# Patient Record
Sex: MALE | Race: WHITE | Employment: UNEMPLOYED | URBAN - METROPOLITAN AREA
[De-identification: names, ages, dates, MRNs, and addresses within clinical notes are randomized per-mention and may not be internally consistent; named-entity substitution may affect disease eponyms.]

---

## 2017-09-19 ENCOUNTER — HOSPITAL ENCOUNTER (EMERGENCY)
Age: 47
Discharge: HOME | End: 2017-09-19
Attending: EMERGENCY MEDICINE
Payer: SELF-PAY

## 2017-09-19 VITALS
HEART RATE: 88 BPM | SYSTOLIC BLOOD PRESSURE: 121 MMHG | TEMPERATURE: 99.4 F | RESPIRATION RATE: 16 BRPM | WEIGHT: 205 LBS | BODY MASS INDEX: 29.35 KG/M2 | HEIGHT: 70 IN | OXYGEN SATURATION: 98 % | DIASTOLIC BLOOD PRESSURE: 87 MMHG

## 2017-09-19 DIAGNOSIS — L02.91 ABSCESS: Primary | ICD-10-CM

## 2017-09-19 PROCEDURE — 75810000289 HC I&D ABSCESS SIMP/COMP/MULT: Performed by: NURSE PRACTITIONER

## 2017-09-19 PROCEDURE — 99283 EMERGENCY DEPT VISIT LOW MDM: CPT | Performed by: NURSE PRACTITIONER

## 2017-09-19 RX ORDER — CLINDAMYCIN HYDROCHLORIDE 150 MG/1
300 CAPSULE ORAL 4 TIMES DAILY
Qty: 56 CAP | Refills: 0 | Status: SHIPPED | OUTPATIENT
Start: 2017-09-19 | End: 2017-09-26

## 2017-09-19 RX ORDER — TRAMADOL HYDROCHLORIDE 50 MG/1
50 TABLET ORAL
Qty: 24 TAB | Refills: 0 | Status: SHIPPED | OUTPATIENT
Start: 2017-09-19 | End: 2018-02-28

## 2017-09-19 NOTE — ED NOTES
I have reviewed discharge instructions with the patient. The patient verbalized understanding. Patient left ED via Discharge Method: ambulatory to Home with (insert name of self). Opportunity for questions and clarification provided. Patient given 2 scripts.

## 2017-09-19 NOTE — ED TRIAGE NOTES
Lauren Ewing is a 52 y.o. male here for abscess. Rapid assessment performed. --- Orders were not placed. --- Patient will be waiting room  Signed By: Chasity Thapa NP     September 19, 2017          Ambulatory to ed for eval of cyst on back for over one year. Got painful last week, grow twice size it was previous. abx from clinic. Then got drained and now worse. Some fever at home. Finished abx 2 days ago - septra. On exam, abscess noted approx quarter sized with mild area of redness surrounding. Will send to back for I and d when room available.

## 2017-09-19 NOTE — DISCHARGE INSTRUCTIONS

## 2017-09-19 NOTE — ED PROVIDER NOTES
HPI Comments: 51 y/o m to ed with abscess left back - already had lanced once but got worse rather than better. Completed septra two days ago. Some fever at home    Patient is a 52 y.o. male presenting with skin problem. The history is provided by the patient. No  was used. Skin Problem    This is a recurrent problem. The current episode started more than 1 week ago. The problem has been gradually worsening. Patient reports a subjective fever - was not measured. The rash is present on the back. The pain is moderate. The pain has been constant since onset. Associated symptoms include pain. Pertinent negatives include no blisters, no itching, no weeping and no hives. Past Medical History:   Diagnosis Date    GERD (gastroesophageal reflux disease)     once a week OTC med       Past Surgical History:   Procedure Laterality Date    HX HEENT      wisdom teeth         History reviewed. No pertinent family history. Social History     Social History    Marital status: SINGLE     Spouse name: N/A    Number of children: N/A    Years of education: N/A     Occupational History    Not on file. Social History Main Topics    Smoking status: Current Every Day Smoker     Packs/day: 1.00     Years: 20.00    Smokeless tobacco: Never Used    Alcohol use 4.0 oz/week     8 Shots of liquor per week    Drug use: No    Sexual activity: Not on file     Other Topics Concern    Not on file     Social History Narrative         ALLERGIES: Review of patient's allergies indicates no known allergies. Review of Systems   Constitutional: Positive for fever. Negative for chills. HENT: Negative for facial swelling and postnasal drip. Eyes: Negative for discharge and redness. Respiratory: Negative for cough and shortness of breath. Cardiovascular: Negative for chest pain and palpitations. Gastrointestinal: Negative for nausea and vomiting.    Endocrine: Negative for cold intolerance and heat intolerance. Genitourinary: Negative for difficulty urinating and dysuria. Musculoskeletal: Negative for back pain and neck pain. Skin: Positive for color change and wound. Negative for itching. Neurological: Negative for dizziness and headaches. Psychiatric/Behavioral: Negative for confusion and decreased concentration. There were no vitals filed for this visit. Physical Exam   Constitutional: He is oriented to person, place, and time. He appears well-developed and well-nourished. No distress. HENT:   Head: Normocephalic and atraumatic. Right Ear: External ear normal.   Left Ear: External ear normal.   Nose: Nose normal.   Eyes: Conjunctivae and EOM are normal. Pupils are equal, round, and reactive to light. Neck: Normal range of motion. Neck supple. Cardiovascular: Normal rate, regular rhythm and normal heart sounds. Pulmonary/Chest: Effort normal and breath sounds normal. No respiratory distress. He has no wheezes. Abdominal: Soft. Bowel sounds are normal. He exhibits no distension. There is no tenderness. Musculoskeletal: Normal range of motion. He exhibits no edema or tenderness. Neurological: He is alert and oriented to person, place, and time. No cranial nerve deficit. Coordination normal.   Skin: Skin is warm and dry. No rash noted. Psychiatric: He has a normal mood and affect. His behavior is normal. Judgment and thought content normal.   Nursing note and vitals reviewed.        MDM  Number of Diagnoses or Management Options  Abscess: new and requires workup  Diagnosis management comments: Abscess, will nicolle and home with abx    Risk of Complications, Morbidity, and/or Mortality  Presenting problems: minimal  Diagnostic procedures: minimal  Management options: minimal    Patient Progress  Patient progress: stable    ED Course       I&D Dyana Simple  Date/Time: 9/19/2017 12:46 PM  Performed by: Tara Ordonez  Authorized by: Tara Ordonez Consent:     Consent obtained:  Verbal    Consent given by:  Patient    Risks discussed:  Bleeding, incomplete drainage, pain, infection and damage to other organs    Alternatives discussed:  No treatment  Location:     Type:  Abscess    Size:  6-8 mm    Location:  Trunk    Trunk location:  Back  Pre-procedure details:     Skin preparation:  Betadine  Anesthesia (see MAR for exact dosages): Anesthesia method:  Local infiltration    Local anesthetic:  Lidocaine 1% w/o epi (3 ml)  Procedure type:     Complexity:  Simple  Procedure details:     Incision types:  Single straight    Incision depth:  Subcutaneous    Scalpel blade:  11    Wound management:  Probed and deloculated, irrigated with saline and extensive cleaning    Drainage:  Purulent    Drainage amount: Moderate    Wound treatment:  Wound left open    Packing materials:  None  Post-procedure details:     Patient tolerance of procedure:   Tolerated well, no immediate complications  Comments:      procedure completed by Randy England RN NP student with my supervision

## 2017-09-19 NOTE — ED TRIAGE NOTES
Pt states that he has an abcess to the back and 2 weeks ago since being drained and taking antibiotics it seems to be getting worse.

## 2018-02-28 ENCOUNTER — APPOINTMENT (OUTPATIENT)
Dept: CT IMAGING | Age: 48
End: 2018-02-28
Attending: EMERGENCY MEDICINE
Payer: SELF-PAY

## 2018-02-28 ENCOUNTER — HOSPITAL ENCOUNTER (EMERGENCY)
Age: 48
Discharge: HOME | End: 2018-02-28
Attending: EMERGENCY MEDICINE
Payer: SELF-PAY

## 2018-02-28 VITALS
SYSTOLIC BLOOD PRESSURE: 154 MMHG | OXYGEN SATURATION: 97 % | RESPIRATION RATE: 16 BRPM | WEIGHT: 200 LBS | TEMPERATURE: 98.6 F | DIASTOLIC BLOOD PRESSURE: 82 MMHG | BODY MASS INDEX: 28.63 KG/M2 | HEIGHT: 70 IN | HEART RATE: 74 BPM

## 2018-02-28 DIAGNOSIS — R10.9 ACUTE LEFT FLANK PAIN: Primary | ICD-10-CM

## 2018-02-28 LAB
ALBUMIN SERPL-MCNC: 3.8 G/DL (ref 3.5–5)
ALBUMIN/GLOB SERPL: 0.9 {RATIO} (ref 1.2–3.5)
ALP SERPL-CCNC: 106 U/L (ref 50–136)
ALT SERPL-CCNC: 95 U/L (ref 12–65)
ANION GAP SERPL CALC-SCNC: 10 MMOL/L (ref 7–16)
AST SERPL-CCNC: 95 U/L (ref 15–37)
BACTERIA URNS QL MICRO: 0 /HPF
BASOPHILS # BLD: 0 K/UL (ref 0–0.2)
BASOPHILS NFR BLD: 0 % (ref 0–2)
BILIRUB SERPL-MCNC: 2.3 MG/DL (ref 0.2–1.1)
BUN SERPL-MCNC: 9 MG/DL (ref 6–23)
CALCIUM SERPL-MCNC: 9 MG/DL (ref 8.3–10.4)
CASTS URNS QL MICRO: NORMAL /LPF
CHLORIDE SERPL-SCNC: 102 MMOL/L (ref 98–107)
CO2 SERPL-SCNC: 28 MMOL/L (ref 21–32)
CREAT SERPL-MCNC: 0.91 MG/DL (ref 0.8–1.5)
DIFFERENTIAL METHOD BLD: ABNORMAL
EOSINOPHIL # BLD: 0.1 K/UL (ref 0–0.8)
EOSINOPHIL NFR BLD: 1 % (ref 0.5–7.8)
EPI CELLS #/AREA URNS HPF: NORMAL /HPF
ERYTHROCYTE [DISTWIDTH] IN BLOOD BY AUTOMATED COUNT: 14.6 % (ref 11.9–14.6)
GLOBULIN SER CALC-MCNC: 4.4 G/DL (ref 2.3–3.5)
GLUCOSE SERPL-MCNC: 142 MG/DL (ref 65–100)
HCT VFR BLD AUTO: 45.2 % (ref 41.1–50.3)
HGB BLD-MCNC: 16.8 G/DL (ref 13.6–17.2)
IMM GRANULOCYTES # BLD: 0 K/UL (ref 0–0.5)
IMM GRANULOCYTES NFR BLD AUTO: 0 % (ref 0–5)
LYMPHOCYTES # BLD: 1.1 K/UL (ref 0.5–4.6)
LYMPHOCYTES NFR BLD: 21 % (ref 13–44)
MCH RBC QN AUTO: 43 PG (ref 26.1–32.9)
MCHC RBC AUTO-ENTMCNC: 37.2 G/DL (ref 31.4–35)
MCV RBC AUTO: 115.6 FL (ref 79.6–97.8)
MONOCYTES # BLD: 0.5 K/UL (ref 0.1–1.3)
MONOCYTES NFR BLD: 9 % (ref 4–12)
NEUTS SEG # BLD: 3.8 K/UL (ref 1.7–8.2)
NEUTS SEG NFR BLD: 69 % (ref 43–78)
PLATELET # BLD AUTO: 243 K/UL (ref 150–450)
PMV BLD AUTO: 10.7 FL (ref 10.8–14.1)
POTASSIUM SERPL-SCNC: 5.5 MMOL/L (ref 3.5–5.1)
PROT SERPL-MCNC: 8.2 G/DL (ref 6.3–8.2)
RBC # BLD AUTO: 3.91 M/UL (ref 4.23–5.67)
RBC #/AREA URNS HPF: NORMAL /HPF
SODIUM SERPL-SCNC: 140 MMOL/L (ref 136–145)
WBC # BLD AUTO: 5.5 K/UL (ref 4.3–11.1)
WBC URNS QL MICRO: NORMAL /HPF

## 2018-02-28 PROCEDURE — 74176 CT ABD & PELVIS W/O CONTRAST: CPT

## 2018-02-28 PROCEDURE — 81003 URINALYSIS AUTO W/O SCOPE: CPT | Performed by: EMERGENCY MEDICINE

## 2018-02-28 PROCEDURE — 80053 COMPREHEN METABOLIC PANEL: CPT | Performed by: EMERGENCY MEDICINE

## 2018-02-28 PROCEDURE — 74011250637 HC RX REV CODE- 250/637: Performed by: EMERGENCY MEDICINE

## 2018-02-28 PROCEDURE — 85025 COMPLETE CBC W/AUTO DIFF WBC: CPT | Performed by: EMERGENCY MEDICINE

## 2018-02-28 PROCEDURE — 81015 MICROSCOPIC EXAM OF URINE: CPT | Performed by: EMERGENCY MEDICINE

## 2018-02-28 PROCEDURE — 99285 EMERGENCY DEPT VISIT HI MDM: CPT | Performed by: EMERGENCY MEDICINE

## 2018-02-28 RX ORDER — OXYCODONE HYDROCHLORIDE 5 MG/1
10 TABLET ORAL
Status: COMPLETED | OUTPATIENT
Start: 2018-02-28 | End: 2018-02-28

## 2018-02-28 RX ORDER — CYCLOBENZAPRINE HCL 10 MG
10 TABLET ORAL
Qty: 15 TAB | Refills: 0 | Status: SHIPPED | OUTPATIENT
Start: 2018-02-28

## 2018-02-28 RX ORDER — TRAMADOL HYDROCHLORIDE 50 MG/1
50-100 TABLET ORAL
Qty: 16 TAB | Refills: 0 | Status: SHIPPED | OUTPATIENT
Start: 2018-02-28 | End: 2020-11-02

## 2018-02-28 RX ORDER — ONDANSETRON 4 MG/1
4 TABLET, ORALLY DISINTEGRATING ORAL
Status: COMPLETED | OUTPATIENT
Start: 2018-02-28 | End: 2018-02-28

## 2018-02-28 RX ADMIN — ONDANSETRON 4 MG: 4 TABLET, ORALLY DISINTEGRATING ORAL at 19:03

## 2018-02-28 RX ADMIN — OXYCODONE HYDROCHLORIDE 10 MG: 5 TABLET ORAL at 19:03

## 2018-02-28 NOTE — ED TRIAGE NOTES
Patient presents via EMS with complaints of left flank pain that began last night, denies urinary symptoms at present.

## 2018-03-01 NOTE — ED NOTES
I have reviewed discharge instructions with the patient. The patient verbalized understanding. Patient left ED via Discharge Method: ambulatory to Home with self. Opportunity for questions and clarification provided. Patient given 2 scripts. To continue your aftercare when you leave the hospital, you may receive an automated call from our care team to check in on how you are doing. This is a free service and part of our promise to provide the best care and service to meet your aftercare needs.  If you have questions, or wish to unsubscribe from this service please call [phone]. Thank you for Choosing our [facility].

## 2018-03-01 NOTE — DISCHARGE INSTRUCTIONS
Rest.  Heating pad or hot water bottle. Call for appointment to recheck with a primary care doctor next week. Recheck sooner  Or rash, high fever, worse pain or vomiting. Abdominal Pain: Care Instructions  Your Care Instructions    Abdominal pain has many possible causes. Some aren't serious and get better on their own in a few days. Others need more testing and treatment. If your pain continues or gets worse, you need to be rechecked and may need more tests to find out what is wrong. You may need surgery to correct the problem. Don't ignore new symptoms, such as fever, nausea and vomiting, urination problems, pain that gets worse, and dizziness. These may be signs of a more serious problem. Your doctor may have recommended a follow-up visit in the next 8 to 12 hours. If you are not getting better, you may need more tests or treatment. The doctor has checked you carefully, but problems can develop later. If you notice any problems or new symptoms, get medical treatment right away. Follow-up care is a key part of your treatment and safety. Be sure to make and go to all appointments, and call your doctor if you are having problems. It's also a good idea to know your test results and keep a list of the medicines you take. How can you care for yourself at home? · Rest until you feel better. · To prevent dehydration, drink plenty of fluids, enough so that your urine is light yellow or clear like water. Choose water and other caffeine-free clear liquids until you feel better. If you have kidney, heart, or liver disease and have to limit fluids, talk with your doctor before you increase the amount of fluids you drink. · If your stomach is upset, eat mild foods, such as rice, dry toast or crackers, bananas, and applesauce. Try eating several small meals instead of two or three large ones.   · Wait until 48 hours after all symptoms have gone away before you have spicy foods, alcohol, and drinks that contain caffeine. · Do not eat foods that are high in fat. · Avoid anti-inflammatory medicines such as aspirin, ibuprofen (Advil, Motrin), and naproxen (Aleve). These can cause stomach upset. Talk to your doctor if you take daily aspirin for another health problem. When should you call for help? Call 911 anytime you think you may need emergency care. For example, call if:  ? · You passed out (lost consciousness). ? · You pass maroon or very bloody stools. ? · You vomit blood or what looks like coffee grounds. ? · You have new, severe belly pain. ?Call your doctor now or seek immediate medical care if:  ? · Your pain gets worse, especially if it becomes focused in one area of your belly. ? · You have a new or higher fever. ? · Your stools are black and look like tar, or they have streaks of blood. ? · You have unexpected vaginal bleeding. ? · You have symptoms of a urinary tract infection. These may include:  ¨ Pain when you urinate. ¨ Urinating more often than usual.  ¨ Blood in your urine. ? · You are dizzy or lightheaded, or you feel like you may faint. ? Watch closely for changes in your health, and be sure to contact your doctor if:  ? · You are not getting better after 1 day (24 hours). Where can you learn more? Go to http://maisha-sagar.info/. Enter I356 in the search box to learn more about \"Abdominal Pain: Care Instructions. \"  Current as of: March 20, 2017  Content Version: 11.4  © 5945-3226 [employer]. Care instructions adapted under license by [employer] (which disclaims liability or warranty for this information). If you have questions about a medical condition or this instruction, always ask your healthcare professional. [place] [address] any warranty or liability for your use of this information.        Flank Pain: Care Instructions  Your Care Instructions  Flank pain is pain on the side of the back just below the rib cage and above the waist. It can be on one or both sides. Flank pain has many possible causes, including a kidney stone, a urinary tract infection, or back strain. Flank pain may get better on its own. But don't ignore new symptoms, such as fever, nausea and vomiting, urination problems, pain that gets worse, and dizziness. These may be signs of a more serious problem. You may have to have tests or other treatment. Follow-up care is a key part of your treatment and safety. Be sure to make and go to all appointments, and call your doctor if you are having problems. It's also a good idea to know your test results and keep a list of the medicines you take. How can you care for yourself at home? · Rest until you feel better. · Take pain medicines exactly as directed. ¨ If the doctor gave you a prescription medicine for pain, take it as prescribed. ¨ If you are not taking a prescription pain medicine, ask your doctor if you can take an over-the-counter pain medicine, such as acetaminophen (Tylenol), ibuprofen (Advil, Motrin), or naproxen (Aleve). Read and follow all instructions on the label. · If your doctor prescribed antibiotics, take them as directed. Do not stop taking them just because you feel better. You need to take the full course of antibiotics. · To apply heat, put a warm water bottle, a heating pad set on low, or a warm cloth on the painful area. Do not go to sleep with a heating pad on your skin. · To prevent dehydration, drink plenty of fluids, enough so that your urine is light yellow or clear like water. Choose water and other caffeine-free clear liquids until you feel better. If you have kidney, heart, or liver disease and have to limit fluids, talk with your doctor before you increase the amount of fluids you drink. When should you call for help? Call your doctor now or seek immediate medical care if:  ? · Your flank pain gets worse.    ? · You have new symptoms, such as fever, nausea, or vomiting. ? · You have symptoms of a urinary problem. For example:  ¨ You have blood or pus in your urine. ¨ You have chills or body aches. ¨ It hurts to urinate. ¨ You have groin or belly pain. ? Watch closely for changes in your health, and be sure to contact your doctor if you do not get better as expected. Where can you learn more? Go to http://maisha-sagar.info/. Enter S191 in the search box to learn more about \"Flank Pain: Care Instructions. \"  Current as of: March 20, 2017  Content Version: 11.4  © 0878-1736 [employer]. Care instructions adapted under license by [employer] (which disclaims liability or warranty for this information). If you have questions about a medical condition or this instruction, always ask your healthcare professional. [address] any warranty or liability for your use of this information.

## 2018-03-01 NOTE — ED PROVIDER NOTES
HPI Comments: 45-year-old male onset 24 hours ago of left flank pain that radiates somewhat to the left upper quadrant. No fever. No nausea vomiting  Or diarrhea. No change in urine. Pain is worse with movement. No cough or change of pain with deep breathing. No substernal chest pain. No previous history of similar pain. Has history essentially benign except for history of hemolytic anemia. Patient states he does drink alcohol daily. Patient is a 52 y.o. male presenting with flank pain. The history is provided by the patient. Flank Pain    This is a new problem. The current episode started 12 to 24 hours ago. The problem has not changed since onset. The problem occurs constantly. Patient reports not work related injury. The pain is associated with no known injury. The quality of the pain is described as aching and dull. The pain is moderate. Associated symptoms include weakness. Pertinent negatives include no chest pain, no fever, no headaches, no abdominal pain, no dysuria and no paresthesias. He has tried nothing for the symptoms. Past Medical History:   Diagnosis Date    GERD (gastroesophageal reflux disease)     once a week OTC med       Past Surgical History:   Procedure Laterality Date    HX HEENT      wisdom teeth         No family history on file. Social History     Social History    Marital status: SINGLE     Spouse name: N/A    Number of children: N/A    Years of education: N/A     Occupational History    Not on file. Social History Main Topics    Smoking status: Current Every Day Smoker     Packs/day: 1.00     Years: 20.00    Smokeless tobacco: Never Used    Alcohol use 4.0 oz/week     8 Shots of liquor per week    Drug use: No    Sexual activity: Not on file     Other Topics Concern    Not on file     Social History Narrative         ALLERGIES: Review of patient's allergies indicates no known allergies.     Review of Systems   Constitutional: Negative for chills and fever.   HENT: Negative for ear pain. Respiratory: Negative for cough and shortness of breath. Cardiovascular: Negative for chest pain and palpitations. Gastrointestinal: Negative for abdominal pain, diarrhea and vomiting. Genitourinary: Positive for flank pain. Negative for dysuria. Musculoskeletal: Negative for back pain and neck pain. Skin: Negative for color change and rash. Neurological: Positive for weakness. Negative for syncope, headaches and paresthesias. All other systems reviewed and are negative. Vitals:    02/28/18 1853 02/28/18 2149   BP: 134/87 (!) 163/92   Pulse: 74 68   Resp: 18 20   Temp: 98.6 °F (37 °C)    SpO2: 96% 97%   Weight: 90.7 kg (200 lb)    Height: 5' 10\" (1.778 m)             Physical Exam   Constitutional: He is oriented to person, place, and time. He appears well-developed and well-nourished. No distress. HENT:   Head: Normocephalic and atraumatic. Mouth/Throat: Oropharynx is clear and moist. No oropharyngeal exudate. Eyes: Conjunctivae and EOM are normal. Pupils are equal, round, and reactive to light. Neck: Normal range of motion. Neck supple. Cardiovascular: Normal rate, regular rhythm and intact distal pulses. No murmur heard. Pulmonary/Chest: Breath sounds normal. No respiratory distress. Abdominal: Soft. Bowel sounds are normal. He exhibits no mass. There is tenderness in the left upper quadrant. There is no rebound, no guarding, no CVA tenderness, no tenderness at McBurney's point and negative Stock's sign. No hernia. Neurological: He is alert and oriented to person, place, and time. Gait normal.   Nl speech   Skin: Skin is warm and dry. Psychiatric: He has a normal mood and affect. His speech is normal.   Nursing note and vitals reviewed. MDM  Number of Diagnoses or Management Options  Diagnosis management comments: Yobani Danas UTI or kidney stone or diverticulitis.   No evidence to suggest pancreatitis or appendicitis. Amount and/or Complexity of Data Reviewed  Clinical lab tests: ordered and reviewed  Tests in the radiology section of CPT®: ordered and reviewed  Independent visualization of images, tracings, or specimens: yes    Risk of Complications, Morbidity, and/or Mortality  Presenting problems: moderate  Diagnostic procedures: low  Management options: moderate    Patient Progress  Patient progress: stable        ED Course       Procedures    Results Include:    Recent Results (from the past 24 hour(s))   CBC WITH AUTOMATED DIFF    Collection Time: 02/28/18  7:00 PM   Result Value Ref Range    WBC 5.5 4.3 - 11.1 K/uL    RBC 3.91 (L) 4.23 - 5.67 M/uL    HGB 16.8 13.6 - 17.2 g/dL    HCT 45.2 41.1 - 50.3 %    .6 (H) 79.6 - 97.8 FL    MCH 43.0 (H) 26.1 - 32.9 PG    MCHC 37.2 (H) 31.4 - 35.0 g/dL    RDW 14.6 11.9 - 14.6 %    PLATELET 416 349 - 542 K/uL    MPV 10.7 (L) 10.8 - 14.1 FL    DF AUTOMATED      NEUTROPHILS 69 43 - 78 %    LYMPHOCYTES 21 13 - 44 %    MONOCYTES 9 4.0 - 12.0 %    EOSINOPHILS 1 0.5 - 7.8 %    BASOPHILS 0 0.0 - 2.0 %    IMMATURE GRANULOCYTES 0 0.0 - 5.0 %    ABS. NEUTROPHILS 3.8 1.7 - 8.2 K/UL    ABS. LYMPHOCYTES 1.1 0.5 - 4.6 K/UL    ABS. MONOCYTES 0.5 0.1 - 1.3 K/UL    ABS. EOSINOPHILS 0.1 0.0 - 0.8 K/UL    ABS. BASOPHILS 0.0 0.0 - 0.2 K/UL    ABS. IMM. GRANS. 0.0 0.0 - 0.5 K/UL   METABOLIC PANEL, COMPREHENSIVE    Collection Time: 02/28/18  7:00 PM   Result Value Ref Range    Sodium 140 136 - 145 mmol/L    Potassium 5.5 (H) 3.5 - 5.1 mmol/L    Chloride 102 98 - 107 mmol/L    CO2 28 21 - 32 mmol/L    Anion gap 10 7 - 16 mmol/L    Glucose 142 (H) 65 - 100 mg/dL    BUN 9 6 - 23 MG/DL    Creatinine 0.91 0.8 - 1.5 MG/DL    GFR est AA >60 >60 ml/min/1.73m2    GFR est non-AA >60 >60 ml/min/1.73m2    Calcium 9.0 8.3 - 10.4 MG/DL    Bilirubin, total 2.3 (H) 0.2 - 1.1 MG/DL    ALT (SGPT) 95 (H) 12 - 65 U/L    AST (SGOT) 95 (H) 15 - 37 U/L    Alk.  phosphatase 106 50 - 136 U/L    Protein, total 8.2 6.3 - 8.2 g/dL    Albumin 3.8 3.5 - 5.0 g/dL    Globulin 4.4 (H) 2.3 - 3.5 g/dL    A-G Ratio 0.9 (L) 1.2 - 3.5     URINE MICROSCOPIC    Collection Time: 02/28/18  9:58 PM   Result Value Ref Range    WBC 0-3 0 /hpf    RBC 0-3 0 /hpf    Epithelial cells 0-3 0 /hpf    Bacteria 0 0 /hpf    Casts 10-20 0 /lpf     Ct Urogram Wo Cont    Result Date: 2/28/2018  CT ABDOMEN AND PELVIS WITHOUT CONTRAST. HISTORY:  Left flank pain. COMPARISON: 12 July 2007 TECHNIQUE:  5mm axial images from above the kidneys through the bladder base using renal stone protocol. Radiation dose reduction techniques were used for this study. Our CT scanners use one or more of the following:  Automated exposure control, adjustment of the mA and or kV according to patient size, iterative reconstruction. FINDINGS:  Abdomen: No radiopaque urinary tract calculi in the right kidney, left kidney or upper ureters. No hydronephrosis or hydroureter. No acute perinephric or periureteral stranding densities. Included portion of the liver and spleen are unremarkable. Aorta normal caliber. Pelvis: No calcified stones in the lower ureters or within the urinary bladder. There are few scattered sigmoid diverticula. IMPRESSION:  Limited noncontrast exam negative for radiopaque urinary tract calculi or evidence of obstruction.      Pain appears to be more musculoskeletal and more costal based than intra-abdominal.

## 2020-10-30 ENCOUNTER — APPOINTMENT (OUTPATIENT)
Dept: GENERAL RADIOLOGY | Age: 50
DRG: 185 | End: 2020-10-30
Attending: NURSE PRACTITIONER

## 2020-10-30 ENCOUNTER — HOSPITAL ENCOUNTER (INPATIENT)
Age: 50
LOS: 3 days | Discharge: HOME | DRG: 185 | End: 2020-11-02
Attending: EMERGENCY MEDICINE | Admitting: SURGERY

## 2020-10-30 ENCOUNTER — APPOINTMENT (OUTPATIENT)
Dept: GENERAL RADIOLOGY | Age: 50
DRG: 185 | End: 2020-10-30
Attending: EMERGENCY MEDICINE

## 2020-10-30 ENCOUNTER — APPOINTMENT (OUTPATIENT)
Dept: CT IMAGING | Age: 50
DRG: 185 | End: 2020-10-30
Attending: EMERGENCY MEDICINE

## 2020-10-30 DIAGNOSIS — S22.42XA CLOSED FRACTURE OF MULTIPLE RIBS OF LEFT SIDE, INITIAL ENCOUNTER: Primary | ICD-10-CM
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
## 2020-10-30 DIAGNOSIS — J93.9 PNEUMOTHORAX ON LEFT: ICD-10-CM

## 2020-10-30 DIAGNOSIS — J94.2 HEMOTHORAX ON LEFT: ICD-10-CM
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
## 2020-10-30 LAB
ALBUMIN SERPL-MCNC: 3.2 G/DL (ref 3.5–5)
ALBUMIN/GLOB SERPL: 1 {RATIO} (ref 1.2–3.5)
ALP SERPL-CCNC: 85 U/L (ref 50–136)
ALT SERPL-CCNC: 61 U/L (ref 12–65)
ANION GAP SERPL CALC-SCNC: 6 MMOL/L (ref 7–16)
AST SERPL-CCNC: 96 U/L (ref 15–37)
BASOPHILS # BLD: 0 K/UL (ref 0–0.2)
BASOPHILS NFR BLD: 1 % (ref 0–2)
BILIRUB SERPL-MCNC: 4.8 MG/DL (ref 0.2–1.1)
BUN SERPL-MCNC: 8 MG/DL (ref 6–23)
CALCIUM SERPL-MCNC: 8.2 MG/DL (ref 8.3–10.4)
CHLORIDE SERPL-SCNC: 98 MMOL/L (ref 98–107)
CO2 SERPL-SCNC: 31 MMOL/L (ref 21–32)
CREAT SERPL-MCNC: 0.72 MG/DL (ref 0.8–1.5)
DIFFERENTIAL METHOD BLD: ABNORMAL
EOSINOPHIL # BLD: 0.1 K/UL (ref 0–0.8)
EOSINOPHIL NFR BLD: 1 % (ref 0.5–7.8)
ERYTHROCYTE [DISTWIDTH] IN BLOOD BY AUTOMATED COUNT: 11.9 % (ref 11.9–14.6)
GLOBULIN SER CALC-MCNC: 3.1 G/DL (ref 2.3–3.5)
GLUCOSE SERPL-MCNC: 138 MG/DL (ref 65–100)
HCT VFR BLD AUTO: 41.2 % (ref 41.1–50.3)
HGB BLD-MCNC: 15.2 G/DL (ref 13.6–17.2)
IMM GRANULOCYTES # BLD AUTO: 0 K/UL (ref 0–0.5)
IMM GRANULOCYTES NFR BLD AUTO: 0 % (ref 0–5)
LIPASE SERPL-CCNC: 103 U/L (ref 73–393)
LYMPHOCYTES # BLD: 1.3 K/UL (ref 0.5–4.6)
LYMPHOCYTES NFR BLD: 21 % (ref 13–44)
MCH RBC QN AUTO: 42.1 PG (ref 26.1–32.9)
MCHC RBC AUTO-ENTMCNC: 36.9 G/DL (ref 31.4–35)
MCV RBC AUTO: 114.1 FL (ref 79.6–97.8)
MONOCYTES # BLD: 0.4 K/UL (ref 0.1–1.3)
MONOCYTES NFR BLD: 7 % (ref 4–12)
NEUTS SEG # BLD: 4.3 K/UL (ref 1.7–8.2)
NEUTS SEG NFR BLD: 69 % (ref 43–78)
NRBC # BLD: 0 K/UL (ref 0–0.2)
PLATELET # BLD AUTO: 88 K/UL (ref 150–450)
PMV BLD AUTO: 11 FL (ref 9.4–12.3)
POTASSIUM SERPL-SCNC: 3.3 MMOL/L (ref 3.5–5.1)
PROT SERPL-MCNC: 6.3 G/DL (ref 6.3–8.2)
RBC # BLD AUTO: 3.61 M/UL (ref 4.23–5.6)
SODIUM SERPL-SCNC: 135 MMOL/L (ref 136–145)
WBC # BLD AUTO: 6.2 K/UL (ref 4.3–11.1)

## 2020-10-30 PROCEDURE — 96374 THER/PROPH/DIAG INJ IV PUSH: CPT

## 2020-10-30 PROCEDURE — 74011250636 HC RX REV CODE- 250/636

## 2020-10-30 PROCEDURE — 75810000165 HC THORACENTESIS

## 2020-10-30 PROCEDURE — 74011250636 HC RX REV CODE- 250/636: Performed by: EMERGENCY MEDICINE

## 2020-10-30 PROCEDURE — 73030 X-RAY EXAM OF SHOULDER: CPT

## 2020-10-30 PROCEDURE — 96375 TX/PRO/DX INJ NEW DRUG ADDON: CPT

## 2020-10-30 PROCEDURE — 74011250637 HC RX REV CODE- 250/637: Performed by: SURGERY

## 2020-10-30 PROCEDURE — 71250 CT THORAX DX C-: CPT

## 2020-10-30 PROCEDURE — 65270000029 HC RM PRIVATE

## 2020-10-30 PROCEDURE — 2709999900 HC NON-CHARGEABLE SUPPLY

## 2020-10-30 PROCEDURE — 83690 ASSAY OF LIPASE: CPT

## 2020-10-30 PROCEDURE — 85025 COMPLETE CBC W/AUTO DIFF WBC: CPT

## 2020-10-30 PROCEDURE — 96376 TX/PRO/DX INJ SAME DRUG ADON: CPT

## 2020-10-30 PROCEDURE — 80053 COMPREHEN METABOLIC PANEL: CPT

## 2020-10-30 PROCEDURE — 74011250636 HC RX REV CODE- 250/636: Performed by: SURGERY

## 2020-10-30 PROCEDURE — 99284 EMERGENCY DEPT VISIT MOD MDM: CPT

## 2020-10-30 PROCEDURE — 77030040361 HC SLV COMPR DVT MDII -B

## 2020-10-30 PROCEDURE — 71045 X-RAY EXAM CHEST 1 VIEW: CPT

## 2020-10-30 RX ORDER — SODIUM CHLORIDE, SODIUM LACTATE, POTASSIUM CHLORIDE, CALCIUM CHLORIDE 600; 310; 30; 20 MG/100ML; MG/100ML; MG/100ML; MG/100ML
125 INJECTION, SOLUTION INTRAVENOUS CONTINUOUS
Status: DISCONTINUED | OUTPATIENT
Start: 2020-10-30 | End: 2020-11-01

## 2020-10-30 RX ORDER — SODIUM CHLORIDE 0.9 % (FLUSH) 0.9 %
5-40 SYRINGE (ML) INJECTION AS NEEDED
Status: DISCONTINUED | OUTPATIENT
Start: 2020-10-30 | End: 2020-11-02 | Stop reason: HOSPADM

## 2020-10-30 RX ORDER — SODIUM CHLORIDE 0.9 % (FLUSH) 0.9 %
5-40 SYRINGE (ML) INJECTION EVERY 8 HOURS
Status: DISCONTINUED | OUTPATIENT
Start: 2020-10-30 | End: 2020-11-02 | Stop reason: HOSPADM

## 2020-10-30 RX ORDER — PROPOFOL 10 MG/ML
30 INJECTION, EMULSION INTRAVENOUS
Status: COMPLETED | OUTPATIENT
Start: 2020-10-30 | End: 2020-10-30

## 2020-10-30 RX ORDER — HYDROMORPHONE HYDROCHLORIDE 1 MG/ML
1 INJECTION, SOLUTION INTRAMUSCULAR; INTRAVENOUS; SUBCUTANEOUS
Status: DISCONTINUED | OUTPATIENT
Start: 2020-10-30 | End: 2020-11-02 | Stop reason: HOSPADM

## 2020-10-30 RX ORDER — PROPOFOL 10 MG/ML
40 INJECTION, EMULSION INTRAVENOUS
Status: COMPLETED | OUTPATIENT
Start: 2020-10-30 | End: 2020-10-30

## 2020-10-30 RX ORDER — HYDROMORPHONE HYDROCHLORIDE 1 MG/ML
INJECTION, SOLUTION INTRAMUSCULAR; INTRAVENOUS; SUBCUTANEOUS
Status: COMPLETED
Start: 2020-10-30 | End: 2020-10-30

## 2020-10-30 RX ORDER — NALOXONE HYDROCHLORIDE 0.4 MG/ML
0.4 INJECTION, SOLUTION INTRAMUSCULAR; INTRAVENOUS; SUBCUTANEOUS AS NEEDED
Status: DISCONTINUED | OUTPATIENT
Start: 2020-10-30 | End: 2020-11-02 | Stop reason: HOSPADM

## 2020-10-30 RX ORDER — POTASSIUM CHLORIDE AND SODIUM CHLORIDE 450; 150 MG/100ML; MG/100ML
INJECTION, SOLUTION INTRAVENOUS CONTINUOUS
Status: DISCONTINUED | OUTPATIENT
Start: 2020-10-30 | End: 2020-11-02 | Stop reason: HOSPADM

## 2020-10-30 RX ORDER — KETOROLAC TROMETHAMINE 30 MG/ML
30 INJECTION, SOLUTION INTRAMUSCULAR; INTRAVENOUS EVERY 8 HOURS
Status: COMPLETED | OUTPATIENT
Start: 2020-10-30 | End: 2020-10-31

## 2020-10-30 RX ORDER — ONDANSETRON 2 MG/ML
4 INJECTION INTRAMUSCULAR; INTRAVENOUS
Status: DISCONTINUED | OUTPATIENT
Start: 2020-10-30 | End: 2020-11-02 | Stop reason: HOSPADM

## 2020-10-30 RX ORDER — MORPHINE SULFATE 4 MG/ML
4 INJECTION INTRAVENOUS
Status: COMPLETED | OUTPATIENT
Start: 2020-10-30 | End: 2020-10-30

## 2020-10-30 RX ORDER — HYDROMORPHONE HYDROCHLORIDE 1 MG/ML
1 INJECTION, SOLUTION INTRAMUSCULAR; INTRAVENOUS; SUBCUTANEOUS
Status: COMPLETED | OUTPATIENT
Start: 2020-10-30 | End: 2020-10-30

## 2020-10-30 RX ORDER — PROPOFOL 10 MG/ML
INJECTION, EMULSION INTRAVENOUS
Status: COMPLETED
Start: 2020-10-30 | End: 2020-10-30

## 2020-10-30 RX ORDER — ONDANSETRON 2 MG/ML
4 INJECTION INTRAMUSCULAR; INTRAVENOUS
Status: COMPLETED | OUTPATIENT
Start: 2020-10-30 | End: 2020-10-30

## 2020-10-30 RX ORDER — HYDROMORPHONE HYDROCHLORIDE 1 MG/ML
1 INJECTION, SOLUTION INTRAMUSCULAR; INTRAVENOUS; SUBCUTANEOUS ONCE
Status: COMPLETED | OUTPATIENT
Start: 2020-10-30 | End: 2020-10-30

## 2020-10-30 RX ORDER — OXYCODONE AND ACETAMINOPHEN 10; 325 MG/1; MG/1
1 TABLET ORAL
Status: DISCONTINUED | OUTPATIENT
Start: 2020-10-30 | End: 2020-11-02 | Stop reason: HOSPADM

## 2020-10-30 RX ADMIN — HYDROMORPHONE HYDROCHLORIDE 1 MG: 1 INJECTION, SOLUTION INTRAMUSCULAR; INTRAVENOUS; SUBCUTANEOUS at 13:34

## 2020-10-30 RX ADMIN — Medication 5 ML: at 22:49

## 2020-10-30 RX ADMIN — SODIUM CHLORIDE AND POTASSIUM CHLORIDE: 4.5; 1.49 INJECTION, SOLUTION INTRAVENOUS at 16:49

## 2020-10-30 RX ADMIN — KETOROLAC TROMETHAMINE 30 MG: 30 INJECTION, SOLUTION INTRAMUSCULAR at 22:48

## 2020-10-30 RX ADMIN — OXYCODONE HYDROCHLORIDE AND ACETAMINOPHEN 1 TABLET: 10; 325 TABLET ORAL at 18:43

## 2020-10-30 RX ADMIN — MORPHINE SULFATE 4 MG: 4 INJECTION INTRAVENOUS at 11:23

## 2020-10-30 RX ADMIN — HYDROMORPHONE HYDROCHLORIDE 1 MG: 1 INJECTION, SOLUTION INTRAMUSCULAR; INTRAVENOUS; SUBCUTANEOUS at 12:14

## 2020-10-30 RX ADMIN — Medication 5 ML: at 16:08

## 2020-10-30 RX ADMIN — PROPOFOL 30 MG: 10 INJECTION, EMULSION INTRAVENOUS at 12:21

## 2020-10-30 RX ADMIN — ONDANSETRON 4 MG: 2 INJECTION INTRAMUSCULAR; INTRAVENOUS at 11:35

## 2020-10-30 RX ADMIN — PROPOFOL 30 MG: 10 INJECTION, EMULSION INTRAVENOUS at 12:26

## 2020-10-30 RX ADMIN — HYDROMORPHONE HYDROCHLORIDE 1 MG: 1 INJECTION, SOLUTION INTRAMUSCULAR; INTRAVENOUS; SUBCUTANEOUS at 16:05

## 2020-10-30 RX ADMIN — ONDANSETRON 4 MG: 2 INJECTION INTRAMUSCULAR; INTRAVENOUS at 17:52

## 2020-10-30 RX ADMIN — ONDANSETRON 4 MG: 2 INJECTION INTRAMUSCULAR; INTRAVENOUS at 22:48

## 2020-10-30 RX ADMIN — PROPOFOL 30 MG: 10 INJECTION, EMULSION INTRAVENOUS at 12:32

## 2020-10-30 RX ADMIN — KETOROLAC TROMETHAMINE 30 MG: 30 INJECTION, SOLUTION INTRAMUSCULAR at 16:11

## 2020-10-30 RX ADMIN — SODIUM CHLORIDE, SODIUM LACTATE, POTASSIUM CHLORIDE, AND CALCIUM CHLORIDE 125 ML/HR: 600; 310; 30; 20 INJECTION, SOLUTION INTRAVENOUS at 11:25

## 2020-10-30 RX ADMIN — PROPOFOL 40 MG: 10 INJECTION, EMULSION INTRAVENOUS at 12:18

## 2020-10-30 RX ADMIN — HYDROMORPHONE HYDROCHLORIDE 1 MG: 1 INJECTION, SOLUTION INTRAMUSCULAR; INTRAVENOUS; SUBCUTANEOUS at 20:50

## 2020-10-30 RX ADMIN — SODIUM CHLORIDE, SODIUM LACTATE, POTASSIUM CHLORIDE, AND CALCIUM CHLORIDE 1000 ML: 600; 310; 30; 20 INJECTION, SOLUTION INTRAVENOUS at 13:34

## 2020-10-30 NOTE — ED NOTES
36 F Chest tube placed by Dr John Robles and Dr Dwight Pinto. Patient tolerated well.  Chest tube connected to atruim/suction and is functioning normal.

## 2020-10-30 NOTE — PROGRESS NOTES
TRANSFER - IN REPORT:    Verbal report received from [facility] on Alma Marr  being received from ED for routine progression of care      Report consisted of patients Situation, Background, Assessment and   Recommendations(SBAR). Information from the following report(s) SBAR was reviewed with the receiving nurse. Opportunity for questions and clarification was provided. Assessment completed upon patients arrival to unit and care assumed.

## 2020-10-30 NOTE — ED TRIAGE NOTES
Pt ambulatory to triage without complications. Pt states he fell out of the attic yesterday morning about 10 foot fall. Pt reports left rib pain and SOB. Bruising noted to thoracic spine. Pt denies n/v/d, abd pain or abd swelling. Pt denies hematuria. No bruising noted to the left ribs. Bilateral, posterior breath sounds noted. Pt denies hitting head or LOC. Pt is not on anticoags.  Pt also reports left shoulder pain

## 2020-10-30 NOTE — ED NOTES
TRANSFER - OUT REPORT:    Verbal report given to Devang Alexander (name) on Manuel Calvin  being transferred to 7th floor (unit) for routine progression of care       Report consisted of patients Situation, Background, Assessment and   Recommendations(SBAR). Information from the following report(s) SBAR was reviewed with the receiving nurse. Lines:   Peripheral IV 10/30/20 Left Antecubital (Active)        Opportunity for questions and clarification was provided.       Patient transported with:   [employer]

## 2020-10-30 NOTE — H&P
H&P/Consult Note/Progress Note/Office Note:   Marc Tiwari  MRN: [id number]  FVM:[id number]  Age:50 y.o. General Surgery Consult ordered by: Dr. Chery Price  Reason for General Surgery Consult: Traumatic fall injury    HPI: Marc Tiwari is a 48 y.o. male with a past medical history of GERD who presented to the ED 10/30/20 with c/o mild shortness of breath and pain to Left flank and rib area after falling through his attic approximately 10 feet and landing on his left side 24 hours ago. Denies LOC. 10/30/20 CT Chest  CT CHEST without CONTRAST.     INDICATION: Pain following fall from attic.     COMPARISON: None.     TECHNIQUE:   5 mm axial scans from the apices through the diaphragms without  contrast. Radiation dose reduction techniques were used for this study. Our CT  scanners use one or more of the following:  Automated exposure control,  adjustment of the mA and or kV according to patient size, iterative  reconstruction.     FINDINGS:   LUNGS: Small right pneumothorax. There is a pneumomediastinum. Moderate  subcutaneous gas on the left. No pulmonary nodules. No airspace disease. AIRWAYS: Trachea and proximal bronchi grossly patent. PLEURA: Small pleural fluid collection on the left. LYMPH NODES: No enlarged axillary, hilar or mediastinal lymph nodes. HEART: Normal size. CORONARIES: Mild  calcifications. UPPER ABDOMEN: Normal size adrenal glands. Fatty infiltration of liver. SKELETAL/CHEST WALL: Multiple acute left-sided rib fractures, numbers 4, 5, 6, 7  and 8. Remote healed fractures of ribs 9, 10 and 11. Sternum is intact. Spinal  alignment appears anatomic. No spinal fracture.     IMPRESSION  IMPRESSION:  Small pneumothorax and pneumomediastinum and subcutaneous gas on  the left due to multiple left-sided rib fractures. 10/30/20  XR Left Shoulder  Left Shoulder      INDICATION: Shoulder pain     Three views of the left shoulder were obtained      FINDINGS: Left shoulder is normally located. Proximal humerus is intact. Scapular evaluation is somewhat limited by extensive subcutaneous air. There  are multiple left rib fractures involving at least the fourth-sixth ribs.     XR of Left Shoulder  IMPRESSION:   1. No evidence of shoulder fracture or dislocation. 2.  Multiple left rib fractures with extensive subcutaneous air. Past Medical History:   Diagnosis Date    GERD (gastroesophageal reflux disease)     once a week OTC med     Past Surgical History:   Procedure Laterality Date    HX HEENT      wisdom teeth     Current Facility-Administered Medications   Medication Dose Route Frequency    lactated Ringers infusion  125 mL/hr IntraVENous CONTINUOUS    sodium chloride (NS) flush 5-40 mL  5-40 mL IntraVENous Q8H    sodium chloride (NS) flush 5-40 mL  5-40 mL IntraVENous PRN    lactated ringers bolus infusion 1,000 mL  1,000 mL IntraVENous ONCE     Current Outpatient Medications   Medication Sig    traMADol (ULTRAM) 50 mg tablet Take 1-2 Tabs by mouth every six (6) hours as needed for Pain. Max Daily Amount: 400 mg.  cyclobenzaprine (FLEXERIL) 10 mg tablet Take 1 Tab by mouth three (3) times daily as needed for Muscle Spasm(s). Patient has no known allergies. Social History     Socioeconomic History    Marital status: SINGLE     Spouse name: Not on file    Number of children: Not on file    Years of education: Not on file    Highest education level: Not on file   Tobacco Use    Smoking status: Current Every Day Smoker     Packs/day: 1.00     Years: 20.00     Pack years: 20.00    Smokeless tobacco: Never Used   Substance and Sexual Activity    Alcohol use: Yes     Alcohol/week: 6.7 standard drinks     Types: 8 Shots of liquor per week    Drug use: No     Social History     Tobacco Use   Smoking Status Current Every Day Smoker    Packs/day: 1.00    Years: 20.00    Pack years: 20.00   Smokeless Tobacco Never Used     No family history on file. ROS: No fever or chills. Comprehensive review of systems was otherwise unremarkable except as noted above. Physical Exam:   Visit Vitals  BP (!) 166/98   Pulse 89   Temp 98.8 °F (37.1 °C)   Resp 18   Ht 5' 10\" (1.778 m)   Wt 185 lb (83.9 kg)   SpO2 100%   BMI 26.54 kg/m²     Vitals:    10/30/20 1103 10/30/20 1307 10/30/20 1308 10/30/20 1328   BP: (!) 168/106  (!) 157/97 (!) 166/98   Pulse: 89 86  89   Resp:  16  18   Temp:       SpO2: 100% 100%  100%   Weight:       Height:         10/30 0701 - 10/30 1900  In: -   Out: 25   No intake/output data recorded. Constitutional: Alert, cooperative, no acute distress; appears stated age    Eyes: Sclera are clear. EOMs intact  ENMT: no external lesions gross hearing normal; no obvious neck masses, no ear or lip lesions, nares normal  CV: RRR. Normal perfusion  Resp: No JVD. Breathing is  non-labored; no audible wheezing. Left chest tube   GI: soft and non-distended     Musculoskeletal: No embolic signs or cyanosis.    Neuro:  Oriented; moves all 4; no focal deficits  Psychiatric: normal affect and mood, no memory impairment    Recent vitals (if inpt):  Patient Vitals for the past 24 hrs:   BP Temp Pulse Resp SpO2 Height Weight   10/30/20 1328 (!) 166/98  89 18 100 %     10/30/20 1308 (!) 157/97         10/30/20 1307   86 16 100 %     10/30/20 1103 (!) 168/106  89  100 %     10/30/20 1021   (!) 103  97 %     10/30/20 1013 (!) 167/107 98.8 °F (37.1 °C) (!) 119 24 99 % 5' 10\" (1.778 m) 185 lb (83.9 kg)       Lab Results   Component Value Date/Time    WBC 5.5 02/28/2018 07:00 PM    HGB 16.8 02/28/2018 07:00 PM    PLATELET 871 54/73/3598 07:00 PM    Sodium 140 02/28/2018 07:00 PM    Potassium 5.5 (H) 02/28/2018 07:00 PM    Chloride 102 02/28/2018 07:00 PM    CO2 28 02/28/2018 07:00 PM    BUN 9 02/28/2018 07:00 PM    Creatinine 0.91 02/28/2018 07:00 PM    Glucose 142 (H) 02/28/2018 07:00 PM    INR 1.2 08/18/2016 06:32 AM    Bilirubin, total 2.3 (H) 02/28/2018 07:00 PM Bilirubin, direct 0.7 (H) 08/17/2016 04:10 PM    ALT (SGPT) 95 (H) 02/28/2018 07:00 PM    Alk. phosphatase 106 02/28/2018 07:00 PM       CT Results  (Last 48 hours)               10/30/20 1038  CT CHEST WO CONT Final result    Impression:  IMPRESSION:  Small pneumothorax and pneumomediastinum and subcutaneous gas on   the left due to multiple left-sided rib fractures. Narrative:  CT CHEST without CONTRAST. INDICATION: Pain following fall from attic. COMPARISON: None. TECHNIQUE:   5 mm axial scans from the apices through the diaphragms without   contrast. Radiation dose reduction techniques were used for this study. Our CT   scanners use one or more of the following:  Automated exposure control,   adjustment of the mA and or kV according to patient size, iterative   reconstruction. FINDINGS:    LUNGS: Small right pneumothorax. There is a pneumomediastinum. Moderate   subcutaneous gas on the left. No pulmonary nodules. No airspace disease. AIRWAYS: Trachea and proximal bronchi grossly patent. PLEURA: Small pleural fluid collection on the left. LYMPH NODES: No enlarged axillary, hilar or mediastinal lymph nodes. HEART: Normal size. CORONARIES: Mild  calcifications. UPPER ABDOMEN: Normal size adrenal glands. Fatty infiltration of liver. SKELETAL/CHEST WALL: Multiple acute left-sided rib fractures, numbers 4, 5, 6, 7   and 8. Remote healed fractures of ribs 9, 10 and 11. Sternum is intact. Spinal   alignment appears anatomic. No spinal fracture. chest X-ray      I reviewed recent labs, recent radiologic studies, and pertinent records including other doctor notes if needed. I independently reviewed radiology images for studies I described above or studies I have ordered.    Admission date (for inpatients): 10/30/2020   * No surgery found *  * No surgery found *    ASSESSMENT/PLAN:  Problem List  Never Reviewed          Codes Class Noted    Acute blood loss anemia ICD-10-CM: D62  ICD-9-CM: 285.1  8/17/2016        Alcohol abuse ICD-10-CM: F10.10  ICD-9-CM: 305.00  8/17/2016        Tobacco abuse ICD-10-CM: Z72.0  ICD-9-CM: 305.1  8/17/2016            Active Problems:    * No active hospital problems.  *       Plan:  Admit to General Surgery  Pain control  Left chest tube to [facility] [place] NP

## 2020-10-30 NOTE — ED PROVIDER NOTES
30-year-old gentleman presents with concerns about left flank pain and left rib pain after falling through his attic. He says he fell about 10 feet and landed mostly on his left side. He said he now has fairly severe pain and some shortness of breath. Denies any loss of consciousness. This fall happened yesterday. He says he took some Tylenol and thought he would get better. He says his pain has increased. No other associated symptoms. Elements of this note were created using speech recognition software. As such, errors of speech recognition may be present. Past Medical History:   Diagnosis Date    GERD (gastroesophageal reflux disease)     once a week OTC med       Past Surgical History:   Procedure Laterality Date    HX HEENT      wisdom teeth         No family history on file. Social History     Socioeconomic History    Marital status: SINGLE     Spouse name: Not on file    Number of children: Not on file    Years of education: Not on file    Highest education level: Not on file   Occupational History    Not on file   Social Needs    Financial resource strain: Not on file    Food insecurity     Worry: Not on file     Inability: Not on file    Transportation needs     Medical: Not on file     Non-medical: Not on file   Tobacco Use    Smoking status: Current Every Day Smoker     Packs/day: 1.00     Years: 20.00     Pack years: 20.00    Smokeless tobacco: Never Used   Substance and Sexual Activity    Alcohol use:  Yes     Alcohol/week: 6.7 standard drinks     Types: 8 Shots of liquor per week    Drug use: No    Sexual activity: Not on file   Lifestyle    Physical activity     Days per week: Not on file     Minutes per session: Not on file    Stress: Not on file   Relationships    Social connections     Talks on phone: Not on file     Gets together: Not on file     Attends Mosque service: Not on file     Active member of club or organization: Not on file     Attends meetings of clubs or organizations: Not on file     Relationship status: Not on file    Intimate partner violence     Fear of current or ex partner: Not on file     Emotionally abused: Not on file     Physically abused: Not on file     Forced sexual activity: Not on file   Other Topics Concern    Not on file   Social History Narrative    Not on file         ALLERGIES: Patient has no known allergies. Review of Systems   Constitutional: Negative for chills and fever. HENT: Negative. Eyes: Negative. Respiratory: Positive for shortness of breath. Negative for cough. Cardiovascular: Negative for chest pain and palpitations. Gastrointestinal: Negative for diarrhea, nausea and vomiting. Musculoskeletal: Negative for myalgias. Skin: Negative for color change and wound. Neurological: Negative for dizziness and headaches. Vitals:    10/30/20 1013 10/30/20 1021   BP: (!) 167/107    Pulse: (!) 119 (!) 103   Resp: 24 (!) 58   Temp: 98.8 °F (37.1 °C)    SpO2: 99% 97%   Weight: 83.9 kg (185 lb)    Height: 5' 10\" (1.778 m)             Physical Exam  Vitals signs and nursing note reviewed. Constitutional:       Appearance: Normal appearance. HENT:      Head: Normocephalic and atraumatic. Cardiovascular:      Rate and Rhythm: Regular rhythm. Tachycardia present. Comments: Mild tachycardia  Pulmonary:      Comments: Patient has splinting while breathing. He does appear to be short of breath. Has diffuse left-sided chest wall crepitus. Abdominal:      General: Bowel sounds are normal.      Palpations: Abdomen is soft. Neurological:      Mental Status: He is alert. MDM  Number of Diagnoses or Management Options  Closed fracture of multiple ribs of left side, initial encounter:   Hemothorax on left:   Pneumothorax on left:   Diagnosis management comments: Concerned about a large left-sided pneumothorax. CRITICAL CARE Documentation:    This patient is critically ill and there is a high probability of of imminent or life threatening deterioration in the patient's condition without immediate management. The nature of the patient's clinical problem is: Multiple rib fractures, hemothorax    I have spent 45 minutes in direct patient care, documentation, review of labs/xrays/old records, discussion with patient, surgery. The time involved in the performance of separately reportable procedures was not counted toward critical care time. Cookie Edward MD; 10/30/2020 @1:23 PM        ED Course as of Oct 30 1256   Fri Oct 30, 2020   1054 No significant amounts of subcutaneous air on his CT with multiple left-sided rib fractures. I have discussed the case with surgery. [AC]   1144 I spoke with surgery who kindly agreed to evaluate the patient. They did asked that I place the chest tube since the surgeon was good to be in the OR for a while.    [AC]      ED Course User Index  [AC] Maurice Becerra MD       Chest Tube Insertion    Date/Time: 10/30/2020 12:57 PM  Performed by: Maurice Becerra MD  Authorized by: Maurice Becerra MD     Consent:     Consent obtained:  Verbal and written    Consent given by:  Patient    Risks discussed:  Damage to surrounding structures, bleeding and incomplete drainage    Alternatives discussed:  No treatment  Pre-procedure details:     Skin preparation:  ChloraPrep    Preparation: Patient was prepped and draped in the usual sterile fashion    Sedation:     Sedation type: Anxiolysis  Anesthesia (see MAR for exact dosages):      Anesthesia method:  Local infiltration    Local anesthetic:  Lidocaine 1% w/o epi  Procedure details:     Placement location:  L lateral    Scalpel size:  11    Tube size (Fr):  40    Dissection instrument:  Lizabeth clamp and finger    Ultrasound guidance: no      Tension pneumothorax: no      Tube connected to:  Water seal    Drainage characteristics:  Bloody    Suture material:  2-0 silk    Dressing:  4x4 sterile gauze and petrolatum-impregnated gauze  Post-procedure details:     Patient tolerance of procedure: Tolerated with difficulty  Comments:      Patient had persistent pain during the procedure so I did use anxiolysis with some boluses of propofol. X-ray was pending at the time of the procedure note.

## 2020-10-31 ENCOUNTER — APPOINTMENT (OUTPATIENT)
Dept: CT IMAGING | Age: 50
DRG: 185 | End: 2020-10-31
Attending: NURSE PRACTITIONER

## 2020-10-31 ENCOUNTER — APPOINTMENT (OUTPATIENT)
Dept: GENERAL RADIOLOGY | Age: 50
DRG: 185 | End: 2020-10-31
Attending: NURSE PRACTITIONER

## 2020-10-31 PROCEDURE — 65270000029 HC RM PRIVATE

## 2020-10-31 PROCEDURE — 73200 CT UPPER EXTREMITY W/O DYE: CPT

## 2020-10-31 PROCEDURE — 74011250636 HC RX REV CODE- 250/636: Performed by: SURGERY

## 2020-10-31 PROCEDURE — 74011250637 HC RX REV CODE- 250/637: Performed by: SURGERY

## 2020-10-31 PROCEDURE — 2709999900 HC NON-CHARGEABLE SUPPLY

## 2020-10-31 PROCEDURE — 71045 X-RAY EXAM CHEST 1 VIEW: CPT

## 2020-10-31 PROCEDURE — 74011250637 HC RX REV CODE- 250/637: Performed by: NURSE PRACTITIONER

## 2020-10-31 RX ORDER — PANTOPRAZOLE SODIUM 40 MG/1
40 TABLET, DELAYED RELEASE ORAL DAILY
Status: DISCONTINUED | OUTPATIENT
Start: 2020-10-31 | End: 2020-11-02 | Stop reason: HOSPADM

## 2020-10-31 RX ADMIN — Medication 5 ML: at 06:18

## 2020-10-31 RX ADMIN — OXYCODONE HYDROCHLORIDE AND ACETAMINOPHEN 1 TABLET: 10; 325 TABLET ORAL at 08:59

## 2020-10-31 RX ADMIN — Medication 5 ML: at 13:01

## 2020-10-31 RX ADMIN — Medication 5 ML: at 06:19

## 2020-10-31 RX ADMIN — Medication 10 ML: at 21:21

## 2020-10-31 RX ADMIN — HYDROMORPHONE HYDROCHLORIDE 1 MG: 1 INJECTION, SOLUTION INTRAMUSCULAR; INTRAVENOUS; SUBCUTANEOUS at 19:33

## 2020-10-31 RX ADMIN — PANTOPRAZOLE SODIUM 40 MG: 40 TABLET, DELAYED RELEASE ORAL at 09:05

## 2020-10-31 RX ADMIN — OXYCODONE HYDROCHLORIDE AND ACETAMINOPHEN 1 TABLET: 10; 325 TABLET ORAL at 02:01

## 2020-10-31 RX ADMIN — Medication 10 ML: at 21:22

## 2020-10-31 RX ADMIN — OXYCODONE HYDROCHLORIDE AND ACETAMINOPHEN 1 TABLET: 10; 325 TABLET ORAL at 15:17

## 2020-10-31 RX ADMIN — OXYCODONE HYDROCHLORIDE AND ACETAMINOPHEN 1 TABLET: 10; 325 TABLET ORAL at 21:20

## 2020-10-31 RX ADMIN — KETOROLAC TROMETHAMINE 30 MG: 30 INJECTION, SOLUTION INTRAMUSCULAR at 13:01

## 2020-10-31 RX ADMIN — KETOROLAC TROMETHAMINE 30 MG: 30 INJECTION, SOLUTION INTRAMUSCULAR at 06:13

## 2020-10-31 NOTE — PROGRESS NOTES
Problem: Falls - Risk of  Goal: *Absence of Falls  Description: Document Shana Barneyann Fall Risk and appropriate interventions in the flowsheet.   Outcome: Progressing Towards Goal  Note: Fall Risk Interventions:  Mobility Interventions: Assess mobility with egress test, Bed/chair exit alarm, Communicate number of staff needed for ambulation/transfer         Medication Interventions: Assess postural VS orthostatic hypotension, Teach patient to arise slowly    Elimination Interventions: Bed/chair exit alarm, Elevated toilet seat, Toileting schedule/hourly rounds    History of Falls Interventions: Door open when patient unattended, Consult care management for discharge planning         Problem: Patient Education: Go to Patient Education Activity  Goal: Patient/Family Education  Outcome: Progressing Towards Goal

## 2020-10-31 NOTE — PROGRESS NOTES
H&P/Consult Note/Progress Note/Office Note:   Franco Walsh  MRN: [id number]  BHR:8/33/7263  Age:50 y.o. General Surgery Consult ordered by: Dr. Justin Murphy  Reason for General Surgery Consult: Traumatic fall injury    HPI: Franco Walsh is a 48 y.o. male with a past medical history of GERD who presented to the ED 10/30/20 with c/o mild shortness of breath and pain to Left flank and rib area after falling through his attic approximately 10 feet and landing on his left side 24 hours ago. Denies LOC. 10/30/20 CT Chest  CT CHEST without CONTRAST.     INDICATION: Pain following fall from attic.     COMPARISON: None.     TECHNIQUE:   5 mm axial scans from the apices through the diaphragms without  contrast. Radiation dose reduction techniques were used for this study. Our CT  scanners use one or more of the following:  Automated exposure control,  adjustment of the mA and or kV according to patient size, iterative  reconstruction.     FINDINGS:   LUNGS: Small right pneumothorax. There is a pneumomediastinum. Moderate  subcutaneous gas on the left. No pulmonary nodules. No airspace disease. AIRWAYS: Trachea and proximal bronchi grossly patent. PLEURA: Small pleural fluid collection on the left. LYMPH NODES: No enlarged axillary, hilar or mediastinal lymph nodes. HEART: Normal size. CORONARIES: Mild  calcifications. UPPER ABDOMEN: Normal size adrenal glands. Fatty infiltration of liver. SKELETAL/CHEST WALL: Multiple acute left-sided rib fractures, numbers 4, 5, 6, 7  and 8. Remote healed fractures of ribs 9, 10 and 11. Sternum is intact. Spinal  alignment appears anatomic. No spinal fracture.     IMPRESSION  IMPRESSION:  Small pneumothorax and pneumomediastinum and subcutaneous gas on  the left due to multiple left-sided rib fractures. 10/30/20  XR Left Shoulder  Left Shoulder      INDICATION: Shoulder pain     Three views of the left shoulder were obtained      FINDINGS: Left shoulder is normally located. Proximal humerus is intact. Scapular evaluation is somewhat limited by extensive subcutaneous air. There  are multiple left rib fractures involving at least the fourth-sixth ribs.     XR of Left Shoulder  IMPRESSION:   1. No evidence of shoulder fracture or dislocation. 2.  Multiple left rib fractures with extensive subcutaneous air. 10/31/20: Pt awake in bed. Pain currently controlled. Has not been out of bed. Tolerating soft diet, however, reports decreased appetite. Left chest tube in place. AF, NAD. CXR pending. Past Medical History:   Diagnosis Date    GERD (gastroesophageal reflux disease)     once a week OTC med     Past Surgical History:   Procedure Laterality Date    HX HEENT      wisdom teeth     Current Facility-Administered Medications   Medication Dose Route Frequency    pantoprazole (PROTONIX) tablet 40 mg  40 mg Oral DAILY    lactated Ringers infusion  125 mL/hr IntraVENous CONTINUOUS    sodium chloride (NS) flush 5-40 mL  5-40 mL IntraVENous Q8H    sodium chloride (NS) flush 5-40 mL  5-40 mL IntraVENous PRN    sodium chloride (NS) flush 5-40 mL  5-40 mL IntraVENous Q8H    sodium chloride (NS) flush 5-40 mL  5-40 mL IntraVENous PRN    HYDROmorphone (PF) (DILAUDID) injection 1 mg  1 mg IntraVENous Q3H PRN    oxyCODONE-acetaminophen (PERCOCET 10)  mg per tablet 1 Tab  1 Tab Oral Q4H PRN    naloxone (NARCAN) injection 0.4 mg  0.4 mg IntraVENous PRN    ondansetron (ZOFRAN) injection 4 mg  4 mg IntraVENous Q4H PRN    0.45% sodium chloride with KCl 20 mEq/L infusion   IntraVENous CONTINUOUS    ketorolac (TORADOL) injection 30 mg  30 mg IntraVENous Q8H     Patient has no known allergies.   Social History     Socioeconomic History    Marital status: SINGLE     Spouse name: Not on file    Number of children: Not on file    Years of education: Not on file    Highest education level: Not on file   Tobacco Use    Smoking status: Current Every Day Smoker     Packs/day: 1.00 Years: 20.00     Pack years: 20.00    Smokeless tobacco: Never Used   Substance and Sexual Activity    Alcohol use: Yes     Alcohol/week: 6.7 standard drinks     Types: 8 Shots of liquor per week    Drug use: No     Social History     Tobacco Use   Smoking Status Current Every Day Smoker    Packs/day: 1.00    Years: 20.00    Pack years: 20.00   Smokeless Tobacco Never Used     No family history on file. ROS: No fever or chills. Comprehensive review of systems was otherwise unremarkable except as noted above. Physical Exam:   Visit Vitals  BP (!) 142/87 (BP 1 Location: Right arm, BP Patient Position: Sitting)   Pulse 70   Temp 98.6 °F (37 °C)   Resp 20   Ht 5' 10\" (1.778 m)   Wt 185 lb (83.9 kg)   SpO2 98%   BMI 26.54 kg/m²     Vitals:    10/30/20 1549 10/30/20 2053 10/31/20 0012 10/31/20 0428   BP: (!) 158/94 (!) 168/114 134/89 (!) 142/87   Pulse: 90 82 88 70   Resp: 22 22 20 20   Temp: 98.6 °F (37 °C) 98.3 °F (36.8 °C) 97.7 °F (36.5 °C) 98.6 °F (37 °C)   SpO2: 99% 94% 99% 98%   Weight:       Height:         No intake/output data recorded. 10/29 1901 - 10/31 0700  In: -   Out: 700 [Urine:125]    Constitutional: Comfortable, Alert, cooperative, NAD. Eyes: Sclera are clear. EOMs intact  ENMT: no external lesions gross hearing normal; no obvious neck masses, no ear or lip lesions, nares normal  CV: RRR. Normal perfusion  Resp: No JVD. Breathing is  non-labored; no audible wheezing. Left chest tube - 25mL out since placed  GI: soft and non-distended     Musculoskeletal: No embolic signs or cyanosis.    Neuro:  Oriented; moves all 4; no focal deficits  Psychiatric: normal affect and mood, no memory impairment    Recent vitals (if inpt):  Patient Vitals for the past 24 hrs:   BP Temp Pulse Resp SpO2 Height Weight   10/31/20 0428 (!) 142/87 98.6 °F (37 °C) 70 20 98 %     10/31/20 0012 134/89 97.7 °F (36.5 °C) 88 20 99 %     10/30/20 2053 (!) 168/114 98.3 °F (36.8 °C) 82 22 94 %     10/30/20 1549 (!) 158/94 98.6 °F (37 °C) 90 22 99 %     10/30/20 1407 (!) 169/98  92 20 98 %     10/30/20 1328 (!) 166/98  89 18 100 %     10/30/20 1308 (!) 157/97         10/30/20 1307   86 16 100 %     10/30/20 1103 (!) 168/106  89  100 %     10/30/20 1021   (!) 103  97 %     10/30/20 1013 (!) 167/107 98.8 °F (37.1 °C) (!) 119 24 99 % 5' 10\" (1.778 m) 185 lb (83.9 kg)       Lab Results   Component Value Date/Time    WBC 6.2 10/30/2020 12:53 PM    HGB 15.2 10/30/2020 12:53 PM    PLATELET 88 (L) 28/10/3081 12:53 PM    Sodium 135 (L) 10/30/2020 12:53 PM    Potassium 3.3 (L) 10/30/2020 12:53 PM    Chloride 98 10/30/2020 12:53 PM    CO2 31 10/30/2020 12:53 PM    BUN 8 10/30/2020 12:53 PM    Creatinine 0.72 (L) 10/30/2020 12:53 PM    Glucose 138 (H) 10/30/2020 12:53 PM    INR 1.2 08/18/2016 06:32 AM    Bilirubin, total 4.8 (H) 10/30/2020 12:53 PM    Bilirubin, direct 0.7 (H) 08/17/2016 04:10 PM    ALT (SGPT) 61 10/30/2020 12:53 PM    Alk. phosphatase 85 10/30/2020 12:53 PM    Lipase 103 10/30/2020 12:53 PM       CT Results  (Last 48 hours)               10/30/20 1038  CT CHEST WO CONT Final result    Impression:  IMPRESSION:  Small pneumothorax and pneumomediastinum and subcutaneous gas on   the left due to multiple left-sided rib fractures. Narrative:  CT CHEST without CONTRAST. INDICATION: Pain following fall from attic. COMPARISON: None. TECHNIQUE:   5 mm axial scans from the apices through the diaphragms without   contrast. Radiation dose reduction techniques were used for this study. Our CT   scanners use one or more of the following:  Automated exposure control,   adjustment of the mA and or kV according to patient size, iterative   reconstruction. FINDINGS:    LUNGS: Small right pneumothorax. There is a pneumomediastinum. Moderate   subcutaneous gas on the left. No pulmonary nodules. No airspace disease.     AIRWAYS: Trachea and proximal bronchi grossly patent. PLEURA: Small pleural fluid collection on the left. LYMPH NODES: No enlarged axillary, hilar or mediastinal lymph nodes. HEART: Normal size. CORONARIES: Mild  calcifications. UPPER ABDOMEN: Normal size adrenal glands. Fatty infiltration of liver. SKELETAL/CHEST WALL: Multiple acute left-sided rib fractures, numbers 4, 5, 6, 7   and 8. Remote healed fractures of ribs 9, 10 and 11. Sternum is intact. Spinal   alignment appears anatomic. No spinal fracture. chest X-ray      I reviewed recent labs, recent radiologic studies, and pertinent records including other doctor notes if needed. I independently reviewed radiology images for studies I described above or studies I have ordered. Admission date (for inpatients): 10/30/2020   * No surgery found *  * No surgery found *    ASSESSMENT/PLAN:  Problem List  Never Reviewed          Codes Class Noted    * (Principal) Fall with significant injury ICD-10-CM: W19. Luigi Sierradominic  ICD-9-CM: Q744.4  10/30/2020        Acute blood loss anemia ICD-10-CM: D62  ICD-9-CM: 285.1  8/17/2016        Alcohol abuse ICD-10-CM: F10.10  ICD-9-CM: 305.00  8/17/2016        Tobacco abuse ICD-10-CM: Z72.0  ICD-9-CM: 305.1  8/17/2016            Principal Problem:    Fall with significant injury (10/30/2020)         Plan:  Pain control - dilaudid, percocet, Toradol  Soft diet  Decrease IVF  OOB  SCD  Left Chest tube  CXR  IS    Keegan Alexandra NP

## 2020-10-31 NOTE — ED NOTES
Note on 10/31/2020. I was reviewing his CT scan for follow-up and educational purposes and became concerned that there may be a small fracture of his glenoid on the left. I discussed the uncertainty of this with on-call for surgery, Rios Alva as he is now admitted.

## 2020-10-31 NOTE — PROGRESS NOTES
Problem: Falls - Risk of  Goal: *Absence of Falls  Description: Document Daphne Ronquillo Fall Risk and appropriate interventions in the flowsheet. Outcome: Progressing Towards Goal  Note: Fall Risk Interventions:  Mobility Interventions: Patient to call before getting OOB         Medication Interventions: Evaluate medications/consider consulting pharmacy, Patient to call before getting OOB    Elimination Interventions: Call light in reach, Bed/chair exit alarm    History of Falls Interventions: Door open when patient unattended, Evaluate medications/consider consulting pharmacy         Problem: Patient Education: Go to Patient Education Activity  Goal: Patient/Family Education  Outcome: Progressing Towards Goal     Problem: Patient Education: Go to Patient Education Activity  Goal: Patient/Family Education  Outcome: Progressing Towards Goal     Problem: Patient Education: Go to Patient Education Activity  Goal: Patient/Family Education  Outcome: Progressing Towards Goal     Problem: Surgical Pathway Day of Surgery  Goal: Off Pathway (Use only if patient is Off Pathway)  Outcome: Progressing Towards Goal  Goal: Activity/Safety  Outcome: Progressing Towards Goal  Goal: Consults, if ordered  Outcome: Progressing Towards Goal  Goal: Nutrition/Diet  Outcome: Progressing Towards Goal  Goal: Medications  Outcome: Progressing Towards Goal  Goal: Respiratory  Outcome: Progressing Towards Goal  Goal: Treatments/Interventions/Procedures  Outcome: Progressing Towards Goal  Goal: Psychosocial  Outcome: Progressing Towards Goal  Goal: *No signs and symptoms of infection or wound complications  Outcome: Progressing Towards Goal  Goal: *Optimal pain control at patient's stated goal  Outcome: Progressing Towards Goal  Goal: *Adequate urinary output (equal to or greater than 30 milliliters/hour)  Description: Ambulatory Surgery patients voiding without difficulty.   Outcome: Progressing Towards Goal  Goal: *Hemodynamically stable  Outcome: Progressing Towards Goal  Goal: *Tolerating diet  Outcome: Progressing Towards Goal  Goal: *Demonstrates progressive activity  Outcome: Progressing Towards Goal

## 2020-11-01 ENCOUNTER — APPOINTMENT (OUTPATIENT)
Dept: GENERAL RADIOLOGY | Age: 50
DRG: 185 | End: 2020-11-01
Attending: NURSE PRACTITIONER

## 2020-11-01 PROCEDURE — 74011250636 HC RX REV CODE- 250/636: Performed by: NURSE PRACTITIONER

## 2020-11-01 PROCEDURE — 99231 SBSQ HOSP IP/OBS SF/LOW 25: CPT | Performed by: SURGERY

## 2020-11-01 PROCEDURE — 77030037759

## 2020-11-01 PROCEDURE — 74011250636 HC RX REV CODE- 250/636: Performed by: SURGERY

## 2020-11-01 PROCEDURE — 74011250637 HC RX REV CODE- 250/637: Performed by: NURSE PRACTITIONER

## 2020-11-01 PROCEDURE — 74011250637 HC RX REV CODE- 250/637: Performed by: SURGERY

## 2020-11-01 PROCEDURE — 71045 X-RAY EXAM CHEST 1 VIEW: CPT

## 2020-11-01 PROCEDURE — 76937 US GUIDE VASCULAR ACCESS: CPT

## 2020-11-01 PROCEDURE — 2709999900 HC NON-CHARGEABLE SUPPLY

## 2020-11-01 PROCEDURE — 65270000029 HC RM PRIVATE

## 2020-11-01 RX ADMIN — HYDROMORPHONE HYDROCHLORIDE 1 MG: 1 INJECTION, SOLUTION INTRAMUSCULAR; INTRAVENOUS; SUBCUTANEOUS at 21:15

## 2020-11-01 RX ADMIN — OXYCODONE HYDROCHLORIDE AND ACETAMINOPHEN 1 TABLET: 10; 325 TABLET ORAL at 18:24

## 2020-11-01 RX ADMIN — HYDROMORPHONE HYDROCHLORIDE 1 MG: 1 INJECTION, SOLUTION INTRAMUSCULAR; INTRAVENOUS; SUBCUTANEOUS at 15:59

## 2020-11-01 RX ADMIN — SODIUM CHLORIDE AND POTASSIUM CHLORIDE: 4.5; 1.49 INJECTION, SOLUTION INTRAVENOUS at 05:32

## 2020-11-01 RX ADMIN — Medication 10 ML: at 05:30

## 2020-11-01 RX ADMIN — OXYCODONE HYDROCHLORIDE AND ACETAMINOPHEN 1 TABLET: 10; 325 TABLET ORAL at 09:41

## 2020-11-01 RX ADMIN — Medication 5 ML: at 21:23

## 2020-11-01 RX ADMIN — Medication 10 ML: at 14:05

## 2020-11-01 RX ADMIN — HYDROMORPHONE HYDROCHLORIDE 1 MG: 1 INJECTION, SOLUTION INTRAMUSCULAR; INTRAVENOUS; SUBCUTANEOUS at 10:38

## 2020-11-01 RX ADMIN — HYDROMORPHONE HYDROCHLORIDE 1 MG: 1 INJECTION, SOLUTION INTRAMUSCULAR; INTRAVENOUS; SUBCUTANEOUS at 01:44

## 2020-11-01 RX ADMIN — PANTOPRAZOLE SODIUM 40 MG: 40 TABLET, DELAYED RELEASE ORAL at 09:41

## 2020-11-01 RX ADMIN — Medication 10 ML: at 05:33

## 2020-11-01 RX ADMIN — OXYCODONE HYDROCHLORIDE AND ACETAMINOPHEN 1 TABLET: 10; 325 TABLET ORAL at 05:26

## 2020-11-01 NOTE — PROGRESS NOTES
Problem: Falls - Risk of  Goal: *Absence of Falls  Description: Document Beverley Rodriguez Fall Risk and appropriate interventions in the flowsheet. Outcome: Progressing Towards Goal  Note: Fall Risk Interventions:  Mobility Interventions: Bed/chair exit alarm         Medication Interventions: Bed/chair exit alarm    Elimination Interventions: Call light in reach, Bed/chair exit alarm    History of Falls Interventions: Door open when patient unattended         Problem: Patient Education: Go to Patient Education Activity  Goal: Patient/Family Education  Outcome: Progressing Towards Goal     Problem: Patient Education: Go to Patient Education Activity  Goal: Patient/Family Education  Outcome: Progressing Towards Goal     Problem: Patient Education: Go to Patient Education Activity  Goal: Patient/Family Education  Outcome: Progressing Towards Goal     Problem: Surgical Pathway Day of Surgery  Goal: Off Pathway (Use only if patient is Off Pathway)  Outcome: Progressing Towards Goal  Goal: Activity/Safety  Outcome: Progressing Towards Goal  Goal: Consults, if ordered  Outcome: Progressing Towards Goal  Goal: Nutrition/Diet  Outcome: Progressing Towards Goal  Goal: Medications  Outcome: Progressing Towards Goal  Goal: Respiratory  Outcome: Progressing Towards Goal  Goal: Treatments/Interventions/Procedures  Outcome: Progressing Towards Goal  Goal: Psychosocial  Outcome: Progressing Towards Goal  Goal: *No signs and symptoms of infection or wound complications  Outcome: Progressing Towards Goal  Goal: *Optimal pain control at patient's stated goal  Outcome: Progressing Towards Goal  Goal: *Adequate urinary output (equal to or greater than 30 milliliters/hour)  Description: Ambulatory Surgery patients voiding without difficulty.   Outcome: Progressing Towards Goal  Goal: *Hemodynamically stable  Outcome: Progressing Towards Goal  Goal: *Tolerating diet  Outcome: Progressing Towards Goal  Goal: *Demonstrates progressive activity  Outcome: Progressing Towards Goal

## 2020-11-01 NOTE — PROGRESS NOTES
Care Management Interventions  PCP Verified by CM: Yes(no PCP; given free clinic information)  Discharge Durable Medical Equipment: No  Physical Therapy Consult: No  Occupational Therapy Consult: No  Speech Therapy Consult: No  Current Support Network: Own Home(w/ girlfriend)  Discharge Location  Discharge Placement: Home with family assistance    CM met with patient at bedside. Proper PPE worn and social distancing maintained. Patient admitted s/p fall through attic. Patient states he lives in home with his girlfriend. Normally independent with ADLs. Has cane in home. Patient is currently unemployed. He is is uninsured and does not have PCP. CM provided him with information on free medical clinics in the area. Per [facility] notes, Medicaid application started. CM will remain available to assist with discharge needs.

## 2020-11-01 NOTE — PROGRESS NOTES
H&P/Consult Note/Progress Note/Office Note:   Wilman Pedroza  MRN: [id number]  QBC:0/49/9506  Age:50 y.o. General Surgery Consult ordered by: Dr. Claude Mcneill  Reason for General Surgery Consult: Traumatic fall injury    HPI: Wilman Pedroza is a 48 y.o. male with a past medical history of GERD who presented to the ED 10/30/20 with c/o mild shortness of breath and pain to Left flank and rib area after falling through his attic approximately 10 feet and landing on his left side 24 hours ago. Denies LOC. 10/30/20 CT Chest  CT CHEST without CONTRAST.     INDICATION: Pain following fall from attic.     COMPARISON: None.     TECHNIQUE:   5 mm axial scans from the apices through the diaphragms without  contrast. Radiation dose reduction techniques were used for this study. Our CT  scanners use one or more of the following:  Automated exposure control,  adjustment of the mA and or kV according to patient size, iterative  reconstruction.     FINDINGS:   LUNGS: Small right pneumothorax. There is a pneumomediastinum. Moderate  subcutaneous gas on the left. No pulmonary nodules. No airspace disease. AIRWAYS: Trachea and proximal bronchi grossly patent. PLEURA: Small pleural fluid collection on the left. LYMPH NODES: No enlarged axillary, hilar or mediastinal lymph nodes. HEART: Normal size. CORONARIES: Mild  calcifications. UPPER ABDOMEN: Normal size adrenal glands. Fatty infiltration of liver. SKELETAL/CHEST WALL: Multiple acute left-sided rib fractures, numbers 4, 5, 6, 7  and 8. Remote healed fractures of ribs 9, 10 and 11. Sternum is intact. Spinal  alignment appears anatomic. No spinal fracture.     IMPRESSION  IMPRESSION:  Small pneumothorax and pneumomediastinum and subcutaneous gas on  the left due to multiple left-sided rib fractures. 10/30/20  XR Left Shoulder  Left Shoulder      INDICATION: Shoulder pain     Three views of the left shoulder were obtained      FINDINGS: Left shoulder is normally located. Proximal humerus is intact. Scapular evaluation is somewhat limited by extensive subcutaneous air. There  are multiple left rib fractures involving at least the fourth-sixth ribs.     XR of Left Shoulder  IMPRESSION:   1. No evidence of shoulder fracture or dislocation. 2.  Multiple left rib fractures with extensive subcutaneous air. 10/31/20: Pt awake in bed. Pain currently controlled. Has not been out of bed. Tolerating soft diet, however, reports decreased appetite. Left chest tube in place. AF, NAD. CXR pending. 11/01/20: Pt awake in bed. C/o pain to Left rib area. Tolerating soft diet. Left chest tube in place to LIS. No air leak. AF, NAD. CXR showing No visible pneumothorax. CT of Left shoulder showing left coracoid fracture. Past Medical History:   Diagnosis Date    GERD (gastroesophageal reflux disease)     once a week OTC med     Past Surgical History:   Procedure Laterality Date    HX HEENT      wisdom teeth     Current Facility-Administered Medications   Medication Dose Route Frequency    pantoprazole (PROTONIX) tablet 40 mg  40 mg Oral DAILY    lactated Ringers infusion  125 mL/hr IntraVENous CONTINUOUS    sodium chloride (NS) flush 5-40 mL  5-40 mL IntraVENous Q8H    sodium chloride (NS) flush 5-40 mL  5-40 mL IntraVENous PRN    sodium chloride (NS) flush 5-40 mL  5-40 mL IntraVENous Q8H    sodium chloride (NS) flush 5-40 mL  5-40 mL IntraVENous PRN    HYDROmorphone (PF) (DILAUDID) injection 1 mg  1 mg IntraVENous Q3H PRN    oxyCODONE-acetaminophen (PERCOCET 10)  mg per tablet 1 Tab  1 Tab Oral Q4H PRN    naloxone (NARCAN) injection 0.4 mg  0.4 mg IntraVENous PRN    ondansetron (ZOFRAN) injection 4 mg  4 mg IntraVENous Q4H PRN    0.45% sodium chloride with KCl 20 mEq/L infusion   IntraVENous CONTINUOUS     Patient has no known allergies.   Social History     Socioeconomic History    Marital status: SINGLE     Spouse name: Not on file    Number of children: Not on file  Years of education: Not on file    Highest education level: Not on file   Tobacco Use    Smoking status: Current Every Day Smoker     Packs/day: 1.00     Years: 20.00     Pack years: 20.00    Smokeless tobacco: Never Used   Substance and Sexual Activity    Alcohol use: Yes     Alcohol/week: 6.7 standard drinks     Types: 8 Shots of liquor per week    Drug use: No     Social History     Tobacco Use   Smoking Status Current Every Day Smoker    Packs/day: 1.00    Years: 20.00    Pack years: 20.00   Smokeless Tobacco Never Used     No family history on file. ROS: No fever or chills. Comprehensive review of systems was otherwise unremarkable except as noted above. Physical Exam:   Visit Vitals  BP (!) 138/102 (BP 1 Location: Left arm, BP Patient Position: At rest)   Pulse 87   Temp 98.1 °F (36.7 °C)   Resp 18   Ht 5' 10\" (1.778 m)   Wt 185 lb (83.9 kg)   SpO2 90%   BMI 26.54 kg/m²     Vitals:    11/01/20 0032 11/01/20 0423 11/01/20 0920 11/01/20 1156   BP: (!) 141/89 (!) 140/90 (!) 140/93 (!) 138/102   Pulse: 73 84 84 87   Resp: 18 18 18 18   Temp: 97.8 °F (36.6 °C) 97.9 °F (36.6 °C) 98 °F (36.7 °C) 98.1 °F (36.7 °C)   SpO2: 99% 100% 97% 90%   Weight:       Height:         No intake/output data recorded. 10/30 1901 - 11/01 0700  In: -   Out: 028 [Urine:125]    Constitutional: Alert, cooperative, NAD. Eyes: Sclera are clear. EOMs intact  ENMT: no external lesions gross hearing normal; no obvious neck masses, no ear or lip lesions, nares normal  CV: RRR. Normal perfusion  Resp: No JVD. Breathing is  non-labored; no audible wheezing. Left chest tube, no air leak. GI: soft and non-distended     Musculoskeletal: No embolic signs or cyanosis.    Neuro:  Oriented; moves all 4; no focal deficits  Psychiatric: normal affect and mood, no memory impairment    Recent vitals (if inpt):  Patient Vitals for the past 24 hrs:   BP Temp Pulse Resp SpO2   11/01/20 1156 (!) 138/102 98.1 °F (36.7 °C) 87 18 90 % 11/01/20 0920 (!) 140/93 98 °F (36.7 °C) 84 18 97 %   11/01/20 0423 (!) 140/90 97.9 °F (36.6 °C) 84 18 100 %   11/01/20 0032 (!) 141/89 97.8 °F (36.6 °C) 73 18 99 %   10/31/20 2034 126/85 97.9 °F (36.6 °C) 81 18 99 %   10/31/20 1533 131/89 98.4 °F (36.9 °C) 88 18 95 %       Lab Results   Component Value Date/Time    WBC 6.2 10/30/2020 12:53 PM    HGB 15.2 10/30/2020 12:53 PM    PLATELET 88 (L) 23/43/9245 12:53 PM    Sodium 135 (L) 10/30/2020 12:53 PM    Potassium 3.3 (L) 10/30/2020 12:53 PM    Chloride 98 10/30/2020 12:53 PM    CO2 31 10/30/2020 12:53 PM    BUN 8 10/30/2020 12:53 PM    Creatinine 0.72 (L) 10/30/2020 12:53 PM    Glucose 138 (H) 10/30/2020 12:53 PM    INR 1.2 08/18/2016 06:32 AM    Bilirubin, total 4.8 (H) 10/30/2020 12:53 PM    Bilirubin, direct 0.7 (H) 08/17/2016 04:10 PM    ALT (SGPT) 61 10/30/2020 12:53 PM    Alk. phosphatase 85 10/30/2020 12:53 PM    Lipase 103 10/30/2020 12:53 PM       CT Results  (Last 48 hours)               10/31/20 1806  CT SHOULDER LT WO CONT Final result    Impression:  IMPRESSION:       COMMINUTED FRACTURE OF THE CORACOID PROCESS OF THE SCAPULA WITH MILD DISTRACTION   OF FRAGMENTS. THE SCAPULA OTHERWISE APPEARS INTACT, AS DOES THE CLAVICLE AND   PROXIMAL HUMERUS. Narrative:  NONCONTRAST CT LEFT SHOULDER WITH ADDITIONAL REFORMATS:       CLINICAL HISTORY:  Left shoulder pain with clinical concern for glenoid   fracture. TECHNIQUE:  Axial images were obtained with spiral technique, and coronal and   sagittal reformats were produced. Radiation dose reduction was achieved using   one or all of the following techniques: automated exposure control, weight-based   dosing, iterative reconstruction. COMPARISON:  Radiographs yesterday. FINDINGS: There is mildly comminuted fracture of the distal coracoid process of   the scapula with mild distraction of fragments.   Specifically, the there is no   definite fracture of glenoid, and the shoulder otherwise appears intact with   normal alignment of the acromioclavicular and glenohumeral joints. Multiple   left rib fractures are again noted with extensive subcutaneous emphysema and   pneumomediastinum. No definite pneumothorax with chest tube in place. chest X-ray      I reviewed recent labs, recent radiologic studies, and pertinent records including other doctor notes if needed. I independently reviewed radiology images for studies I described above or studies I have ordered. Admission date (for inpatients): 10/30/2020   * No surgery found *  * No surgery found *    ASSESSMENT/PLAN:  Problem List  Never Reviewed          Codes Class Noted    * (Principal) Fall with significant injury ICD-10-CM: W19. Lili Champ  ICD-9-CM: U941.4  10/30/2020        Acute blood loss anemia ICD-10-CM: D62  ICD-9-CM: 285.1  8/17/2016        Alcohol abuse ICD-10-CM: F10.10  ICD-9-CM: 305.00  8/17/2016        Tobacco abuse ICD-10-CM: Z72.0  ICD-9-CM: 305.1  8/17/2016            Principal Problem:    Fall with significant injury (10/30/2020)         Plan:  Pain control - dilaudid, percocet  Soft diet  OOB  SCD  Left Chest tube to water seal  CXR in am  IS  Possibly home tomorrow if cleared by ortho and pain controlled    Don Hodgkin, NP

## 2020-11-01 NOTE — PROGRESS NOTES
Unsuccessful attempt x1 18g 2.25inch Accucath in right forearm with ultrasound assistance. Able to place 20g PIV in right forearm.     Lot# [id number]

## 2020-11-01 NOTE — CONSULTS
Ortho      Has left coracoid fracture. It is stable. Sling for comfort. Can follow up with mi in the office in 7-10 days.     Thanks

## 2020-11-02 ENCOUNTER — APPOINTMENT (OUTPATIENT)
Dept: GENERAL RADIOLOGY | Age: 50
DRG: 185 | End: 2020-11-02
Attending: SURGERY

## 2020-11-02 ENCOUNTER — APPOINTMENT (OUTPATIENT)
Dept: GENERAL RADIOLOGY | Age: 50
DRG: 185 | End: 2020-11-02
Attending: NURSE PRACTITIONER

## 2020-11-02 VITALS
OXYGEN SATURATION: 95 % | HEART RATE: 131 BPM | BODY MASS INDEX: 26.48 KG/M2 | HEIGHT: 70 IN | WEIGHT: 185 LBS | SYSTOLIC BLOOD PRESSURE: 138 MMHG | TEMPERATURE: 99.9 F | DIASTOLIC BLOOD PRESSURE: 88 MMHG | RESPIRATION RATE: 28 BRPM

## 2020-11-02 PROCEDURE — 74011250637 HC RX REV CODE- 250/637: Performed by: NURSE PRACTITIONER

## 2020-11-02 PROCEDURE — 74011250636 HC RX REV CODE- 250/636: Performed by: SURGERY

## 2020-11-02 PROCEDURE — 74011250636 HC RX REV CODE- 250/636: Performed by: NURSE PRACTITIONER

## 2020-11-02 PROCEDURE — 71045 X-RAY EXAM CHEST 1 VIEW: CPT

## 2020-11-02 PROCEDURE — 74011250637 HC RX REV CODE- 250/637: Performed by: SURGERY

## 2020-11-02 RX ORDER — CYCLOBENZAPRINE HCL 10 MG
10 TABLET ORAL 3 TIMES DAILY
Status: DISCONTINUED | OUTPATIENT
Start: 2020-11-02 | End: 2020-11-02 | Stop reason: HOSPADM

## 2020-11-02 RX ORDER — KETOROLAC TROMETHAMINE 15 MG/ML
15 INJECTION, SOLUTION INTRAMUSCULAR; INTRAVENOUS EVERY 6 HOURS
Status: DISCONTINUED | OUTPATIENT
Start: 2020-11-02 | End: 2020-11-02 | Stop reason: HOSPADM

## 2020-11-02 RX ORDER — HYDROCODONE BITARTRATE AND ACETAMINOPHEN 5; 325 MG/1; MG/1
1 TABLET ORAL
Qty: 30 TAB | Refills: 0 | Status: SHIPPED | OUTPATIENT
Start: 2020-11-02 | End: 2020-11-12

## 2020-11-02 RX ORDER — KETOROLAC TROMETHAMINE 15 MG/ML
15 INJECTION, SOLUTION INTRAMUSCULAR; INTRAVENOUS EVERY 6 HOURS
Status: DISCONTINUED | OUTPATIENT
Start: 2020-11-02 | End: 2020-11-02

## 2020-11-02 RX ADMIN — CYCLOBENZAPRINE 10 MG: 10 TABLET, FILM COATED ORAL at 11:14

## 2020-11-02 RX ADMIN — OXYCODONE HYDROCHLORIDE AND ACETAMINOPHEN 1 TABLET: 10; 325 TABLET ORAL at 07:41

## 2020-11-02 RX ADMIN — Medication 10 ML: at 05:30

## 2020-11-02 RX ADMIN — KETOROLAC TROMETHAMINE 15 MG: 15 INJECTION, SOLUTION INTRAMUSCULAR; INTRAVENOUS at 11:41

## 2020-11-02 RX ADMIN — OXYCODONE HYDROCHLORIDE AND ACETAMINOPHEN 1 TABLET: 10; 325 TABLET ORAL at 00:01

## 2020-11-02 RX ADMIN — SODIUM CHLORIDE AND POTASSIUM CHLORIDE: 4.5; 1.49 INJECTION, SOLUTION INTRAVENOUS at 03:24

## 2020-11-02 RX ADMIN — PANTOPRAZOLE SODIUM 40 MG: 40 TABLET, DELAYED RELEASE ORAL at 07:42

## 2020-11-02 RX ADMIN — HYDROMORPHONE HYDROCHLORIDE 1 MG: 1 INJECTION, SOLUTION INTRAMUSCULAR; INTRAVENOUS; SUBCUTANEOUS at 03:30

## 2020-11-02 RX ADMIN — OXYCODONE HYDROCHLORIDE AND ACETAMINOPHEN 1 TABLET: 10; 325 TABLET ORAL at 11:14

## 2020-11-02 NOTE — PROGRESS NOTES
Pt is medically cleared for dc to home today. No dc needs or concerns identified or reported. SW remains available to assist as needed.     Care Management Interventions  PCP Verified by CM: Yes(no PCP; given free clinic information)  Discharge Durable Medical Equipment: No  Physical Therapy Consult: No  Occupational Therapy Consult: No  Speech Therapy Consult: No  Current Support Network: Own Home(w/ girlfriend)  Discharge Location  Discharge Placement: Home with family assistance

## 2020-11-02 NOTE — PROGRESS NOTES
Problem: Falls - Risk of  Goal: *Absence of Falls  Description: Document Sal Le Fall Risk and appropriate interventions in the flowsheet.   11/1/2020 2251 by Krystina Reyes  Outcome: Progressing Towards Goal  Note: Fall Risk Interventions:  Mobility Interventions: Bed/chair exit alarm         Medication Interventions: Bed/chair exit alarm, Teach patient to arise slowly    Elimination Interventions: Call light in reach    History of Falls Interventions: Consult care management for discharge planning, Door open when patient unattended      11/1/2020 2250 by Krystina Reyes  Outcome: Progressing Towards Goal  Note: Fall Risk Interventions:  Mobility Interventions: Bed/chair exit alarm         Medication Interventions: Bed/chair exit alarm, Teach patient to arise slowly    Elimination Interventions: Call light in reach    History of Falls Interventions: Consult care management for discharge planning, Door open when patient unattended         Problem: Pain  Goal: *Control of Pain  Outcome: Progressing Towards Goal

## 2020-11-02 NOTE — PROGRESS NOTES
Patient told this nurse that his chest tube was suppose to be taken out today. Nurse explained to patient that MD will  assess the chest tube  and order for chest xray. Patient was very upset. Three hours later,nurse  found chest tube on the floor. Patient pulled out chest tube. Respirations are even and unlabored. Pt denies any distress. Patient is alert and oriented. Dr Aggie Whipple notified. MD says to applied  Vaseline guaze on the site. Site is intact and dry. Will monitor.

## 2020-11-02 NOTE — PROGRESS NOTES
Discharge instructions covered thoroughly with patient. All questions answered to satisfaction. All belongings sent home with patient. Patient insists on leaving by walking and his girlfriend will accompany him home. Follow up instructions given as well as MD contact info. Encouraged to obtain a PCP and f/u with urgent care or ED should he experience worsening symptoms. Patient verbalized understanding.

## 2020-11-02 NOTE — DISCHARGE INSTRUCTIONS
Patient Education     Discharge Instructions/Follow-up Plans:   MD Instructions: Follow-up with your PCP as needed  Diet - as tolerated  Activity - ambulate - as tolerated  Cough and deep breathing exercises     No driving while taking narcotics. Do not drink alcohol while taking narcotics. Resume other home medications. If problems or questions arise, please call our office at [phone]. Greater than 30 minutes were spent discharging the patient    Chest Tube Removal: What to Expect at [address] [facility]     A chest tube is placed through the chest wall between two ribs. You may have had a chest tube put in to help your collapsed lung expand. Or the tube may have helped drain fluid from a chest infection or surgery. The tube was removed before you came home. You may have some pain in your chest from the cut (incision) where the tube was put in. For most people, the pain goes away after about 2 weeks. You will have a bandage taped over the wound. Your doctor will remove the bandage and examine the wound in about 2 days. It will take about 3 to 4 weeks for your incision to heal completely. It may leave a small scar that will fade with time. This care sheet gives you a general idea about how long it will take for you to recover. But each person recovers at a different pace. Follow the steps below to feel better as quickly as possible. How can you care for yourself at home? Activity    · Rest when you feel tired. Getting enough sleep will help you recover.     · Try to walk each day. Start by walking a little more than you did the day before. Bit by bit, increase the amount you walk. Walking boosts blood flow and helps prevent pneumonia and constipation.     · Avoid strenuous activities, such as bicycle riding, jogging, weight lifting, or aerobic exercise, until your doctor says it is okay.     · How soon you can return to work or your normal routine depends on what health problems you have. Talk with your doctor about how long it will take you to recover.     · You may shower after your bandage is removed. Pat the cut (incision) dry. Do not take a bath for 2 weeks after your chest tube is out, or until your doctor tells you it is okay.     · Practice deep breathing exercises as directed by your doctor. Coughing exercises also can help drain fluid out of your chest.   Diet    · You can eat your normal diet. If your stomach is upset, try bland, low-fat foods like plain rice, broiled chicken, toast, and yogurt.     · Drink plenty of fluids (unless your doctor tells you not to). Medicines    · Your doctor will tell you if and when you can restart your medicines. You will also get instructions about taking any new medicines.     · If you take aspirin or some other blood thinner, ask your doctor if and when to start taking it again. Make sure that you understand exactly what your doctor wants you to do.     · Be safe with medicines. Take pain medicines exactly as directed. ? If the doctor gave you a prescription medicine for pain, take it as prescribed. ? If you are not taking a prescription pain medicine, ask your doctor if you can take an over-the-counter medicine.     · Take your antibiotics as directed. Do not stop taking them just because you feel better. You need to take the full course of antibiotics. Incision care    · Keep the incision dry as it heals. You will have a bandage over it to help the incision heal and protect it. Your doctor will tell you how to take care of this. Other instructions    · Do not smoke. Smoking makes lung problems worse. If you need help quitting, talk to your doctor about stop-smoking programs and medicines. These can increase your chances of quitting for good. Follow-up care is a key part of your treatment and safety. Be sure to make and go to all appointments, and call your doctor if you are having problems.  It's also a good idea to know your test results and keep a list of the medicines you take. When should you call for help? Call 911 anytime you think you may need emergency care. For example, call if:    · You passed out (lost consciousness).     · You have severe trouble breathing.     · You have sudden chest pain and shortness of breath, or you cough up blood. Call your doctor now or seek immediate medical care if:    · You have trouble breathing.     · Your shortness of breath is getting worse.     · Bright red blood soaks through the bandage over your incision.     · You have a fever. Watch closely for any changes in your health, and be sure to contact your doctor if:    · You do not get better as expected. Where can you learn more? Go to http://www.gray.com/  Enter U748 in the search box to learn more about \"Chest Tube Removal: What to Expect at Home. \"  Current as of: February 24, 2020               Content Version: 12.6  © 6855-1082 [employer]. Care instructions adapted under license by [employer] (which disclaims liability or warranty for this information). If you have questions about a medical condition or this instruction, always ask your healthcare professional. [place] [address] any warranty or liability for your use of this information.

## 2020-11-02 NOTE — DISCHARGE SUMMARY
[facility] [address], [address] [facility]  [phone]   Discharge Summary     Bella Ramos  MRN: [id number]     : 1970     Age: 48 y.o. Admit date: 10/30/2020     Discharge date: 2020  Attending Physician: Som Jacob MD  Primary Discharge Diagnosis:   Principal Problem:    Fall with significant injury (10/30/2020)      Primary Operations or Procedures Performed :  * No surgery found *     Brief History and Reason for Admission: Bella Ramos was admitted with the following history of present illness. Bella Ramos is a 48 y.o. male with a past medical history of GERD who presented to the ED 10/30/20 with c/o mild shortness of breath and pain to Left flank and rib area after falling through his attic approximately 10 feet and landing on his left side 24 hours ago. Denies LOC.      10/30/20 CT Chest  CT CHEST without CONTRAST.     INDICATION: Pain following fall from attic.     COMPARISON: None.     TECHNIQUE:   5 mm axial scans from the apices through the diaphragms without  contrast. Radiation dose reduction techniques were used for this study.  Our CT  scanners use one or more of the following:  Automated exposure control,  adjustment of the mA and or kV according to patient size, iterative  reconstruction.     FINDINGS:   LUNGS: Small right pneumothorax. There is a pneumomediastinum. Moderate  subcutaneous gas on the left. No pulmonary nodules.   No airspace disease. AIRWAYS: Trachea and proximal bronchi grossly patent. PLEURA: Small pleural fluid collection on the left. LYMPH NODES: No enlarged axillary, hilar or mediastinal lymph nodes. HEART: Normal size. CORONARIES: Mild  calcifications. UPPER ABDOMEN: Normal size adrenal glands. Fatty infiltration of liver. SKELETAL/CHEST WALL: Multiple acute left-sided rib fractures, numbers 4, 5, 6, 7  and 8. Remote healed fractures of ribs 9, 10 and 11. Sternum is intact. Spinal  alignment appears anatomic. No spinal fracture.     IMPRESSION  IMPRESSION:  Small pneumothorax and pneumomediastinum and subcutaneous gas on  the left due to multiple left-sided rib fractures.      10/30/20  XR Left Shoulder  Left Shoulder      INDICATION: Shoulder pain     Three views of the left shoulder were obtained      FINDINGS: Left shoulder is normally located.  Proximal humerus is intact. Scapular evaluation is somewhat limited by extensive subcutaneous air.  There  are multiple left rib fractures involving at least the fourth-sixth ribs.     XR of Left Shoulder  IMPRESSION:   1.  No evidence of shoulder fracture or dislocation. 2.  Multiple left rib fractures with extensive subcutaneous air. Hospital Course: The patient pulled out his chest tube. Follow up chest xray was stable. His only complaint was pain from rib fractures. He is stable for discharge. The patient progressed satisfactorily meeting milestones necessary for successful discharge including tolerating a diet, adequate mobility, adequate pain control, and active bowel function. Patient was deemed a good candidate for discharge at the time of morning rounding. They are to follow up as indicated in their provided discharge paperwork. The patient helped develop and voices understanding with the plan of care. They are amenable without reservations at this time to moving forward with discharge. Condition at Discharge: Good    Discharge Medications:   Current Discharge Medication List      START taking these medications    Details   HYDROcodone-acetaminophen (Norco) 5-325 mg per tablet Take 1 Tab by mouth every four (4) hours as needed for Pain for up to 5 days. Max Daily Amount: 6 Tabs.   Qty: 30 Tab, Refills: 0    Associated Diagnoses: Fall with significant injury, subsequent encounter         CONTINUE these medications which have NOT CHANGED    Details   cyclobenzaprine (FLEXERIL) 10 mg tablet Take 1 Tab by mouth three (3) times daily as needed for Muscle Spasm(s). Qty: 15 Tab, Refills: 0    Associated Diagnoses: Acute left flank pain         STOP taking these medications       traMADol (ULTRAM) 50 mg tablet Comments:   Reason for Stopping:                 Disposition: Home    Discharge Instructions/Follow-up Care:      MD Instructions: Follow-up with your PCP as needed  Diet - as tolerated  Activity - ambulate - as tolerated  Cough and deep breathing exercises     No driving while taking narcotics. Do not drink alcohol while taking narcotics. Resume other home medications. If problems or questions arise, please call our office at [phone].      Greater than 30 minutes were spent discharging the patient      Signed:  Trevon Chao NP   11/2/2020  2:35 PM

## 2020-11-02 NOTE — PROGRESS NOTES
Problem: Falls - Risk of  Goal: *Absence of Falls  Description: Document Ele Ruth Fall Risk and appropriate interventions in the flowsheet.   Outcome: Progressing Towards Goal  Note: Fall Risk Interventions:  Mobility Interventions: Bed/chair exit alarm         Medication Interventions: Bed/chair exit alarm, Teach patient to arise slowly    Elimination Interventions: Call light in reach    History of Falls Interventions: Consult care management for discharge planning, Door open when patient unattended         Problem: Patient Education: Go to Patient Education Activity  Goal: Patient/Family Education  Outcome: Progressing Towards Goal

## 2020-11-03 ENCOUNTER — APPOINTMENT (OUTPATIENT)
Dept: GENERAL RADIOLOGY | Age: 50
DRG: 871 | End: 2020-11-03
Attending: EMERGENCY MEDICINE

## 2020-11-03 ENCOUNTER — APPOINTMENT (OUTPATIENT)
Dept: CT IMAGING | Age: 50
DRG: 871 | End: 2020-11-03
Attending: EMERGENCY MEDICINE

## 2020-11-03 ENCOUNTER — HOSPITAL ENCOUNTER (INPATIENT)
Age: 50
LOS: 9 days | Discharge: HOME | DRG: 871 | End: 2020-11-12
Attending: EMERGENCY MEDICINE | Admitting: INTERNAL MEDICINE

## 2020-11-03 DIAGNOSIS — R00.0 SINUS TACHYCARDIA: ICD-10-CM

## 2020-11-03 DIAGNOSIS — J94.8 HYDROPNEUMOTHORAX: ICD-10-CM

## 2020-11-03 DIAGNOSIS — E87.6 HYPOKALEMIA: ICD-10-CM

## 2020-11-03 DIAGNOSIS — J90 LOCULATED PLEURAL EFFUSION: ICD-10-CM

## 2020-11-03 DIAGNOSIS — J18.9 PNEUMONIA OF LEFT LUNG DUE TO INFECTIOUS ORGANISM, UNSPECIFIED PART OF LUNG: Primary | ICD-10-CM

## 2020-11-03 DIAGNOSIS — B95.61 MSSA BACTEREMIA: ICD-10-CM

## 2020-11-03 DIAGNOSIS — S22.42XG CLOSED FRACTURE OF MULTIPLE RIBS OF LEFT SIDE WITH DELAYED HEALING, SUBSEQUENT ENCOUNTER: ICD-10-CM

## 2020-11-03 DIAGNOSIS — R78.81 MSSA BACTEREMIA: ICD-10-CM

## 2020-11-03 DIAGNOSIS — E87.20 LACTIC ACIDOSIS: ICD-10-CM

## 2020-11-03 DIAGNOSIS — E83.42 HYPOMAGNESEMIA: ICD-10-CM

## 2020-11-03 DIAGNOSIS — E87.1 HYPONATREMIA: ICD-10-CM

## 2020-11-03 PROBLEM — D72.829 LEUKOCYTOSIS: Status: ACTIVE | Noted: 2020-11-03

## 2020-11-03 PROBLEM — A41.9 SEPSIS (HCC): Status: ACTIVE | Noted: 2020-11-03

## 2020-11-03 LAB
ALBUMIN SERPL-MCNC: 2.7 G/DL (ref 3.5–5)
ALBUMIN/GLOB SERPL: 0.7 {RATIO} (ref 1.2–3.5)
ALP SERPL-CCNC: 73 U/L (ref 50–136)
ALT SERPL-CCNC: 47 U/L (ref 12–65)
ANION GAP SERPL CALC-SCNC: 9 MMOL/L (ref 7–16)
APTT PPP: 35.7 SEC (ref 24.1–35.1)
AST SERPL-CCNC: 53 U/L (ref 15–37)
BASOPHILS # BLD: 0.1 K/UL (ref 0–0.2)
BASOPHILS NFR BLD: 1 % (ref 0–2)
BILIRUB SERPL-MCNC: 3.6 MG/DL (ref 0.2–1.1)
BUN SERPL-MCNC: 9 MG/DL (ref 6–23)
CALCIUM SERPL-MCNC: 8.7 MG/DL (ref 8.3–10.4)
CHLORIDE SERPL-SCNC: 88 MMOL/L (ref 98–107)
CO2 SERPL-SCNC: 31 MMOL/L (ref 21–32)
CREAT SERPL-MCNC: 0.88 MG/DL (ref 0.8–1.5)
DIFFERENTIAL METHOD BLD: ABNORMAL
EOSINOPHIL # BLD: 0.1 K/UL (ref 0–0.8)
EOSINOPHIL NFR BLD: 1 % (ref 0.5–7.8)
ERYTHROCYTE [DISTWIDTH] IN BLOOD BY AUTOMATED COUNT: 11.8 % (ref 11.9–14.6)
GLOBULIN SER CALC-MCNC: 4.1 G/DL (ref 2.3–3.5)
GLUCOSE SERPL-MCNC: 176 MG/DL (ref 65–100)
HCT VFR BLD AUTO: 40.4 % (ref 41.1–50.3)
HGB BLD-MCNC: 15.1 G/DL (ref 13.6–17.2)
IMM GRANULOCYTES # BLD AUTO: 0.4 K/UL (ref 0–0.5)
IMM GRANULOCYTES NFR BLD AUTO: 3 % (ref 0–5)
INR PPP: 1.2
LACTATE SERPL-SCNC: 1.6 MMOL/L (ref 0.4–2)
LACTATE SERPL-SCNC: 2.8 MMOL/L (ref 0.4–2)
LIPASE SERPL-CCNC: 29 U/L (ref 73–393)
LYMPHOCYTES # BLD: 0.6 K/UL (ref 0.5–4.6)
LYMPHOCYTES NFR BLD: 5 % (ref 13–44)
MCH RBC QN AUTO: 41.9 PG (ref 26.1–32.9)
MCHC RBC AUTO-ENTMCNC: 37.4 G/DL (ref 31.4–35)
MCV RBC AUTO: 112.2 FL (ref 79.6–97.8)
MONOCYTES # BLD: 1.8 K/UL (ref 0.1–1.3)
MONOCYTES NFR BLD: 14 % (ref 4–12)
NEUTS SEG # BLD: 9.9 K/UL (ref 1.7–8.2)
NEUTS SEG NFR BLD: 76 % (ref 43–78)
NRBC # BLD: 0 K/UL (ref 0–0.2)
PLATELET # BLD AUTO: 143 K/UL (ref 150–450)
PLATELET COMMENTS,PCOM: SLIGHT
PMV BLD AUTO: 10.5 FL (ref 9.4–12.3)
POTASSIUM SERPL-SCNC: 3.4 MMOL/L (ref 3.5–5.1)
PROCALCITONIN SERPL-MCNC: 18.54 NG/ML
PROT SERPL-MCNC: 6.8 G/DL (ref 6.3–8.2)
PROTHROMBIN TIME: 15.2 SEC (ref 12.5–14.7)
RBC # BLD AUTO: 3.6 M/UL (ref 4.23–5.6)
RBC MORPH BLD: ABNORMAL
SODIUM SERPL-SCNC: 128 MMOL/L (ref 138–145)
WBC # BLD AUTO: 12.9 K/UL (ref 4.3–11.1)
WBC MORPH BLD: ABNORMAL

## 2020-11-03 PROCEDURE — 85025 COMPLETE CBC W/AUTO DIFF WBC: CPT

## 2020-11-03 PROCEDURE — 99285 EMERGENCY DEPT VISIT HI MDM: CPT

## 2020-11-03 PROCEDURE — 71045 X-RAY EXAM CHEST 1 VIEW: CPT

## 2020-11-03 PROCEDURE — 96365 THER/PROPH/DIAG IV INF INIT: CPT

## 2020-11-03 PROCEDURE — 93005 ELECTROCARDIOGRAM TRACING: CPT | Performed by: EMERGENCY MEDICINE

## 2020-11-03 PROCEDURE — 2709999900 HC NON-CHARGEABLE SUPPLY

## 2020-11-03 PROCEDURE — 84145 PROCALCITONIN (PCT): CPT

## 2020-11-03 PROCEDURE — 80053 COMPREHEN METABOLIC PANEL: CPT

## 2020-11-03 PROCEDURE — 74011000636 HC RX REV CODE- 636: Performed by: EMERGENCY MEDICINE

## 2020-11-03 PROCEDURE — 74011000258 HC RX REV CODE- 258: Performed by: EMERGENCY MEDICINE

## 2020-11-03 PROCEDURE — 87205 SMEAR GRAM STAIN: CPT

## 2020-11-03 PROCEDURE — 36415 COLL VENOUS BLD VENIPUNCTURE: CPT

## 2020-11-03 PROCEDURE — 83605 ASSAY OF LACTIC ACID: CPT

## 2020-11-03 PROCEDURE — 85730 THROMBOPLASTIN TIME PARTIAL: CPT

## 2020-11-03 PROCEDURE — 96366 THER/PROPH/DIAG IV INF ADDON: CPT

## 2020-11-03 PROCEDURE — 71260 CT THORAX DX C+: CPT

## 2020-11-03 PROCEDURE — 87077 CULTURE AEROBIC IDENTIFY: CPT

## 2020-11-03 PROCEDURE — 77030040361 HC SLV COMPR DVT MDII -B

## 2020-11-03 PROCEDURE — 74011250636 HC RX REV CODE- 250/636: Performed by: INTERNAL MEDICINE

## 2020-11-03 PROCEDURE — 77030027138 HC INCENT SPIROMETER -A

## 2020-11-03 PROCEDURE — 83690 ASSAY OF LIPASE: CPT

## 2020-11-03 PROCEDURE — 85610 PROTHROMBIN TIME: CPT

## 2020-11-03 PROCEDURE — 65270000029 HC RM PRIVATE

## 2020-11-03 PROCEDURE — 96375 TX/PRO/DX INJ NEW DRUG ADDON: CPT

## 2020-11-03 PROCEDURE — 74011250637 HC RX REV CODE- 250/637: Performed by: INTERNAL MEDICINE

## 2020-11-03 PROCEDURE — 87040 BLOOD CULTURE FOR BACTERIA: CPT

## 2020-11-03 PROCEDURE — 96367 TX/PROPH/DG ADDL SEQ IV INF: CPT

## 2020-11-03 PROCEDURE — 74011250636 HC RX REV CODE- 250/636: Performed by: EMERGENCY MEDICINE

## 2020-11-03 PROCEDURE — 87150 DNA/RNA AMPLIFIED PROBE: CPT

## 2020-11-03 PROCEDURE — 87186 SC STD MICRODIL/AGAR DIL: CPT

## 2020-11-03 RX ORDER — HYDROMORPHONE HYDROCHLORIDE 1 MG/ML
1 INJECTION, SOLUTION INTRAMUSCULAR; INTRAVENOUS; SUBCUTANEOUS ONCE
Status: COMPLETED | OUTPATIENT
Start: 2020-11-03 | End: 2020-11-03

## 2020-11-03 RX ORDER — POLYETHYLENE GLYCOL 3350 17 G/17G
17 POWDER, FOR SOLUTION ORAL DAILY PRN
Status: DISCONTINUED | OUTPATIENT
Start: 2020-11-03 | End: 2020-11-12 | Stop reason: HOSPADM

## 2020-11-03 RX ORDER — POTASSIUM CHLORIDE 20 MEQ/1
40 TABLET, EXTENDED RELEASE ORAL
Status: COMPLETED | OUTPATIENT
Start: 2020-11-03 | End: 2020-11-03

## 2020-11-03 RX ORDER — SODIUM CHLORIDE 0.9 % (FLUSH) 0.9 %
5-40 SYRINGE (ML) INJECTION AS NEEDED
Status: DISCONTINUED | OUTPATIENT
Start: 2020-11-03 | End: 2020-11-12 | Stop reason: HOSPADM

## 2020-11-03 RX ORDER — ACETAMINOPHEN 650 MG/1
650 SUPPOSITORY RECTAL
Status: DISCONTINUED | OUTPATIENT
Start: 2020-11-03 | End: 2020-11-07

## 2020-11-03 RX ORDER — SODIUM CHLORIDE 9 MG/ML
125 INJECTION, SOLUTION INTRAVENOUS CONTINUOUS
Status: DISCONTINUED | OUTPATIENT
Start: 2020-11-03 | End: 2020-11-06

## 2020-11-03 RX ORDER — MORPHINE SULFATE 2 MG/ML
4 INJECTION, SOLUTION INTRAMUSCULAR; INTRAVENOUS ONCE
Status: COMPLETED | OUTPATIENT
Start: 2020-11-03 | End: 2020-11-03

## 2020-11-03 RX ORDER — KETOROLAC TROMETHAMINE 15 MG/ML
15 INJECTION, SOLUTION INTRAMUSCULAR; INTRAVENOUS
Status: DISPENSED | OUTPATIENT
Start: 2020-11-03 | End: 2020-11-08

## 2020-11-03 RX ORDER — HYDROMORPHONE HYDROCHLORIDE 1 MG/ML
0.5 INJECTION, SOLUTION INTRAMUSCULAR; INTRAVENOUS; SUBCUTANEOUS
Status: DISCONTINUED | OUTPATIENT
Start: 2020-11-03 | End: 2020-11-11

## 2020-11-03 RX ORDER — SODIUM CHLORIDE 0.9 % (FLUSH) 0.9 %
10 SYRINGE (ML) INJECTION
Status: COMPLETED | OUTPATIENT
Start: 2020-11-03 | End: 2020-11-03

## 2020-11-03 RX ORDER — AMOXICILLIN AND CLAVULANATE POTASSIUM 875; 125 MG/1; MG/1
1 TABLET, FILM COATED ORAL EVERY 12 HOURS
Status: DISCONTINUED | OUTPATIENT
Start: 2020-11-03 | End: 2020-11-04

## 2020-11-03 RX ORDER — ONDANSETRON 2 MG/ML
4 INJECTION INTRAMUSCULAR; INTRAVENOUS
Status: DISCONTINUED | OUTPATIENT
Start: 2020-11-03 | End: 2020-11-12 | Stop reason: HOSPADM

## 2020-11-03 RX ORDER — ONDANSETRON 4 MG/1
4 TABLET, ORALLY DISINTEGRATING ORAL
Status: DISCONTINUED | OUTPATIENT
Start: 2020-11-03 | End: 2020-11-12 | Stop reason: HOSPADM

## 2020-11-03 RX ORDER — SODIUM CHLORIDE 0.9 % (FLUSH) 0.9 %
5-40 SYRINGE (ML) INJECTION EVERY 8 HOURS
Status: DISCONTINUED | OUTPATIENT
Start: 2020-11-03 | End: 2020-11-12 | Stop reason: HOSPADM

## 2020-11-03 RX ORDER — OXYCODONE AND ACETAMINOPHEN 10; 325 MG/1; MG/1
1 TABLET ORAL
Status: DISCONTINUED | OUTPATIENT
Start: 2020-11-03 | End: 2020-11-12

## 2020-11-03 RX ORDER — NALOXONE HYDROCHLORIDE 0.4 MG/ML
0.4 INJECTION, SOLUTION INTRAMUSCULAR; INTRAVENOUS; SUBCUTANEOUS AS NEEDED
Status: DISCONTINUED | OUTPATIENT
Start: 2020-11-03 | End: 2020-11-12 | Stop reason: HOSPADM

## 2020-11-03 RX ORDER — ACETAMINOPHEN 325 MG/1
650 TABLET ORAL
Status: DISCONTINUED | OUTPATIENT
Start: 2020-11-03 | End: 2020-11-07

## 2020-11-03 RX ORDER — VANCOMYCIN 2 GRAM/500 ML IN 0.9 % SODIUM CHLORIDE INTRAVENOUS
2000
Status: COMPLETED | OUTPATIENT
Start: 2020-11-03 | End: 2020-11-03

## 2020-11-03 RX ADMIN — POTASSIUM CHLORIDE 40 MEQ: 20 TABLET, EXTENDED RELEASE ORAL at 16:23

## 2020-11-03 RX ADMIN — AMOXICILLIN AND CLAVULANATE POTASSIUM 1 TABLET: 875; 125 TABLET, FILM COATED ORAL at 21:48

## 2020-11-03 RX ADMIN — SODIUM CHLORIDE 125 ML/HR: 900 INJECTION, SOLUTION INTRAVENOUS at 17:30

## 2020-11-03 RX ADMIN — HYDROMORPHONE HYDROCHLORIDE 1 MG: 1 INJECTION, SOLUTION INTRAMUSCULAR; INTRAVENOUS; SUBCUTANEOUS at 13:27

## 2020-11-03 RX ADMIN — CEFEPIME HYDROCHLORIDE 2 G: 2 INJECTION, POWDER, FOR SOLUTION INTRAVENOUS at 11:54

## 2020-11-03 RX ADMIN — SODIUM CHLORIDE 100 ML: 900 INJECTION, SOLUTION INTRAVENOUS at 13:51

## 2020-11-03 RX ADMIN — IOPAMIDOL 100 ML: 755 INJECTION, SOLUTION INTRAVENOUS at 13:51

## 2020-11-03 RX ADMIN — SODIUM CHLORIDE 1000 ML: 900 INJECTION, SOLUTION INTRAVENOUS at 11:57

## 2020-11-03 RX ADMIN — OXYCODONE HYDROCHLORIDE AND ACETAMINOPHEN 1 TABLET: 10; 325 TABLET ORAL at 17:33

## 2020-11-03 RX ADMIN — OXYCODONE HYDROCHLORIDE AND ACETAMINOPHEN 1 TABLET: 10; 325 TABLET ORAL at 23:39

## 2020-11-03 RX ADMIN — Medication 10 ML: at 17:30

## 2020-11-03 RX ADMIN — MORPHINE SULFATE 4 MG: 2 INJECTION, SOLUTION INTRAMUSCULAR; INTRAVENOUS at 11:55

## 2020-11-03 RX ADMIN — HYDROMORPHONE HYDROCHLORIDE 0.5 MG: 1 INJECTION, SOLUTION INTRAMUSCULAR; INTRAVENOUS; SUBCUTANEOUS at 21:47

## 2020-11-03 RX ADMIN — HYDROMORPHONE HYDROCHLORIDE 1 MG: 1 INJECTION, SOLUTION INTRAMUSCULAR; INTRAVENOUS; SUBCUTANEOUS at 16:22

## 2020-11-03 RX ADMIN — Medication 10 ML: at 13:51

## 2020-11-03 RX ADMIN — Medication 10 ML: at 21:51

## 2020-11-03 RX ADMIN — VANCOMYCIN HYDROCHLORIDE 2000 MG: 10 INJECTION, POWDER, LYOPHILIZED, FOR SOLUTION INTRAVENOUS at 12:57

## 2020-11-03 NOTE — PROGRESS NOTES
11/03/20 1710   Dual Skin Pressure Injury Assessment   Dual Skin Pressure Injury Assessment WDL   Second Care Provider (Based on [address] [place] [address]) Gilles Canary   Skin Integumentary   Skin Integumentary (WDL) X    Pressure  Injury Documentation No Pressure Injury Noted-Pressure Ulcer Prevention Initiated   Skin Color Appropriate for ethnicity   Skin Condition/Temp Dry; Warm   Skin Integrity Incision (comment)  (Left lateral chest dsg d/I)   Wound Prevention and Protection Methods   Orientation of Wound Prevention Posterior   Location of Wound Prevention Sacrum/Coccyx   Dressing Present  No   Wound Offloading (Prevention Methods) Repositioning

## 2020-11-03 NOTE — H&P
Hospitalist Note     Admit Date:  11/3/2020 11:31 AM   Name:  Tj Erazo   Age:  48 y.o.  :  1970   MRN:  [id number]   PCP:  None  Treatment Team: Attending Provider: Amaya Varghese MD; Primary Nurse: Tata Duque    HPI/Subjective:   Mr. Anthony Gr is a 47 y/o WM with a h/o GERD who was admitted to the surgical service on 10/30 for traumatic PTX and rib fractures after falling from the attic while attempting to do some home repairs. Chest tube placed in the ER. Reportedly removed his CT over the weekend. F/u CXR was unchanged. CT shoulder shows a coracoid fracture, seen by Ortho and planned for sling and conservative management. He was discharged home with pain medications on . He was only able to get Norco filled, not his Flexeril (said it \"wasn't ready\"). He's had progressive pain and SOB since then. No fevers, chills, sweats, chest pain, hemoptysis, N/V/D, abdominal pain. Intermittent cough which is apparently his baseline due to tobacco abuse. Main reason for returning is progression of pain. CXR shows new effusion. CT chest with loculated hydropneumothorax. Surgery has reviewed imaging and no indication for intervention or repeat chest tube placement at this point. Labs with hypoNa, hypoK, WBCs 13K (normal neutrophil count), LA 2.8, Tm 100.3F. Hospitalist consulted for admission for pain control. 10 systems reviewed and negative except as noted in HPI. Past Medical History:   Diagnosis Date    GERD (gastroesophageal reflux disease)     once a week OTC med      Past Surgical History:   Procedure Laterality Date    HX HEENT      wisdom teeth      No Known Allergies   Social History     Tobacco Use    Smoking status: Current Every Day Smoker     Packs/day: 1.00     Years: 20.00     Pack years: 20.00    Smokeless tobacco: Never Used   Substance Use Topics    Alcohol use: Yes     Alcohol/week: 6.7 standard drinks     Types: 8 Shots of liquor per week      No family history on file. Family history reviewed and noncontributory. Immunization History   Administered Date(s) Administered    TDAP Vaccine 09/01/2011     PTA Medications:  Current Outpatient Medications   Medication Instructions    cyclobenzaprine (FLEXERIL) 10 mg, Oral, 3 TIMES DAILY AS NEEDED    HYDROcodone-acetaminophen (Norco) 5-325 mg per tablet 1 Tab, Oral, EVERY 4 HOURS AS NEEDED       Objective:     Patient Vitals for the past 24 hrs:   Temp Pulse Resp BP SpO2   11/03/20 1435 -- (!) 124 -- -- 98 %   11/03/20 1400 -- (!) 120 20 (!) 140/87 96 %   11/03/20 1314 -- (!) 123 18 (!) 162/103 95 %   11/03/20 1259 -- (!) 127 (!) 61 135/85 98 %   11/03/20 1213 -- (!) 124 29 125/82 97 %   11/03/20 1158 -- (!) 118 20 (!) 130/94 98 %   11/03/20 1147 -- (!) 126 -- (!) 126/90 94 %   11/03/20 1124 100.3 °F (37.9 °C) (!) 141 22 118/77 94 %     Oxygen Therapy  O2 Sat (%): 98 % (11/03/20 1435)  Pulse via Oximetry: 124 beats per minute (11/03/20 1435)  O2 Device: Nasal cannula (11/03/20 1400)  O2 Flow Rate (L/min): 2 l/min (11/03/20 1400)    Estimated body mass index is 26.54 kg/m² as calculated from the following:    Height as of this encounter: 5' 10\" (1.778 m). Weight as of this encounter: 83.9 kg (185 lb). No intake or output data in the 24 hours ending 11/03/20 1526    *Note that automatically entered I/Os may not be accurate; dependent on patient compliance with collection and accurate  by assistants. Physical Exam:  General:    Well nourished. No overt distress. Eyes:   Normal sclerae. Extraocular movements intact. HENT:  Normocephalic, atraumatic. Moist mucous membranes  CV:   Tachycardia, reg rhythm. No m/r/g. No edema  Lungs:  Rhonchi mid left fields, decreased towards the base. Clear on the right. 2L NC O2. Abdomen: Soft, nontender, nondistended. Extremities: Warm and dry. No cyanosis or clubbing  Neurologic: CN II-XII grossly intact. Sensation intact. Skin:     No rashes. No jaundice.   Normal coloration. Extensive ecchymoses over left flank and above hip, less so in the midline near the waist line. Gauze bandage in place over left lateral chest from old CT site. Tenderness to palpation of the area. Psych:  Normal mood and affect. I reviewed the labs, imaging, EKGs, telemetry, and other studies done this admission. Data Reviewed:   Recent Results (from the past 24 hour(s))   CBC WITH AUTOMATED DIFF    Collection Time: 11/03/20 11:48 AM   Result Value Ref Range    WBC 12.9 (H) 4.3 - 11.1 K/uL    RBC 3.60 (L) 4.23 - 5.6 M/uL    HGB 15.1 13.6 - 17.2 g/dL    HCT 40.4 (L) 41.1 - 50.3 %    .2 (H) 79.6 - 97.8 FL    MCH 41.9 (H) 26.1 - 32.9 PG    MCHC 37.4 (H) 31.4 - 35.0 g/dL    RDW 11.8 (L) 11.9 - 14.6 %    PLATELET 711 (L) 981 - 450 K/uL    MPV 10.5 9.4 - 12.3 FL    ABSOLUTE NRBC 0.00 0.0 - 0.2 K/uL    NEUTROPHILS 76 43 - 78 %    LYMPHOCYTES 5 (L) 13 - 44 %    MONOCYTES 14 (H) 4.0 - 12.0 %    EOSINOPHILS 1 0.5 - 7.8 %    BASOPHILS 1 0.0 - 2.0 %    IMMATURE GRANULOCYTES 3 0.0 - 5.0 %    ABS. NEUTROPHILS 9.9 (H) 1.7 - 8.2 K/UL    ABS. LYMPHOCYTES 0.6 0.5 - 4.6 K/UL    ABS. MONOCYTES 1.8 (H) 0.1 - 1.3 K/UL    ABS. EOSINOPHILS 0.1 0.0 - 0.8 K/UL    ABS. BASOPHILS 0.1 0.0 - 0.2 K/UL    ABS. IMM. GRANS.  0.4 0.0 - 0.5 K/UL    RBC COMMENTS NORMOCYTIC/NORMOCHROMIC      WBC COMMENTS Result Confirmed By Smear      PLATELET COMMENTS SLIGHT      DF AUTOMATED     PROTHROMBIN TIME + INR    Collection Time: 11/03/20 11:48 AM   Result Value Ref Range    Prothrombin time 15.2 (H) 12.5 - 14.7 sec    INR 1.2     PTT    Collection Time: 11/03/20 11:48 AM   Result Value Ref Range    aPTT 35.7 (H) 24.1 - 35.1 SEC   LIPASE    Collection Time: 11/03/20 11:48 AM   Result Value Ref Range    Lipase 29 (L) 73 - 017 U/L   METABOLIC PANEL, COMPREHENSIVE    Collection Time: 11/03/20 11:48 AM   Result Value Ref Range    Sodium 128 (L) 138 - 145 mmol/L    Potassium 3.4 (L) 3.5 - 5.1 mmol/L    Chloride 88 (L) 98 - 107 mmol/L    CO2 31 21 - 32 mmol/L    Anion gap 9 7 - 16 mmol/L    Glucose 176 (H) 65 - 100 mg/dL    BUN 9 6 - 23 MG/DL    Creatinine 0.88 0.8 - 1.5 MG/DL    GFR est AA >60 >60 ml/min/1.73m2    GFR est non-AA >60 >60 ml/min/1.73m2    Calcium 8.7 8.3 - 10.4 MG/DL    Bilirubin, total 3.6 (H) 0.2 - 1.1 MG/DL    ALT (SGPT) 47 12 - 65 U/L    AST (SGOT) 53 (H) 15 - 37 U/L    Alk.  phosphatase 73 50 - 136 U/L    Protein, total 6.8 6.3 - 8.2 g/dL    Albumin 2.7 (L) 3.5 - 5.0 g/dL    Globulin 4.1 (H) 2.3 - 3.5 g/dL    A-G Ratio 0.7 (L) 1.2 - 3.5     LACTIC ACID    Collection Time: 11/03/20 11:48 AM   Result Value Ref Range    Lactic acid 2.8 (H) 0.4 - 2.0 MMOL/L       All Micro Results     Procedure Component Value Units Date/Time    CULTURE, BLOOD [[id number]] Collected:  11/03/20 1145    Order Status:  Completed Specimen:  Blood Updated:  11/03/20 1258    CULTURE, BLOOD [[id number]] Collected:  11/03/20 1205    Order Status:  Completed Specimen:  Blood Updated:  11/03/20 1220          Medications Administered     cefepime (MAXIPIME) 2 g in 0.9% sodium chloride (MBP/ADV) 100 mL     Admin Date  11/03/2020 Action  New Bag Dose  2 g Rate  200 mL/hr Route  IntraVENous Administered By  Dhaval Ferrzaynab          HYDROmorphone (PF) (DILAUDID) injection 1 mg     Admin Date  11/03/2020 Action  Given Dose  1 mg Route  IntraVENous Administered By  Sharon Counter          iopamidoL (ISOVUE-370) 76 % injection 100 mL     Admin Date  11/03/2020 Action  Given Dose  100 mL Route  IntraVENous Administered By  Jake Liu L, RT, R, CT          morphine injection 4 mg     Admin Date  11/03/2020 Action  Given Dose  4 mg Route  IntraVENous Administered By  Edgar MEYERS          saline peripheral flush soln 10 mL     Admin Date  11/03/2020 Action  Given Dose  10 mL Route  InterCATHeter Administered By  Jake Liu L, RT, R, CT          sodium chloride 0.9 % bolus infusion 1,000 mL     Admin Date  11/03/2020 Action  New Bag Dose  1000 mL Route  IntraVENous Administered By  Eulene El          sodium chloride 0.9 % bolus infusion 100 mL     Admin Date  11/03/2020 Action  New Bag Dose  100 mL Route  IntraVENous Administered By  Chelsie Kent, RT, R, CT          vancomycin (VANCOCIN) 2000 mg in  ml infusion     Admin Date  11/03/2020 Action  New Bag Dose  2000 mg Rate  250 mL/hr Route  IntraVENous Administered By  Martha Graff                Other Studies:  No results found for this visit on 11/03/20. Ct Chest W Cont    Result Date: 11/3/2020  PE protocol chest CT 11/3/2020 Comparison: Chest x-ray earlier today Indication: Fever, shortness of breath, recent fracture. Technique: Multiple contiguous 2.5 mm axial images were obtained through the pulmonary vasculature following uneventful administration of IV contrast, Isovue 370 100 cc. Intravenous contrast was used for better evaluation of solid organs and vascular structures. Radiation dose reduction techniques were used for this study:  Our CT scanners use one or all of the following: Automated exposure control, adjustment of the mA and/or kVp according to patient's size, iterative reconstruction. Findings: An adequate bolus opacifies the pulmonary vasculature. No evidence of pulmonary embolism. No evidence of aortic dissection. The main pulmonary artery is normal in caliber. Multilevel nondisplaced lateral left rib fractures. Extensive subcutaneous emphysema along the left chest wall. In addition, there appears to be loculated hydropneumothorax on the left with adjacent atelectasis. Right lung is clear. Heart size normal for age. Limited arterial phase evaluation of the upper abdomen is unremarkable. Review of bone windows demonstrates no aggressive appearing bony lesions. IMPRESSION: 1. Negative for pulmonary embolism. 2. Loculated hydropneumothorax on the left. Superimposed infection cannot be excluded.      Xr Chest Port    Result Date: 11/3/2020   Portable view of the chest 11/3/2020 Comparison: Chest x-ray 11/02/2020 and prior Indication: Shortness of breath FINDINGS: Mild cardiac enlargement progressive infiltrates throughout the left lung particularly peripherally of the left upper lobe. Suspected increase in left pleural effusion. No discrete pneumothorax identified. Left chest wall subcutaneous emphysema slightly improved. No discrete acute osseous lesion seen. IMPRESSION: Increasing left pleural effusion and left infiltrates. SARS-CoV-2 Lab Results  \"Novel Coronavirus\" Test: No results found for: COV2NT   \"Emergent Disease\" Test: No results found for: EDPR  \"SARS-COV-2\" Test: No results found for: XGCOVT  Rapid Test: No results found for: COVR           Assessment and Plan:     Hospital Problems as of 11/3/2020 Date Reviewed: 11/3/2020          Codes Class Noted - Resolved POA    Sinus tachycardia ICD-10-CM: R00.0  ICD-9-CM: 427.89  11/3/2020 - Present Yes        Leukocytosis ICD-10-CM: D72.829  ICD-9-CM: 288.60  11/3/2020 - Present Yes        * (Principal) Hydropneumothorax ICD-10-CM: J94.8  ICD-9-CM: 511.89  11/3/2020 - Present Yes        Loculated pleural effusion ICD-10-CM: J90  ICD-9-CM: 511.9  11/3/2020 - Present Yes        Lactic acidosis ICD-10-CM: E87.2  ICD-9-CM: 276.2  11/3/2020 - Present Yes        Hyponatremia ICD-10-CM: E87.1  ICD-9-CM: 276.1  11/3/2020 - Present Yes        Hypokalemia ICD-10-CM: E87.6  ICD-9-CM: 276.8  11/3/2020 - Present Yes              Plan:  # Sepsis likely 2/2 pulmonary source   - Tachycardia, lactic acidosis, leukocytosis with recent traumatic PTX now with effusion/hematoma on imaging. No surgical intervention recommended at this point. Overall he appears well. Pain control, Augmentin, add on PCT. # Lactic acidosis   - Repeat at 1800. # Hydropneumothorax   - As above. # HypoNa   - IVFs, BMP tomorrow. # HypoK   - Replace. BMP tomorrow. Discharge planning: Home when able. DVT ppx ordered: SCDs, ambulation.   Code status:  Full code.   Estimated LOS:  Greater than 2 midnights  Risk:  high    Signed:  Rosa Jameson MD

## 2020-11-03 NOTE — ED PROVIDER NOTES
49-year-old male presenting for worsening shortness of breath and rib pain. Recent hospitalizations for multiple rib fractures  Was discharged about 3 days ago tells me that he had his tramadol and Norco prescriptions filled but they would not fill the patient's muscle relaxer and said his pain is become extraordinarily high. He also feels increasingly short of breath and feverish. He fell from the attic about 4 days ago landing on his left side resulting in multiple rib fractures. He had a pneumothorax requiring chest tube placement. While he was in the hospital the chest tube got dislodged and a repeat chest x-ray thereafter showed that the lung remained inflated. He was eating drinking and had decent pain control so he was discharged home. Since then his pain is gradually worsened. He is become more short of breath and more weak. The history is provided by the patient. Rib Pain   This is a recurrent problem. The current episode started more than 2 days ago. The problem has been gradually worsening. Associated symptoms include a fever, cough, sputum production, wheezing and chest pain. Pertinent negatives include no headaches, no coryza, no rhinorrhea, no sore throat, no swollen glands, no ear pain, no neck pain, no hemoptysis, no PND, no orthopnea, no syncope, no vomiting, no abdominal pain, no rash, no leg pain, no leg swelling and no claudication. The problem's precipitants include medical treatment. He has tried nothing for the symptoms. The treatment provided no relief. He has had prior hospitalizations. He has had prior ED visits. He has had no prior ICU admissions. Associated medical issues comments: rib fractures. Shortness of Breath   This is a new problem. The current episode started 2 days ago. The problem has been gradually worsening. Associated symptoms include a fever, cough, sputum production, wheezing and chest pain.  Pertinent negatives include no headaches, no coryza, no rhinorrhea, no sore throat, no swollen glands, no ear pain, no neck pain, no hemoptysis, no PND, no orthopnea, no syncope, no vomiting, no abdominal pain, no rash, no leg pain, no leg swelling and no claudication. The problem's precipitants include medical treatment. He has tried nothing for the symptoms. The treatment provided no relief. He has had prior hospitalizations. He has had prior ED visits. He has had no prior ICU admissions. Associated medical issues comments: rib fractures. Past Medical History:   Diagnosis Date    GERD (gastroesophageal reflux disease)     once a week OTC med       Past Surgical History:   Procedure Laterality Date    HX HEENT      wisdom teeth         No family history on file. Social History     Socioeconomic History    Marital status: SINGLE     Spouse name: Not on file    Number of children: Not on file    Years of education: Not on file    Highest education level: Not on file   Occupational History    Not on file   Social Needs    Financial resource strain: Not on file    Food insecurity     Worry: Not on file     Inability: Not on file    Transportation needs     Medical: Not on file     Non-medical: Not on file   Tobacco Use    Smoking status: Current Every Day Smoker     Packs/day: 1.00     Years: 20.00     Pack years: 20.00    Smokeless tobacco: Never Used   Substance and Sexual Activity    Alcohol use:  Yes     Alcohol/week: 6.7 standard drinks     Types: 8 Shots of liquor per week    Drug use: No    Sexual activity: Not on file   Lifestyle    Physical activity     Days per week: Not on file     Minutes per session: Not on file    Stress: Not on file   Relationships    Social connections     Talks on phone: Not on file     Gets together: Not on file     Attends [employer] service: Not on file     Active member of club or organization: Not on file     Attends meetings of clubs or organizations: Not on file     Relationship status: Not on file   [address] [place] [address] Intimate partner violence     Fear of current or ex partner: Not on file     Emotionally abused: Not on file     Physically abused: Not on file     Forced sexual activity: Not on file   Other Topics Concern    Not on file   Social History Narrative    Not on file         ALLERGIES: Patient has no known allergies. Review of Systems   Constitutional: Positive for fever. HENT: Negative for ear pain, rhinorrhea and sore throat. Respiratory: Positive for cough, sputum production, chest tightness, shortness of breath and wheezing. Negative for hemoptysis. Cardiovascular: Positive for chest pain. Negative for orthopnea, claudication, leg swelling, syncope and PND. Gastrointestinal: Negative for abdominal pain and vomiting. Musculoskeletal: Negative for neck pain. Skin: Negative for rash. Neurological: Negative for headaches. All other systems reviewed and are negative. Vitals:    11/03/20 1124   BP: 118/77   Pulse: (!) 141   Resp: 22   Temp: 100.3 °F (37.9 °C)   SpO2: 94%   Weight: 83.9 kg (185 lb)   Height: 5' 10\" (1.778 m)            Physical Exam  Vitals signs and nursing note reviewed. Constitutional:       Appearance: He is well-developed. He is ill-appearing. HENT:      Head: Normocephalic and atraumatic. Eyes:      Conjunctiva/sclera: Conjunctivae normal.      Pupils: Pupils are equal, round, and reactive to light. Neck:      Musculoskeletal: Normal range of motion and neck supple. Cardiovascular:      Rate and Rhythm: Regular rhythm. Tachycardia present. Heart sounds: Normal heart sounds. Pulmonary:      Effort: Tachypnea present. Breath sounds: Examination of the left-upper field reveals rhonchi. Examination of the left-middle field reveals rhonchi. Examination of the left-lower field reveals rhonchi. Rhonchi present. Chest:      Chest wall: Tenderness present. Abdominal:      General: Bowel sounds are normal.      Palpations: Abdomen is soft. Musculoskeletal: Normal range of motion. General: No deformity. Skin:     General: Skin is warm and dry. Neurological:      Mental Status: He is alert and oriented to person, place, and time. Cranial Nerves: No cranial nerve deficit. Psychiatric:         Behavior: Behavior normal.          MDM  Number of Diagnoses or Management Options  Closed fracture of multiple ribs of left side with delayed healing, subsequent encounter:   Pneumonia of left lung due to infectious organism, unspecified part of lung:   Diagnosis management comments: 51-year-old male presenting for reevaluation of left-sided chest wall pain. Patient was found to be tachycardic and had a borderline fever. Given his recent rib fractures, worsening shortness of breath and to sirs criteria I am concerned the patient may have an underlying pneumonia. Ordering sepsis labs blood cultures and starting at antibiotics for HCAP. Other considerations are PE so we will get a CT of his chest which will evaluate the ribs, lungs, and rule out clot. I fully expect the patient to need hospitalization.        Amount and/or Complexity of Data Reviewed  Clinical lab tests: ordered and reviewed (Results for orders placed or performed during the hospital encounter of 11/03/20  -CBC WITH AUTOMATED DIFF       Result                      Value             Ref Range           WBC                         12.9 (H)          4.3 - 11.1 K*       RBC                         3.60 (L)          4.23 - 5.6 M*       HGB                         15.1              13.6 - 17.2 *       HCT                         40.4 (L)          41.1 - 50.3 %       MCV                         112.2 (H)         79.6 - 97.8 *       MCH                         41.9 (H)          26.1 - 32.9 *       MCHC                        37.4 (H)          31.4 - 35.0 *       RDW                         11.8 (L)          11.9 - 14.6 %       PLATELET                    143 (L)           150 - 450 K/*       MPV                         10.5              9.4 - 12.3 FL       ABSOLUTE NRBC               0.00              0.0 - 0.2 K/*       NEUTROPHILS                 76                43 - 78 %           LYMPHOCYTES                 5 (L)             13 - 44 %           MONOCYTES                   14 (H)            4.0 - 12.0 %        EOSINOPHILS                 1                 0.5 - 7.8 %         BASOPHILS                   1                 0.0 - 2.0 %         IMMATURE GRANULOCYTES       3                 0.0 - 5.0 %         ABS. NEUTROPHILS            9.9 (H)           1.7 - 8.2 K/*       ABS. LYMPHOCYTES            0.6               0.5 - 4.6 K/*       ABS. MONOCYTES              1.8 (H)           0.1 - 1.3 K/*       ABS. EOSINOPHILS            0.1               0.0 - 0.8 K/*       ABS. BASOPHILS              0.1               0.0 - 0.2 K/*       ABS. IMM.  GRANS.            0.4               0.0 - 0.5 K/*       RBC COMMENTS                                                  NORMOCYTIC/NORMOCHROMIC       WBC COMMENTS                                                  Result Confirmed By Smear       PLATELET COMMENTS           SLIGHT                                DF                          AUTOMATED                        -PROTHROMBIN TIME + INR       Result                      Value             Ref Range           Prothrombin time            15.2 (H)          12.5 - 14.7 *       INR                         1.2                              -PTT       Result                      Value             Ref Range           aPTT                        35.7 (H)          24.1 - 35.1 *  -LIPASE       Result                      Value             Ref Range           Lipase                      29 (L)            73 - 505 U/L   -METABOLIC PANEL, COMPREHENSIVE       Result                      Value             Ref Range           Sodium                      128 (L)           138 - 145 mm*       Potassium 3.4 (L)           3.5 - 5.1 mm*       Chloride                    88 (L)            98 - 107 mmo*       CO2                         31                21 - 32 mmol*       Anion gap                   9                 7 - 16 mmol/L       Glucose                     176 (H)           65 - 100 mg/*       BUN                         9                 6 - 23 MG/DL        Creatinine                  0.88              0.8 - 1.5 MG*       GFR est AA                  >60               >60 ml/min/1*       GFR est non-AA              >60               >60 ml/min/1*       Calcium                     8.7               8.3 - 10.4 M*       Bilirubin, total            3.6 (H)           0.2 - 1.1 MG*       ALT (SGPT)                  47                12 - 65 U/L         AST (SGOT)                  53 (H)            15 - 37 U/L         Alk. phosphatase            73                50 - 136 U/L        Protein, total              6.8               6.3 - 8.2 g/*       Albumin                     2.7 (L)           3.5 - 5.0 g/*       Globulin                    4.1 (H)           2.3 - 3.5 g/*       A-G Ratio                   0.7 (L)           1.2 - 3.5      -LACTIC ACID       Result                      Value             Ref Range           Lactic acid                 2.8 (H)           0.4 - 2.0 MM*  )  Tests in the radiology section of CPT®: ordered and reviewed (Xr Chest Sngl V    Result Date: 11/2/2020  Clinical history: Follow-up. TECHNIQUE: AP portable chest. COMPARISON: Yesterday. FINDINGS: Previous left chest tube has been removed. There is small left apical pneumothorax. There is left-sided subcutaneous emphysema. Bibasilar opacities, likely atelectasis. Cardiac mediastinal contour and the surrounding bones are stable. IMPRESSION: 1. Interval removal of left chest tube. Small left apical pneumothorax. 2. Bibasilar opacities, likely atelectasis.     Xr Chest Sngl V    Result Date: 11/1/2020   Portable view of the chest COMPARISON: Yesterday CLINICAL HISTORY: Follow-up. FINDINGS: Stable left-sided chest tube. There is no visible pneumothorax. There is left-sided subcutaneous emphysema. No evidence of pulmonary edema or large pleural effusion. Cardiac mediastinal contour and the surrounding bones are stable. IMPRESSION: 1. Stable left chest tube. No visible pneumothorax. 2. Left chest wall emphysema. Xr Chest Sngl V    Result Date: 10/31/2020  PORTABLE CHEST 1 VIEW HISTORY: Small pneumothorax and pneumomediastinum COMPARISON: October 30, 2020 FINDINGS: A left-sided chest tube has been repositioned. Subcutaneous air is present throughout the left chest wall and lower left neck. There is no new consolidation or mass effect. IMPRESSION: Repositioning of left-sided chest tube. Xr Shoulder Lt Ap/lat Min 2 V    Result Date: 10/30/2020  Left Shoulder INDICATION: Shoulder pain Three views of the left shoulder were obtained FINDINGS: Left shoulder is normally located. Proximal humerus is intact. Scapular evaluation is somewhat limited by extensive subcutaneous air. There are multiple left rib fractures involving at least the fourth-sixth ribs. IMPRESSION: 1. No evidence of shoulder fracture or dislocation. 2.  Multiple left rib fractures with extensive subcutaneous air. Ct Chest Wo Cont    Result Date: 10/30/2020  CT CHEST without CONTRAST. INDICATION: Pain following fall from attic. COMPARISON: None. TECHNIQUE:   5 mm axial scans from the apices through the diaphragms without contrast. Radiation dose reduction techniques were used for this study. Our CT scanners use one or more of the following:  Automated exposure control, adjustment of the mA and or kV according to patient size, iterative reconstruction. FINDINGS: LUNGS: Small right pneumothorax. There is a pneumomediastinum. Moderate subcutaneous gas on the left. No pulmonary nodules. No airspace disease. AIRWAYS: Trachea and proximal bronchi grossly patent. PLEURA: Small pleural fluid collection on the left. LYMPH NODES: No enlarged axillary, hilar or mediastinal lymph nodes. HEART: Normal size. CORONARIES: Mild  calcifications. UPPER ABDOMEN: Normal size adrenal glands. Fatty infiltration of liver. SKELETAL/CHEST WALL: Multiple acute left-sided rib fractures, numbers 4, 5, 6, 7 and 8. Remote healed fractures of ribs 9, 10 and 11. Sternum is intact. Spinal alignment appears anatomic. No spinal fracture. IMPRESSION:  Small pneumothorax and pneumomediastinum and subcutaneous gas on the left due to multiple left-sided rib fractures. Ct Chest W Cont    Result Date: 11/3/2020  PE protocol chest CT 11/3/2020 Comparison: Chest x-ray earlier today Indication: Fever, shortness of breath, recent fracture. Technique: Multiple contiguous 2.5 mm axial images were obtained through the pulmonary vasculature following uneventful administration of IV contrast, Isovue 370 100 cc. Intravenous contrast was used for better evaluation of solid organs and vascular structures. Radiation dose reduction techniques were used for this study:  Our CT scanners use one or all of the following: Automated exposure control, adjustment of the mA and/or kVp according to patient's size, iterative reconstruction. Findings: An adequate bolus opacifies the pulmonary vasculature. No evidence of pulmonary embolism. No evidence of aortic dissection. The main pulmonary artery is normal in caliber. Multilevel nondisplaced lateral left rib fractures. Extensive subcutaneous emphysema along the left chest wall. In addition, there appears to be loculated hydropneumothorax on the left with adjacent atelectasis. Right lung is clear. Heart size normal for age. Limited arterial phase evaluation of the upper abdomen is unremarkable. Review of bone windows demonstrates no aggressive appearing bony lesions. IMPRESSION: 1. Negative for pulmonary embolism. 2. Loculated hydropneumothorax on the left. Superimposed infection cannot be excluded. Ct Shoulder Lt Wo Cont    Result Date: 10/31/2020  NONCONTRAST CT LEFT SHOULDER WITH ADDITIONAL REFORMATS: CLINICAL HISTORY:  Left shoulder pain with clinical concern for glenoid fracture. TECHNIQUE:  Axial images were obtained with spiral technique, and coronal and sagittal reformats were produced. Radiation dose reduction was achieved using one or all of the following techniques: automated exposure control, weight-based dosing, iterative reconstruction. COMPARISON:  Radiographs yesterday. FINDINGS: There is mildly comminuted fracture of the distal coracoid process of the scapula with mild distraction of fragments. Specifically, the there is no definite fracture of glenoid, and the shoulder otherwise appears intact with normal alignment of the acromioclavicular and glenohumeral joints. Multiple left rib fractures are again noted with extensive subcutaneous emphysema and pneumomediastinum. No definite pneumothorax with chest tube in place. IMPRESSION: COMMINUTED FRACTURE OF THE CORACOID PROCESS OF THE SCAPULA WITH MILD DISTRACTION OF FRAGMENTS. THE SCAPULA OTHERWISE APPEARS INTACT, AS DOES THE CLAVICLE AND PROXIMAL HUMERUS. Xr Chest Port    Result Date: 11/3/2020   Portable view of the chest 11/3/2020 Comparison: Chest x-ray 11/02/2020 and prior Indication: Shortness of breath FINDINGS: Mild cardiac enlargement progressive infiltrates throughout the left lung particularly peripherally of the left upper lobe. Suspected increase in left pleural effusion. No discrete pneumothorax identified. Left chest wall subcutaneous emphysema slightly improved. No discrete acute osseous lesion seen. IMPRESSION: Increasing left pleural effusion and left infiltrates. Xr Chest Port    Result Date: 11/2/2020  PORTABLE CHEST, November 2, 2020 at 1201 hours CLINICAL HISTORY:  Follow-up left pneumothorax after chest tube removal. COMPARISON:  0445 hours today.  FINDINGS: AP erect image demonstrates small left apical pneumothorax which is not significantly changed. Subcutaneous emphysema persists in the left lateral chest wall as well. Multiple left rib fractures with adjacent contusion/infiltrate is again noted. The heart size is within normal limits without evidence of congestive heart failure. IMPRESSION:  STABLE SMALL LEFT APICAL PNEUMOTHORAX WITH NO SIGNIFICANT CHANGE FROM 0445 HOURS TODAY. Xr Chest Port    Result Date: 10/30/2020  EXAMINATION: CHEST RADIOGRAPH 10/30/2020 12:58 PM ACCESSION NUMBER: [id number] INDICATION: s/p chest tube placement COMPARISON: August 17, 2016 TECHNIQUE: A single AP view of the chest was obtained. FINDINGS: Cardiac Silhouette: Within normal limits in size. Lungs: Placement of left basilar chest tube remain, associated atelectasis. No apparent pneumothorax. Extensive left-sided subcutaneous air. Pleura: No pleural effusion. No pneumothorax. Osseous Structures: Multiple left rib fractures. Upper Abdomen: Unremarkable. IMPRESSION: Placement of left chest tube drained. No visible pneumothorax. Left-sided rib fractures.     )  Tests in the medicine section of CPT®: ordered and reviewed  Discuss the patient with other providers: yes (Discussed the case with surgery who is signed off. Discussed the case with the hospitalist will assume care and make other appropriate consultations)  Independent visualization of images, tracings, or specimens: yes (Reviewed the chest x-ray.)    Risk of Complications, Morbidity, and/or Mortality  Presenting problems: high  Diagnostic procedures: high  Management options: high  General comments: Patient's vital signs seem to be improving. He has been treated with broad-spectrum antibiotics for concerning left lower lobe pneumonia in the setting of rib fractures. Acid is slightly elevated so will need to be repeated. Getting a chest CT to better characterize the fluid and lung injury.   Further discussion with the patient reveals that he had a very difficult time with the initial chest tube and may very well need general anesthesia in order to accomplish another 1. I personally reviewed the patient's vital signs, laboratory tests, and/or radiological findings. I discussed these findings with the patient and their significance. I answered all questions and explained that given these findings there is significant concern for increased morbidity and/or mortality without immediate intervention. As a result, I recommended admission to the hospital, consulted the appropriate service, and transitioned care to that service in improved condition      Patient Progress  Patient progress: improved    ED Course as of Nov 03 1344   Tue Nov 03, 2020   1132 EKG performed shows sinus tachycardia rate 145, normal axis, low voltage, no acute ischemic change    [JS]   1240 Patient has significantly enlarged consolidation in the left lung concerning for worsening pulmonary contusion versus pneumonia    [JS]   1316 Discussed case with the surgery service as he was just discharged from their care yesterday. They feel that he is more appropriate now for the hospitalist service. [JS]   1717 Consulting the hospitalist for admission.     [JS]      ED Course User Index  [JS] Sasha Castro MD       Critical Care  Performed by: Sasha Castro MD  Authorized by: Sasha Castro MD     Critical care provider statement:     Critical care time (minutes):  45    Critical care was necessary to treat or prevent imminent or life-threatening deterioration of the following conditions:  Sepsis    Critical care was time spent personally by me on the following activities:  Blood draw for specimens, ordering and performing treatments and interventions, development of treatment plan with patient or surrogate, ordering and review of laboratory studies, discussions with consultants, ordering and review of radiographic studies, discussions with primary provider, pulse oximetry, evaluation of patient's response to treatment, re-evaluation of patient's condition, examination of patient, review of old charts and obtaining history from patient or surrogate    I assumed direction of critical care for this patient from another provider in my specialty: no I   feel   weak

## 2020-11-03 NOTE — ED TRIAGE NOTES
Patient arrives ambulatory to triage with mask in place. Discharged from this facility yesterday. Reports left sided rib pain worsening over night. Reports difficulty to breathe due to pain. Only able to obtain norco, not flexiril when discharged.

## 2020-11-03 NOTE — ED NOTES
TRANSFER - OUT REPORT:    Verbal report given to Vangie Woods RN(name) on Emilie Fall  being transferred to 222(unit) for routine progression of care       Report consisted of patients Situation, Background, Assessment and   Recommendations(SBAR). Information from the following report(s) Kardex, ED Summary, MAR and Recent Results was reviewed with the receiving nurse. Lines:   Peripheral IV 11/03/20 Left Antecubital (Active)        Opportunity for questions and clarification was provided.       Patient transported with:   [employer]

## 2020-11-03 NOTE — CONSULTS
Pt discharged yesterday after admission for PTX due to rib fractures. He pulled his own chest tube Saturday morning. Returns now due to pain    CT reviewed:  - no new pneumothorax. He has several areas of coagulated blood with entrapped air, all extrinsic to the lung  -no pulmonary contusion    He requires no surgical intervention nor chest tube  Hemothorax should resolve spontaneously  Will be available if any surgical need arises- recommend improving analgesia and prompt discharge.

## 2020-11-03 NOTE — PROGRESS NOTES
TRANSFER - IN REPORT:    Verbal report received from Jonel on Renu Fall  being received from ER for routine progression of care      Report consisted of patients Situation, Background, Assessment and   Recommendations(SBAR). Information from the following report(s) Kardex was reviewed with the receiving nurse. Opportunity for questions and clarification was provided. Assessment completed upon patients arrival to unit and care assumed.

## 2020-11-04 ENCOUNTER — APPOINTMENT (OUTPATIENT)
Dept: CT IMAGING | Age: 50
DRG: 871 | End: 2020-11-04
Attending: NURSE PRACTITIONER

## 2020-11-04 ENCOUNTER — APPOINTMENT (OUTPATIENT)
Dept: GENERAL RADIOLOGY | Age: 50
DRG: 871 | End: 2020-11-04
Attending: NURSE PRACTITIONER

## 2020-11-04 PROBLEM — B95.61 MSSA BACTEREMIA: Status: ACTIVE | Noted: 2020-11-04

## 2020-11-04 PROBLEM — R78.81 MSSA BACTEREMIA: Status: ACTIVE | Noted: 2020-11-04

## 2020-11-04 LAB
ACC. NO. FROM MICRO ORDER, ACCP: ABNORMAL
ANION GAP SERPL CALC-SCNC: 4 MMOL/L (ref 7–16)
APPEARANCE FLD: NORMAL
ATRIAL RATE: 136 BPM
BASOPHILS # BLD: 0.1 K/UL (ref 0–0.2)
BASOPHILS NFR BLD: 1 % (ref 0–2)
BUN SERPL-MCNC: 8 MG/DL (ref 6–23)
CALCIUM SERPL-MCNC: 7.9 MG/DL (ref 8.3–10.4)
CALCULATED P AXIS, ECG09: 56 DEGREES
CALCULATED R AXIS, ECG10: 19 DEGREES
CALCULATED T AXIS, ECG11: 64 DEGREES
CHLORIDE SERPL-SCNC: 96 MMOL/L (ref 98–107)
CO2 SERPL-SCNC: 33 MMOL/L (ref 21–32)
COLOR FLD: YELLOW
CREAT SERPL-MCNC: 0.66 MG/DL (ref 0.8–1.5)
DIAGNOSIS, 93000: NORMAL
DIFFERENTIAL METHOD BLD: ABNORMAL
EOSINOPHIL # BLD: 0.2 K/UL (ref 0–0.8)
EOSINOPHIL NFR BLD: 2 % (ref 0.5–7.8)
EOSINOPHIL NFR BRONCH MANUAL: 1 %
ERYTHROCYTE [DISTWIDTH] IN BLOOD BY AUTOMATED COUNT: 12 % (ref 11.9–14.6)
GLUCOSE SERPL-MCNC: 114 MG/DL (ref 65–100)
HCT VFR BLD AUTO: 38 % (ref 41.1–50.3)
HGB BLD-MCNC: 13.6 G/DL (ref 13.6–17.2)
IMM GRANULOCYTES # BLD AUTO: 1 K/UL (ref 0–0.5)
IMM GRANULOCYTES NFR BLD AUTO: 12 % (ref 0–5)
INTERPRETATION: ABNORMAL
LDH FLD L TO P-CCNC: 575 U/L
LYMPHOCYTES # BLD: 0.5 K/UL (ref 0.5–4.6)
LYMPHOCYTES NFR BLD: 6 % (ref 13–44)
LYMPHOCYTES NFR BRONCH MANUAL: 6 %
MCH RBC QN AUTO: 41.6 PG (ref 26.1–32.9)
MCHC RBC AUTO-ENTMCNC: 35.8 G/DL (ref 31.4–35)
MCV RBC AUTO: 116.2 FL (ref 79.6–97.8)
MECA (METHICILLIN-RESISTANCE GENES), MRGP: NOT DETECTED
MONOCYTES # BLD: 1.2 K/UL (ref 0.1–1.3)
MONOCYTES NFR BLD: 14 % (ref 4–12)
NEUTROPHILS NFR BRONCH MANUAL: 93 %
NEUTS SEG # BLD: 5.6 K/UL (ref 1.7–8.2)
NEUTS SEG NFR BLD: 65 % (ref 43–78)
NRBC # BLD: 0 K/UL (ref 0–0.2)
NUC CELL # FLD: 982 /CU MM
P-R INTERVAL, ECG05: 126 MS
PLATELET # BLD AUTO: 135 K/UL (ref 150–450)
PLATELET COMMENTS,PCOM: ADEQUATE
PMV BLD AUTO: 10.8 FL (ref 9.4–12.3)
POTASSIUM SERPL-SCNC: 3.6 MMOL/L (ref 3.5–5.1)
PROT FLD-MCNC: 4 G/DL
Q-T INTERVAL, ECG07: 310 MS
QRS DURATION, ECG06: 90 MS
QTC CALCULATION (BEZET), ECG08: 466 MS
RBC # BLD AUTO: 3.27 M/UL (ref 4.23–5.6)
RBC # FLD: 3000 /CU MM
RBC MORPH BLD: ABNORMAL
RBC MORPH BLD: ABNORMAL
SODIUM SERPL-SCNC: 133 MMOL/L (ref 136–145)
SPECIMEN SOURCE FLD: NORMAL
STAPHYLOCOCCUS AUREUS: DETECTED
STAPHYLOCOCCUS, STAPP: DETECTED
VENTRICULAR RATE, ECG03: 136 BPM
WBC # BLD AUTO: 8.6 K/UL (ref 4.3–11.1)
WBC MORPH BLD: ABNORMAL

## 2020-11-04 PROCEDURE — 74011250636 HC RX REV CODE- 250/636: Performed by: INTERNAL MEDICINE

## 2020-11-04 PROCEDURE — 74011250637 HC RX REV CODE- 250/637: Performed by: NURSE PRACTITIONER

## 2020-11-04 PROCEDURE — 71046 X-RAY EXAM CHEST 2 VIEWS: CPT

## 2020-11-04 PROCEDURE — 0W9B30Z DRAINAGE OF LEFT PLEURAL CAVITY WITH DRAINAGE DEVICE, PERCUTANEOUS APPROACH: ICD-10-PCS | Performed by: RADIOLOGY

## 2020-11-04 PROCEDURE — 84157 ASSAY OF PROTEIN OTHER: CPT

## 2020-11-04 PROCEDURE — 2709999900 HC NON-CHARGEABLE SUPPLY

## 2020-11-04 PROCEDURE — 74011000250 HC RX REV CODE- 250: Performed by: RADIOLOGY

## 2020-11-04 PROCEDURE — 87150 DNA/RNA AMPLIFIED PROBE: CPT

## 2020-11-04 PROCEDURE — 87040 BLOOD CULTURE FOR BACTERIA: CPT

## 2020-11-04 PROCEDURE — 87205 SMEAR GRAM STAIN: CPT

## 2020-11-04 PROCEDURE — 36415 COLL VENOUS BLD VENIPUNCTURE: CPT

## 2020-11-04 PROCEDURE — 80048 BASIC METABOLIC PNL TOTAL CA: CPT

## 2020-11-04 PROCEDURE — 74011000250 HC RX REV CODE- 250: Performed by: NURSE PRACTITIONER

## 2020-11-04 PROCEDURE — 74011250636 HC RX REV CODE- 250/636: Performed by: NURSE PRACTITIONER

## 2020-11-04 PROCEDURE — 74011250636 HC RX REV CODE- 250/636: Performed by: RADIOLOGY

## 2020-11-04 PROCEDURE — 65270000029 HC RM PRIVATE

## 2020-11-04 PROCEDURE — 83615 LACTATE (LD) (LDH) ENZYME: CPT

## 2020-11-04 PROCEDURE — 89050 BODY FLUID CELL COUNT: CPT

## 2020-11-04 PROCEDURE — 99153 MOD SED SAME PHYS/QHP EA: CPT

## 2020-11-04 PROCEDURE — 77030003511 CT THORACENTESIS INSRT CHEST TUBE

## 2020-11-04 PROCEDURE — 74011250637 HC RX REV CODE- 250/637: Performed by: INTERNAL MEDICINE

## 2020-11-04 PROCEDURE — 99152 MOD SED SAME PHYS/QHP 5/>YRS: CPT

## 2020-11-04 PROCEDURE — 85025 COMPLETE CBC W/AUTO DIFF WBC: CPT

## 2020-11-04 PROCEDURE — 74011000258 HC RX REV CODE- 258: Performed by: NURSE PRACTITIONER

## 2020-11-04 RX ORDER — CEFAZOLIN SODIUM/WATER 2 G/20 ML
2 SYRINGE (ML) INTRAVENOUS EVERY 8 HOURS
Status: DISCONTINUED | OUTPATIENT
Start: 2020-11-04 | End: 2020-11-12 | Stop reason: HOSPADM

## 2020-11-04 RX ORDER — LIDOCAINE HYDROCHLORIDE 20 MG/ML
2-20 INJECTION, SOLUTION INFILTRATION; PERINEURAL
Status: DISCONTINUED | OUTPATIENT
Start: 2020-11-04 | End: 2020-11-04

## 2020-11-04 RX ORDER — VANCOMYCIN/0.9 % SOD CHLORIDE 1.5G/250ML
1500 PLASTIC BAG, INJECTION (ML) INTRAVENOUS EVERY 12 HOURS
Status: DISCONTINUED | OUTPATIENT
Start: 2020-11-04 | End: 2020-11-04

## 2020-11-04 RX ORDER — MIDAZOLAM HYDROCHLORIDE 1 MG/ML
.5-2 INJECTION, SOLUTION INTRAMUSCULAR; INTRAVENOUS
Status: DISCONTINUED | OUTPATIENT
Start: 2020-11-04 | End: 2020-11-11

## 2020-11-04 RX ORDER — LORAZEPAM 1 MG/1
1 TABLET ORAL
Status: DISCONTINUED | OUTPATIENT
Start: 2020-11-04 | End: 2020-11-11

## 2020-11-04 RX ORDER — SODIUM CHLORIDE 0.9 % (FLUSH) 0.9 %
5-40 SYRINGE (ML) INJECTION AS NEEDED
Status: DISCONTINUED | OUTPATIENT
Start: 2020-11-04 | End: 2020-11-12 | Stop reason: HOSPADM

## 2020-11-04 RX ORDER — LANOLIN ALCOHOL/MO/W.PET/CERES
100 CREAM (GRAM) TOPICAL DAILY
Status: DISCONTINUED | OUTPATIENT
Start: 2020-11-04 | End: 2020-11-12 | Stop reason: HOSPADM

## 2020-11-04 RX ORDER — FOLIC ACID 1 MG/1
1 TABLET ORAL DAILY
Status: DISCONTINUED | OUTPATIENT
Start: 2020-11-04 | End: 2020-11-12 | Stop reason: HOSPADM

## 2020-11-04 RX ORDER — SODIUM CHLORIDE 9 MG/ML
25 INJECTION, SOLUTION INTRAVENOUS ONCE
Status: COMPLETED | OUTPATIENT
Start: 2020-11-04 | End: 2020-11-04

## 2020-11-04 RX ORDER — SODIUM CHLORIDE 0.9 % (FLUSH) 0.9 %
5-40 SYRINGE (ML) INJECTION EVERY 8 HOURS
Status: DISCONTINUED | OUTPATIENT
Start: 2020-11-04 | End: 2020-11-04

## 2020-11-04 RX ORDER — FENTANYL CITRATE 50 UG/ML
25-100 INJECTION, SOLUTION INTRAMUSCULAR; INTRAVENOUS
Status: DISCONTINUED | OUTPATIENT
Start: 2020-11-04 | End: 2020-11-04

## 2020-11-04 RX ORDER — VANCOMYCIN 2 GRAM/500 ML IN 0.9 % SODIUM CHLORIDE INTRAVENOUS
2000 EVERY 12 HOURS
Status: DISCONTINUED | OUTPATIENT
Start: 2020-11-04 | End: 2020-11-04

## 2020-11-04 RX ORDER — IBUPROFEN 200 MG
1 TABLET ORAL EVERY 24 HOURS
Status: DISCONTINUED | OUTPATIENT
Start: 2020-11-04 | End: 2020-11-12 | Stop reason: HOSPADM

## 2020-11-04 RX ADMIN — LIDOCAINE HYDROCHLORIDE 10 ML: 20 INJECTION, SOLUTION INFILTRATION; PERINEURAL at 15:54

## 2020-11-04 RX ADMIN — CEFAZOLIN SODIUM 2 G: 100 INJECTION, POWDER, LYOPHILIZED, FOR SOLUTION INTRAVENOUS at 20:38

## 2020-11-04 RX ADMIN — LORAZEPAM 1 MG: 1 TABLET ORAL at 23:37

## 2020-11-04 RX ADMIN — MIDAZOLAM 1 MG: 1 INJECTION INTRAMUSCULAR; INTRAVENOUS at 15:27

## 2020-11-04 RX ADMIN — OXYCODONE HYDROCHLORIDE AND ACETAMINOPHEN 1 TABLET: 10; 325 TABLET ORAL at 05:44

## 2020-11-04 RX ADMIN — Medication 10 ML: at 05:45

## 2020-11-04 RX ADMIN — MIDAZOLAM 1 MG: 1 INJECTION INTRAMUSCULAR; INTRAVENOUS at 15:03

## 2020-11-04 RX ADMIN — CEFTRIAXONE 2 G: 2 INJECTION, POWDER, FOR SOLUTION INTRAMUSCULAR; INTRAVENOUS at 09:52

## 2020-11-04 RX ADMIN — Medication 10 ML: at 16:58

## 2020-11-04 RX ADMIN — SODIUM CHLORIDE 125 ML/HR: 900 INJECTION, SOLUTION INTRAVENOUS at 01:32

## 2020-11-04 RX ADMIN — HYDROMORPHONE HYDROCHLORIDE 0.5 MG: 1 INJECTION, SOLUTION INTRAMUSCULAR; INTRAVENOUS; SUBCUTANEOUS at 14:12

## 2020-11-04 RX ADMIN — SODIUM CHLORIDE 125 ML/HR: 900 INJECTION, SOLUTION INTRAVENOUS at 17:05

## 2020-11-04 RX ADMIN — HYDROMORPHONE HYDROCHLORIDE 0.5 MG: 1 INJECTION, SOLUTION INTRAMUSCULAR; INTRAVENOUS; SUBCUTANEOUS at 03:03

## 2020-11-04 RX ADMIN — HYDROMORPHONE HYDROCHLORIDE 0.5 MG: 1 INJECTION, SOLUTION INTRAMUSCULAR; INTRAVENOUS; SUBCUTANEOUS at 18:52

## 2020-11-04 RX ADMIN — SODIUM CHLORIDE 25 ML/HR: 900 INJECTION, SOLUTION INTRAVENOUS at 14:59

## 2020-11-04 RX ADMIN — OXYCODONE HYDROCHLORIDE AND ACETAMINOPHEN 1 TABLET: 10; 325 TABLET ORAL at 16:59

## 2020-11-04 RX ADMIN — FENTANYL CITRATE 50 MCG: 50 INJECTION, SOLUTION INTRAMUSCULAR; INTRAVENOUS at 15:27

## 2020-11-04 RX ADMIN — OXYCODONE HYDROCHLORIDE AND ACETAMINOPHEN 1 TABLET: 10; 325 TABLET ORAL at 09:53

## 2020-11-04 RX ADMIN — CEFAZOLIN SODIUM 2 G: 100 INJECTION, POWDER, LYOPHILIZED, FOR SOLUTION INTRAVENOUS at 14:59

## 2020-11-04 RX ADMIN — VANCOMYCIN HYDROCHLORIDE 2000 MG: 10 INJECTION, POWDER, LYOPHILIZED, FOR SOLUTION INTRAVENOUS at 02:46

## 2020-11-04 RX ADMIN — HYDROMORPHONE HYDROCHLORIDE 0.5 MG: 1 INJECTION, SOLUTION INTRAMUSCULAR; INTRAVENOUS; SUBCUTANEOUS at 22:09

## 2020-11-04 RX ADMIN — FENTANYL CITRATE 50 MCG: 50 INJECTION, SOLUTION INTRAMUSCULAR; INTRAVENOUS at 15:03

## 2020-11-04 NOTE — PROGRESS NOTES
This CM attempted to complete assessment with pt, however, pt off floor for procedure. This CM to try again at a later time.       Care Management Interventions  Transition of Care Consult (CM Consult): Discharge Planning

## 2020-11-04 NOTE — PROGRESS NOTES
TRANSFER - IN REPORT:    Verbal report received from [facility] on Alcus Ree  being received from IR for ordered procedure      Report consisted of patients Situation, Background, Assessment and   Recommendations(SBAR). Information from the following report(s) Kardex was reviewed with the receiving nurse. Opportunity for questions and clarification was provided. Assessment completed upon patients arrival to unit and care assumed.

## 2020-11-04 NOTE — PROGRESS NOTES
END OF SHIFT NOTE:    INTAKE/OUTPUT  No intake/output data recorded. Voiding: YES  Catheter: NO  Drain:              Flatus: Patient does have flatus present. Stool:  0 occurrences. Characteristics:       Emesis: 0 occurrences. Characteristics:        VITAL SIGNS  Patient Vitals for the past 12 hrs:   Temp Pulse Resp BP SpO2   11/03/20 1650 99.1 °F (37.3 °C) (!) 121 20 132/80 99 %   11/03/20 1618 -- (!) 126 18 (!) 146/78 98 %   11/03/20 1512 -- (!) 126 -- -- 98 %   11/03/20 1435 -- (!) 124 -- -- 98 %   11/03/20 1414 -- -- -- (!) 140/79 96 %   11/03/20 1400 -- (!) 120 20 (!) 140/87 96 %   11/03/20 1314 -- (!) 123 18 (!) 162/103 95 %   11/03/20 1259 -- (!) 127 (!) 61 135/85 98 %   11/03/20 1213 -- (!) 124 29 125/82 97 %   11/03/20 1158 -- (!) 118 20 (!) 130/94 98 %   11/03/20 1147 -- (!) 126 -- (!) 126/90 94 %   11/03/20 1124 100.3 °F (37.9 °C) (!) 141 22 118/77 94 %       Pain Assessment  Pain Intensity 1: 4 (11/03/20 1830)  Pain Location 1: Chest  Pain Intervention(s) 1: Medication (see MAR)  Patient Stated Pain Goal: 0    Ambulating  Yes    Shift report given to oncoming nurse at the bedside.     Beto Roman, OSMANY

## 2020-11-04 NOTE — PROCEDURES
Department of Interventional Radiology  [phone]        Interventional Radiology Brief Procedure Note    Patient: Mia Sharif MRN: [id number]  SSN: [id number]    YOB: 1970  Age: 48 y.o. Sex: male      Date of Procedure: 11/4/2020    Pre-Procedure Diagnosis: loculated pleural effusion    Post-Procedure Diagnosis: SAME    Procedure(s): Image Guided Drain Placement    Brief Description of Procedure: chest tube placement    Performed By: Sincere Ro MD     Assistants: None    Anesthesia:Moderate Sedation    Estimated Blood Loss: Less than 10ml    Specimens:  Microbiology    Implants: All Purpose Drain    Findings: largest collection in anterior left thorax. 12F drain placed. Fluid sent for cultures    Complications: None    Recommendations: external drainage     Follow Up: we will follow.   Will likely benefit from tpa    Signed By: Sincere Ro MD     November 4, 2020

## 2020-11-04 NOTE — PROGRESS NOTES
TRANSFER - OUT REPORT:    Verbal report given to [facility] RN(name) on Derik altamirano M Fall  being transferred to 2nd floor(unit) for routine progression of care       Report consisted of patients Situation, Background, Assessment and   Recommendations(SBAR). Information from the following report(s) SBAR, Procedure Summary and MAR was reviewed with the receiving nurse. Opportunity for questions and clarification was provided. Conscious Sedation:   100 Mcg of Fentanyl administered  2 Mg of Versed administered    Pt tolerated procedure well.      Visit Vitals  /85   Pulse (!) 108   Temp 99 °F (37.2 °C)   Resp 18   Ht 5' 10\" (1.778 m)   Wt 83.9 kg (185 lb)   SpO2 96%   BMI 26.54 kg/m²     Past Medical History:   Diagnosis Date    GERD (gastroesophageal reflux disease)     once a week OTC med     Peripheral IV 11/03/20 Left Antecubital (Active)   Site Assessment Clean, dry, & intact 11/04/20 0850   Phlebitis Assessment 0 11/04/20 0850   Infiltration Assessment 0 11/04/20 0850   Dressing Status Clean, dry, & intact 11/04/20 0850   Dressing Type Transparent 11/04/20 0850   Hub Color/Line Status Infusing 11/04/20 0850

## 2020-11-04 NOTE — PROGRESS NOTES
Hospitalist Progress Note     Admit Date:  11/3/2020 11:31 AM   Name:  Brigido Art   Age:  48 y.o.  :  1970   MRN:  [id number]   PCP:  None  Treatment Team: Attending Provider: Rex Nickerson MD; Primary Nurse: Natacha Rogers    Subjective:   HPI and or CC:  48year old CM PMH GERD, ETOH and tobacco use admitted to surgical service on 10/30- s/p mechanical fall through drywall from attic. Chest tube placed in ED that patient removed self. CT shoulder shows a coracoid fracture, seen by Ortho and planned for sling and conservative management. He was discharged home with pain medications. The patient returned 11/3 after stating unable to get Flexeril but did get Norco. CT chest showed loculated hydropneumothorax. Surgery saw patient and stated in indication for intervention or repeat chest tube. Labs with hypoNa, hypoK, WBCs 13K (normal neutrophil count), LA 2.8, Tm 100.3F.   BC shows MSSA. IR consulted for drain placement for questionable empyema on chest x ray . ABT changed from Vancomycin and Augmentin to Rocephin IV and ID consulted. :  Patient alert and oriented x3. On RA with saturations 99%/ afebrile. IR consulted today for drain placement. Rocephin started today.         Objective:     Patient Vitals for the past 24 hrs:   Temp Pulse Resp BP SpO2   20 0702 98.6 °F (37 °C) (!) 111 19 117/70 99 %   20 0324 99.4 °F (37.4 °C) (!) 113 19 135/88 95 %   20 2307 98.3 °F (36.8 °C) (!) 110 19 129/80 95 %   20 1913 98.2 °F (36.8 °C) (!) 113 20 125/87 94 %   20 1650 99.1 °F (37.3 °C) (!) 121 20 132/80 99 %   20 1618 -- (!) 126 18 (!) 146/78 98 %   20 1512 -- (!) 126 -- -- 98 %   20 1435 -- (!) 124 -- -- 98 %   20 1414 -- -- -- (!) 140/79 96 %   20 1400 -- (!) 120 20 (!) 140/87 96 %   20 1314 -- (!) 123 18 (!) 162/103 95 %   20 1259 -- (!) 127 (!) 61 135/85 98 %   20 1213 -- (!) 124 29 125/82 97 % 11/03/20 1158 -- (!) 118 20 (!) 130/94 98 %   11/03/20 1147 -- (!) 126 -- (!) 126/90 94 %   11/03/20 1124 100.3 °F (37.9 °C) (!) 141 22 118/77 94 %     Oxygen Therapy  O2 Sat (%): 99 % (11/04/20 0702)  Pulse via Oximetry: 126 beats per minute (11/03/20 1512)  O2 Device: Room air (11/03/20 1925)  O2 Flow Rate (L/min): 2 l/min (11/03/20 1400)  ETCO2 (mmHg): 2 mmHg (11/03/20 1618)    Intake/Output Summary (Last 24 hours) at 11/4/2020 0923  Last data filed at 11/4/2020 0918  Gross per 24 hour   Intake 1661 ml   Output 1650 ml   Net 11 ml         REVIEW OF SYSTEMS: Comprehensive ROS performed and negative except as stated in HPI. Physical Examination:  General:    Well nourished. No gross distress. Head:  Normocephalic, atraumatic, nares patent  Eyes:  Extraocular movements intact, normal sclera  CV:   RRR. No  Murmurs, clicks, or gallops, distal pulses intact  Lungs:   Diminished left lower lobe, SQ air appreciated  Abdomen:   Soft, nondistended, nontender. Extremities: Warm and dry. No cyanosis or edema. Skin:     No rashes or jaundice. Neuro:  No gross focal deficits, no tremor  Psych:  Flat affect    Data Review:  I have reviewed all labs, meds, telemetry events, and studies from the last 24 hours.     Recent Results (from the past 24 hour(s))   EKG, 12 LEAD, INITIAL    Collection Time: 11/03/20 11:26 AM   Result Value Ref Range    Ventricular Rate 136 BPM    Atrial Rate 136 BPM    P-R Interval 126 ms    QRS Duration 90 ms    Q-T Interval 310 ms    QTC Calculation (Bezet) 466 ms    Calculated P Axis 56 degrees    Calculated R Axis 19 degrees    Calculated T Axis 64 degrees    Diagnosis       Sinus tachycardia  Cannot rule out Inferior infarct , age undetermined  Abnormal ECG  When compared with ECG of 17-AUG-2016 20:25,  Minimal criteria for Inferior infarct are now Present  Confirmed by LORIE MUSE (), Lana Burt ([id number]) on 11/4/2020 6:30:06 AM     CULTURE, BLOOD    Collection Time: 11/03/20 11:45 AM Specimen: Blood   Result Value Ref Range    Special Requests: LEFT  Antecubital        GRAM STAIN GRAM POSITIVE COCCI      GRAM STAIN AEROBIC AND ANAEROBIC BOTTLES      GRAM STAIN        RESULTS VERIFIED, PHONED TO AND READ BACK BY OSMANY MCDONOUGH @ 0030 11/4/20 MM    Culture result: CULTURE IN [facility] UPDATES TO FOLLOW      Culture result: REFER TO Gayle Barrera Dr [id number]    BLOOD CULTURE ID PANEL    Collection Time: 11/03/20 11:45 AM    Specimen: Blood   Result Value Ref Range    Acc. no. from Micro Order [id number]     Staphylococcus Detected (A) NOTDET      Staphylococcus aureus Detected (A) NOTDET      mecA (Methicillin-Resistance Genes) NOT DETECTED NOTDET      INTERPRETATION        Gram positive cocci in clusters, identified in realtime PCR as probable MSSA. CBC WITH AUTOMATED DIFF    Collection Time: 11/03/20 11:48 AM   Result Value Ref Range    WBC 12.9 (H) 4.3 - 11.1 K/uL    RBC 3.60 (L) 4.23 - 5.6 M/uL    HGB 15.1 13.6 - 17.2 g/dL    HCT 40.4 (L) 41.1 - 50.3 %    .2 (H) 79.6 - 97.8 FL    MCH 41.9 (H) 26.1 - 32.9 PG    MCHC 37.4 (H) 31.4 - 35.0 g/dL    RDW 11.8 (L) 11.9 - 14.6 %    PLATELET 208 (L) 362 - 450 K/uL    MPV 10.5 9.4 - 12.3 FL    ABSOLUTE NRBC 0.00 0.0 - 0.2 K/uL    NEUTROPHILS 76 43 - 78 %    LYMPHOCYTES 5 (L) 13 - 44 %    MONOCYTES 14 (H) 4.0 - 12.0 %    EOSINOPHILS 1 0.5 - 7.8 %    BASOPHILS 1 0.0 - 2.0 %    IMMATURE GRANULOCYTES 3 0.0 - 5.0 %    ABS. NEUTROPHILS 9.9 (H) 1.7 - 8.2 K/UL    ABS. LYMPHOCYTES 0.6 0.5 - 4.6 K/UL    ABS. MONOCYTES 1.8 (H) 0.1 - 1.3 K/UL    ABS. EOSINOPHILS 0.1 0.0 - 0.8 K/UL    ABS. BASOPHILS 0.1 0.0 - 0.2 K/UL    ABS. IMM. GRANS.  0.4 0.0 - 0.5 K/UL    RBC COMMENTS NORMOCYTIC/NORMOCHROMIC      WBC COMMENTS Result Confirmed By Smear      PLATELET COMMENTS SLIGHT      DF AUTOMATED     PROTHROMBIN TIME + INR    Collection Time: 11/03/20 11:48 AM   Result Value Ref Range    Prothrombin time 15.2 (H) 12.5 - 14.7 sec    INR 1.2     PTT    Collection Time: 11/03/20 11:48 AM   Result Value Ref Range    aPTT 35.7 (H) 24.1 - 35.1 SEC   LIPASE    Collection Time: 11/03/20 11:48 AM   Result Value Ref Range    Lipase 29 (L) 73 - 666 U/L   METABOLIC PANEL, COMPREHENSIVE    Collection Time: 11/03/20 11:48 AM   Result Value Ref Range    Sodium 128 (L) 138 - 145 mmol/L    Potassium 3.4 (L) 3.5 - 5.1 mmol/L    Chloride 88 (L) 98 - 107 mmol/L    CO2 31 21 - 32 mmol/L    Anion gap 9 7 - 16 mmol/L    Glucose 176 (H) 65 - 100 mg/dL    BUN 9 6 - 23 MG/DL    Creatinine 0.88 0.8 - 1.5 MG/DL    GFR est AA >60 >60 ml/min/1.73m2    GFR est non-AA >60 >60 ml/min/1.73m2    Calcium 8.7 8.3 - 10.4 MG/DL    Bilirubin, total 3.6 (H) 0.2 - 1.1 MG/DL    ALT (SGPT) 47 12 - 65 U/L    AST (SGOT) 53 (H) 15 - 37 U/L    Alk. phosphatase 73 50 - 136 U/L    Protein, total 6.8 6.3 - 8.2 g/dL    Albumin 2.7 (L) 3.5 - 5.0 g/dL    Globulin 4.1 (H) 2.3 - 3.5 g/dL    A-G Ratio 0.7 (L) 1.2 - 3.5     LACTIC ACID    Collection Time: 11/03/20 11:48 AM   Result Value Ref Range    Lactic acid 2.8 (H) 0.4 - 2.0 MMOL/L   PROCALCITONIN    Collection Time: 11/03/20 11:48 AM   Result Value Ref Range    Procalcitonin 18.54 ng/mL   CULTURE, BLOOD    Collection Time: 11/03/20 12:05 PM    Specimen: Blood   Result Value Ref Range    Special Requests: RIGHT  HAND        GRAM STAIN GRAM POS COCCI IN CLUSTERS      GRAM STAIN AEROBIC BOTTLE POSITIVE      GRAM STAIN        CRITICAL RESULT NOT CALLED DUE TO PREVIOUS NOTIFICATION OF CRITICAL RESULT WITHIN THE LAST 24 HOURS.     Culture result: CULTURE IN PROGRESS,FURTHER UPDATES TO FOLLOW     LACTIC ACID    Collection Time: 11/03/20  6:24 PM   Result Value Ref Range    Lactic acid 1.6 0.4 - 2.0 MMOL/L   METABOLIC PANEL, BASIC    Collection Time: 11/04/20  1:36 AM   Result Value Ref Range    Sodium 133 (L) 136 - 145 mmol/L    Potassium 3.6 3.5 - 5.1 mmol/L    Chloride 96 (L) 98 - 107 mmol/L    CO2 33 (H) 21 - 32 mmol/L    Anion gap 4 (L) 7 - 16 mmol/L    Glucose 114 (H) 65 - 100 mg/dL    BUN 8 6 - 23 MG/DL    Creatinine 0.66 (L) 0.8 - 1.5 MG/DL    GFR est AA >60 >60 ml/min/1.73m2    GFR est non-AA >60 >60 ml/min/1.73m2    Calcium 7.9 (L) 8.3 - 10.4 MG/DL   CBC WITH AUTOMATED DIFF    Collection Time: 11/04/20  1:36 AM   Result Value Ref Range    WBC 8.6 4.3 - 11.1 K/uL    RBC 3.27 (L) 4.23 - 5.6 M/uL    HGB 13.6 13.6 - 17.2 g/dL    HCT 38.0 (L) 41.1 - 50.3 %    .2 (H) 79.6 - 97.8 FL    MCH 41.6 (H) 26.1 - 32.9 PG    MCHC 35.8 (H) 31.4 - 35.0 g/dL    RDW 12.0 11.9 - 14.6 %    PLATELET 368 (L) 217 - 450 K/uL    MPV 10.8 9.4 - 12.3 FL    ABSOLUTE NRBC 0.00 0.0 - 0.2 K/uL    DF PENDING         All Micro Results     Procedure Component Value Units Date/Time    CULTURE, BODY FLUID Alvena Distel STAIN [[id number]]     Order Status:  Sent Specimen: Body Fluid from Pleural Fluid     BLOOD CULTURE ID PANEL [[id number]]  (Abnormal) Collected:  11/03/20 1145    Order Status:  Completed Specimen:  Blood Updated:  11/04/20 0806     Acc. no. from Micro Order [id number]     Staphylococcus Detected        Staphylococcus aureus Detected        Comment: RESULTS VERIFIED, PHONED TO AND READ BACK BY  Amilcar tejada RN @ 3897 ON 11/04/2020 AK. mecA (Methicillin-Resistance Genes) NOT DETECTED        INTERPRETATION       Gram positive cocci in clusters, identified in realtime PCR as probable MSSA. Comment: Recommend discontinuing IV vancomycin starting cefazolin or nafcillin if patient not on beta-lactam therapy. Infectious Diseases Consult recommended in adult patients. THIS TEST DOES NOT REPLACE SENSITIVITY TESTING. CULTURE, BLOOD [[id number]] Collected:  11/03/20 1205    Order Status:  Completed Specimen:  Blood Updated:  11/04/20 0653     Special Requests: --        RIGHT  HAND       GRAM STAIN       GRAM POS COCCI IN CLUSTERS            AEROBIC BOTTLE POSITIVE               CRITICAL RESULT NOT CALLED DUE TO PREVIOUS NOTIFICATION OF CRITICAL RESULT WITHIN THE LAST 24 HOURS.            Culture result: CULTURE IN PROGRESS,FURTHER UPDATES TO FOLLOW          CULTURE, BLOOD [[id number]] Collected:  11/04/20 0136    Order Status:  Completed Specimen:  Blood Updated:  11/04/20 0315    CULTURE, BLOOD [[id number]] Collected:  11/03/20 1145    Order Status:  Completed Specimen:  Blood Updated:  11/04/20 0231     Special Requests: --        LEFT  Antecubital       GRAM STAIN GRAM POSITIVE COCCI               AEROBIC AND ANAEROBIC BOTTLES                  RESULTS VERIFIED, PHONED TO AND READ BACK BY OSMANY MCDONOUGH @ 0030 11/4/20 MM           Culture result:       CULTURE IN PROGRESS,FURTHER UPDATES TO FOLLOW            REFER TO Gayle Barrera Dr [id number]          Current Meds:  Current Facility-Administered Medications   Medication Dose Route Frequency    cefTRIAXone (ROCEPHIN) 2 g in 0.9% sodium chloride (MBP/ADV) 50 mL  2 g IntraVENous Q24H    ketorolac (TORADOL) injection 15 mg  15 mg IntraMUSCular Q6H PRN    oxyCODONE-acetaminophen (PERCOCET 10)  mg per tablet 1 Tab  1 Tab Oral Q4H PRN    naloxone (NARCAN) injection 0.4 mg  0.4 mg IntraVENous PRN    HYDROmorphone (PF) (DILAUDID) injection 0.5 mg  0.5 mg IntraVENous Q3H PRN    sodium chloride (NS) flush 5-40 mL  5-40 mL IntraVENous Q8H    sodium chloride (NS) flush 5-40 mL  5-40 mL IntraVENous PRN    acetaminophen (TYLENOL) tablet 650 mg  650 mg Oral Q6H PRN    Or    acetaminophen (TYLENOL) suppository 650 mg  650 mg Rectal Q6H PRN    polyethylene glycol (MIRALAX) packet 17 g  17 g Oral DAILY PRN    ondansetron (ZOFRAN ODT) tablet 4 mg  4 mg Oral Q8H PRN    Or    ondansetron (ZOFRAN) injection 4 mg  4 mg IntraVENous Q6H PRN    0.9% sodium chloride infusion  125 mL/hr IntraVENous CONTINUOUS       Diet:  DIET REGULAR    Other Studies (last 24 hours):  Xr Chest Pa Lat    Result Date: 11/4/2020  CHEST X-RAY, 2 views. HISTORY:  Pleural effusion TECHNIQUE: PA and lateral views. COMPARISON: Multiple recent exams. FINDINGS: Lungs: Right lung is clear.  Large densities left lung remains with air-fluid levels. Costophrenic angles: Blunted on the left. Heart size: is normal. Pulmonary vasculature: is unremarkable. Aorta: Unremarkable. Included portion of the upper abdomen: is unremarkable. Bones: Right rib fractures Other: None. IMPRESSION: Right lung remains clear. Multiple densities left lung with air-fluid levels, possibly empyema. Ct Chest W Cont    Result Date: 11/3/2020  PE protocol chest CT 11/3/2020 Comparison: Chest x-ray earlier today Indication: Fever, shortness of breath, recent fracture. Technique: Multiple contiguous 2.5 mm axial images were obtained through the pulmonary vasculature following uneventful administration of IV contrast, Isovue 370 100 cc. Intravenous contrast was used for better evaluation of solid organs and vascular structures. Radiation dose reduction techniques were used for this study:  Our CT scanners use one or all of the following: Automated exposure control, adjustment of the mA and/or kVp according to patient's size, iterative reconstruction. Findings: An adequate bolus opacifies the pulmonary vasculature. No evidence of pulmonary embolism. No evidence of aortic dissection. The main pulmonary artery is normal in caliber. Multilevel nondisplaced lateral left rib fractures. Extensive subcutaneous emphysema along the left chest wall. In addition, there appears to be loculated hydropneumothorax on the left with adjacent atelectasis. Right lung is clear. Heart size normal for age. Limited arterial phase evaluation of the upper abdomen is unremarkable. Review of bone windows demonstrates no aggressive appearing bony lesions. IMPRESSION: 1. Negative for pulmonary embolism. 2. Loculated hydropneumothorax on the left. Superimposed infection cannot be excluded.      Xr Chest Port    Result Date: 11/3/2020   Portable view of the chest 11/3/2020 Comparison: Chest x-ray 11/02/2020 and prior Indication: Shortness of breath FINDINGS: Mild cardiac enlargement progressive infiltrates throughout the left lung particularly peripherally of the left upper lobe. Suspected increase in left pleural effusion. No discrete pneumothorax identified. Left chest wall subcutaneous emphysema slightly improved. No discrete acute osseous lesion seen. IMPRESSION: Increasing left pleural effusion and left infiltrates. Assessment and Plan:     Hospital Problems as of 11/4/2020 Date Reviewed: 11/3/2020          Codes Class Noted - Resolved POA    Sinus tachycardia ICD-10-CM: R00.0  ICD-9-CM: 427.89  11/3/2020 - Present Yes        Leukocytosis ICD-10-CM: D72.829  ICD-9-CM: 288.60  11/3/2020 - Present Yes        Hydropneumothorax ICD-10-CM: J94.8  ICD-9-CM: 511.89  11/3/2020 - Present Yes        Loculated pleural effusion ICD-10-CM: J90  ICD-9-CM: 511.9  11/3/2020 - Present Yes        Lactic acidosis ICD-10-CM: E87.2  ICD-9-CM: 276.2  11/3/2020 - Present Yes        Hyponatremia ICD-10-CM: E87.1  ICD-9-CM: 276.1  11/3/2020 - Present Yes        Hypokalemia ICD-10-CM: E87.6  ICD-9-CM: 276.8  11/3/2020 - Present Yes        * (Principal) Sepsis ([facility] [address] ICD-10-CM: A41.9  ICD-9-CM: 038.9, 995.91  11/3/2020 - Present Yes              A/P:    1. MSSA bacteremia with sepsis/lactic acidosis:  Vancomycin and Augmentin stopped, Rocephin 2 grams IV started  [facility] tomorrow am  ID consult  Source likely left lung effusion/empyema-IR consulted for drain-culture fluid  LA now normal.    2. Loculated pleural effusion/empyema:  Spoke with surgery today; they recommend IR consult  Order placed  Will culture fluid    3. Acute Hyponatremia:  133 today; improved  NS IVF  Trend    4. Alcohol abuse:  States last drank 1 week ago  Will order CIWA for now      5. Tobacco abuse:  Smoking cessation  Nicotine patch          Signed:  Charles Pradhan NP

## 2020-11-04 NOTE — CONSULTS
Infectious Disease Consult    Today's Date: 11/4/2020   Admit Date: 11/3/2020    Impression:   · MSSA bacteremia (11/3), source lung? · Left sided loculated hydropneumothorax, s/p fall  · Low grade fever/leukocytosis   · ETOH abuse    Plan:   · Change Ceftriaxone to Cefazolin  · Follow repeat BC and thoracentesis results: would send fluid for cell count and cultures   · Check TTE     Anti-infectives:   · Cefazolin 11/4-  · Vanc 11/3-11/4  · Augmentin 11/3-11/4  · CTX 11/4  · Cefepime 11/3    Subjective:   Date of Consultation:  November 4, 2020  Referring Physician: Priti Valladares NP    Patient is a 48 y.o. male who on 10/29 fell through the attic floor, landing on the 1st floor. He was evaluated the ED the following day 10/30, and noted to have rib fractures and traumatic pneumothorax s/p CT placement. He was observed until 11/2, when he insisted on leaving; at this point his CT has accidentally fallen out and repeat imaging was stable. He returned the following day 11/3 secondary to pain. He reports having \"hot flashes\" but no obvious fever/chills/rigors. Noted to have leukocytosis, elevated PCT, repeat CT chest with loculated Left sided hydropneumothorax. BC with MSSA in 3 of 4 bottles. Denies having any prior illness, skin issues, IV site issues. Currently with pain on the left side. No nausea, vomiting or diarrhea. Patient Active Problem List   Diagnosis Code    Acute blood loss anemia D62    Alcohol abuse F10.10    Tobacco abuse Z72.0    Fall with significant injury W19. XXXA    Sinus tachycardia R00.0    Leukocytosis D72.829    Hydropneumothorax J94.8    Loculated pleural effusion J90    Lactic acidosis E87.2    Hyponatremia E87.1    Hypokalemia E87.6    Sepsis (HCC) A41.9    MSSA bacteremia R78.81, B95.61     Past Medical History:   Diagnosis Date    GERD (gastroesophageal reflux disease)     once a week OTC med      No family history on file.    Social History     Tobacco Use    Smoking status: Current Every Day Smoker     Packs/day: 1.00     Years: 20.00     Pack years: 20.00    Smokeless tobacco: Never Used   Substance Use Topics    Alcohol use: Yes     Alcohol/week: 6.7 standard drinks     Types: 8 Shots of liquor per week     Past Surgical History:   Procedure Laterality Date    HX HEENT      wisdom teeth      Prior to Admission medications    Medication Sig Start Date End Date Taking? Authorizing Provider   HYDROcodone-acetaminophen (Norco) 5-325 mg per tablet Take 1 Tab by mouth every four (4) hours as needed for Pain for up to 5 days. Max Daily Amount: 6 Tabs. 20  Jose Carmona NP   cyclobenzaprine (FLEXERIL) 10 mg tablet Take 1 Tab by mouth three (3) times daily as needed for Muscle Spasm(s). 18   Saúl Salmeron MD       No Known Allergies     Review of Systems:  A comprehensive review of systems was negative except for that written in the History of Present Illness. Objective:     Visit Vitals  /70   Pulse (!) 111   Temp 98.6 °F (37 °C)   Resp 19   Ht 5' 10\" (1.778 m)   Wt 83.9 kg (185 lb)   SpO2 99%   BMI 26.54 kg/m²     Temp (24hrs), Av.7 °F (37.1 °C), Min:98.2 °F (36.8 °C), Max:99.4 °F (37.4 °C)       Lines:  Peripheral IV:   Left arm    Physical Exam:    General:  Alert, cooperative, well noursished, well developed, appears stated age   Eyes:  Sclera anicteric. Pupils equally round and reactive to light. Mouth/Throat: Mucous membranes normal, oral pharynx clear   Neck: Supple   Lungs:   Clear to auscultation R lung, Left with diffuse crackles, room air, good effort   CV:  Regular rate and rhythm,no murmur, click, rub or gallop   Abdomen:   Soft, non-tender.  bowel sounds normal. non-distended   Extremities: No cyanosis or edema   Skin: Skin color, texture, turgor normal. no acute rash or lesions, moderate amounts of bruising noted to chest/back   Lymph nodes: Cervical and supraclavicular normal   Musculoskeletal: No swelling or deformity   Lines/Devices:  Intact, no erythema, drainage or tenderness   Psych: Alert and oriented, normal mood affect given the setting       Data Review:     CBC:  Recent Labs     11/04/20 0136 11/03/20  1148   WBC 8.6 12.9*   GRANS 65 76   MONOS 14* 14*   EOS 2 1   ANEU 5.6 9.9*   ABL 0.5 0.6   HGB 13.6 15.1   HCT 38.0* 40.4*   * 143*       BMP:  Recent Labs     11/04/20 0136 11/03/20  1148   CREA 0.66* 0.88   BUN 8 9   * 128*   K 3.6 3.4*   CL 96* 88*   CO2 33* 31   AGAP 4* 9   * 176*       LFTS:  Recent Labs     11/03/20  1148   TBILI 3.6*   ALT 47   AP 73   TP 6.8   ALB 2.7*       Microbiology:     All Micro Results     Procedure Component Value Units Date/Time    CULTURE, BODY FLUID Anil Whittington STAIN [[id number]]     Order Status:  Sent Specimen: Body Fluid from Pleural Fluid     BLOOD CULTURE ID PANEL [[id number]]  (Abnormal) Collected:  11/03/20 1145    Order Status:  Completed Specimen:  Blood Updated:  11/04/20 0806     Acc. no. from Micro Order [id number]     Staphylococcus Detected        Staphylococcus aureus Detected        Comment: RESULTS VERIFIED, PHONED TO AND READ BACK BY  Amilcar tejada RN @ 5868 ON 11/04/2020 AK. mecA (Methicillin-Resistance Genes) NOT DETECTED        INTERPRETATION       Gram positive cocci in clusters, identified in realtime PCR as probable MSSA. Comment: Recommend discontinuing IV vancomycin starting cefazolin or nafcillin if patient not on beta-lactam therapy. Infectious Diseases Consult recommended in adult patients. THIS TEST DOES NOT REPLACE SENSITIVITY TESTING. CULTURE, BLOOD [[id number]] Collected:  11/03/20 1205    Order Status:  Completed Specimen:  Blood Updated:  11/04/20 0653     Special Requests: --        RIGHT  HAND       GRAM STAIN       GRAM POS COCCI IN CLUSTERS            AEROBIC BOTTLE POSITIVE               CRITICAL RESULT NOT CALLED DUE TO PREVIOUS NOTIFICATION OF CRITICAL RESULT WITHIN THE LAST 24 HOURS. Culture result:       CULTURE IN PROGRESS,FURTHER UPDATES TO FOLLOW          CULTURE, BLOOD [[id number]] Collected:  11/04/20 0136    Order Status:  Completed Specimen:  Blood Updated:  11/04/20 0315    CULTURE, BLOOD [[id number]] Collected:  11/03/20 1145    Order Status:  Completed Specimen:  Blood Updated:  11/04/20 0231     Special Requests: --        LEFT  Antecubital       GRAM STAIN GRAM POSITIVE COCCI               AEROBIC AND ANAEROBIC BOTTLES                  RESULTS VERIFIED, PHONED TO AND READ BACK BY OSMANY MCDONOUGH @ 0030 11/4/20 MM           Culture result:       CULTURE IN PROGRESS,FURTHER UPDATES TO FOLLOW            REFER TO Yessenia Christy [id number]          Imaging:   Reviewed     Signed By: Mikayla Cotton NP     November 4, 2020

## 2020-11-04 NOTE — PROGRESS NOTES
Blood culture grew Gram Positive Cocci in Anaerobic bottle . Will redo blood culture   Give empiric Vancomycin. Wood Polo

## 2020-11-05 LAB
ANION GAP SERPL CALC-SCNC: 8 MMOL/L (ref 7–16)
BASOPHILS # BLD: 0.1 K/UL (ref 0–0.2)
BASOPHILS NFR BLD: 1 % (ref 0–2)
BUN SERPL-MCNC: 7 MG/DL (ref 6–23)
CALCIUM SERPL-MCNC: 7.6 MG/DL (ref 8.3–10.4)
CHLORIDE SERPL-SCNC: 95 MMOL/L (ref 98–107)
CO2 SERPL-SCNC: 28 MMOL/L (ref 21–32)
CREAT SERPL-MCNC: 0.41 MG/DL (ref 0.8–1.5)
DIFFERENTIAL METHOD BLD: ABNORMAL
EOSINOPHIL # BLD: 0.1 K/UL (ref 0–0.8)
EOSINOPHIL NFR BLD: 1 % (ref 0.5–7.8)
ERYTHROCYTE [DISTWIDTH] IN BLOOD BY AUTOMATED COUNT: 12.5 % (ref 11.9–14.6)
GLUCOSE SERPL-MCNC: 131 MG/DL (ref 65–100)
HCT VFR BLD AUTO: 31.5 % (ref 41.1–50.3)
HGB BLD-MCNC: 11.4 G/DL (ref 13.6–17.2)
HIV 1+2 AB+HIV1 P24 AG SERPL QL IA: NONREACTIVE
HIV12 RESULT COMMENT, HHIVC: ABNORMAL
IMM GRANULOCYTES # BLD AUTO: 0.1 K/UL (ref 0–0.5)
IMM GRANULOCYTES NFR BLD AUTO: 1 % (ref 0–5)
LYMPHOCYTES # BLD: 0.5 K/UL (ref 0.5–4.6)
LYMPHOCYTES NFR BLD: 7 % (ref 13–44)
MCH RBC QN AUTO: 40.9 PG (ref 26.1–32.9)
MCHC RBC AUTO-ENTMCNC: 36.2 G/DL (ref 31.4–35)
MCV RBC AUTO: 112.9 FL (ref 79.6–97.8)
MONOCYTES # BLD: 1 K/UL (ref 0.1–1.3)
MONOCYTES NFR BLD: 14 % (ref 4–12)
NEUTS SEG # BLD: 5.6 K/UL (ref 1.7–8.2)
NEUTS SEG NFR BLD: 76 % (ref 43–78)
NRBC # BLD: 0 K/UL (ref 0–0.2)
PLATELET # BLD AUTO: 152 K/UL (ref 150–450)
PLATELET COMMENTS,PCOM: ADEQUATE
PMV BLD AUTO: 10.1 FL (ref 9.4–12.3)
POTASSIUM SERPL-SCNC: 3 MMOL/L (ref 3.5–5.1)
PROCALCITONIN SERPL-MCNC: 26.99 NG/ML
RBC # BLD AUTO: 2.79 M/UL (ref 4.23–5.6)
RBC MORPH BLD: ABNORMAL
SODIUM SERPL-SCNC: 131 MMOL/L (ref 138–145)
WBC # BLD AUTO: 7.4 K/UL (ref 4.3–11.1)
WBC MORPH BLD: ABNORMAL

## 2020-11-05 PROCEDURE — 87389 HIV-1 AG W/HIV-1&-2 AB AG IA: CPT

## 2020-11-05 PROCEDURE — 0T9B70Z DRAINAGE OF BLADDER WITH DRAINAGE DEVICE, VIA NATURAL OR ARTIFICIAL OPENING: ICD-10-PCS | Performed by: INTERNAL MEDICINE

## 2020-11-05 PROCEDURE — 2709999900 HC NON-CHARGEABLE SUPPLY

## 2020-11-05 PROCEDURE — 74011250636 HC RX REV CODE- 250/636: Performed by: INTERNAL MEDICINE

## 2020-11-05 PROCEDURE — 87040 BLOOD CULTURE FOR BACTERIA: CPT

## 2020-11-05 PROCEDURE — 36415 COLL VENOUS BLD VENIPUNCTURE: CPT

## 2020-11-05 PROCEDURE — C8929 TTE W OR WO FOL WCON,DOPPLER: HCPCS

## 2020-11-05 PROCEDURE — 74011250637 HC RX REV CODE- 250/637: Performed by: NURSE PRACTITIONER

## 2020-11-05 PROCEDURE — 84145 PROCALCITONIN (PCT): CPT

## 2020-11-05 PROCEDURE — 74011000250 HC RX REV CODE- 250: Performed by: INTERNAL MEDICINE

## 2020-11-05 PROCEDURE — 65270000029 HC RM PRIVATE

## 2020-11-05 PROCEDURE — 74011000250 HC RX REV CODE- 250: Performed by: NURSE PRACTITIONER

## 2020-11-05 PROCEDURE — 85025 COMPLETE CBC W/AUTO DIFF WBC: CPT

## 2020-11-05 PROCEDURE — 80048 BASIC METABOLIC PNL TOTAL CA: CPT

## 2020-11-05 PROCEDURE — 74011250636 HC RX REV CODE- 250/636: Performed by: NURSE PRACTITIONER

## 2020-11-05 PROCEDURE — 74011250637 HC RX REV CODE- 250/637: Performed by: INTERNAL MEDICINE

## 2020-11-05 RX ADMIN — SODIUM CHLORIDE 125 ML/HR: 900 INJECTION, SOLUTION INTRAVENOUS at 10:13

## 2020-11-05 RX ADMIN — OXYCODONE HYDROCHLORIDE AND ACETAMINOPHEN 1 TABLET: 10; 325 TABLET ORAL at 15:08

## 2020-11-05 RX ADMIN — OXYCODONE HYDROCHLORIDE AND ACETAMINOPHEN 1 TABLET: 10; 325 TABLET ORAL at 09:17

## 2020-11-05 RX ADMIN — Medication 10 ML: at 21:00

## 2020-11-05 RX ADMIN — HYDROMORPHONE HYDROCHLORIDE 0.5 MG: 1 INJECTION, SOLUTION INTRAMUSCULAR; INTRAVENOUS; SUBCUTANEOUS at 13:05

## 2020-11-05 RX ADMIN — Medication 10 ML: at 13:09

## 2020-11-05 RX ADMIN — MULTIPLE VITAMINS W/ MINERALS TAB 1 TABLET: TAB at 09:17

## 2020-11-05 RX ADMIN — SODIUM CHLORIDE 125 ML/HR: 900 INJECTION, SOLUTION INTRAVENOUS at 02:05

## 2020-11-05 RX ADMIN — Medication 10 ML: at 05:57

## 2020-11-05 RX ADMIN — HYDROMORPHONE HYDROCHLORIDE 0.5 MG: 1 INJECTION, SOLUTION INTRAMUSCULAR; INTRAVENOUS; SUBCUTANEOUS at 22:45

## 2020-11-05 RX ADMIN — CEFAZOLIN SODIUM 2 G: 100 INJECTION, POWDER, LYOPHILIZED, FOR SOLUTION INTRAVENOUS at 05:22

## 2020-11-05 RX ADMIN — Medication 100 MG: at 09:17

## 2020-11-05 RX ADMIN — CEFAZOLIN SODIUM 2 G: 100 INJECTION, POWDER, LYOPHILIZED, FOR SOLUTION INTRAVENOUS at 12:24

## 2020-11-05 RX ADMIN — WATER 4 MG: 1 INJECTION INTRAMUSCULAR; INTRAVENOUS; SUBCUTANEOUS at 11:18

## 2020-11-05 RX ADMIN — PERFLUTREN 1 ML: 6.52 INJECTION, SUSPENSION INTRAVENOUS at 09:00

## 2020-11-05 RX ADMIN — HYDROMORPHONE HYDROCHLORIDE 0.5 MG: 1 INJECTION, SOLUTION INTRAMUSCULAR; INTRAVENOUS; SUBCUTANEOUS at 17:57

## 2020-11-05 RX ADMIN — SODIUM CHLORIDE 125 ML/HR: 900 INJECTION, SOLUTION INTRAVENOUS at 18:05

## 2020-11-05 RX ADMIN — CEFAZOLIN SODIUM 2 G: 100 INJECTION, POWDER, LYOPHILIZED, FOR SOLUTION INTRAVENOUS at 21:00

## 2020-11-05 RX ADMIN — HYDROMORPHONE HYDROCHLORIDE 0.5 MG: 1 INJECTION, SOLUTION INTRAMUSCULAR; INTRAVENOUS; SUBCUTANEOUS at 05:57

## 2020-11-05 RX ADMIN — FOLIC ACID 1 MG: 1 TABLET ORAL at 09:17

## 2020-11-05 RX ADMIN — OXYCODONE HYDROCHLORIDE AND ACETAMINOPHEN 1 TABLET: 10; 325 TABLET ORAL at 02:00

## 2020-11-05 NOTE — PROGRESS NOTES
Problem: Falls - Risk of  Goal: *Absence of Falls  Description: Document Daphne Ronquillo Fall Risk and appropriate interventions in the flowsheet.   Outcome: Progressing Towards Goal  Note: Fall Risk Interventions:            Medication Interventions: Patient to call before getting OOB, Teach patient to arise slowly         History of Falls Interventions: Consult care management for discharge planning         Problem: Patient Education: Go to Patient Education Activity  Goal: Patient/Family Education  Outcome: Progressing Towards Goal

## 2020-11-05 NOTE — PROGRESS NOTES
Department of Interventional Radiology  [phone]                                 Progress Note  Patient: Ailyn Mace MRN: [id number]  SSN: [id number]    YOB: 1970  Age: 48 y.o. Sex: male      Subjective:   Ambulating in room. No new complaints. Review of Systems:    Pertinent items are noted in the History of Present Illness. Objective:     Vitals:    11/04/20 1857 11/04/20 2317 11/05/20 0410 11/05/20 0733   BP: 128/83 (!) 157/87 (!) 128/90 (!) 132/93   Pulse: (!) 109 (!) 119 (!) 108 (!) 107   Resp: 19 19 18 18   Temp: 99 °F (37.2 °C) 98.3 °F (36.8 °C) 98.2 °F (36.8 °C) 99.2 °F (37.3 °C)   SpO2: 96% 98% 92% 97%   Weight:       Height:            Pain Assessment  Pain Intensity 1: 7 (11/05/20 0917)  Pain Location 1: Chest  Pain Intervention(s) 1: Medication (see MAR)  Patient Stated Pain Goal: 2    Intake and Output:   chest tube 180 ml in [facility], thin blood tinged          Physical Exam:   chest tube flushes easily, serosang fluid in atrium   Dressing and drain intact    Lab/Data Review:  Pleural fluid cultures pending    BMP: No results found for: NA, K, CL, CO2, AGAP, GLU, BUN, CREA, GFRAA, GFRNA  CBC: No results found for: WBC, HGB, HGBEXT, HCT, HCTEXT, PLT, PLTEXT, HGBEXT, HCTEXT, PLTEXT  Assessment:   Loculated pleural effusion s/p chest tube placement    Plan:   tpa injected into chest tube to dwell.   Return to suction @ 1300    Signed By: Roya Monahan PA-C     November 5, 2020

## 2020-11-05 NOTE — PROGRESS NOTES
END OF SHIFT NOTE:    INTAKE/OUTPUT  11/03 0701 - 11/04 0700  In: 4042 [I.V.:1661]  Out: 1200 [Urine:1200]  Voiding: YES  Catheter: NO  Drain:              Flatus: Patient does have flatus present. Stool:  0 occurrences. Characteristics:  Stool Assessment  Stool Color: Other (Comment)(have not observed)    Emesis: 0 occurrences. Characteristics:        VITAL SIGNS  Patient Vitals for the past 12 hrs:   Temp Pulse Resp BP SpO2   11/04/20 1857 99 °F (37.2 °C) (!) 109 19 128/83 96 %   11/04/20 1645 99.6 °F (37.6 °C) (!) 111 19 (!) 141/87 98 %   11/04/20 1611 -- (!) 108 -- 129/85 96 %   11/04/20 1601 -- (!) 105 -- 126/84 95 %   11/04/20 1551 -- (!) 112 18 134/88 91 %   11/04/20 1547 -- (!) 110 18 135/86 93 %   11/04/20 1534 -- (!) 110 16 124/79 97 %   11/04/20 1530 -- (!) 108 18 127/79 98 %   11/04/20 1525 -- (!) 112 16 120/79 97 %   11/04/20 1520 -- (!) 107 18 123/84 98 %   11/04/20 1515 -- (!) 110 20 (!) 127/92 98 %   11/04/20 1510 -- (!) 111 18 (!) 131/92 98 %   11/04/20 1505 -- (!) 108 20 130/84 98 %   11/04/20 1501 -- (!) 107 18 125/85 98 %   11/04/20 1455 -- (!) 106 18 (!) 138/90 97 %   11/04/20 1316 -- (!) 110 -- 120/70 90 %   11/04/20 1221 99 °F (37.2 °C) (!) 109 -- 124/72 91 %   11/04/20 1107 99 °F (37.2 °C) (!) 116 -- 126/76 96 %       Pain Assessment  Pain Intensity 1: 7 (11/04/20 1852)  Pain Location 1: Chest  Pain Intervention(s) 1: Medication (see MAR)  Patient Stated Pain Goal: 2    Ambulating  Yes    Shift report given to oncoming nurse at the bedside.     Sierra Arias RN

## 2020-11-05 NOTE — PROGRESS NOTES
Hospitalist Progress Note     Admit Date:  11/3/2020 11:31 AM   Name:  Doc Fields   Age:  48 y.o.  :  1970   MRN:  [id number]   PCP:  None  Treatment Team: Attending Provider: Otis Doran MD; Consulting Provider: Desmond Garcia MD; Care Manager: Sundar [facility]; Utilization Review: Bandar Escobar RN; Primary Nurse: Noreen Guy; Care Manager: Chris Sanchez    Subjective:   HPI and or CC:  48year old CM PMH GERD, ETOH and tobacco use admitted to surgical service on 10/30- s/p mechanical fall through drywall from attic. Chest tube placed in ED that patient removed self. CT shoulder shows a coracoid fracture, seen by Ortho and planned for sling and conservative management. He was discharged home with pain medications. The patient returned 11/3 after stating unable to get Flexeril but did get Norco. CT chest showed loculated hydropneumothorax. Surgery saw patient and stated in indication for intervention or repeat chest tube. Labs with hypoNa, hypoK, WBCs 13K (normal neutrophil count), LA 2.8, Tm 100.3F.   BC shows MSSA. IR consulted for drain placement for questionable empyema on chest x ray . ABT changed from Vancomycin and Augmentin to Rocephin IV and ID consulted. :  Patient alert and oriented x3. On 2 liters with saturations 99%. Echocardiogram today. Afebrile, no leukocytosis.         Objective:     Patient Vitals for the past 24 hrs:   Temp Pulse Resp BP SpO2   20 0733 99.2 °F (37.3 °C) (!) 107 18 (!) 132/93 97 %   20 0410 98.2 °F (36.8 °C) (!) 108 18 (!) 128/90 92 %   20 2317 98.3 °F (36.8 °C) (!) 119 19 (!) 157/87 98 %   20 1857 99 °F (37.2 °C) (!) 109 19 128/83 96 %   20 1645 99.6 °F (37.6 °C) (!) 111 19 (!) 141/87 98 %   20 1611 -- (!) 108 -- 129/85 96 %   20 1601 -- (!) 105 -- 126/84 95 %   20 1551 -- (!) 112 18 134/88 91 %   20 1547 -- (!) 110 18 135/86 93 %   20 1534 -- (!) 110 16 124/79 97 %   11/04/20 1530 -- (!) 108 18 127/79 98 %   11/04/20 1525 -- (!) 112 16 120/79 97 %   11/04/20 1520 -- (!) 107 18 123/84 98 %   11/04/20 1515 -- (!) 110 20 (!) 127/92 98 %   11/04/20 1510 -- (!) 111 18 (!) 131/92 98 %   11/04/20 1505 -- (!) 108 20 130/84 98 %   11/04/20 1501 -- (!) 107 18 125/85 98 %   11/04/20 1455 -- (!) 106 18 (!) 138/90 97 %   11/04/20 1316 -- (!) 110 -- 120/70 90 %   11/04/20 1221 99 °F (37.2 °C) (!) 109 -- 124/72 91 %   11/04/20 1107 99 °F (37.2 °C) (!) 116 -- 126/76 96 %     Oxygen Therapy  O2 Sat (%): 97 % (11/05/20 0733)  Pulse via Oximetry: 108 beats per minute (11/04/20 1611)  O2 Device: Nasal cannula (11/05/20 0800)  O2 Flow Rate (L/min): 2 l/min (11/05/20 0800)  ETCO2 (mmHg): 34 mmHg (11/04/20 1534)    Intake/Output Summary (Last 24 hours) at 11/5/2020 0958  Last data filed at 11/5/2020 0758  Gross per 24 hour   Intake 2226 ml   Output 830 ml   Net 1396 ml         REVIEW OF SYSTEMS: Comprehensive ROS performed and negative except as stated in HPI. Physical Examination:  General:    Well nourished. No gross distress. Head:  Normocephalic, atraumatic, nares patent  Eyes:  Extraocular movements intact, normal sclera  CV:   RRR. No  Murmurs, clicks, or gallops, distal pulses intact  Lungs:   Diminished left lower lobe, drain present left upper chest  Abdomen:   Soft, nondistended, nontender. Extremities: Warm and dry. No cyanosis or edema. Skin:     No rashes or jaundice. Ecchymosis present left flank, low back. Skin irritation left chest/abdomen from tape. Neuro:  No gross focal deficits, no tremor  Psych:  Flat affect    Data Review:  I have reviewed all labs, meds, telemetry events, and studies from the last 24 hours. Recent Results (from the past 24 hour(s))   CULTURE, BODY FLUID Ethelene Ramal STAIN    Collection Time: 11/04/20  3:25 PM    Specimen: Pleural Fluid;  Body Fluid   Result Value Ref Range    Special Requests: NO SPECIAL REQUESTS      GRAM STAIN PENDING Culture result:        NO GROWTH AFTER SHORT PERIOD OF INCUBATION. FURTHER RESULTS TO FOLLOW AFTER OVERNIGHT INCUBATION. LDH, BODY FLUID    Collection Time: 11/04/20  3:25 PM   Result Value Ref Range    Fluid Type: PLEURAL FLUID      LD, body fld. 575 U/L   PROTEIN TOTAL, FLUID    Collection Time: 11/04/20  3:25 PM   Result Value Ref Range    Fluid Type: PLEURAL FLUID      Protein total, body fld. 4.0 g/dL   CELL COUNT, BODY FLUID    Collection Time: 11/04/20  3:25 PM   Result Value Ref Range    BODY FLUID TYPE PLEURAL FLUID      FLUID COLOR YELLOW      FLUID APPEARANCE TURBID      FLUID RBC CT. 3,000 /cu mm    FLUID WBC COUNT 982 /cu mm    BRCH NEUTROPHIL 93 %    BRCH LYMPHS 6 %    BRCH EOSINS 1 %        All Micro Results     Procedure Component Value Units Date/Time    CULTURE, BLOOD [[id number]]  (Abnormal) Collected:  11/03/20 1205    Order Status:  Completed Specimen:  Blood Updated:  11/05/20 0934     Special Requests: --        RIGHT  HAND       GRAM STAIN       GRAM POS COCCI IN CLUSTERS            AEROBIC BOTTLE POSITIVE               CRITICAL RESULT NOT CALLED DUE TO PREVIOUS NOTIFICATION OF CRITICAL RESULT WITHIN THE LAST 24 HOURS. Culture result: STAPHYLOCOCCUS AUREUS         REFER TO ACCESSION [id number] FOR SUSCEPTIBILITY            CULTURE IN PROGRESS,FURTHER UPDATES TO FOLLOW          CULTURE, BLOOD [[id number]]  (Abnormal) Collected:  11/03/20 1145    Order Status:  Completed Specimen:  Blood Updated:  11/05/20 0932     Special Requests: --        LEFT  Antecubital       GRAM STAIN GRAM POSITIVE COCCI               AEROBIC AND ANAEROBIC BOTTLES                  RESULTS VERIFIED, PHONED TO AND READ BACK BY OSMANY MCDONOUGH @ 0030 11/4/20 MM           Culture result:       STAPHYLOCOCCUS AUREUS SENSITIVITY TO FOLLOW            REFER TO [employer] PANEL ACCESSION [id number]    CULTURE, BODY FLUID W [place] Call [[id number]] Collected:  11/04/20 1525    Order Status:  Completed Specimen:   Body Fluid from Pleural Fluid Updated:  11/05/20 0750     Special Requests: NO SPECIAL REQUESTS        GRAM STAIN PENDING     Culture result:       NO GROWTH AFTER SHORT PERIOD OF INCUBATION. FURTHER RESULTS TO FOLLOW AFTER OVERNIGHT INCUBATION. CULTURE, BLOOD [[id number]] Collected:  11/04/20 0136    Order Status:  Completed Specimen:  Blood Updated:  11/05/20 0735     Special Requests: --        RIGHT  ARM       Culture result: NO GROWTH 1 DAY       CULTURE, BLOOD [[id number]]     Order Status:  Sent Specimen:  Blood     CULTURE, BLOOD [[id number]]     Order Status:  Sent Specimen:  Blood     BLOOD CULTURE ID PANEL [[id number]]  (Abnormal) Collected:  11/03/20 1145    Order Status:  Completed Specimen:  Blood Updated:  11/04/20 0806     Acc. no. from Micro Order [id number]     Staphylococcus Detected        Staphylococcus aureus Detected        Comment: RESULTS VERIFIED, PHONED TO AND READ BACK BY  Amilcar tejada RN @ 4169 ON 11/04/2020 AK. mecA (Methicillin-Resistance Genes) NOT DETECTED        INTERPRETATION       Gram positive cocci in clusters, identified in realtime PCR as probable MSSA. Comment: Recommend discontinuing IV vancomycin starting cefazolin or nafcillin if patient not on beta-lactam therapy. Infectious Diseases Consult recommended in adult patients. THIS TEST DOES NOT REPLACE SENSITIVITY TESTING.              Current Meds:  Current Facility-Administered Medications   Medication Dose Route Frequency    sodium chloride (NS) flush 5-40 mL  5-40 mL IntraVENous PRN    LORazepam (ATIVAN) tablet 1 mg  1 mg Oral F9F PRN    folic acid (FOLVITE) tablet 1 mg  1 mg Oral DAILY    thiamine HCL (B-1) tablet 100 mg  100 mg Oral DAILY    multivitamin, tx-iron-ca-min (THERA-M w/ IRON) tablet 1 Tab  1 Tab Oral DAILY    nicotine (NICODERM CQ) 21 mg/24 hr patch 1 Patch  1 Patch TransDERmal Q24H    ceFAZolin (ANCEF) 2 g/20 mL in sterile water IV syringe  2 g IntraVENous Q8H    midazolam (VERSED) injection 0.5-2 mg  0.5-2 mg IntraVENous Multiple    ketorolac (TORADOL) injection 15 mg  15 mg IntraMUSCular Q6H PRN    oxyCODONE-acetaminophen (PERCOCET 10)  mg per tablet 1 Tab  1 Tab Oral Q4H PRN    naloxone (NARCAN) injection 0.4 mg  0.4 mg IntraVENous PRN    HYDROmorphone (PF) (DILAUDID) injection 0.5 mg  0.5 mg IntraVENous Q3H PRN    sodium chloride (NS) flush 5-40 mL  5-40 mL IntraVENous Q8H    sodium chloride (NS) flush 5-40 mL  5-40 mL IntraVENous PRN    acetaminophen (TYLENOL) tablet 650 mg  650 mg Oral Q6H PRN    Or    acetaminophen (TYLENOL) suppository 650 mg  650 mg Rectal Q6H PRN    polyethylene glycol (MIRALAX) packet 17 g  17 g Oral DAILY PRN    ondansetron (ZOFRAN ODT) tablet 4 mg  4 mg Oral Q8H PRN    Or    ondansetron (ZOFRAN) injection 4 mg  4 mg IntraVENous Q6H PRN    0.9% sodium chloride infusion  125 mL/hr IntraVENous CONTINUOUS       Diet:  DIET REGULAR    Other Studies (last 24 hours):  Ct Thoracentesis Insrt Chest Tube    Result Date: 11/4/2020  Title: Percutaneous drain placement. History: History of left rib fractures with recurrent loculated left pleural effusion, possibly empyema Consent: Informed written and oral consent was obtained from the patient after explanation of benefits and risks (including, but not limitted to: Infection, Hemorrhage, Visceral Injury). The patient's questions were answered to their satisfaction. The patient stated understanding and requested that we proceed. Technique: All CT scans at this facility are performed using dose reduction/dose modulation techniques, as appropriate the performed exam, including the following:  Automated Exposure Control; Adjustment of the mA and/or kV according to patient size (this includes techniques or standardized protocols for targeted exams where dose is matched to indication/reason for exam); and Use of Iterative Reconstruction Technique.  Procedure:  Sterile barrier technique (including:  cap, mask, sterile gloves, sterile sheet, hand hygiene, and chlorhexidene for cutaneous antisepsis) were used. A a preliminary CT scan was performed with radio-opaque markers on the skin. Following routine prep and drape of the left anterior chest, a local field block with 1% lidocaine was achieved. A needle was advanced into the cavity. The needle was exchanged over an Amplatz wire for a 12 [employer] multipurpose drain. . The catheter was secured with a suture. A atrium chest tube was attached. Specimen:  Microbiology. Radiation Exposure Indices: Total Exam DLP = 1168 mGy-cm Medications:  Under physician supervision, 31 minutes of moderate intravenous conscious sedation was performed by administering Versed, Fentanyl intravenously. Continuous pulse oximetry, heart rate, and blood pressure monitoring was performed with an independent, trained observer present. Impression: Uncomplicated percutaneous drain placement into the likely left pleural effusion. Plan: Chest tube suction drainage. We will follow. The tube may benefit from TPA administration in the near future.        Assessment and Plan:     Hospital Problems as of 11/5/2020 Date Reviewed: 11/3/2020          Codes Class Noted - Resolved POA    MSSA bacteremia ICD-10-CM: R78.81, B95.61  ICD-9-CM: 790.7, 041.11  11/4/2020 - Present Unknown        Sinus tachycardia ICD-10-CM: R00.0  ICD-9-CM: 427.89  11/3/2020 - Present Yes        Leukocytosis ICD-10-CM: D72.829  ICD-9-CM: 288.60  11/3/2020 - Present Yes        Hydropneumothorax ICD-10-CM: J94.8  ICD-9-CM: 511.89  11/3/2020 - Present Yes        Loculated pleural effusion ICD-10-CM: J90  ICD-9-CM: 511.9  11/3/2020 - Present Yes        Lactic acidosis ICD-10-CM: E87.2  ICD-9-CM: 276.2  11/3/2020 - Present Yes        Hyponatremia ICD-10-CM: E87.1  ICD-9-CM: 276.1  11/3/2020 - Present Yes        Hypokalemia ICD-10-CM: E87.6  ICD-9-CM: 276.8  11/3/2020 - Present Yes        * (Principal) Sepsis ([address] ICD-10-CM: A41.9  ICD-9-CM: 038.9, 995.91  11/3/2020 - Present Yes              A/P:    1. MSSA bacteremia with sepsis/lactic acidosis:  Vancomycin and Augmentin stopped 11/4 and Ancef started by ID  [facility]  ID following  Source likely left lung effusion/empyema-IR consulted for drain-culture fluid  LA now normal.  Echocardiogram today    2. Loculated pleural effusion/empyema:  Spoke with surgery 11/4; they recommend IR consult  IR placed drain 11/4   culture fluid    3. Acute Hyponatremia:  133 today; improved  NS IVF  Trend    4. Alcohol abuse:  States last drank 1 week ago   CIWA for now      5. Tobacco abuse:  Smoking cessation  Nicotine patch          Signed:  Percy Butler NP

## 2020-11-05 NOTE — PROGRESS NOTES
CM met with patient to complete initial assessment. Patient was found lying in bed, alert and oriented x3. States that he lives in a one story home, 3STE with his g/f. 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 He stated that for his medications, he has been using his savings and does not anticipate that he will be out of work much longer. I asked him if he would like information on the [facility], he stated it is not necessary because he will be back to work soon. Demographics confirmed. Patient does not have PCP currently. CM will continue to follow. Unknown needs at this time.      Care Management Interventions  PCP Verified by CM: Yes(Patient does not have PCP )  Transition of Care Consult (CM Consult): Discharge Planning  Physical Therapy Consult: No  Occupational Therapy Consult: No  Speech Therapy Consult: No  Current Support Network: Lives with Spouse(Patient lives with his g/f)  Confirm Follow Up Transport: Family  Discharge Location  Discharge Placement: Home

## 2020-11-05 NOTE — PROGRESS NOTES
Infectious Disease Consult    Today's Date: 2020   Admit Date: 11/3/2020    Impression:   · MSSA bacteremia (11/3), source chest tube soft tissue site? Empyema? · Left sided loculated exudative pleural effusions; possible empyema  · ETOH abuse    Plan:   · Continue Cefazolin  · Follow repeat BC and pleural fluid cultures  · If pleural fluid culture is positive, then I think that he will need VATS    Anti-infectives:   · Cefazolin -  · Vanc 11/3-  · Augmentin 11/3-  · CTX   · Cefepime 11/3    Subjective:   He is complaining of discomfort around the anterior chest tube that is affected by movement. No Known Allergies     Review of Systems:  A comprehensive review of systems was negative except for that written in the History of Present Illness. Objective:     Visit Vitals  /86   Pulse (!) 107   Temp 99.8 °F (37.7 °C)   Resp 18   Ht 5' 10\" (1.778 m)   Wt 83.9 kg (185 lb)   SpO2 91%   BMI 26.54 kg/m²     Temp (24hrs), Av.9 °F (37.2 °C), Min:97.9 °F (36.6 °C), Max:99.8 °F (37.7 °C)       Lines:  Peripheral IV:   Left arm    Physical Exam:    General:  Appears uncomfortable from chest tube; no cardiopulmonary distress   Eyes:  Sclera anicteric. Pupils equally round and reactive to light. Mouth/Throat: Mucous membranes normal, oral pharynx clear   Neck: Supple   Lungs:   Clear to auscultation bilaterally; anterior pleural drain on left   CV:  Regular rate and rhythm,no murmur, click, rub or gallop   Abdomen:   Soft, non-tender.  bowel sounds normal. non-distended   Extremities: No cyanosis or edema   Skin: Extensive bruising to lower back and left flank; induration around prior chest tube site in left mid axillary line is improved   Lymph nodes: Cervical and supraclavicular normal   Musculoskeletal: No swelling or deformity   Lines/Devices:  Intact, no erythema, drainage or tenderness   Psych: Alert and oriented       Data Review:     CBC:  Recent Labs     20  1159 11/04/20  0136 11/03/20  1148   WBC 7.4 8.6 12.9*   GRANS 76 65 76   MONOS 14* 14* 14*   EOS 1 2 1   ANEU 5.6 5.6 9.9*   ABL 0.5 0.5 0.6   HGB 11.4* 13.6 15.1   HCT 31.5* 38.0* 40.4*    135* 143*       BMP:  Recent Labs     11/05/20  1159 11/04/20  0136 11/03/20  1148   CREA 0.41* 0.66* 0.88   BUN 7 8 9   * 133* 128*   K 3.0* 3.6 3.4*   CL 95* 96* 88*   CO2 28 33* 31   AGAP 8 4* 9   * 114* 176*       LFTS:  Recent Labs     11/03/20  1148   TBILI 3.6*   ALT 47   AP 73   TP 6.8   ALB 2.7*       Microbiology:     All Micro Results     Procedure Component Value Units Date/Time    CULTURE, BODY FLUID Gege Keen STAIN [[id number]] Collected:  11/04/20 1525    Order Status:  Completed Specimen: Body Fluid from Pleural Fluid Updated:  11/05/20 1256     Special Requests: NO SPECIAL REQUESTS        GRAM STAIN NO WBC'S SEEN         NO DEFINITE ORGANISM SEEN        Culture result:       NO GROWTH AFTER SHORT PERIOD OF INCUBATION. FURTHER RESULTS TO FOLLOW AFTER OVERNIGHT INCUBATION. CULTURE, BLOOD [[id number]] Collected:  11/05/20 1159    Order Status:  Completed Specimen:  Blood Updated:  11/05/20 1242    CULTURE, BLOOD [[id number]]     Order Status:  Canceled Specimen:  Blood     CULTURE, BLOOD [[id number]]     Order Status:  Canceled Specimen:  Blood     CULTURE, BLOOD [[id number]]  (Abnormal) Collected:  11/03/20 1205    Order Status:  Completed Specimen:  Blood Updated:  11/05/20 0934     Special Requests: --        RIGHT  HAND       GRAM STAIN       GRAM POS COCCI IN CLUSTERS            AEROBIC BOTTLE POSITIVE               CRITICAL RESULT NOT CALLED DUE TO PREVIOUS NOTIFICATION OF CRITICAL RESULT WITHIN THE LAST 24 HOURS.            Culture result: STAPHYLOCOCCUS AUREUS         REFER TO ACCESSION [id number] FOR SUSCEPTIBILITY            CULTURE IN PROGRESS,FURTHER UPDATES TO FOLLOW          CULTURE, BLOOD [[id number]]  (Abnormal) Collected:  11/03/20 1145    Order Status:  Completed Specimen:  Blood Updated:  11/05/20 0932     Special Requests: --        LEFT  Antecubital       GRAM STAIN GRAM POSITIVE COCCI               AEROBIC AND ANAEROBIC BOTTLES                  RESULTS VERIFIED, PHONED TO AND READ BACK BY OSMANY MCDONOUGH @ 0030 11/4/20 MM           Culture result:       STAPHYLOCOCCUS AUREUS SENSITIVITY TO FOLLOW            REFER TO [address]le [address] Dr PANEL ACCESSION [id number]    CULTURE, BLOOD [[id number]] Collected:  11/04/20 0136    Order Status:  Completed Specimen:  Blood Updated:  11/05/20 0735     Special Requests: --        RIGHT  ARM       Culture result: NO GROWTH 1 DAY       CULTURE, BLOOD [[id number]]     Order Status:  Canceled Specimen:  Blood     CULTURE, BLOOD [[id number]]     Order Status:  Canceled Specimen:  Blood     BLOOD CULTURE ID PANEL [[id number]]  (Abnormal) Collected:  11/03/20 1145    Order Status:  Completed Specimen:  Blood Updated:  11/04/20 0806     Acc. no. from Micro Order [id number]     Staphylococcus Detected        Staphylococcus aureus Detected        Comment: RESULTS VERIFIED, PHONED TO AND READ BACK BY  Amilcar tejada RN @ 5199 ON 11/04/2020 [place]. mecA (Methicillin-Resistance Genes) NOT DETECTED        INTERPRETATION       Gram positive cocci in clusters, identified in realtime PCR as probable MSSA. Comment: Recommend discontinuing IV vancomycin starting cefazolin or nafcillin if patient not on beta-lactam therapy. Infectious Diseases Consult recommended in adult patients. THIS TEST DOES NOT REPLACE SENSITIVITY TESTING. Imaging:   TTE (11/4/2020)  -  Left ventricle: Systolic function was normal. Ejection fraction was estimated in the range of 55 % to 60 %. There were no regional wall motion  abnormalities. -  Aortic valve: Although there was no diagnostic evidence for vegetation, this study is not adequate to completely exclude the possibility.  -  Mitral valve:  Although there was no diagnostic evidence for vegetation, this study is not adequate to completely exclude the possibility.  -  Tricuspid valve: Although there was no diagnostic evidence for vegetation, this study is not adequate to completely exclude the possibility.     Signed By: Lakisha Narvaez MD     November 5, 2020

## 2020-11-06 LAB
ACC. NO. FROM MICRO ORDER, ACCP: ABNORMAL
ANION GAP SERPL CALC-SCNC: 8 MMOL/L (ref 7–16)
BASOPHILS # BLD: 0 K/UL (ref 0–0.2)
BASOPHILS NFR BLD: 1 % (ref 0–2)
BUN SERPL-MCNC: 7 MG/DL (ref 6–23)
CALCIUM SERPL-MCNC: 7.5 MG/DL (ref 8.3–10.4)
CHLORIDE SERPL-SCNC: 94 MMOL/L (ref 98–107)
CO2 SERPL-SCNC: 31 MMOL/L (ref 21–32)
CREAT SERPL-MCNC: 0.53 MG/DL (ref 0.8–1.5)
DIFFERENTIAL METHOD BLD: ABNORMAL
EOSINOPHIL # BLD: 0.1 K/UL (ref 0–0.8)
EOSINOPHIL NFR BLD: 1 % (ref 0.5–7.8)
ERYTHROCYTE [DISTWIDTH] IN BLOOD BY AUTOMATED COUNT: 13.1 % (ref 11.9–14.6)
GLUCOSE SERPL-MCNC: 80 MG/DL (ref 65–100)
HCT VFR BLD AUTO: 33.4 % (ref 41.1–50.3)
HGB BLD-MCNC: 11.9 G/DL (ref 13.6–17.2)
IMM GRANULOCYTES # BLD AUTO: 0.1 K/UL (ref 0–0.5)
IMM GRANULOCYTES NFR BLD AUTO: 1 % (ref 0–5)
INTERPRETATION: ABNORMAL
LYMPHOCYTES # BLD: 0.6 K/UL (ref 0.5–4.6)
LYMPHOCYTES NFR BLD: 7 % (ref 13–44)
MCH RBC QN AUTO: 40.6 PG (ref 26.1–32.9)
MCHC RBC AUTO-ENTMCNC: 35.6 G/DL (ref 31.4–35)
MCV RBC AUTO: 114 FL (ref 79.6–97.8)
MECA (METHICILLIN-RESISTANCE GENES), MRGP: NOT DETECTED
MONOCYTES # BLD: 1.1 K/UL (ref 0.1–1.3)
MONOCYTES NFR BLD: 15 % (ref 4–12)
NEUTS SEG # BLD: 5.9 K/UL (ref 1.7–8.2)
NEUTS SEG NFR BLD: 76 % (ref 43–78)
NRBC # BLD: 0 K/UL (ref 0–0.2)
PLATELET # BLD AUTO: 150 K/UL (ref 150–450)
PMV BLD AUTO: 10.1 FL (ref 9.4–12.3)
POTASSIUM SERPL-SCNC: 2.8 MMOL/L (ref 3.5–5.1)
RBC # BLD AUTO: 2.93 M/UL (ref 4.23–5.6)
SODIUM SERPL-SCNC: 133 MMOL/L (ref 136–145)
STAPHYLOCOCCUS AUREUS: DETECTED
STAPHYLOCOCCUS, STAPP: DETECTED
WBC # BLD AUTO: 7.7 K/UL (ref 4.3–11.1)

## 2020-11-06 PROCEDURE — 94760 N-INVAS EAR/PLS OXIMETRY 1: CPT

## 2020-11-06 PROCEDURE — 74011250637 HC RX REV CODE- 250/637: Performed by: INTERNAL MEDICINE

## 2020-11-06 PROCEDURE — 74011250636 HC RX REV CODE- 250/636: Performed by: INTERNAL MEDICINE

## 2020-11-06 PROCEDURE — 87075 CULTR BACTERIA EXCEPT BLOOD: CPT

## 2020-11-06 PROCEDURE — 2709999900 HC NON-CHARGEABLE SUPPLY

## 2020-11-06 PROCEDURE — 65270000029 HC RM PRIVATE

## 2020-11-06 PROCEDURE — 77010033678 HC OXYGEN DAILY

## 2020-11-06 PROCEDURE — 74011250637 HC RX REV CODE- 250/637: Performed by: NURSE PRACTITIONER

## 2020-11-06 PROCEDURE — 87205 SMEAR GRAM STAIN: CPT

## 2020-11-06 PROCEDURE — 85025 COMPLETE CBC W/AUTO DIFF WBC: CPT

## 2020-11-06 PROCEDURE — 80048 BASIC METABOLIC PNL TOTAL CA: CPT

## 2020-11-06 PROCEDURE — 74011250636 HC RX REV CODE- 250/636: Performed by: NURSE PRACTITIONER

## 2020-11-06 PROCEDURE — 74011000250 HC RX REV CODE- 250: Performed by: NURSE PRACTITIONER

## 2020-11-06 PROCEDURE — 87077 CULTURE AEROBIC IDENTIFY: CPT

## 2020-11-06 PROCEDURE — 36415 COLL VENOUS BLD VENIPUNCTURE: CPT

## 2020-11-06 PROCEDURE — 87186 SC STD MICRODIL/AGAR DIL: CPT

## 2020-11-06 RX ORDER — HALOPERIDOL 5 MG/ML
2 INJECTION INTRAMUSCULAR ONCE
Status: COMPLETED | OUTPATIENT
Start: 2020-11-06 | End: 2020-11-06

## 2020-11-06 RX ORDER — SODIUM CHLORIDE 9 MG/ML
100 INJECTION, SOLUTION INTRAVENOUS CONTINUOUS
Status: DISCONTINUED | OUTPATIENT
Start: 2020-11-06 | End: 2020-11-07

## 2020-11-06 RX ORDER — POTASSIUM CHLORIDE 20 MEQ/1
40 TABLET, EXTENDED RELEASE ORAL
Status: COMPLETED | OUTPATIENT
Start: 2020-11-06 | End: 2020-11-06

## 2020-11-06 RX ORDER — POTASSIUM CHLORIDE 20 MEQ/1
40 TABLET, EXTENDED RELEASE ORAL EVERY 6 HOURS
Status: COMPLETED | OUTPATIENT
Start: 2020-11-06 | End: 2020-11-07

## 2020-11-06 RX ORDER — CHLORDIAZEPOXIDE HYDROCHLORIDE 25 MG/1
25 CAPSULE, GELATIN COATED ORAL 4 TIMES DAILY
Status: DISCONTINUED | OUTPATIENT
Start: 2020-11-06 | End: 2020-11-07

## 2020-11-06 RX ORDER — LORAZEPAM 2 MG/ML
1 INJECTION INTRAMUSCULAR
Status: DISCONTINUED | OUTPATIENT
Start: 2020-11-06 | End: 2020-11-11

## 2020-11-06 RX ADMIN — Medication 10 ML: at 20:49

## 2020-11-06 RX ADMIN — Medication 10 ML: at 14:32

## 2020-11-06 RX ADMIN — KETOROLAC TROMETHAMINE 15 MG: 15 INJECTION, SOLUTION INTRAMUSCULAR; INTRAVENOUS at 00:35

## 2020-11-06 RX ADMIN — LORAZEPAM 1 MG: 1 TABLET ORAL at 17:20

## 2020-11-06 RX ADMIN — POTASSIUM CHLORIDE 40 MEQ: 1500 TABLET, EXTENDED RELEASE ORAL at 20:49

## 2020-11-06 RX ADMIN — FOLIC ACID 1 MG: 1 TABLET ORAL at 08:32

## 2020-11-06 RX ADMIN — HALOPERIDOL LACTATE 2 MG: 5 INJECTION, SOLUTION INTRAMUSCULAR at 03:47

## 2020-11-06 RX ADMIN — MULTIPLE VITAMINS W/ MINERALS TAB 1 TABLET: TAB at 08:32

## 2020-11-06 RX ADMIN — CHLORDIAZEPOXIDE HYDROCHLORIDE 25 MG: 25 CAPSULE ORAL at 08:32

## 2020-11-06 RX ADMIN — OXYCODONE HYDROCHLORIDE AND ACETAMINOPHEN 1 TABLET: 10; 325 TABLET ORAL at 15:35

## 2020-11-06 RX ADMIN — CHLORDIAZEPOXIDE HYDROCHLORIDE 25 MG: 25 CAPSULE ORAL at 17:20

## 2020-11-06 RX ADMIN — CEFAZOLIN SODIUM 2 G: 100 INJECTION, POWDER, LYOPHILIZED, FOR SOLUTION INTRAVENOUS at 11:40

## 2020-11-06 RX ADMIN — OXYCODONE HYDROCHLORIDE AND ACETAMINOPHEN 1 TABLET: 10; 325 TABLET ORAL at 10:17

## 2020-11-06 RX ADMIN — POTASSIUM CHLORIDE 40 MEQ: 1500 TABLET, EXTENDED RELEASE ORAL at 15:35

## 2020-11-06 RX ADMIN — SODIUM CHLORIDE 100 ML/HR: 900 INJECTION, SOLUTION INTRAVENOUS at 15:37

## 2020-11-06 RX ADMIN — CHLORDIAZEPOXIDE HYDROCHLORIDE 25 MG: 25 CAPSULE ORAL at 14:32

## 2020-11-06 RX ADMIN — LORAZEPAM 1 MG: 1 TABLET ORAL at 00:35

## 2020-11-06 RX ADMIN — LORAZEPAM 1 MG: 2 INJECTION INTRAMUSCULAR; INTRAVENOUS at 20:49

## 2020-11-06 RX ADMIN — CHLORDIAZEPOXIDE HYDROCHLORIDE 25 MG: 25 CAPSULE ORAL at 20:49

## 2020-11-06 RX ADMIN — Medication 10 ML: at 05:18

## 2020-11-06 RX ADMIN — Medication 100 MG: at 08:32

## 2020-11-06 RX ADMIN — CEFAZOLIN SODIUM 2 G: 100 INJECTION, POWDER, LYOPHILIZED, FOR SOLUTION INTRAVENOUS at 03:48

## 2020-11-06 RX ADMIN — CEFAZOLIN SODIUM 2 G: 100 INJECTION, POWDER, LYOPHILIZED, FOR SOLUTION INTRAVENOUS at 20:54

## 2020-11-06 RX ADMIN — CHLORDIAZEPOXIDE HYDROCHLORIDE 25 MG: 25 CAPSULE ORAL at 03:47

## 2020-11-06 NOTE — PROGRESS NOTES
END OF SHIFT NOTE:    INTAKE/OUTPUT  11/04 0701 - 11/05 0700  In: 2226 [I.V.:2226]  Out: 1080 [Urine:900]  Voiding: YES  Catheter: NO  Drain:              Flatus: Patient does have flatus present. Stool:  1 occurrences. Characteristics:  Stool Assessment  Stool Color: Other (Comment)(have not observed)    Emesis: 0 occurrences. Characteristics:        VITAL SIGNS  Patient Vitals for the past 12 hrs:   Temp Pulse Resp BP SpO2   11/05/20 1534 99.8 °F (37.7 °C) (!) 107 18 131/86 91 %   11/05/20 1114 97.9 °F (36.6 °C) (!) 113 18 (!) 140/88 92 %       Pain Assessment  Pain Intensity 1: 7 (11/05/20 1757)  Pain Location 1: Chest  Pain Intervention(s) 1: Medication (see MAR)  Patient Stated Pain Goal: 2    Ambulating  Yes    Shift report given to oncoming nurse at the bedside.     [place]

## 2020-11-06 NOTE — PROGRESS NOTES
Hospitalist Progress Note    Subjective:   Daily Progress Note: 11/6/2020 2:18 PM    Patient was admitted to surgical service from 10/30-11/2 after mechanical fall through attic floor sustaining rib fractures and left pneumothorax. Chest tube placed in ER at that time, then reportedly removed chest tube about 4 days PTA. Also sustained a coracoid fracture, seen by Ortho with sling/conservative management. Progressive pain and SOB since discharge, then presented to ER again 11/3. New effusion on CXR. CT chest with loculated hydropneumothorax. Tachycardic on arrival.  Leukocytosis   11/4:  Blood culture with Gram Positive Cocci in Anaerobic bottle. Empiric vanc begun, changed to cefazolin after culture returned with [employer] MSSA. To IR for chest tube/drain placement. Concern for developing empyema. Second set of cultures ordered. ID in for consult. 11/5:  Cultures repeated. TTE below, study not adequate to completely exclude the possibility of vegetation. Received tPa in tube    11/6:  Confused early this am, apparently disconnected chest tube and was up walking around halls. Calmed with librium and haldol. Currently alert and oriented, reports feeling somewhat better. Chest tube draining bloody fluid. Old chest tube site with small amount purulent drainage, culture to lab.   Blood cultures done 11/5 with NGTD    ADDITIONAL HISTORY:  ETOH abuse, tobacco use, GERD,     Current Facility-Administered Medications   Medication Dose Route Frequency    chlordiazePOXIDE (LIBRIUM) capsule 25 mg  25 mg Oral QID    sodium chloride (NS) flush 5-40 mL  5-40 mL IntraVENous PRN    LORazepam (ATIVAN) tablet 1 mg  1 mg Oral C3F PRN    folic acid (FOLVITE) tablet 1 mg  1 mg Oral DAILY    thiamine HCL (B-1) tablet 100 mg  100 mg Oral DAILY    multivitamin, tx-iron-ca-min (THERA-M w/ IRON) tablet 1 Tab  1 Tab Oral DAILY    nicotine (NICODERM CQ) 21 mg/24 hr patch 1 Patch  1 Patch TransDERmal Q24H    ceFAZolin (ANCEF) 2 g/20 mL in sterile water IV syringe  2 g IntraVENous Q8H    midazolam (VERSED) injection 0.5-2 mg  0.5-2 mg IntraVENous Multiple    ketorolac (TORADOL) injection 15 mg  15 mg IntraMUSCular Q6H PRN    oxyCODONE-acetaminophen (PERCOCET 10)  mg per tablet 1 Tab  1 Tab Oral Q4H PRN    naloxone (NARCAN) injection 0.4 mg  0.4 mg IntraVENous PRN    HYDROmorphone (PF) (DILAUDID) injection 0.5 mg  0.5 mg IntraVENous Q3H PRN    sodium chloride (NS) flush 5-40 mL  5-40 mL IntraVENous Q8H    sodium chloride (NS) flush 5-40 mL  5-40 mL IntraVENous PRN    acetaminophen (TYLENOL) tablet 650 mg  650 mg Oral Q6H PRN    Or    acetaminophen (TYLENOL) suppository 650 mg  650 mg Rectal Q6H PRN    polyethylene glycol (MIRALAX) packet 17 g  17 g Oral DAILY PRN    ondansetron (ZOFRAN ODT) tablet 4 mg  4 mg Oral Q8H PRN    Or    ondansetron (ZOFRAN) injection 4 mg  4 mg IntraVENous Q6H PRN        Review of Systems  A comprehensive review of systems was negative except for that written in the HPI. Objective:     Visit Vitals  /60   Pulse 92   Temp 98.2 °F (36.8 °C)   Resp 18   Ht 5' 10\" (1.778 m)   Wt 83.9 kg (185 lb)   SpO2 92%   BMI 26.54 kg/m²    O2 Flow Rate (L/min): 2 l/min O2 Device: Nasal cannula    Temp (24hrs), Av.6 °F (37 °C), Min:98 °F (36.7 °C), Max:99.8 °F (37.7 °C)     1901 -  0700  In: 8044 [I.V.:3402]  Out: 3415 [Urine:1400]    General appearance: Oriented and alert, cooperative, states feeling a little better. Chest tube draining bloody fluid. Head: Normocephalic, without obvious abnormality, atraumatic  Throat: Lips, mucosa, and tongue normal. Teeth and gums normal  Neck: supple, symmetrical, trachea midline,  no JVD  Lungs: Painful left ribs. Lungs clear to auscultation bilaterally. Chest tube continues to drain bloody fluid. Heart: regular rate and rhythm, S1, S2 normal, no murmur, click, rub or gallop  Abdomen: soft, non-tender.  Bowel sounds normal. No masses, no organomegaly  Extremities: All extremities normal, atraumatic, no cyanosis or edema  Skin: Skin color, texture, turgor normal. No rashes or lesions  Neurologic: Grossly normal    Additional comments: Notes,orders, test results, vitals reviewed    Data Review  Recent Results (from the past 24 hour(s))   CBC WITH AUTOMATED DIFF    Collection Time: 11/06/20  4:40 AM   Result Value Ref Range    WBC 7.7 4.3 - 11.1 K/uL    RBC 2.93 (L) 4.23 - 5.6 M/uL    HGB 11.9 (L) 13.6 - 17.2 g/dL    HCT 33.4 (L) 41.1 - 50.3 %    .0 (H) 79.6 - 97.8 FL    MCH 40.6 (H) 26.1 - 32.9 PG    MCHC 35.6 (H) 31.4 - 35.0 g/dL    RDW 13.1 11.9 - 14.6 %    PLATELET 189 296 - 599 K/uL    MPV 10.1 9.4 - 12.3 FL    ABSOLUTE NRBC 0.00 0.0 - 0.2 K/uL    DF AUTOMATED      NEUTROPHILS 76 43 - 78 %    LYMPHOCYTES 7 (L) 13 - 44 %    MONOCYTES 15 (H) 4.0 - 12.0 %    EOSINOPHILS 1 0.5 - 7.8 %    BASOPHILS 1 0.0 - 2.0 %    IMMATURE GRANULOCYTES 1 0.0 - 5.0 %    ABS. NEUTROPHILS 5.9 1.7 - 8.2 K/UL    ABS. LYMPHOCYTES 0.6 0.5 - 4.6 K/UL    ABS. MONOCYTES 1.1 0.1 - 1.3 K/UL    ABS. EOSINOPHILS 0.1 0.0 - 0.8 K/UL    ABS. BASOPHILS 0.0 0.0 - 0.2 K/UL    ABS. IMM. GRANS. 0.1 0.0 - 0.5 K/UL   METABOLIC PANEL, BASIC    Collection Time: 11/06/20  4:40 AM   Result Value Ref Range    Sodium 133 (L) 136 - 145 mmol/L    Potassium 2.8 (L) 3.5 - 5.1 mmol/L    Chloride 94 (L) 98 - 107 mmol/L    CO2 31 21 - 32 mmol/L    Anion gap 8 7 - 16 mmol/L    Glucose 80 65 - 100 mg/dL    BUN 7 6 - 23 MG/DL    Creatinine 0.53 (L) 0.8 - 1.5 MG/DL    GFR est AA >60 >60 ml/min/1.73m2    GFR est non-AA >60 >60 ml/min/1.73m2    Calcium 7.5 (L) 8.3 - 10.4 MG/DL      11/5:  TTE:   -  Left ventricle: Systolic function was normal. Ejection fraction was estimated in the range of 55 % to 60 %. There were no regional wall motion abnormalities.   -  Aortic valve:  Although there was no diagnostic evidence for vegetation, this study is not adequate to completely exclude the possibility.   - Mitral valve: Although there was no diagnostic evidence for vegetation, this study is not adequate to completely exclude the possibility.   -  Tricuspid valve:  Although there was no diagnostic evidence for vegetation, this study is not adequate to completely exclude the possibility.       Assessment/Plan:   Sepsis due to MSSA bacteremia: source most likely lung   ID on board:  Changed to ancef 11/4, continue    Blood cultures done 11/5 with NGTD   Old chest tube site wound culture pending    Pleural fluid culture results pending     Sinus tachycardia:  Resolving     Leukocytosis:  COntinue abx as ordered    IDD (11/3/2020)    Left side loculated Hydropneumothorax:  Due to fall    Chest tube left, replaced per IR 11/4   tPa given through tube 11/5:  COntinues to drain bloody fluid   May have empyema     Lactic acidosis:  Resolved     Hyponatremia/Hypokalemia:  Replace and recheck    Care Plan discussed with: Patient, Dr Leilani Johns and Nurse    Signed By: Leslie Merida NP     November 6, 2020

## 2020-11-06 NOTE — PROGRESS NOTES
Patient up several times this shift with confusion. Patient removing tubing from Chest tube and ambulating down the hallway looking for other people. Stated that he don't know where he is. Md aware. New meds were started.

## 2020-11-06 NOTE — PROGRESS NOTES
Infectious Disease Consult    Today's Date: 2020   Admit Date: 11/3/2020    Impression:   · MSSA bacteremia (11/3), source chest tube soft tissue site? Empyema? · Soft tissue at prior chest tube site is bruised but less indurated  · Left sided loculated exudative pleural effusions; possible empyema but culture negative thus far  · ETOH abuse; according to him he hasn't had a drink since prior to 10/30/2020 admission    Plan:   · Continue Cefazolin  · Follow repeat BC and pleural fluid cultures, thus far negative  · If pleural fluid culture is positive, then I think that he will need VATS, but hopefully the bacteremia can be attributed to a soft tissue source. Anti-infectives:   · Cefazolin -  · Vanc 11/3-  · Augmentin 11/3-  · CTX   · Cefepime 11/3    Subjective:   Per the notes, he had some delirium overnight and was treated with haldol. He is complaining of some chills vs tremors intermittently. His primary complaint remains left chest pain with movement. No Known Allergies     Review of Systems:  A comprehensive review of systems was negative except for that written in the History of Present Illness. Objective:     Visit Vitals  /85   Pulse 99   Temp 98.3 °F (36.8 °C)   Resp 18   Ht 5' 10\" (1.778 m)   Wt 83.9 kg (185 lb)   SpO2 91%   BMI 26.54 kg/m²     Temp (24hrs), Av.5 °F (36.9 °C), Min:97.9 °F (36.6 °C), Max:99.8 °F (37.7 °C)       Lines:  Peripheral IV:   Left arm    Physical Exam:    General:  Lying in bed and looks a little more comfortable than yesterday but anxious appearing   Eyes:  Sclera anicteric. Pupils equally round and reactive to light. Mouth/Throat: Mucous membranes normal, oral pharynx clear   Neck: Supple   Lungs:   Clear to auscultation bilaterally; anterior pleural drain on left   CV:  Regular rate and rhythm,no murmur, click, rub or gallop   Abdomen:   Soft, non-tender.  bowel sounds normal. non-distended   Extremities: No cyanosis or edema Skin: Extensive bruising to lower back and left flank; induration around prior chest tube site in left mid axillary line is improving   Lymph nodes: Cervical and supraclavicular normal   Musculoskeletal: No swelling or deformity   Lines/Devices:  Intact, no erythema, drainage or tenderness   Psych: Alert and oriented       Data Review:     CBC:  Recent Labs     11/06/20 0440 11/05/20 1159 11/04/20 0136   WBC 7.7 7.4 8.6   GRANS 76 76 65   MONOS 15* 14* 14*   EOS 1 1 2   ANEU 5.9 5.6 5.6   ABL 0.6 0.5 0.5   HGB 11.9* 11.4* 13.6   HCT 33.4* 31.5* 38.0*    152 135*       BMP:  Recent Labs     11/06/20 0440 11/05/20 1159 11/04/20 0136   CREA 0.53* 0.41* 0.66*   BUN 7 7 8   * 131* 133*   K 2.8* 3.0* 3.6   CL 94* 95* 96*   CO2 31 28 33*   AGAP 8 8 4*   GLU 80 131* 114*       LFTS:  Recent Labs     11/03/20 1148   TBILI 3.6*   ALT 47   AP 73   TP 6.8   ALB 2.7*       Microbiology:     All Micro Results     Procedure Component Value Units Date/Time    CULTURE, BLOOD [[id number]] Collected:  11/05/20 1159    Order Status:  Completed Specimen:  Blood Updated:  11/06/20 0948     Special Requests: --        RIGHT  HAND       Culture result: NO GROWTH AFTER 21 HOURS       CULTURE, BODY FLUID PAT Hdez [[id number]] Collected:  11/04/20 1525    Order Status:  Completed Specimen:   Body Fluid from Pleural Fluid Updated:  11/06/20 0850     Special Requests: NO SPECIAL REQUESTS        GRAM STAIN NO WBC'S SEEN         NO DEFINITE ORGANISM SEEN        Culture result: NO GROWTH 1 DAY       CULTURE, BLOOD [[id number]]  (Abnormal) Collected:  11/03/20 1145    Order Status:  Completed Specimen:  Blood Updated:  11/06/20 0833     Special Requests: --        LEFT  Antecubital       GRAM STAIN GRAM POSITIVE COCCI               AEROBIC AND ANAEROBIC BOTTLES                  RESULTS VERIFIED, PHONED TO AND READ BACK BY OSMANY MCDONOUGH @ 0030 11/4/20 MM           Culture result:       STAPHYLOCOCCUS AUREUS REPEATING SUSCEPTIBILITY            REFER  Holly Somers PANEL ACCESSION [id number]    CULTURE, BLOOD [[id number]]  (Abnormal) Collected:  11/04/20 0136    Order Status:  Completed Specimen:  Blood Updated:  11/06/20 0832     Special Requests: --        RIGHT  ARM       GRAM STAIN GRAM POSITIVE COCCI         AEROBIC BOTTLE POSITIVE               RESULTS VERIFIED, PHONED TO AND READ BACK BY OSMANY LAZO AT 2055 11.5.2020 EOR           Culture result:       STAPHYLOCOCCUS AUREUS CULTURE IN [address] UPDATES TO FOLLOW            REFER  Holly Somers ACCESSION [id number]    BLOOD CULTURE ID PANEL [[id number]]  (Abnormal) Collected:  11/04/20 0136    Order Status:  Completed Specimen:  Blood Updated:  11/06/20 0823     Acc. no. from Micro Order [id number]     Staphylococcus Detected        Staphylococcus aureus Detected        Comment: CRITICAL RESULT NOT CALLED DUE TO PREVIOUS NOTIFICATION OF CRITICAL RESULT WITHIN THE LAST 24 HOURS. mecA (Methicillin-Resistance Genes) NOT DETECTED        INTERPRETATION       Gram positive cocci in clusters, identified in realtime PCR as probable MSSA. Comment: Recommend discontinuing IV vancomycin starting cefazolin or nafcillin if patient not on beta-lactam therapy. Infectious Diseases Consult recommended in adult patients. THIS TEST DOES NOT REPLACE SENSITIVITY TESTING. CULTURE, BLOOD [[id number]]     Order Status:  Canceled Specimen:  Blood     CULTURE, BLOOD [[id number]]     Order Status:  Canceled Specimen:  Blood     CULTURE, BLOOD [[id number]]  (Abnormal) Collected:  11/03/20 1205    Order Status:  Completed Specimen:  Blood Updated:  11/05/20 0934     Special Requests: --        RIGHT  HAND       GRAM STAIN       GRAM POS COCCI IN CLUSTERS            AEROBIC BOTTLE POSITIVE               CRITICAL RESULT NOT CALLED DUE TO PREVIOUS NOTIFICATION OF CRITICAL RESULT WITHIN THE LAST 24 HOURS.            Culture result: STAPHYLOCOCCUS AUREUS         REFER TO ACCESSION [id number] FOR SUSCEPTIBILITY            CULTURE IN PROGRESS,FURTHER UPDATES TO FOLLOW          CULTURE, BLOOD [[id number]]     Order Status:  Canceled Specimen:  Blood     CULTURE, BLOOD [[id number]]     Order Status:  Canceled Specimen:  Blood     BLOOD CULTURE ID PANEL [[id number]]  (Abnormal) Collected:  11/03/20 1145    Order Status:  Completed Specimen:  Blood Updated:  11/04/20 0806     Acc. no. from Micro Order [id number]     Staphylococcus Detected        Staphylococcus aureus Detected        Comment: RESULTS VERIFIED, PHONED TO AND READ BACK BY  Fitzgerald terrell RN @ 2783 ON 11/04/2020 [place]. mecA (Methicillin-Resistance Genes) NOT DETECTED        INTERPRETATION       Gram positive cocci in clusters, identified in realtime PCR as probable MSSA. Comment: Recommend discontinuing IV vancomycin starting cefazolin or nafcillin if patient not on beta-lactam therapy. Infectious Diseases Consult recommended in adult patients. THIS TEST DOES NOT REPLACE SENSITIVITY TESTING. Imaging:   TTE (11/4/2020)  -  Left ventricle: Systolic function was normal. Ejection fraction was estimated in the range of 55 % to 60 %. There were no regional wall motion  abnormalities. -  Aortic valve: Although there was no diagnostic evidence for vegetation, this study is not adequate to completely exclude the possibility.  -  Mitral valve: Although there was no diagnostic evidence for vegetation, this study is not adequate to completely exclude the possibility.  -  Tricuspid valve: Although there was no diagnostic evidence for vegetation, this study is not adequate to completely exclude the possibility.     Signed By: Mohamud Blanco MD     November 6, 2020

## 2020-11-07 ENCOUNTER — APPOINTMENT (OUTPATIENT)
Dept: GENERAL RADIOLOGY | Age: 50
DRG: 871 | End: 2020-11-07
Attending: NURSE PRACTITIONER

## 2020-11-07 PROBLEM — E83.42 HYPOMAGNESEMIA: Status: ACTIVE | Noted: 2020-11-07

## 2020-11-07 PROBLEM — E87.6 HYPOKALEMIA: Status: RESOLVED | Noted: 2020-11-03 | Resolved: 2020-11-07

## 2020-11-07 LAB
ALBUMIN SERPL-MCNC: 1.8 G/DL (ref 3.5–5)
ALBUMIN/GLOB SERPL: 0.5 {RATIO} (ref 1.2–3.5)
ALP SERPL-CCNC: 229 U/L (ref 50–136)
ALT SERPL-CCNC: 46 U/L (ref 12–65)
AMMONIA PLAS-SCNC: 24 UMOL/L (ref 11–32)
ANION GAP SERPL CALC-SCNC: 4 MMOL/L (ref 7–16)
ANION GAP SERPL CALC-SCNC: 6 MMOL/L (ref 7–16)
AST SERPL-CCNC: 110 U/L (ref 15–37)
BACTERIA SPEC CULT: ABNORMAL
BACTERIA SPEC CULT: NORMAL
BASOPHILS # BLD: 0.1 K/UL (ref 0–0.2)
BASOPHILS NFR BLD: 1 % (ref 0–2)
BILIRUB SERPL-MCNC: 1.5 MG/DL (ref 0.2–1.1)
BUN SERPL-MCNC: 6 MG/DL (ref 6–23)
BUN SERPL-MCNC: 7 MG/DL (ref 6–23)
CALCIUM SERPL-MCNC: 7.7 MG/DL (ref 8.3–10.4)
CALCIUM SERPL-MCNC: 7.9 MG/DL (ref 8.3–10.4)
CHLORIDE SERPL-SCNC: 97 MMOL/L (ref 98–107)
CHLORIDE SERPL-SCNC: 99 MMOL/L (ref 98–107)
CO2 SERPL-SCNC: 31 MMOL/L (ref 21–32)
CO2 SERPL-SCNC: 31 MMOL/L (ref 21–32)
CREAT SERPL-MCNC: 0.52 MG/DL (ref 0.8–1.5)
CREAT SERPL-MCNC: 0.53 MG/DL (ref 0.8–1.5)
DIFFERENTIAL METHOD BLD: ABNORMAL
EOSINOPHIL # BLD: 0 K/UL (ref 0–0.8)
EOSINOPHIL NFR BLD: 0 % (ref 0.5–7.8)
ERYTHROCYTE [DISTWIDTH] IN BLOOD BY AUTOMATED COUNT: 13.7 % (ref 11.9–14.6)
GLOBULIN SER CALC-MCNC: 3.8 G/DL (ref 2.3–3.5)
GLUCOSE SERPL-MCNC: 130 MG/DL (ref 65–100)
GLUCOSE SERPL-MCNC: 136 MG/DL (ref 65–100)
GRAM STN SPEC: ABNORMAL
GRAM STN SPEC: NORMAL
GRAM STN SPEC: NORMAL
HCT VFR BLD AUTO: 32 % (ref 41.1–50.3)
HCT VFR BLD AUTO: 33.4 % (ref 41.1–50.3)
HGB BLD-MCNC: 11.3 G/DL (ref 13.6–17.2)
HGB BLD-MCNC: 12.1 G/DL (ref 13.6–17.2)
IMM GRANULOCYTES # BLD AUTO: 0.2 K/UL (ref 0–0.5)
IMM GRANULOCYTES NFR BLD AUTO: 2 % (ref 0–5)
LYMPHOCYTES # BLD: 1 K/UL (ref 0.5–4.6)
LYMPHOCYTES NFR BLD: 10 % (ref 13–44)
MAGNESIUM SERPL-MCNC: 1.4 MG/DL (ref 1.8–2.4)
MAGNESIUM SERPL-MCNC: 2.3 MG/DL (ref 1.8–2.4)
MCH RBC QN AUTO: 41.2 PG (ref 26.1–32.9)
MCHC RBC AUTO-ENTMCNC: 36.2 G/DL (ref 31.4–35)
MCV RBC AUTO: 113.6 FL (ref 79.6–97.8)
MONOCYTES # BLD: 1.3 K/UL (ref 0.1–1.3)
MONOCYTES NFR BLD: 14 % (ref 4–12)
NEUTS SEG # BLD: 7 K/UL (ref 1.7–8.2)
NEUTS SEG NFR BLD: 74 % (ref 43–78)
NRBC # BLD: 0 K/UL (ref 0–0.2)
PLATELET # BLD AUTO: 183 K/UL (ref 150–450)
PMV BLD AUTO: 10.6 FL (ref 9.4–12.3)
POTASSIUM SERPL-SCNC: 3.2 MMOL/L (ref 3.5–5.1)
POTASSIUM SERPL-SCNC: 3.5 MMOL/L (ref 3.5–5.1)
PROT SERPL-MCNC: 5.6 G/DL (ref 6.3–8.2)
RBC # BLD AUTO: 2.94 M/UL (ref 4.23–5.6)
SERVICE CMNT-IMP: ABNORMAL
SERVICE CMNT-IMP: NORMAL
SODIUM SERPL-SCNC: 134 MMOL/L (ref 136–145)
SODIUM SERPL-SCNC: 134 MMOL/L (ref 136–145)
WBC # BLD AUTO: 9.6 K/UL (ref 4.3–11.1)

## 2020-11-07 PROCEDURE — 74011250636 HC RX REV CODE- 250/636: Performed by: INTERNAL MEDICINE

## 2020-11-07 PROCEDURE — 65270000029 HC RM PRIVATE

## 2020-11-07 PROCEDURE — 85025 COMPLETE CBC W/AUTO DIFF WBC: CPT

## 2020-11-07 PROCEDURE — 74011250637 HC RX REV CODE- 250/637: Performed by: INTERNAL MEDICINE

## 2020-11-07 PROCEDURE — 85018 HEMOGLOBIN: CPT

## 2020-11-07 PROCEDURE — 74011000250 HC RX REV CODE- 250: Performed by: NURSE PRACTITIONER

## 2020-11-07 PROCEDURE — 74011250636 HC RX REV CODE- 250/636: Performed by: NURSE PRACTITIONER

## 2020-11-07 PROCEDURE — 2709999900 HC NON-CHARGEABLE SUPPLY

## 2020-11-07 PROCEDURE — 71045 X-RAY EXAM CHEST 1 VIEW: CPT

## 2020-11-07 PROCEDURE — 83735 ASSAY OF MAGNESIUM: CPT

## 2020-11-07 PROCEDURE — 80053 COMPREHEN METABOLIC PANEL: CPT

## 2020-11-07 PROCEDURE — 74011250637 HC RX REV CODE- 250/637: Performed by: NURSE PRACTITIONER

## 2020-11-07 PROCEDURE — 99223 1ST HOSP IP/OBS HIGH 75: CPT | Performed by: INTERNAL MEDICINE

## 2020-11-07 PROCEDURE — 82140 ASSAY OF AMMONIA: CPT

## 2020-11-07 PROCEDURE — 74011250636 HC RX REV CODE- 250/636: Performed by: FAMILY MEDICINE

## 2020-11-07 PROCEDURE — 36415 COLL VENOUS BLD VENIPUNCTURE: CPT

## 2020-11-07 RX ORDER — CHLORDIAZEPOXIDE HYDROCHLORIDE 25 MG/1
25 CAPSULE, GELATIN COATED ORAL 3 TIMES DAILY
Status: DISCONTINUED | OUTPATIENT
Start: 2020-11-07 | End: 2020-11-09

## 2020-11-07 RX ORDER — POLYETHYLENE GLYCOL 3350 17 G/17G
17 POWDER, FOR SOLUTION ORAL DAILY
Status: DISCONTINUED | OUTPATIENT
Start: 2020-11-08 | End: 2020-11-12 | Stop reason: HOSPADM

## 2020-11-07 RX ORDER — POTASSIUM CHLORIDE 20 MEQ/1
40 TABLET, EXTENDED RELEASE ORAL
Status: COMPLETED | OUTPATIENT
Start: 2020-11-07 | End: 2020-11-07

## 2020-11-07 RX ORDER — MAGNESIUM SULFATE 1 G/100ML
1 INJECTION INTRAVENOUS ONCE
Status: COMPLETED | OUTPATIENT
Start: 2020-11-07 | End: 2020-11-07

## 2020-11-07 RX ORDER — MAGNESIUM SULFATE HEPTAHYDRATE 40 MG/ML
2 INJECTION, SOLUTION INTRAVENOUS ONCE
Status: COMPLETED | OUTPATIENT
Start: 2020-11-07 | End: 2020-11-07

## 2020-11-07 RX ADMIN — CEFAZOLIN SODIUM 2 G: 100 INJECTION, POWDER, LYOPHILIZED, FOR SOLUTION INTRAVENOUS at 20:07

## 2020-11-07 RX ADMIN — MULTIPLE VITAMINS W/ MINERALS TAB 1 TABLET: TAB at 08:01

## 2020-11-07 RX ADMIN — FOLIC ACID 1 MG: 1 TABLET ORAL at 09:00

## 2020-11-07 RX ADMIN — OXYCODONE HYDROCHLORIDE AND ACETAMINOPHEN 1 TABLET: 10; 325 TABLET ORAL at 21:19

## 2020-11-07 RX ADMIN — CEFAZOLIN SODIUM 2 G: 100 INJECTION, POWDER, LYOPHILIZED, FOR SOLUTION INTRAVENOUS at 04:00

## 2020-11-07 RX ADMIN — MAGNESIUM SULFATE HEPTAHYDRATE 1 G: 1 INJECTION, SOLUTION INTRAVENOUS at 10:07

## 2020-11-07 RX ADMIN — POTASSIUM CHLORIDE 40 MEQ: 1500 TABLET, EXTENDED RELEASE ORAL at 00:48

## 2020-11-07 RX ADMIN — HYDROMORPHONE HYDROCHLORIDE 0.5 MG: 1 INJECTION, SOLUTION INTRAMUSCULAR; INTRAVENOUS; SUBCUTANEOUS at 00:49

## 2020-11-07 RX ADMIN — POTASSIUM CHLORIDE 40 MEQ: 1500 TABLET, EXTENDED RELEASE ORAL at 15:45

## 2020-11-07 RX ADMIN — OXYCODONE HYDROCHLORIDE AND ACETAMINOPHEN 1 TABLET: 10; 325 TABLET ORAL at 15:46

## 2020-11-07 RX ADMIN — MAGNESIUM SULFATE HEPTAHYDRATE 2 G: 40 INJECTION, SOLUTION INTRAVENOUS at 07:56

## 2020-11-07 RX ADMIN — KETOROLAC TROMETHAMINE 15 MG: 15 INJECTION, SOLUTION INTRAMUSCULAR; INTRAVENOUS at 00:49

## 2020-11-07 RX ADMIN — KETOROLAC TROMETHAMINE 15 MG: 15 INJECTION, SOLUTION INTRAMUSCULAR; INTRAVENOUS at 11:25

## 2020-11-07 RX ADMIN — CHLORDIAZEPOXIDE HYDROCHLORIDE 25 MG: 25 CAPSULE ORAL at 08:02

## 2020-11-07 RX ADMIN — OXYCODONE HYDROCHLORIDE AND ACETAMINOPHEN 1 TABLET: 10; 325 TABLET ORAL at 07:58

## 2020-11-07 RX ADMIN — Medication 10 ML: at 06:13

## 2020-11-07 RX ADMIN — Medication 10 ML: at 14:47

## 2020-11-07 RX ADMIN — CHLORDIAZEPOXIDE HYDROCHLORIDE 25 MG: 25 CAPSULE ORAL at 15:45

## 2020-11-07 RX ADMIN — Medication 100 MG: at 08:02

## 2020-11-07 RX ADMIN — Medication 10 ML: at 21:20

## 2020-11-07 RX ADMIN — SODIUM CHLORIDE 100 ML/HR: 900 INJECTION, SOLUTION INTRAVENOUS at 00:53

## 2020-11-07 RX ADMIN — CEFAZOLIN SODIUM 2 G: 100 INJECTION, POWDER, LYOPHILIZED, FOR SOLUTION INTRAVENOUS at 11:28

## 2020-11-07 RX ADMIN — CHLORDIAZEPOXIDE HYDROCHLORIDE 25 MG: 25 CAPSULE ORAL at 21:37

## 2020-11-07 NOTE — PROGRESS NOTES
END OF SHIFT NOTE:    INTAKE/OUTPUT  11/05 0701 - 11/06 0700  In: 2841 [I.V.:1814]  Out: 4782 [Urine:1100]  Voiding: YES  Catheter: NO  Drain:              Flatus: Patient does have flatus present. Stool:  1 occurrences. Characteristics:  Stool Assessment  Stool Color: Other (Comment)(have not observed)    Emesis: 0 occurrences. Characteristics:        VITAL SIGNS  Patient Vitals for the past 12 hrs:   Temp Pulse Resp BP SpO2   11/06/20 1458 98.9 °F (37.2 °C) (!) 112 18 116/83 97 %   11/06/20 1153 98.2 °F (36.8 °C) 92 18 104/60 92 %       Pain Assessment  Pain Intensity 1: 7 (11/06/20 1535)  Pain Location 1: Chest  Pain Intervention(s) 1: Medication (see MAR)  Patient Stated Pain Goal: 2    Ambulating  Yes    Shift report given to oncoming nurse at the bedside.     Jay Mitchell

## 2020-11-07 NOTE — PROGRESS NOTES
Upon entering room patient sitting straight up in bed, SOB and coughing. 02= 96% on RA. Am assessment complete. Wall suction regulator noted to be on intermittent suction, changed to continuous suction. Valve on pigtail of chest tube noted to be turned slightly to the left. Aligned valve vertically, parallel with tubing, with immediate 970 ml of sanguinous output from chest tube. Viann Eisenmenger notified and asked to come assess patient.

## 2020-11-07 NOTE — PROGRESS NOTES
Hospitalist Progress Note    Subjective:   Daily Progress Note: 11/7/2020 9:44 AM    Patient was admitted to surgical service from 10/30-11/2 after mechanical fall through attic floor sustaining rib fractures and left pneumothorax. Chest tube placed in ER at that time, then reportedly removed chest tube about 4 days PTA. Also sustained a coracoid fracture, seen by Ortho with sling/conservative management. Progressive pain and SOB since discharge, then presented to ER again 11/3. New effusion on CXR. CT chest with loculated hydropneumothorax. Tachycardic on arrival.  Leukocytosis   11/4:  Blood culture with Gram Positive Cocci in Anaerobic bottle. Empiric vanc begun, changed to cefazolin after culture returned with [employer] MSSA. To IR for chest tube/drain placement. Concern for developing empyema. Second set of cultures ordered. ID in for consult. 11/5:  Cultures repeated. TTE below, study not adequate to completely exclude the possibility of vegetation. Received tPa in tube   11/6:  Confused early this am, apparently disconnected chest tube and was up walking around halls. Calmed with librium and haldol. Currently alert and oriented, reports feeling somewhat better. Chest tube draining bloody fluid. Old chest tube site with small amount purulent drainage, culture to lab. Blood cultures done 11/5 with NGTD    11/7:  Called to room urgently per RN at 0800 for respiratory distress. Found patient sitting up in tripod position in obvious respiratory distress. Oxygen sat 96% on RA. Found with suction on intermittent setting and chest tube partially clamped, unknown how long. Immediately unclamped with return 970 ml bloody pleural fluid. Suction set to continuous. Left lung sounds diminished. Stat labs ordered including another set of blood cultures. CXR with effusion entire lower half of lung. Pulmonary consulted for assist, in immediately.   Librium de escalated to 25 mg tid.      ADDITIONAL HISTORY: ETOH abuse with 1 pint liquor daily, tobacco use, GERD,      Current Facility-Administered Medications   Medication Dose Route Frequency    magnesium sulfate 1 g/100 ml IVPB (premix or compounded)  1 g IntraVENous ONCE    chlordiazePOXIDE (LIBRIUM) capsule 25 mg  25 mg Oral QID    LORazepam (ATIVAN) injection 1 mg  1 mg IntraVENous Q4H PRN    sodium chloride (NS) flush 5-40 mL  5-40 mL IntraVENous PRN    LORazepam (ATIVAN) tablet 1 mg  1 mg Oral B7U PRN    folic acid (FOLVITE) tablet 1 mg  1 mg Oral DAILY    thiamine HCL (B-1) tablet 100 mg  100 mg Oral DAILY    multivitamin, tx-iron-ca-min (THERA-M w/ IRON) tablet 1 Tab  1 Tab Oral DAILY    nicotine (NICODERM CQ) 21 mg/24 hr patch 1 Patch  1 Patch TransDERmal Q24H    ceFAZolin (ANCEF) 2 g/20 mL in sterile water IV syringe  2 g IntraVENous Q8H    midazolam (VERSED) injection 0.5-2 mg  0.5-2 mg IntraVENous Multiple    ketorolac (TORADOL) injection 15 mg  15 mg IntraMUSCular Q6H PRN    oxyCODONE-acetaminophen (PERCOCET 10)  mg per tablet 1 Tab  1 Tab Oral Q4H PRN    naloxone (NARCAN) injection 0.4 mg  0.4 mg IntraVENous PRN    HYDROmorphone (PF) (DILAUDID) injection 0.5 mg  0.5 mg IntraVENous Q3H PRN    sodium chloride (NS) flush 5-40 mL  5-40 mL IntraVENous Q8H    sodium chloride (NS) flush 5-40 mL  5-40 mL IntraVENous PRN    acetaminophen (TYLENOL) tablet 650 mg  650 mg Oral Q6H PRN    Or    acetaminophen (TYLENOL) suppository 650 mg  650 mg Rectal Q6H PRN    polyethylene glycol (MIRALAX) packet 17 g  17 g Oral DAILY PRN    ondansetron (ZOFRAN ODT) tablet 4 mg  4 mg Oral Q8H PRN    Or    ondansetron (ZOFRAN) injection 4 mg  4 mg IntraVENous Q6H PRN        Review of Systems  A comprehensive review of systems was negative except for that written in the HPI.     Objective:     Visit Vitals  /72   Pulse (!) 101   Temp 97.9 °F (36.6 °C)   Resp 20   Ht 5' 10\" (1.778 m)   Wt 83.9 kg (185 lb)   SpO2 97%   BMI 26.54 kg/m²    O2 Flow Rate (L/min): 2 l/min O2 Device: Nasal cannula    Temp (24hrs), Av.1 °F (36.7 °C), Min:97.6 °F (36.4 °C), Max:98.9 °F (37.2 °C)    701 - 1900  In: -   Out: 970   1901 - 700  In: 1899 [I.V.:1239]  Out: 815 [Urine:725]    General appearance: Oriented and alert, cooperative, in acute respiratory distress. See above     Head: Normocephalic, without obvious abnormality, atraumatic  Throat: Lips, mucosa, and tongue normal. Teeth and gums normal  Neck: supple, symmetrical, trachea midline,  no JVD  Lungs: Painful left ribs. Right Lung clear. Left lung with completely diminished sounds entire lower half of lung. See above for chest tube issue. Heart: Mild tachycardia, regular rate and rhythm, S1, S2 normal, no murmur, click, rub or gallop  Abdomen: soft, non-tender. Bowel sounds normal. No masses,  no organomegaly  Extremities:  All extremities normal, atraumatic, no cyanosis or edema  Skin: Skin color, texture, turgor normal. No rashes or lesions  Neurologic: Grossly normal     Additional comments: Notes,orders, test results, vitals reviewed    Data Review  Recent Results (from the past 24 hour(s))   METABOLIC PANEL, BASIC    Collection Time: 20  5:26 AM   Result Value Ref Range    Sodium 134 (L) 136 - 145 mmol/L    Potassium 3.5 3.5 - 5.1 mmol/L    Chloride 99 98 - 107 mmol/L    CO2 31 21 - 32 mmol/L    Anion gap 4 (L) 7 - 16 mmol/L    Glucose 130 (H) 65 - 100 mg/dL    BUN 6 6 - 23 MG/DL    Creatinine 0.53 (L) 0.8 - 1.5 MG/DL    GFR est AA >60 >60 ml/min/1.73m2    GFR est non-AA >60 >60 ml/min/1.73m2    Calcium 7.7 (L) 8.3 - 10.4 MG/DL   CBC WITH AUTOMATED DIFF    Collection Time: 20  5:26 AM   Result Value Ref Range    WBC 9.6 4.3 - 11.1 K/uL    RBC 2.94 (L) 4.23 - 5.6 M/uL    HGB 12.1 (L) 13.6 - 17.2 g/dL    HCT 33.4 (L) 41.1 - 50.3 %    .6 (H) 79.6 - 97.8 FL    MCH 41.2 (H) 26.1 - 32.9 PG    MCHC 36.2 (H) 31.4 - 35.0 g/dL    RDW 13.7 11.9 - 14.6 %    PLATELET 509 136 - 450 K/uL    MPV 10.6 9.4 - 12.3 FL    ABSOLUTE NRBC 0.00 0.0 - 0.2 K/uL    DF AUTOMATED      NEUTROPHILS 74 43 - 78 %    LYMPHOCYTES 10 (L) 13 - 44 %    MONOCYTES 14 (H) 4.0 - 12.0 %    EOSINOPHILS 0 (L) 0.5 - 7.8 %    BASOPHILS 1 0.0 - 2.0 %    IMMATURE GRANULOCYTES 2 0.0 - 5.0 %    ABS. NEUTROPHILS 7.0 1.7 - 8.2 K/UL    ABS. LYMPHOCYTES 1.0 0.5 - 4.6 K/UL    ABS. MONOCYTES 1.3 0.1 - 1.3 K/UL    ABS. EOSINOPHILS 0.0 0.0 - 0.8 K/UL    ABS. BASOPHILS 0.1 0.0 - 0.2 K/UL    ABS. IMM. GRANS. 0.2 0.0 - 0.5 K/UL   MAGNESIUM    Collection Time: 11/07/20  5:26 AM   Result Value Ref Range    Magnesium 1.4 (LL) 1.8 - 2.4 mg/dL        Ref. Range 11/7/2020 11:14 11/7/2020 12:56   HGB Latest Ref Range: 13.6 - 17.2 g/dL  11.3 (L)   HCT Latest Ref Range: 41.1 - 50.3 %  32.0 (L)      Ref. Range 11/7/2020 12:56   Sodium Latest Ref Range: 136 - 145 mmol/L 134 (L)   Potassium Latest Ref Range: 3.5 - 5.1 mmol/L 3.2 (L)   Chloride Latest Ref Range: 98 - 107 mmol/L 97 (L)   CO2 Latest Ref Range: 21 - 32 mmol/L 31   Anion gap Latest Ref Range: 7 - 16 mmol/L 6 (L)   Glucose Latest Ref Range: 65 - 100 mg/dL 136 (H)   BUN Latest Ref Range: 6 - 23 MG/DL 7   Creatinine Latest Ref Range: 0.8 - 1.5 MG/DL 0.52 (L)   Calcium Latest Ref Range: 8.3 - 10.4 MG/DL 7.9 (L)   Magnesium Latest Ref Range: 1.8 - 2.4 mg/dL 2.3   GFR est non-AA Latest Ref Range: >60 ml/min/1.73m2 >60   GFR est AA Latest Ref Range: >60 ml/min/1.73m2 >60   Bilirubin, total Latest Ref Range: 0.2 - 1.1 MG/DL 1.5 (H)   Protein, total Latest Ref Range: 6.3 - 8.2 g/dL 5.6 (L)   Albumin Latest Ref Range: 3.5 - 5.0 g/dL 1.8 (L)   Globulin Latest Ref Range: 2.3 - 3.5 g/dL 3.8 (H)   A-G Ratio Latest Ref Range: 1.2 - 3.5   0.5 (L)   ALT Latest Ref Range: 12 - 65 U/L 46   AST Latest Ref Range: 15 - 37 U/L 110 (H)   Alk.  phosphatase Latest Ref Range: 50 - 136 U/L 229 (H)   Ammonia Latest Ref Range: 11 - 32 UMOL/L 24     11/3:  CXR:  Increasing left pleural effusion and left infiltrates. 11/3:  CT CHEST:   Negative for pulmonary embolism. Loculated hydropneumothorax on the left. Superimposed infection cannot be excluded    11/4:  CXR:  Right lung remains clear. Multiple densities left lung with air-fluid levels, possibly empyema. 11/4:  CHEST TUBE INSERTION LEFT:  Uncomplicated percutaneous drain placement into the likely left pleural effusion.   Plan: Chest tube suction drainage. We will follow. The tube may benefit from TPA administration in the near future. 11/7:  CXR 5937:  Decrease in the complex presumed pleural effusion in the left hemithorax with a pigtail chest tube now in place    11/7:  CXR:  Lung volumes again low with some crowding evident. Left-sided chest tube remains in place with no pneumothorax or increasing pleural fluid. There is persistent loculated pleural fluid and opacities at the left lower lung  with a small air-fluid level noted at this region today as well. No new findings elsewhere.       11/5:  TTE:   -  Left ventricle: Systolic function was normal. Ejection fraction was estimated in the range of 55 % to 60 %. There were no regional wall motion abnormalities.    -  Aortic valve: Although there was no diagnostic evidence for vegetation, this study is not adequate to completely exclude the possibility.   -  Mitral valve: Although there was no diagnostic evidence for vegetation, this study is not adequate to completely exclude the possibility.   -  Tricuspid valve:  Although there was no diagnostic evidence for vegetation, this study is not adequate to completely exclude the possibility.     SEE [facility] FOR MULTIPLE CULTURES     Assessment/Plan:   Sepsis due to MSSA bacteremia: source most likely lung              ID on board:  Changed to ancef 11/4, continue               Blood cultures done 11/5 with NGTD              Old chest tube site wound culture pending               Pleural fluid culture results pending      Sinus tachycardia:  Resolving    Leukocytosis:  Continue abx as ordered    WBC down to 9.6 today              ID on board, appreciate     Left side loculated Hydropneumothorax:  Due to fall               Chest tube left, replaced per IR 11/4              tPa given through tube 11/5:  Continues to drain bloody  fluid              May have empyema     11/7:  Chest tube found clamped on intermittent suction   For unclear length of time with patient in respiratory    Distress: unclamped, placed on continuous suction    With immediate return 970 ml bloody pleural fluid   CXR and repeat 4 hrs later above   Pulmonary consulted for assist with CT:  Appreciate   Patient condition improved within 4 hours       Lactic acidosis:  Resolved      Hyponatremia/Hypokalemia:  Replace and recheck    Alcoholism: drinks a pint of liquor daily   Confused at night    Continue librium, de escalate dose 11/7 to 25 mg tid   Continue prn ativan   Ammonia normal 11/7   Continue to monitor for DTs    Care Plan discussed with: Patient, Dr Umair Astorga, Pulmonary, and Nurse    Signed By: China Knapp NP     November 7, 2020

## 2020-11-07 NOTE — CONSULTS
PULMONARY/CCM CONSULT :  11/7/2020    Date of Admission:  11/3/2020    The patient's chart has been reviewed and the chart has been discussed with nursing staff. Subjective: This patient has been seen and evaluated at the request of Nakul Mccain NP     Patient is a 48 y.o.  male who was initially admitted per the surgical service on 10/30 for traumatic PTX and rib fractures after falling from the attic while attempting to do some home repairs. CT was placed in the ER. Unfortunately, that weekend the CT was accidentally removed by the patient. He was discharged home on 11/2. He returned to the ER on 11/3/20 with c/o pain. It was deemed by surgery at that time that no surgical intervention nor chest tube was necessary and that hemothorax should resolve spontaneously. The pt was admitted per the hospitalist for further monitoring. Initially, CXR remained stable. 11/3- Chest CT showed loculated hydropneumothorax where a superimposed infection could not be excluded. MSSA bacteremia was also noted, with ?source lung. He was started on cefepime, Augmentin, and vanc during that time. 11/4--CT was placed per IR  after a following of CXRs showed an increase in the size of the PTX. Cultures of pleural fluid were sent to micro during that session. No growth so far. Blood cultures positive for staph aureus. ID was consulted as well. Antibiotics were adjusted. He is currently on cefazolin, all other antibiotics were stopped. 11/5-procal 26.99, blood cultures repeated (pending), TPA was placed to CT site. 11/6 PM--he became confused and unhooked his CT from suction and walked around in hallway. He was given Haldol IM x once. 11/7--AM nurse noted pt with increase cough and SOB, CT was partially clamped, reported 0 output overnight from CT. When unclamped, CT with immediate sanguinous return of 900 ml. Initially he was placed on Airvo per RT for SOB.      He has a further PMHx of ETOH abuse (admits to 1 pint liquor per day), GERD, and current 1 ppd (20 pack year) smoker. He denies any fevers, productive cough, nausea, vomiting, or diarrhea. He reports mild cough, SOB (which has improved since this AM), and fatigue. State he feels better since fluid has been drained. We were consulted for assistance with CT management. Past Surgical History:   Procedure Laterality Date    HX HEENT      wisdom teeth      Social History     Tobacco Use    Smoking status: Current Every Day Smoker     Packs/day: 1.00     Years: 20.00     Pack years: 20.00    Smokeless tobacco: Never Used   Substance Use Topics    Alcohol use: Yes     Alcohol/week: 6.7 standard drinks     Types: 8 Shots of liquor per week      No family history on file. No Known Allergies   Prior to Admission Medications   Prescriptions Last Dose Informant Patient Reported? Taking? HYDROcodone-acetaminophen (Norco) 5-325 mg per tablet   No No   Sig: Take 1 Tab by mouth every four (4) hours as needed for Pain for up to 5 days. Max Daily Amount: 6 Tabs. cyclobenzaprine (FLEXERIL) 10 mg tablet   No No   Sig: Take 1 Tab by mouth three (3) times daily as needed for Muscle Spasm(s).       Facility-Administered Medications: None       MEDS SCHEDULED:    Current Facility-Administered Medications   Medication Dose Route Frequency    magnesium sulfate 1 g/100 ml IVPB (premix or compounded)  1 g IntraVENous ONCE    chlordiazePOXIDE (LIBRIUM) capsule 25 mg  25 mg Oral QID    LORazepam (ATIVAN) injection 1 mg  1 mg IntraVENous Q4H PRN    sodium chloride (NS) flush 5-40 mL  5-40 mL IntraVENous PRN    LORazepam (ATIVAN) tablet 1 mg  1 mg Oral L1V PRN    folic acid (FOLVITE) tablet 1 mg  1 mg Oral DAILY    thiamine HCL (B-1) tablet 100 mg  100 mg Oral DAILY    multivitamin, tx-iron-ca-min (THERA-M w/ IRON) tablet 1 Tab  1 Tab Oral DAILY    nicotine (NICODERM CQ) 21 mg/24 hr patch 1 Patch  1 Patch TransDERmal Q24H    ceFAZolin (ANCEF) 2 g/20 mL in sterile water IV syringe  2 g IntraVENous Q8H    midazolam (VERSED) injection 0.5-2 mg  0.5-2 mg IntraVENous Multiple    ketorolac (TORADOL) injection 15 mg  15 mg IntraMUSCular Q6H PRN    oxyCODONE-acetaminophen (PERCOCET 10)  mg per tablet 1 Tab  1 Tab Oral Q4H PRN    naloxone (NARCAN) injection 0.4 mg  0.4 mg IntraVENous PRN    HYDROmorphone (PF) (DILAUDID) injection 0.5 mg  0.5 mg IntraVENous Q3H PRN    sodium chloride (NS) flush 5-40 mL  5-40 mL IntraVENous Q8H    sodium chloride (NS) flush 5-40 mL  5-40 mL IntraVENous PRN    acetaminophen (TYLENOL) tablet 650 mg  650 mg Oral Q6H PRN    Or    acetaminophen (TYLENOL) suppository 650 mg  650 mg Rectal Q6H PRN    polyethylene glycol (MIRALAX) packet 17 g  17 g Oral DAILY PRN    ondansetron (ZOFRAN ODT) tablet 4 mg  4 mg Oral Q8H PRN    Or    ondansetron (ZOFRAN) injection 4 mg  4 mg IntraVENous Q6H PRN         Review of Systems  Pertinent items are noted in HPI. Objective:     Vitals:    11/07/20 0328 11/07/20 0723 11/07/20 0818 11/07/20 0848   BP: 130/71 117/84 115/77 118/72   Pulse: (!) 115 (!) 118 (!) 107 (!) 101   Resp: 20 20     Temp: 97.6 °F (36.4 °C) 97.9 °F (36.6 °C)     SpO2: 91% 96% 99% 97%   Weight:       Height:         11/07 0701 - 11/07 1900  In: -   Out: 970   11/05 1901 - 11/07 0700  In: 1239 [I.V.:1239]  Out: 815 [Urine:725]      PHYSICAL EXAM     Physical Exam:   General:  Alert, slightly drowsy, cooperative, no acute distress, appears stated age. Eyes:  Conjunctivae/corneas clear. Nose: Nares patent and moist.    Mouth/Throat: Lips, mucosa, and tongue pink and intact. Neck: Supple, symmetrical.   Respiratory:   R clear, L diminished, CT to suction on L, no airleak, sanguinous drainage noted   Cardiovascular:  Regular rate and rhythm, S1, S2, no murmur, click, rub or gallop. GI:   Abdomen semi-firm, round,  non-tender. Bowel sounds active X 4 Q.  No masses,     Musculoskeletal: Extremities symmetrical, atraumatic, no cyanosis, no edema. Pulses: 2+ and symmetric all extremities. Skin: Skin color, texture, turgor normal. No rashes or lesions       Neurologic: 2+ strength bilateral upper and lower extremities, sensation throughout appropriate. Alert and oriented x4. Drowsy. CHEST X-RAYS:  11/7/2020 11/4/2020        CULTURES:  Results     Procedure Component Value Units Date/Time    CULTURE, Joelle Peraleso STAIN [[id number]] Collected:  11/06/20 1737    Order Status:  Completed Specimen:  Wound from Drainage Updated:  11/06/20 2112    CULTURE, ANAEROBIC [[id number]] Collected:  11/06/20 1737    Order Status:  Completed Specimen:  Wound Drainage Updated:  11/06/20 2111    CULTURE, BLOOD [[id number]] Collected:  11/05/20 1159    Order Status:  Completed Specimen:  Blood Updated:  11/07/20 0643     Special Requests: --        RIGHT  HAND       Culture result: NO GROWTH 2 DAYS       CULTURE, BLOOD [[id number]]     Order Status:  Canceled Specimen:  Blood     CULTURE, BLOOD [[id number]]     Order Status:  Canceled Specimen:  Blood     CULTURE, BLOOD [[id number]]     Order Status:  Canceled Specimen:  Blood     CULTURE, BLOOD [[id number]]     Order Status:  Canceled Specimen:  Blood     CULTURE, BODY FLUID W Rosezella Bellis [[id number]] Collected:  11/04/20 1525    Order Status:  Completed Specimen:   Body Fluid from Pleural Fluid Updated:  11/07/20 0907     Special Requests: NO SPECIAL REQUESTS        GRAM STAIN NO WBC'S SEEN         NO DEFINITE ORGANISM SEEN        Culture result: NO GROWTH 2 DAYS       CULTURE, BLOOD [[id number]]  (Abnormal) Collected:  11/04/20 0136    Order Status:  Completed Specimen:  Blood Updated:  11/07/20 0733     Special Requests: --        RIGHT  ARM       GRAM STAIN GRAM POSITIVE COCCI         AEROBIC BOTTLE POSITIVE               RESULTS VERIFIED, PHONED TO AND READ BACK BY OSMANY LAZO AT 2055 11.5.2020 EOR           Culture result: STAPHYLOCOCCUS AUREUS         REFER TO [address] [id number] For Susceptibility Refer to Culture  Accession [id number]      BLOOD CULTURE ID PANEL [[id number]]  (Abnormal) Collected:  11/04/20 0136    Order Status:  Completed Specimen:  Blood Updated:  11/06/20 0823     Acc. no. from Micro Order [id number]     Staphylococcus Detected        Staphylococcus aureus Detected        Comment: CRITICAL RESULT NOT CALLED DUE TO PREVIOUS NOTIFICATION OF CRITICAL RESULT WITHIN THE LAST 24 HOURS. mecA (Methicillin-Resistance Genes) NOT DETECTED        INTERPRETATION       Gram positive cocci in clusters, identified in realtime PCR as probable MSSA. Comment: Recommend discontinuing IV vancomycin starting cefazolin or nafcillin if patient not on beta-lactam therapy. Infectious Diseases Consult recommended in adult patients. THIS TEST DOES NOT REPLACE SENSITIVITY TESTING. CULTURE, BLOOD [[id number]]  (Abnormal) Collected:  11/03/20 1205    Order Status:  Completed Specimen:  Blood Updated:  11/07/20 0731     Special Requests: --        RIGHT  HAND       GRAM STAIN       GRAM POS COCCI IN CLUSTERS            AEROBIC BOTTLE POSITIVE               CRITICAL RESULT NOT CALLED DUE TO PREVIOUS NOTIFICATION OF CRITICAL RESULT WITHIN THE LAST 24 HOURS.            Culture result: STAPHYLOCOCCUS AUREUS         REFER TO ACCESSION [id number] FOR SUSCEPTIBILITY    CULTURE, BLOOD [[id number]]  (Abnormal)  (Susceptibility) Collected:  11/03/20 1145    Order Status:  Completed Specimen:  Blood Updated:  11/07/20 0730     Special Requests: --        LEFT  Antecubital       GRAM STAIN GRAM POSITIVE COCCI               AEROBIC AND ANAEROBIC BOTTLES                  RESULTS VERIFIED, PHONED TO AND READ BACK BY OSMANY MCDONOUGH @ 0030 11/4/20 MM           Culture result: STAPHYLOCOCCUS AUREUS         REFER TO [facility] PANEL ACCESSION [id number]    Susceptibility      Staphylococcus aureus     RO     Cefazolin ($) Susceptible     Clindamycin ($) Susceptible     Oxacillin Susceptible     Rifampin ($$$$) Susceptible [1]      Tetracycline Susceptible     Trimeth-Sulfamethoxa Susceptible     Vancomycin ($) Susceptible            [1]   Rifampin is not to be used for mono-therapy. BLOOD CULTURE ID PANEL [[id number]]  (Abnormal) Collected:  11/03/20 1145    Order Status:  Completed Specimen:  Blood Updated:  11/04/20 0806     Acc. no. from Micro Order [id number]     Staphylococcus Detected        Staphylococcus aureus Detected        Comment: RESULTS VERIFIED, PHONED TO AND READ BACK BY  Amilcar tejada RN @ 9890 ON 11/04/2020 AK. mecA (Methicillin-Resistance Genes) NOT DETECTED        INTERPRETATION       Gram positive cocci in clusters, identified in realtime PCR as probable MSSA. Comment: Recommend discontinuing IV vancomycin starting cefazolin or nafcillin if patient not on beta-lactam therapy. Infectious Diseases Consult recommended in adult patients. THIS TEST DOES NOT REPLACE SENSITIVITY TESTING. ECHO:   TTE (11/4/2020)  -  Left ventricle: Systolic function was normal. Ejection fraction was estimated in the range of 55 % to 60 %. There were no regional wall motion  abnormalities. -  Aortic valve: Although there was no diagnostic evidence for vegetation, this study is not adequate to completely exclude the possibility. -  Mitral valve: Although there was no diagnostic evidence for vegetation, this study is not adequate to completely exclude the possibility. -  Tricuspid valve: Although there was no diagnostic evidence for vegetation, this study is not adequate to completely exclude the possibility.       LABS  Recent Labs     11/07/20 0526 11/06/20 0440 11/05/20  1159   WBC 9.6 7.7 7.4   HGB 12.1* 11.9* 11.4*   HCT 33.4* 33.4* 31.5*    150 152     Recent Labs     11/07/20  0526 11/06/20 0440 11/05/20  1159   * 133* 131*   K 3.5 2.8* 3.0*   CL 99 94* 95*   * 80 131*   CO2 31 31 28   BUN 6 7 7   CREA 0.53* 0.53* 0.41*   MG 1.4*  --   -- No results for input(s): PH, PCO2, PO2, HCO3 in the last 72 hours. Assessment:     Hospital Problems  Date Reviewed: 11/3/2020          Codes Class Noted POA    Hypomagnesemia ICD-10-CM: E83.42  ICD-9-CM: 275.2  11/7/2020 Unknown        MSSA bacteremia ICD-10-CM: R78.81, B95.61  ICD-9-CM: 790.7, 041.11  11/4/2020 Unknown        Sinus tachycardia ICD-10-CM: R00.0  ICD-9-CM: 427.89  11/3/2020 Yes        Leukocytosis ICD-10-CM: D72.829  ICD-9-CM: 288.60  11/3/2020 Yes        Hydropneumothorax ICD-10-CM: J94.8  ICD-9-CM: 511.89  11/3/2020 Yes        Loculated pleural effusion ICD-10-CM: J90  ICD-9-CM: 511.9  11/3/2020 Yes        Lactic acidosis ICD-10-CM: E87.2  ICD-9-CM: 276.2  11/3/2020 Yes        Hyponatremia ICD-10-CM: E87.1  ICD-9-CM: 276.1  11/3/2020 Yes        * (Principal) Sepsis ([facility] [place] [address] ICD-10-CM: A41.9  ICD-9-CM: 038.9, 995.91  11/3/2020 Yes              Plan:   --antibiotics per ID (currently on cefazolin D4)  --repeat BC pending  --pleural fluid cultures pending (if positive, ? VATS, but hopefully bacteriemia can be attributed to soft tissue source)  --cont CT to suction   --monitor output from CT (initial outpt was approx 1000 ml, but has since slowed)  --repeat Hgb at 1:00 PM (has had significant Sanguinous drainage from CT this AM)  --repeat CXR at 11:00  --repeat Mag level at noon (replaced with 3 gram this AM)  --wean O2 as tolerated, placed back on 2 LPM NC (O2 sat 95%)  --monitor for ETOH withdrawal (already on librium)  --ammonia, CMP   --smoking cessation    Aftab Coad,  NP-C    More than 50% of time documented was spent in face-to-face contact with the patient and in the care of the patient on the floor/unit where the patient is located. Lungs: decreased sounds in left base. No wheezing. Chest tube with noted no air leak. This is 2nd atrium and has 90 ml in it now, but 900 output earlier.    Heart S1 and S2 audible, no murmers or rubs appreciated  Other     F/u cultures since may need VATS if positive. Keep on suction for now. Pain control  Laxatives for constipation. I have spoken with and examined the patient. I have reviewed the history, examination, assessment, and plan and agree with the above. Judy Walter MD      This note was signed electronically. Errors are unfortunately her likely due to dictation software.

## 2020-11-07 NOTE — PROGRESS NOTES
END OF SHIFT NOTE:    INTAKE/OUTPUT  11/06 0701 - 11/07 0700  In: 914 [I.V.:914]  Out: 515 [Urine:425]  Voiding: YES  Catheter: NO  Drain:              Flatus: Patient does have flatus present. Stool:  0 occurrences. Characteristics:  Stool Assessment  Stool Color: Other (Comment)(have not observed)    Emesis: 0 occurrences. Characteristics:        VITAL SIGNS  Patient Vitals for the past 12 hrs:   Temp Pulse Resp BP SpO2   11/07/20 0328 97.6 °F (36.4 °C) (!) 115 20 130/71 91 %   11/07/20 0026 98 °F (36.7 °C) (!) 123 22 117/87 90 %       Pain Assessment  Pain Intensity 1: 2 (11/07/20 0149)  Pain Location 1: Chest  Pain Intervention(s) 1: Medication (see MAR)  Patient Stated Pain Goal: 2    Ambulating  Yes    Shift report given to oncoming nurse at the bedside.     Laura Patterson

## 2020-11-07 NOTE — PROGRESS NOTES
Problem: Falls - Risk of  Goal: *Absence of Falls  Description: Document Addi Cho Fall Risk and appropriate interventions in the flowsheet.   Outcome: Progressing Towards Goal  Note: Fall Risk Interventions:            Medication Interventions: Evaluate medications/consider consulting pharmacy, Patient to call before getting OOB, Teach patient to arise slowly         History of Falls Interventions: Consult care management for discharge planning, Investigate reason for fall, Room close to nurse's station

## 2020-11-08 ENCOUNTER — APPOINTMENT (OUTPATIENT)
Dept: GENERAL RADIOLOGY | Age: 50
DRG: 871 | End: 2020-11-08
Attending: NURSE PRACTITIONER

## 2020-11-08 LAB
ALBUMIN SERPL-MCNC: 1.7 G/DL (ref 3.5–5)
ALBUMIN/GLOB SERPL: 0.4 {RATIO} (ref 1.2–3.5)
ALP SERPL-CCNC: 257 U/L (ref 50–136)
ALT SERPL-CCNC: 56 U/L (ref 12–65)
ANION GAP SERPL CALC-SCNC: 5 MMOL/L (ref 7–16)
AST SERPL-CCNC: 131 U/L (ref 15–37)
BASOPHILS # BLD: 0.1 K/UL (ref 0–0.2)
BASOPHILS NFR BLD: 1 % (ref 0–2)
BILIRUB SERPL-MCNC: 1.3 MG/DL (ref 0.2–1.1)
BUN SERPL-MCNC: 6 MG/DL (ref 6–23)
CALCIUM SERPL-MCNC: 8.4 MG/DL (ref 8.3–10.4)
CHLORIDE SERPL-SCNC: 97 MMOL/L (ref 98–107)
CO2 SERPL-SCNC: 33 MMOL/L (ref 21–32)
CREAT SERPL-MCNC: 0.49 MG/DL (ref 0.8–1.5)
DIFFERENTIAL METHOD BLD: ABNORMAL
EOSINOPHIL # BLD: 0.2 K/UL (ref 0–0.8)
EOSINOPHIL NFR BLD: 2 % (ref 0.5–7.8)
ERYTHROCYTE [DISTWIDTH] IN BLOOD BY AUTOMATED COUNT: 14 % (ref 11.9–14.6)
GLOBULIN SER CALC-MCNC: 3.8 G/DL (ref 2.3–3.5)
GLUCOSE SERPL-MCNC: 114 MG/DL (ref 65–100)
HCT VFR BLD AUTO: 31.2 % (ref 41.1–50.3)
HGB BLD-MCNC: 10.9 G/DL (ref 13.6–17.2)
IMM GRANULOCYTES # BLD AUTO: 0.4 K/UL (ref 0–0.5)
IMM GRANULOCYTES NFR BLD AUTO: 4 % (ref 0–5)
LYMPHOCYTES # BLD: 1 K/UL (ref 0.5–4.6)
LYMPHOCYTES NFR BLD: 11 % (ref 13–44)
MAGNESIUM SERPL-MCNC: 2.2 MG/DL (ref 1.8–2.4)
MCH RBC QN AUTO: 39.9 PG (ref 26.1–32.9)
MCHC RBC AUTO-ENTMCNC: 34.9 G/DL (ref 31.4–35)
MCV RBC AUTO: 114.3 FL (ref 79.6–97.8)
MONOCYTES # BLD: 1 K/UL (ref 0.1–1.3)
MONOCYTES NFR BLD: 10 % (ref 4–12)
NEUTS SEG # BLD: 6.9 K/UL (ref 1.7–8.2)
NEUTS SEG NFR BLD: 73 % (ref 43–78)
NRBC # BLD: 0 K/UL (ref 0–0.2)
PLATELET # BLD AUTO: 249 K/UL (ref 150–450)
PMV BLD AUTO: 10.2 FL (ref 9.4–12.3)
POTASSIUM SERPL-SCNC: 3.4 MMOL/L (ref 3.5–5.1)
PROT SERPL-MCNC: 5.5 G/DL (ref 6.3–8.2)
RBC # BLD AUTO: 2.73 M/UL (ref 4.23–5.6)
SODIUM SERPL-SCNC: 135 MMOL/L (ref 138–145)
WBC # BLD AUTO: 9.5 K/UL (ref 4.3–11.1)

## 2020-11-08 PROCEDURE — 74011250637 HC RX REV CODE- 250/637: Performed by: NURSE PRACTITIONER

## 2020-11-08 PROCEDURE — 80053 COMPREHEN METABOLIC PANEL: CPT

## 2020-11-08 PROCEDURE — 74011000250 HC RX REV CODE- 250: Performed by: NURSE PRACTITIONER

## 2020-11-08 PROCEDURE — 65270000029 HC RM PRIVATE

## 2020-11-08 PROCEDURE — 74011250636 HC RX REV CODE- 250/636: Performed by: NURSE PRACTITIONER

## 2020-11-08 PROCEDURE — 99232 SBSQ HOSP IP/OBS MODERATE 35: CPT | Performed by: INTERNAL MEDICINE

## 2020-11-08 PROCEDURE — 74011250637 HC RX REV CODE- 250/637: Performed by: INTERNAL MEDICINE

## 2020-11-08 PROCEDURE — 83735 ASSAY OF MAGNESIUM: CPT

## 2020-11-08 PROCEDURE — 74011250636 HC RX REV CODE- 250/636: Performed by: INTERNAL MEDICINE

## 2020-11-08 PROCEDURE — 36415 COLL VENOUS BLD VENIPUNCTURE: CPT

## 2020-11-08 PROCEDURE — 85025 COMPLETE CBC W/AUTO DIFF WBC: CPT

## 2020-11-08 PROCEDURE — 71045 X-RAY EXAM CHEST 1 VIEW: CPT

## 2020-11-08 RX ORDER — POTASSIUM CHLORIDE 20 MEQ/1
40 TABLET, EXTENDED RELEASE ORAL 2 TIMES DAILY WITH MEALS
Status: COMPLETED | OUTPATIENT
Start: 2020-11-08 | End: 2020-11-08

## 2020-11-08 RX ADMIN — Medication 10 ML: at 06:05

## 2020-11-08 RX ADMIN — POLYETHYLENE GLYCOL 3350 17 G: 17 POWDER, FOR SOLUTION ORAL at 09:00

## 2020-11-08 RX ADMIN — CEFAZOLIN SODIUM 2 G: 100 INJECTION, POWDER, LYOPHILIZED, FOR SOLUTION INTRAVENOUS at 20:36

## 2020-11-08 RX ADMIN — POTASSIUM CHLORIDE 40 MEQ: 1500 TABLET, EXTENDED RELEASE ORAL at 16:57

## 2020-11-08 RX ADMIN — POTASSIUM CHLORIDE 40 MEQ: 1500 TABLET, EXTENDED RELEASE ORAL at 08:26

## 2020-11-08 RX ADMIN — OXYCODONE HYDROCHLORIDE AND ACETAMINOPHEN 1 TABLET: 10; 325 TABLET ORAL at 16:57

## 2020-11-08 RX ADMIN — FOLIC ACID 1 MG: 1 TABLET ORAL at 08:25

## 2020-11-08 RX ADMIN — OXYCODONE HYDROCHLORIDE AND ACETAMINOPHEN 1 TABLET: 10; 325 TABLET ORAL at 04:23

## 2020-11-08 RX ADMIN — CHLORDIAZEPOXIDE HYDROCHLORIDE 25 MG: 25 CAPSULE ORAL at 08:25

## 2020-11-08 RX ADMIN — HYDROMORPHONE HYDROCHLORIDE 0.5 MG: 1 INJECTION, SOLUTION INTRAMUSCULAR; INTRAVENOUS; SUBCUTANEOUS at 19:39

## 2020-11-08 RX ADMIN — MULTIPLE VITAMINS W/ MINERALS TAB 1 TABLET: TAB at 08:26

## 2020-11-08 RX ADMIN — CEFAZOLIN SODIUM 2 G: 100 INJECTION, POWDER, LYOPHILIZED, FOR SOLUTION INTRAVENOUS at 13:18

## 2020-11-08 RX ADMIN — KETOROLAC TROMETHAMINE 15 MG: 15 INJECTION, SOLUTION INTRAMUSCULAR; INTRAVENOUS at 06:02

## 2020-11-08 RX ADMIN — CEFAZOLIN SODIUM 2 G: 100 INJECTION, POWDER, LYOPHILIZED, FOR SOLUTION INTRAVENOUS at 04:24

## 2020-11-08 RX ADMIN — HYDROMORPHONE HYDROCHLORIDE 0.5 MG: 1 INJECTION, SOLUTION INTRAMUSCULAR; INTRAVENOUS; SUBCUTANEOUS at 13:37

## 2020-11-08 RX ADMIN — Medication 100 MG: at 08:26

## 2020-11-08 RX ADMIN — Medication 10 ML: at 13:23

## 2020-11-08 RX ADMIN — Medication 10 ML: at 21:44

## 2020-11-08 RX ADMIN — CHLORDIAZEPOXIDE HYDROCHLORIDE 25 MG: 25 CAPSULE ORAL at 16:57

## 2020-11-08 RX ADMIN — OXYCODONE HYDROCHLORIDE AND ACETAMINOPHEN 1 TABLET: 10; 325 TABLET ORAL at 11:19

## 2020-11-08 RX ADMIN — CHLORDIAZEPOXIDE HYDROCHLORIDE 25 MG: 25 CAPSULE ORAL at 21:44

## 2020-11-08 NOTE — PROGRESS NOTES
Hospitalist Progress Note    Subjective:   Daily Progress Note: 11/8/2020 1:12 PM    Patient was admitted to surgical service from 10/30-11/2 after mechanical fall through attic floor sustaining rib fractures and left pneumothorax.  Chest tube placed in ER at that time, then reportedly removed chest tube about 4 days PTA. Charmayne Sicilian sustained a coracoid fracture, seen by Ortho with sling/conservative management.  Progressive pain and SOB since discharge, then presented to ER again 11/3. New effusion on CXR.  CT chest with loculated hydropneumothorax. Tachycardic on arrival.  Leukocytosis   11/4:  Blood culture with Gram Positive Cocci in Anaerobic bottle.  Empiric vanc begun, changed to cefazolin after culture returned with Bio[employer] MSSA.  To IR for chest tube/drain placement.  Concern for developing empyema.  Second set of cultures ordered.  ID in for consult. 11/5:  Cultures repeated.  TTE below, study not adequate to completely exclude the possibility of vegetation.  Received tPa in tube   11/6:  Confused early this am, apparently disconnected chest tube and was up walking around halls. Mona with librium and haldol.  Currently alert and oriented, reports feeling somewhat better.  Chest tube draining bloody fluid.  Old chest tube site with small amount purulent drainage, culture to lab.  Blood cultures done 11/5 with NGTD   11/7:  Called to room urgently per RN at 0800 for respiratory distress. Found patient sitting up in tripod position in obvious respiratory distress. Oxygen sat 96% on RA. Found with suction on intermittent setting and chest tube partially clamped, unknown how long. Immediately unclamped with return 970 ml bloody pleural fluid. Suction set to continuous. Left lung sounds diminished. Stat labs ordered including another set of blood cultures. CXR with effusion entire lower half of lung. Pulmonary consulted for assist, in immediately. Librium de escalated to 25 mg tid. Continued HS confusion. 11/8:  Tachypnea worsening all day, oxygen saturation remains stable 96-98%. Dry cough. Minimal breath sounds auscultated on left. CXR worse on left today. 80 ml OP in chest tube drainage container x 24 hours. Pulmonary with request for IR consult today for additional tPa placement.      ADDITIONAL HISTORY:  ETOH abuse with 1 pint liquor daily, tobacco use, GERD,       Current Facility-Administered Medications   Medication Dose Route Frequency    potassium chloride (K-DUR, KLOR-CON) SR tablet 40 mEq  40 mEq Oral BID WITH MEALS    chlordiazePOXIDE (LIBRIUM) capsule 25 mg  25 mg Oral TID    polyethylene glycol (MIRALAX) packet 17 g  17 g Oral DAILY    LORazepam (ATIVAN) injection 1 mg  1 mg IntraVENous Q4H PRN    sodium chloride (NS) flush 5-40 mL  5-40 mL IntraVENous PRN    LORazepam (ATIVAN) tablet 1 mg  1 mg Oral V8K PRN    folic acid (FOLVITE) tablet 1 mg  1 mg Oral DAILY    thiamine HCL (B-1) tablet 100 mg  100 mg Oral DAILY    multivitamin, tx-iron-ca-min (THERA-M w/ IRON) tablet 1 Tab  1 Tab Oral DAILY    nicotine (NICODERM CQ) 21 mg/24 hr patch 1 Patch  1 Patch TransDERmal Q24H    ceFAZolin (ANCEF) 2 g/20 mL in sterile water IV syringe  2 g IntraVENous Q8H    midazolam (VERSED) injection 0.5-2 mg  0.5-2 mg IntraVENous Multiple    ketorolac (TORADOL) injection 15 mg  15 mg IntraMUSCular Q6H PRN    oxyCODONE-acetaminophen (PERCOCET 10)  mg per tablet 1 Tab  1 Tab Oral Q4H PRN    naloxone (NARCAN) injection 0.4 mg  0.4 mg IntraVENous PRN    HYDROmorphone (PF) (DILAUDID) injection 0.5 mg  0.5 mg IntraVENous Q3H PRN    sodium chloride (NS) flush 5-40 mL  5-40 mL IntraVENous Q8H    sodium chloride (NS) flush 5-40 mL  5-40 mL IntraVENous PRN    polyethylene glycol (MIRALAX) packet 17 g  17 g Oral DAILY PRN    ondansetron (ZOFRAN ODT) tablet 4 mg  4 mg Oral Q8H PRN    Or    ondansetron (ZOFRAN) injection 4 mg  4 mg IntraVENous Q6H PRN        Review of Systems  A comprehensive review of systems was negative except for that written in the HPI. Objective:     Visit Vitals  /74   Pulse 99   Temp 97.8 °F (36.6 °C)   Resp 20   Ht 5' 10\" (1.778 m)   Wt 83.9 kg (185 lb)   SpO2 98%   BMI 26.54 kg/m²    O2 Flow Rate (L/min): 2 l/min O2 Device: Nasal cannula    Temp (24hrs), Av.1 °F (36.7 °C), Min:97.8 °F (36.6 °C), Max:98.3 °F (36.8 °C)     1901 -  0700  In: 914 [I.V.:914]  Out: 8875 [Urine:1650]    General appearance: Oriented and alert, cooperative, in worsening acute respiratory distress. See above     Head: Normocephalic, without obvious abnormality, atraumatic  Throat: Lips, mucosa, and tongue normal. Teeth and gums normal  Neck: supple, symmetrical, trachea midline,  no JVD  Lungs: Painful left ribs.  Right Lung clear. Left lung with completely diminished sounds throughout. Heart: Mild tachycardia, regular rate and rhythm, S1, S2 normal, no murmur, click, rub or gallop  Abdomen: soft, non-tender.  Bowel sounds normal. No masses,  no organomegaly  Extremities: All extremities normal, atraumatic, no cyanosis or edema  Skin: Skin color, texture, turgor normal. No rashes or lesions  Neurologic: Grossly normal     Additional comments: Notes,orders, test results, vitals reviewed    Data Review  Recent Results (from the past 24 hour(s))   MAGNESIUM    Collection Time: 20  3:46 AM   Result Value Ref Range    Magnesium 2.2 1.8 - 2.4 mg/dL   CBC WITH AUTOMATED DIFF    Collection Time: 20  3:46 AM   Result Value Ref Range    WBC 9.5 4.3 - 11.1 K/uL    RBC 2.73 (L) 4.23 - 5.6 M/uL    HGB 10.9 (L) 13.6 - 17.2 g/dL    HCT 31.2 (L) 41.1 - 50.3 %    .3 (H) 79.6 - 97.8 FL    MCH 39.9 (H) 26.1 - 32.9 PG    MCHC 34.9 31.4 - 35.0 g/dL    RDW 14.0 11.9 - 14.6 %    PLATELET 536 224 - 496 K/uL    MPV 10.2 9.4 - 12.3 FL    ABSOLUTE NRBC 0.00 0.0 - 0.2 K/uL    DF AUTOMATED      NEUTROPHILS 73 43 - 78 %    LYMPHOCYTES 11 (L) 13 - 44 %    MONOCYTES 10 4.0 - 12.0 %    EOSINOPHILS 2 0.5 - 7.8 %    BASOPHILS 1 0.0 - 2.0 %    IMMATURE GRANULOCYTES 4 0.0 - 5.0 %    ABS. NEUTROPHILS 6.9 1.7 - 8.2 K/UL    ABS. LYMPHOCYTES 1.0 0.5 - 4.6 K/UL    ABS. MONOCYTES 1.0 0.1 - 1.3 K/UL    ABS. EOSINOPHILS 0.2 0.0 - 0.8 K/UL    ABS. BASOPHILS 0.1 0.0 - 0.2 K/UL    ABS. IMM. GRANS. 0.4 0.0 - 0.5 K/UL   METABOLIC PANEL, COMPREHENSIVE    Collection Time: 11/08/20  3:46 AM   Result Value Ref Range    Sodium 135 (L) 138 - 145 mmol/L    Potassium 3.4 (L) 3.5 - 5.1 mmol/L    Chloride 97 (L) 98 - 107 mmol/L    CO2 33 (H) 21 - 32 mmol/L    Anion gap 5 (L) 7 - 16 mmol/L    Glucose 114 (H) 65 - 100 mg/dL    BUN 6 6 - 23 MG/DL    Creatinine 0.49 (L) 0.8 - 1.5 MG/DL    GFR est AA >60 >60 ml/min/1.73m2    GFR est non-AA >60 >60 ml/min/1.73m2    Calcium 8.4 8.3 - 10.4 MG/DL    Bilirubin, total 1.3 (H) 0.2 - 1.1 MG/DL    ALT (SGPT) 56 12 - 65 U/L    AST (SGOT) 131 (H) 15 - 37 U/L    Alk. phosphatase 257 (H) 50 - 136 U/L    Protein, total 5.5 (L) 6.3 - 8.2 g/dL    Albumin 1.7 (L) 3.5 - 5.0 g/dL    Globulin 3.8 (H) 2.3 - 3.5 g/dL    A-G Ratio 0.4 (L) 1.2 - 3.5       11/3:  CXR:  Increasing left pleural effusion and left infiltrates.     11/3:  CT CHEST:   Negative for pulmonary embolism. Loculated hydropneumothorax on the left. Superimposed infection cannot be excluded     11/4:  CXR:  Right lung remains clear. Multiple densities left lung with air-fluid levels, possibly empyema.     11/4:  CHEST TUBE INSERTION LEFT:  Uncomplicated percutaneous drain placement into the likely left pleural effusion.   Plan: Chest tube suction drainage. We will follow. The tube may benefit from TPA administration in the near future.     11/7:  CXR 0826:  Decrease in the complex presumed pleural effusion in the left hemithorax with a pigtail chest tube now in place     11/7:  CXR:  Lung volumes again low with some crowding evident. Left-sided chest tube remains in place with no pneumothorax or increasing pleural fluid.   There is persistent loculated pleural fluid and opacities at the left lower lung  with a small air-fluid level noted at this region today as well. No new findings elsewhere.      11/8:  CXR:  Left-sided pigtail catheter again seen with no clear pneumothorax noted but there is been an increase in the loculated fluid along the left chest wall.        11/5:  TTE:   -  Left ventricle: Systolic function was normal. Ejection fraction was estimated in the range of 55 % to 60 %. There were no regional wall motion abnormalities.    -  Aortic valve: Although there was no diagnostic evidence for vegetation, this study is not adequate to completely exclude the possibility.   -  Mitral valve: Although there was no diagnostic evidence for vegetation, this study is not adequate to completely exclude the possibility.   -  Tricuspid valve: Although there was no diagnostic evidence for vegetation, this study is not adequate to completely exclude the possibility.     SEE [facility] FOR MULTIPLE CULTURES     Assessment/Plan:   Sepsis due to MSSA bacteremia: source most likely lung              ID on board:  Changed to ancef 11/4, continue               Blood cultures done 11/5 with NGTD              OAT chest tube site wound culture pending               Pleural fluid culture results pending      Sinus tachycardia:  Resolving      Leukocytosis:  Continue abx as ordered               WBC down to 9.6 today              ID on board, appreciate     Left side loculated Hydropneumothorax:  Due to fall               Chest tube left, replaced per IR 11/4              tPa given through tube 11/5              May have empyema   Pulmonary on board: greatly appreciate    11/7:  Found with CT clamped on intermittent suction:   Opened with full suction continuous:  900 ml bloody  pleural fluid evacuated.    11/8:  No drainage from chest tube, worsening resp    Distress with stable oxygenation at present:  Pulm   Requests IR consult today for repeat tPa administration   CXR 11/8 worsening loculating fluid along left wall                 Lactic acidosis:  Resolved      Hyponatremia/Hypokalemia:  Replace and recheck     Alcoholism: drinks a pint of liquor daily              Confused at night               Continue librium, de escalate dose 11/7 to 25 mg tid              Continue prn ativan and haldol               Ammonia normal 11/7              Continue to monitor for DTs    Care Plan discussed with: Patient, Pulmonary, Dr Savanna Pedroza, and Nurse    Signed By: Angie Moyer NP     November 8, 2020

## 2020-11-08 NOTE — PROGRESS NOTES
END OF SHIFT NOTE:    INTAKE/OUTPUT  11/06 0701 - 11/07 0700  In: 914 [I.V.:914]  Out: 515 [Urine:425]  Voiding: YES  Catheter: NO  Drain:              Flatus: Patient does have flatus present. Stool:  1 occurrences. Characteristics:  Stool Assessment  Stool Color: Other (Comment)(have not observed)    Emesis: 0 occurrences. Characteristics:        VITAL SIGNS  Patient Vitals for the past 12 hrs:   Temp Pulse Resp BP SpO2   11/07/20 1711 -- -- -- -- 98 %   11/07/20 1600 98.3 °F (36.8 °C) 95 20 116/84 97 %   11/07/20 1153 98 °F (36.7 °C) 92 20 117/76 97 %   11/07/20 0848 -- (!) 101 -- 118/72 97 %   11/07/20 0818 -- (!) 107 -- 115/77 99 %       Pain Assessment  Pain Intensity 1: 0 (11/07/20 1645)  Pain Location 1: Chest  Pain Intervention(s) 1: Medication (see MAR)  Patient Stated Pain Goal: 2    Ambulating  Yes    Shift report given to oncoming nurse at the bedside.     Arabella Buckley RN

## 2020-11-08 NOTE — PROGRESS NOTES
END OF SHIFT NOTE:    INTAKE/OUTPUT  11/07 0701 - 11/08 0700  In: -   Out: 2413 [Urine:1225]  Voiding: YES  Catheter: NO  Drain:      LEFT CT x1 TO SUCTION - 80cc TOTAL OUT OVERNIGHT           Flatus: Patient does have flatus present. Stool:  0 occurrences. Characteristics:    Emesis: 0 occurrences. Characteristics:        VITAL SIGNS  Patient Vitals for the past 12 hrs:   Temp Pulse Resp BP SpO2   11/08/20 0353 98.1 °F (36.7 °C) 96 20 (!) 126/94 98 %   11/07/20 2328 98.1 °F (36.7 °C) 90 20 127/84 96 %   11/07/20 2015 98.2 °F (36.8 °C) 98 20 131/88 98 %   11/07/20 1910 -- -- -- -- 95 %       Pain Assessment  Pain Intensity 1: 7 (11/08/20 0601)  Pain Location 1: Flank, Back  Pain Intervention(s) 1: Medication (see MAR)  Patient Stated Pain Goal: 2    Ambulating  Yes    Shift report given to oncoming nurse at the bedside.     Cedric Werner, RN

## 2020-11-08 NOTE — PROGRESS NOTES
IR consulted via [employer]. Message sent to Dr Edward Noel. Message read. No other communication received.

## 2020-11-08 NOTE — PROGRESS NOTES
Problem: Falls - Risk of  Goal: *Absence of Falls  Description: Document Chito Argueta Fall Risk and appropriate interventions in the flowsheet.   Outcome: Progressing Towards Goal  Note: Fall Risk Interventions:  Mobility Interventions: Patient to call before getting OOB    Mentation Interventions: Adequate sleep, hydration, pain control, Door open when patient unattended    Medication Interventions: Patient to call before getting OOB    Elimination Interventions: Call light in reach    History of Falls Interventions: Bed/chair exit alarm         Problem: Patient Education: Go to Patient Education Activity  Goal: Patient/Family Education  Outcome: Progressing Towards Goal

## 2020-11-08 NOTE — PROGRESS NOTES
Mar[address]  Admission Date: 11/3/2020             Daily Progress Note: 11/8/2020   Patient is a 48 y.o.  male with a PMHx of ETOH abuse, GERD, and smoker. He who was initially admitted per the surgical service on 10/30 for traumatic PTX and rib fractures after falling from the attic while attempting to do some home repairs. CT was placed in the ER. Unfortunately, that weekend the CT was accidentally removed by the patient. He was discharged home on 11/2. He returned to the ER on 11/3/20 with c/o pain. It was deemed by surgery at that time that no surgical intervention nor chest tube was necessary and that hemothorax should resolve spontaneously. The pt was admitted per the hospitalist for further monitoring. Initially, CXR remained stable. 11/3- Chest CT showed loculated hydropneumothorax where a superimposed infection could not be excluded. MSSA bacteremia was also noted, with ?source lung. He was started on cefepime, Augmentin, and vanc during that time. 11/4--CT was placed per IR  after a following of CXRs showed an increase in the size of the PTX. Cultures of pleural fluid were sent to micro during that session. No growth so far. Blood cultures positive for staph aureus. ID was consulted as well. Antibiotics were adjusted. He is currently on cefazolin, all other antibiotics were stopped. 11/5-procal 26.99, blood cultures repeated (pending), TPA was placed to CT site. 11/6 PM--he became confused and unhooked his CT from suction and walked around in hallway. He was given Haldol IM x once. 11/7--AM nurse noted pt with increase cough and SOB, CT was partially clamped, reported 0 output overnight from CT. When unclamped, CT with immediate sanguinous return of 900 ml. Initially he was placed on Airvo per RT for SOB, but once fluid was removed was able to be weaned to 2 L.        Subjective:   Remains on 2 LPM NC. CT output 80 ml sanguinous drainage over night.      Review of Systems  Constitutional: negative for fevers, chills, sweats and fatigue  Respiratory: positive for cough, negative for sputum, hemoptysis, wheezing or dyspnea on exertion  Cardiovascular: negative for chest pain, chest pressure/discomfort, lower extremity edema  Gastrointestinal: negative for nausea, vomiting, diarrhea and constipation    Current Facility-Administered Medications   Medication Dose Route Frequency    potassium chloride (K-DUR, KLOR-CON) SR tablet 40 mEq  40 mEq Oral BID WITH MEALS    chlordiazePOXIDE (LIBRIUM) capsule 25 mg  25 mg Oral TID    polyethylene glycol (MIRALAX) packet 17 g  17 g Oral DAILY    LORazepam (ATIVAN) injection 1 mg  1 mg IntraVENous Q4H PRN    sodium chloride (NS) flush 5-40 mL  5-40 mL IntraVENous PRN    LORazepam (ATIVAN) tablet 1 mg  1 mg Oral E3D PRN    folic acid (FOLVITE) tablet 1 mg  1 mg Oral DAILY    thiamine HCL (B-1) tablet 100 mg  100 mg Oral DAILY    multivitamin, tx-iron-ca-min (THERA-M w/ IRON) tablet 1 Tab  1 Tab Oral DAILY    nicotine (NICODERM CQ) 21 mg/24 hr patch 1 Patch  1 Patch TransDERmal Q24H    ceFAZolin (ANCEF) 2 g/20 mL in sterile water IV syringe  2 g IntraVENous Q8H    midazolam (VERSED) injection 0.5-2 mg  0.5-2 mg IntraVENous Multiple    ketorolac (TORADOL) injection 15 mg  15 mg IntraMUSCular Q6H PRN    oxyCODONE-acetaminophen (PERCOCET 10)  mg per tablet 1 Tab  1 Tab Oral Q4H PRN    naloxone (NARCAN) injection 0.4 mg  0.4 mg IntraVENous PRN    HYDROmorphone (PF) (DILAUDID) injection 0.5 mg  0.5 mg IntraVENous Q3H PRN    sodium chloride (NS) flush 5-40 mL  5-40 mL IntraVENous Q8H    sodium chloride (NS) flush 5-40 mL  5-40 mL IntraVENous PRN    polyethylene glycol (MIRALAX) packet 17 g  17 g Oral DAILY PRN    ondansetron (ZOFRAN ODT) tablet 4 mg  4 mg Oral Q8H PRN    Or    ondansetron (ZOFRAN) injection 4 mg  4 mg IntraVENous Q6H PRN         Objective:     Vitals:    11/07/20 1910 11/07/20 2015 11/07/20 2328 11/08/20 0353   BP:  131/88 127/84 (!) 126/94   Pulse:  98 90 96   Resp:  20 20 20   Temp:  98.2 °F (36.8 °C) 98.1 °F (36.7 °C) 98.1 °F (36.7 °C)   SpO2: 95% 98% 96% 98%   Weight:       Height:         Intake and Output:   11/06 1901 - 11/08 0700  In: 914 [I.V.:914]  Out: 6008 [Urine:1650]  No intake/output data recorded. Intake/Output Summary (Last 24 hours) at 11/8/2020 0709  Last data filed at 11/8/2020 0428  Gross per 24 hour   Intake --   Output 2413 ml   Net -2413 ml       Physical Exam:            GEN: well developed and in no acute distress, more alert today   HEENT:  PERRL, EOMI, no alar flaring or epistaxis, oral mucosa moist without cyanosis,   NECK:  no JVD, no retractions, no thyromegaly or masses,   LUNGS:  R clear, L diminished. L CT with no airleak, output slowing, sanguinous  HEART:  RRR with no M,G,R;  ABDOMEN:  soft with no tenderness, positive bowel sounds present  EXTREMITIES:  warm with no cyanosis, no edema  SKIN:  no jaundice or ecchymosis   NEURO:  alert and oriented, grossly non-focal      Lines/Drains: CT, PIV      CHEST XRAY:   11/8/2020  Pending    11/7/2020        ECHO:   TTE (11/4/2020)  -  Left ventricle: Systolic function was normal. Ejection fraction was estimated in the range of 55 % to 60 %. There were no regional wall motion  abnormalities. -  Aortic valve: Although there was no diagnostic evidence for vegetation, this study is not adequate to completely exclude the possibility. -  Mitral valve: Although there was no diagnostic evidence for vegetation, this study is not adequate to completely exclude the possibility. -  Tricuspid valve:  Although there was no diagnostic evidence for vegetation, this study is not adequate to completely exclude the possibility.       LAB  Recent Labs     11/08/20  0346 11/07/20  1256 11/07/20  0526 11/06/20  0440   WBC 9.5  --  9.6 7.7   HGB 10.9* 11.3* 12.1* 11.9*   HCT 31.2* 32.0* 33.4* 33.4*     --  183 150     Recent Labs 11/08/20  0346 11/07/20  1256 11/07/20  0526   * 134* 134*   K 3.4* 3.2* 3.5   CL 97* 97* 99   CO2 33* 31 31   * 136* 130*   BUN 6 7 6   CREA 0.49* 0.52* 0.53*   MG 2.2 2.3 1.4*     ABG:  No results found for: PH, PHI, PCO2, PCO2I, PO2, PO2I, HCO3, HCO3I, FIO2, FIO2I    Cultures:   Results     Procedure Component Value Units Date/Time    CULTURE, Yudi Bradley [[id number]] Collected:  11/06/20 1737    Order Status:  Completed Specimen:  Wound from Drainage Updated:  11/07/20 1243     Special Requests: NO SPECIAL REQUESTS        GRAM STAIN 0 TO 3 WBC'S SEEN PER OIF      NO DEFINITE ORGANISM SEEN        Culture result:       NO GROWTH AFTER SHORT PERIOD OF INCUBATION. FURTHER RESULTS TO FOLLOW AFTER OVERNIGHT INCUBATION. Paul Young [[id number]] Collected:  11/06/20 1737    Order Status:  Completed Specimen:  Wound Drainage Updated:  11/06/20 2111    CULTURE, BLOOD [[id number]] Collected:  11/05/20 1159    Order Status:  Completed Specimen:  Blood Updated:  11/07/20 0643     Special Requests: --        RIGHT  HAND       Culture result: NO GROWTH 2 DAYS       CULTURE, BLOOD [[id number]]     Order Status:  Canceled Specimen:  Blood     CULTURE, BLOOD [[id number]]     Order Status:  Canceled Specimen:  Blood     CULTURE, BLOOD [[id number]]     Order Status:  Canceled Specimen:  Blood     CULTURE, BLOOD [[id number]]     Order Status:  Canceled Specimen:  Blood     CULTURE, BODY FLUID W Sonu Noble [[id number]] Collected:  11/04/20 1525    Order Status:  Completed Specimen:   Body Fluid from Pleural Fluid Updated:  11/07/20 0907     Special Requests: NO SPECIAL REQUESTS        GRAM STAIN NO WBC'S SEEN         NO DEFINITE ORGANISM SEEN        Culture result: NO GROWTH 2 DAYS       CULTURE, BLOOD [[id number]]  (Abnormal) Collected:  11/04/20 0136    Order Status:  Completed Specimen:  Blood Updated:  11/07/20 0733     Special Requests: --        RIGHT  ARM       GRAM STAIN GRAM POSITIVE COCCI AEROBIC BOTTLE POSITIVE               RESULTS VERIFIED, PHONED TO AND READ BACK BY [facility] AT 2055 11.5.2020 EOR           Culture result: STAPHYLOCOCCUS AUREUS         REFER TO [address] ACCESSION [id number]            For Susceptibility Refer to Culture  Accession [id number]      BLOOD CULTURE ID PANEL [[id number]]  (Abnormal) Collected:  11/04/20 0136    Order Status:  Completed Specimen:  Blood Updated:  11/06/20 0823     Acc. no. from Micro Order [id number]     Staphylococcus Detected        Staphylococcus aureus Detected        Comment: CRITICAL RESULT NOT CALLED DUE TO PREVIOUS NOTIFICATION OF CRITICAL RESULT WITHIN THE LAST 24 HOURS. mecA (Methicillin-Resistance Genes) NOT DETECTED        INTERPRETATION       Gram positive cocci in clusters, identified in realtime PCR as probable MSSA. Comment: Recommend discontinuing IV vancomycin starting cefazolin or nafcillin if patient not on beta-lactam therapy. Infectious Diseases Consult recommended in adult patients. THIS TEST DOES NOT REPLACE SENSITIVITY TESTING. CULTURE, BLOOD [[id number]]  (Abnormal) Collected:  11/03/20 1205    Order Status:  Completed Specimen:  Blood Updated:  11/07/20 0731     Special Requests: --        RIGHT  HAND       GRAM STAIN       GRAM POS COCCI IN CLUSTERS            AEROBIC BOTTLE POSITIVE               CRITICAL RESULT NOT CALLED DUE TO PREVIOUS NOTIFICATION OF CRITICAL RESULT WITHIN THE LAST 24 HOURS.            Culture result: STAPHYLOCOCCUS AUREUS         REFER TO ACCESSION [id number] FOR SUSCEPTIBILITY    CULTURE, BLOOD [[id number]]  (Abnormal)  (Susceptibility) Collected:  11/03/20 1145    Order Status:  Completed Specimen:  Blood Updated:  11/07/20 0730     Special Requests: --        LEFT  Antecubital       GRAM STAIN GRAM POSITIVE COCCI               AEROBIC AND ANAEROBIC BOTTLES                  RESULTS VERIFIED, PHONED TO AND READ BACK BY OSMANY MCDONOUGH @ 0030 11/4/20 MM           Culture result: STAPHYLOCOCCUS AUREUS         REFER TO [employer] PANEL ACCESSION [id number]    Susceptibility      Staphylococcus aureus     RO     Cefazolin ($) Susceptible     Clindamycin ($) Susceptible     Oxacillin Susceptible     Rifampin ($$$$) Susceptible [1]      Tetracycline Susceptible     Trimeth-Sulfamethoxa Susceptible     Vancomycin ($) Susceptible            [1]   Rifampin is not to be used for mono-therapy. BLOOD CULTURE ID PANEL [[id number]]  (Abnormal) Collected:  11/03/20 1145    Order Status:  Completed Specimen:  Blood Updated:  11/04/20 0806     Acc. no. from Micro Order [id number]     Staphylococcus Detected        Staphylococcus aureus Detected        Comment: RESULTS VERIFIED, PHONED TO AND READ BACK BY  Amilcar tejada RN @ 0063 ON 11/04/2020 AK. mecA (Methicillin-Resistance Genes) NOT DETECTED        INTERPRETATION       Gram positive cocci in clusters, identified in realtime PCR as probable MSSA. Comment: Recommend discontinuing IV vancomycin starting cefazolin or nafcillin if patient not on beta-lactam therapy. Infectious Diseases Consult recommended in adult patients. THIS TEST DOES NOT REPLACE SENSITIVITY TESTING.                Assessment:     Hospital Problems  Date Reviewed: 11/3/2020          Codes Class Noted POA    Hypomagnesemia ICD-10-CM: E83.42  ICD-9-CM: 275.2  11/7/2020 Unknown        MSSA bacteremia ICD-10-CM: R78.81, B95.61  ICD-9-CM: 790.7, 041.11  11/4/2020 Unknown        Sinus tachycardia ICD-10-CM: R00.0  ICD-9-CM: 427.89  11/3/2020 Yes        Leukocytosis ICD-10-CM: D72.829  ICD-9-CM: 288.60  11/3/2020 Yes        Hydropneumothorax ICD-10-CM: J94.8  ICD-9-CM: 511.89  11/3/2020 Yes        Loculated pleural effusion ICD-10-CM: J90  ICD-9-CM: 511.9  11/3/2020 Yes        Lactic acidosis ICD-10-CM: E87.2  ICD-9-CM: 276.2  11/3/2020 Yes        Hyponatremia ICD-10-CM: E87.1  ICD-9-CM: 276.1  11/3/2020 Yes        * (Principal) Sepsis ([address] ICD-10-CM: A41.9  ICD-9-CM: 038.9, 995.91 11/3/2020 Yes              PLAN:   --CXR this AM, pending results, may need chest CT  --keep CT to suction for now  --wean O2 as tolerated, check RA sat  --replace K   --cefazolin D5  --BC pending  --pleural fluid cultures pending, negative x 2 days  --monitor for ETOH withdrawal    Kaleb Lucas NP    More than 50% of time documented was spent in face-to-face contact with the patient and in the care of the patient on the floor/unit where the patient is located. Lungs decreased sounds in left chest.   Heart S1 and S2 audible, no murmers or rubs appreciated  Other   cxr noted and worsening effusion on left with only 80 ml out overnight. Will tell staff to get IR to see if they can place in TPA again. I have spoken with and examined the patient. I have reviewed the history, examination, assessment, and plan and agree with the above. Melina Gallagher MD      This note was signed electronically. Errors are unfortunately her likely due to dictation software.

## 2020-11-09 LAB
ALBUMIN SERPL-MCNC: 1.8 G/DL (ref 3.5–5)
ALBUMIN/GLOB SERPL: 0.4 {RATIO} (ref 1.2–3.5)
ALP SERPL-CCNC: 343 U/L (ref 50–136)
ALT SERPL-CCNC: 60 U/L (ref 12–65)
ANION GAP SERPL CALC-SCNC: 4 MMOL/L (ref 7–16)
AST SERPL-CCNC: 142 U/L (ref 15–37)
BACTERIA SPEC CULT: ABNORMAL
BASOPHILS # BLD: 0.1 K/UL (ref 0–0.2)
BASOPHILS NFR BLD: 1 % (ref 0–2)
BILIRUB SERPL-MCNC: 1.1 MG/DL (ref 0.2–1.1)
BUN SERPL-MCNC: 5 MG/DL (ref 6–23)
CALCIUM SERPL-MCNC: 8.3 MG/DL (ref 8.3–10.4)
CHLORIDE SERPL-SCNC: 99 MMOL/L (ref 98–107)
CO2 SERPL-SCNC: 34 MMOL/L (ref 21–32)
CREAT SERPL-MCNC: 0.47 MG/DL (ref 0.8–1.5)
DIFFERENTIAL METHOD BLD: ABNORMAL
EOSINOPHIL # BLD: 0.2 K/UL (ref 0–0.8)
EOSINOPHIL NFR BLD: 1 % (ref 0.5–7.8)
ERYTHROCYTE [DISTWIDTH] IN BLOOD BY AUTOMATED COUNT: 14.4 % (ref 11.9–14.6)
GLOBULIN SER CALC-MCNC: 4.4 G/DL (ref 2.3–3.5)
GLUCOSE SERPL-MCNC: 119 MG/DL (ref 65–100)
GRAM STN SPEC: ABNORMAL
GRAM STN SPEC: ABNORMAL
HCT VFR BLD AUTO: 34 % (ref 41.1–50.3)
HGB BLD-MCNC: 11.6 G/DL (ref 13.6–17.2)
IMM GRANULOCYTES # BLD AUTO: 0.6 K/UL (ref 0–0.5)
IMM GRANULOCYTES NFR BLD AUTO: 4 % (ref 0–5)
LYMPHOCYTES # BLD: 1.4 K/UL (ref 0.5–4.6)
LYMPHOCYTES NFR BLD: 10 % (ref 13–44)
MAGNESIUM SERPL-MCNC: 2.2 MG/DL (ref 1.8–2.4)
MCH RBC QN AUTO: 39.9 PG (ref 26.1–32.9)
MCHC RBC AUTO-ENTMCNC: 34.1 G/DL (ref 31.4–35)
MCV RBC AUTO: 116.8 FL (ref 79.6–97.8)
MONOCYTES # BLD: 1 K/UL (ref 0.1–1.3)
MONOCYTES NFR BLD: 7 % (ref 4–12)
NEUTS SEG # BLD: 10.2 K/UL (ref 1.7–8.2)
NEUTS SEG NFR BLD: 76 % (ref 43–78)
NRBC # BLD: 0 K/UL (ref 0–0.2)
PLATELET # BLD AUTO: 330 K/UL (ref 150–450)
PMV BLD AUTO: 10.2 FL (ref 9.4–12.3)
POTASSIUM SERPL-SCNC: 4 MMOL/L (ref 3.5–5.1)
PROT SERPL-MCNC: 6.2 G/DL (ref 6.3–8.2)
RBC # BLD AUTO: 2.91 M/UL (ref 4.23–5.6)
SERVICE CMNT-IMP: ABNORMAL
SODIUM SERPL-SCNC: 137 MMOL/L (ref 136–145)
WBC # BLD AUTO: 13.4 K/UL (ref 4.3–11.1)

## 2020-11-09 PROCEDURE — 2709999900 HC NON-CHARGEABLE SUPPLY

## 2020-11-09 PROCEDURE — 74011000250 HC RX REV CODE- 250: Performed by: NURSE PRACTITIONER

## 2020-11-09 PROCEDURE — 74011250636 HC RX REV CODE- 250/636: Performed by: PHYSICIAN ASSISTANT

## 2020-11-09 PROCEDURE — 74011250636 HC RX REV CODE- 250/636: Performed by: INTERNAL MEDICINE

## 2020-11-09 PROCEDURE — 80053 COMPREHEN METABOLIC PANEL: CPT

## 2020-11-09 PROCEDURE — 99232 SBSQ HOSP IP/OBS MODERATE 35: CPT | Performed by: INTERNAL MEDICINE

## 2020-11-09 PROCEDURE — 74011250637 HC RX REV CODE- 250/637: Performed by: INTERNAL MEDICINE

## 2020-11-09 PROCEDURE — 77010033678 HC OXYGEN DAILY

## 2020-11-09 PROCEDURE — 83735 ASSAY OF MAGNESIUM: CPT

## 2020-11-09 PROCEDURE — 36415 COLL VENOUS BLD VENIPUNCTURE: CPT

## 2020-11-09 PROCEDURE — 94760 N-INVAS EAR/PLS OXIMETRY 1: CPT

## 2020-11-09 PROCEDURE — 74011250637 HC RX REV CODE- 250/637: Performed by: NURSE PRACTITIONER

## 2020-11-09 PROCEDURE — 74011250636 HC RX REV CODE- 250/636: Performed by: NURSE PRACTITIONER

## 2020-11-09 PROCEDURE — 65270000029 HC RM PRIVATE

## 2020-11-09 PROCEDURE — 77030012390 HC DRN CHST BTL GTNG -B

## 2020-11-09 PROCEDURE — 85025 COMPLETE CBC W/AUTO DIFF WBC: CPT

## 2020-11-09 RX ORDER — CHLORDIAZEPOXIDE HYDROCHLORIDE 10 MG/1
10 CAPSULE, GELATIN COATED ORAL 4 TIMES DAILY
Status: DISCONTINUED | OUTPATIENT
Start: 2020-11-09 | End: 2020-11-10

## 2020-11-09 RX ORDER — CALCIUM CARBONATE 200(500)MG
200 TABLET,CHEWABLE ORAL
Status: DISCONTINUED | OUTPATIENT
Start: 2020-11-09 | End: 2020-11-12 | Stop reason: HOSPADM

## 2020-11-09 RX ADMIN — Medication 100 MG: at 08:11

## 2020-11-09 RX ADMIN — OXYCODONE HYDROCHLORIDE AND ACETAMINOPHEN 1 TABLET: 10; 325 TABLET ORAL at 18:32

## 2020-11-09 RX ADMIN — CHLORDIAZEPOXIDE HYDROCHLORIDE 25 MG: 25 CAPSULE ORAL at 08:10

## 2020-11-09 RX ADMIN — OXYCODONE HYDROCHLORIDE AND ACETAMINOPHEN 1 TABLET: 10; 325 TABLET ORAL at 04:51

## 2020-11-09 RX ADMIN — ALTEPLASE 4 MG: 2.2 INJECTION, POWDER, LYOPHILIZED, FOR SOLUTION INTRAVENOUS at 11:00

## 2020-11-09 RX ADMIN — MULTIPLE VITAMINS W/ MINERALS TAB 1 TABLET: TAB at 08:11

## 2020-11-09 RX ADMIN — ONDANSETRON 4 MG: 2 INJECTION INTRAMUSCULAR; INTRAVENOUS at 00:27

## 2020-11-09 RX ADMIN — Medication 10 ML: at 14:27

## 2020-11-09 RX ADMIN — OXYCODONE HYDROCHLORIDE AND ACETAMINOPHEN 1 TABLET: 10; 325 TABLET ORAL at 10:44

## 2020-11-09 RX ADMIN — LORAZEPAM 1 MG: 1 TABLET ORAL at 12:40

## 2020-11-09 RX ADMIN — LORAZEPAM 1 MG: 2 INJECTION INTRAMUSCULAR; INTRAVENOUS at 00:27

## 2020-11-09 RX ADMIN — CEFAZOLIN SODIUM 2 G: 100 INJECTION, POWDER, LYOPHILIZED, FOR SOLUTION INTRAVENOUS at 21:52

## 2020-11-09 RX ADMIN — FOLIC ACID 1 MG: 1 TABLET ORAL at 08:10

## 2020-11-09 RX ADMIN — CEFAZOLIN SODIUM 2 G: 100 INJECTION, POWDER, LYOPHILIZED, FOR SOLUTION INTRAVENOUS at 12:32

## 2020-11-09 RX ADMIN — CEFAZOLIN SODIUM 2 G: 100 INJECTION, POWDER, LYOPHILIZED, FOR SOLUTION INTRAVENOUS at 03:49

## 2020-11-09 RX ADMIN — CHLORDIAZEPOXIDE HYDROCHLORIDE 25 MG: 25 CAPSULE ORAL at 16:56

## 2020-11-09 RX ADMIN — POLYETHYLENE GLYCOL 3350 17 G: 17 POWDER, FOR SOLUTION ORAL at 08:11

## 2020-11-09 NOTE — PROGRESS NOTES
Hospitalist Progress Note    Subjective:   Daily Progress Note: 11/9/2020 0954 and /1729    Patient was admitted to surgical service from 10/30-11/2 after mechanical fall through attic floor sustaining rib fractures and left pneumothorax.  Chest tube placed in ER at that time, then reportedly removed chest tube about 4 days PTA. Sabra Stewart sustained a coracoid fracture, seen by Ortho with sling/conservative management.  Progressive pain and SOB since discharge, then presented to ER again 11/3. New effusion on CXR.  CT chest with loculated hydropneumothorax. Tachycardic on arrival.  Leukocytosis   11/4:  Blood culture with Gram Positive Cocci in Anaerobic bottle.  Empiric vanc begun, changed to cefazolin after culture returned with [employer] MSSA.  To IR for chest tube/drain placement.  Concern for developing empyema.  Second set of cultures ordered.  ID in for consult. 11/5:  Cultures repeated.  TTE below, study not adequate to completely exclude the possibility of vegetation.  Received tPa in tube   11/6:  Confused early this am, apparently disconnected chest tube and was up walking around halls. Mona with librium and haldol.  Currently alert and oriented, reports feeling somewhat better.  Chest tube draining bloody fluid.  Old chest tube site with small amount purulent drainage, culture to lab.  Blood cultures done 11/5 with NGTD   11/7:  Called to room urgently per RN at 0800 for respiratory distress. Found patient sitting up in tripod position in obvious respiratory distress. Oxygen sat 96% on RA.  Found with suction on intermittent setting and chest tube partially clamped, unknown how long. Immediately unclamped with return 970 ml bloody pleural fluid. Suction set to continuous.  Left lung sounds diminished.  Stat labs ordered including another set of blood cultures. CXR with effusion entire lower half of lung. Pulmonary consulted for assist, in immediately.  Librium de escalated to 25 mg tid.  Continued HS confusion.    11/8:  Tachypnea worsening all day, oxygen saturation remains stable 96-98%. Dry cough. Minimal breath sounds auscultated on left. CXR worse on left today. 80 ml OP in chest tube drainage container x 24 hours. Pulmonary with request for IR consult today for additional tPa placement.      11/9:  IR in, administered second dose of tPa in chest tube at bedside. Tolerated well, 320 ml out by 1500. ID in, continue cefazolin for MSSA x 4-6 weeks. WBC up to 13.4 today. Remains SOB with stable oxygen sat on 2 liters oxygen.      ADDITIONAL HISTORY:  ETOH abuse with 1 pint liquor daily, tobacco use, GERD,      Current Facility-Administered Medications   Medication Dose Route Frequency    calcium carbonate (TUMS) chewable tablet 200 mg [elemental]  200 mg Oral TID PRN    chlordiazePOXIDE (LIBRIUM) capsule 25 mg  25 mg Oral TID    polyethylene glycol (MIRALAX) packet 17 g  17 g Oral DAILY    LORazepam (ATIVAN) injection 1 mg  1 mg IntraVENous Q4H PRN    sodium chloride (NS) flush 5-40 mL  5-40 mL IntraVENous PRN    LORazepam (ATIVAN) tablet 1 mg  1 mg Oral X1J PRN    folic acid (FOLVITE) tablet 1 mg  1 mg Oral DAILY    thiamine HCL (B-1) tablet 100 mg  100 mg Oral DAILY    multivitamin, tx-iron-ca-min (THERA-M w/ IRON) tablet 1 Tab  1 Tab Oral DAILY    nicotine (NICODERM CQ) 21 mg/24 hr patch 1 Patch  1 Patch TransDERmal Q24H    ceFAZolin (ANCEF) 2 g/20 mL in sterile water IV syringe  2 g IntraVENous Q8H    midazolam (VERSED) injection 0.5-2 mg  0.5-2 mg IntraVENous Multiple    oxyCODONE-acetaminophen (PERCOCET 10)  mg per tablet 1 Tab  1 Tab Oral Q4H PRN    naloxone (NARCAN) injection 0.4 mg  0.4 mg IntraVENous PRN    HYDROmorphone (PF) (DILAUDID) injection 0.5 mg  0.5 mg IntraVENous Q3H PRN    sodium chloride (NS) flush 5-40 mL  5-40 mL IntraVENous Q8H    sodium chloride (NS) flush 5-40 mL  5-40 mL IntraVENous PRN    polyethylene glycol (MIRALAX) packet 17 g  17 g Oral DAILY PRN    ondansetron (ZOFRAN ODT) tablet 4 mg  4 mg Oral Q8H PRN    Or    ondansetron (ZOFRAN) injection 4 mg  4 mg IntraVENous Q6H PRN        Review of Systems  A comprehensive review of systems was negative except for that written in the HPI. Objective:     Visit Vitals  /67   Pulse 85   Temp 97.7 °F (36.5 °C)   Resp 20   Ht 5' 10\" (1.778 m)   Wt 83.9 kg (185 lb)   SpO2 98%   BMI 26.54 kg/m²    O2 Flow Rate (L/min): 2 l/min O2 Device: Nasal cannula    Temp (24hrs), Av.9 °F (36.6 °C), Min:97.7 °F (36.5 °C), Max:98 °F (36.7 °C)    701 - 1900  In: -   Out: 125 [Urine:125]  1901 - 700  In: 240 [P.O.:240]  Out: 1943 [Urine:1825]    General appearance: Oriented and alert, cooperative, continued SOB.  See above     Head: Normocephalic, without obvious abnormality, atraumatic  Throat: Lips, mucosa, and tongue normal. Teeth and gums normal  Neck: supple, symmetrical, trachea midline,  no JVD  Lungs: Painful left ribs.  Right Lung clear. Left lung with completely diminished sounds throughout.       Heart: Mild tachycardia, regular rate and rhythm, S1, S2 normal, no murmur, click, rub or gallop  Abdomen: soft, non-tender.  Bowel sounds normal. No masses,  no organomegaly  Extremities: All extremities normal, atraumatic, no cyanosis or edema  Skin: Skin color, texture, turgor normal. No rashes or lesions  Neurologic: Grossly normal     Additional comments: Notes,orders, test results, vitals reviewed    Data Review  Recent Results (from the past 24 hour(s))   CBC WITH AUTOMATED DIFF    Collection Time: 20  3:16 AM   Result Value Ref Range    WBC 13.4 (H) 4.3 - 11.1 K/uL    RBC 2.91 (L) 4.23 - 5.6 M/uL    HGB 11.6 (L) 13.6 - 17.2 g/dL    HCT 34.0 (L) 41.1 - 50.3 %    .8 (H) 79.6 - 97.8 FL    MCH 39.9 (H) 26.1 - 32.9 PG    MCHC 34.1 31.4 - 35.0 g/dL    RDW 14.4 11.9 - 14.6 %    PLATELET 413 933 - 810 K/uL    MPV 10.2 9.4 - 12.3 FL    ABSOLUTE NRBC 0.00 0.0 - 0.2 K/uL    DF AUTOMATED NEUTROPHILS 76 43 - 78 %    LYMPHOCYTES 10 (L) 13 - 44 %    MONOCYTES 7 4.0 - 12.0 %    EOSINOPHILS 1 0.5 - 7.8 %    BASOPHILS 1 0.0 - 2.0 %    IMMATURE GRANULOCYTES 4 0.0 - 5.0 %    ABS. NEUTROPHILS 10.2 (H) 1.7 - 8.2 K/UL    ABS. LYMPHOCYTES 1.4 0.5 - 4.6 K/UL    ABS. MONOCYTES 1.0 0.1 - 1.3 K/UL    ABS. EOSINOPHILS 0.2 0.0 - 0.8 K/UL    ABS. BASOPHILS 0.1 0.0 - 0.2 K/UL    ABS. IMM. GRANS. 0.6 (H) 0.0 - 0.5 K/UL   METABOLIC PANEL, COMPREHENSIVE    Collection Time: 11/09/20  3:16 AM   Result Value Ref Range    Sodium 137 136 - 145 mmol/L    Potassium 4.0 3.5 - 5.1 mmol/L    Chloride 99 98 - 107 mmol/L    CO2 34 (H) 21 - 32 mmol/L    Anion gap 4 (L) 7 - 16 mmol/L    Glucose 119 (H) 65 - 100 mg/dL    BUN 5 (L) 6 - 23 MG/DL    Creatinine 0.47 (L) 0.8 - 1.5 MG/DL    GFR est AA >60 >60 ml/min/1.73m2    GFR est non-AA >60 >60 ml/min/1.73m2    Calcium 8.3 8.3 - 10.4 MG/DL    Bilirubin, total 1.1 0.2 - 1.1 MG/DL    ALT (SGPT) 60 12 - 65 U/L    AST (SGOT) 142 (H) 15 - 37 U/L    Alk. phosphatase 343 (H) 50 - 136 U/L    Protein, total 6.2 (L) 6.3 - 8.2 g/dL    Albumin 1.8 (L) 3.5 - 5.0 g/dL    Globulin 4.4 (H) 2.3 - 3.5 g/dL    A-G Ratio 0.4 (L) 1.2 - 3.5     MAGNESIUM    Collection Time: 11/09/20  3:16 AM   Result Value Ref Range    Magnesium 2.2 1.8 - 2.4 mg/dL     11/3:  CXR:  Increasing left pleural effusion and left infiltrates.     11/3:  CT CHEST:   Negative for pulmonary embolism.  Loculated hydropneumothorax on the left. Superimposed infection cannot be excluded     11/4:  CXR:  Right lung remains clear. Multiple densities left lung with air-fluid levels, possibly empyema.     11/4:  CHEST TUBE INSERTION LEFT:  Uncomplicated percutaneous drain placement into the likely left pleural effusion.   Plan: Chest tube suction drainage. We will follow.  The tube may benefit from TPA administration in the near future.     11/7:  [facility] 0217:  Decrease in the complex presumed pleural effusion in the left hemithorax with a pigtail chest tube now in place     11/7:  CXR:  Lung volumes again low with some crowding evident. Left-sided chest tube remains in place with no pneumothorax or increasing pleural fluid.  There is persistent loculated pleural fluid and opacities at the left lower lung  with a small air-fluid level noted at this region today as well.  No new findings elsewhere.      11/8:  CXR:  Left-sided pigtail catheter again seen with no clear pneumothorax noted but there is been an increase in the loculated fluid along the left chest wall.         11/5:  TTE:   -  Left ventricle: Systolic function was normal. Ejection fraction was estimated in the range of 55 % to 60 %. There were no regional wall motion abnormalities.    -  Aortic valve: Although there was no diagnostic evidence for vegetation, this study is not adequate to completely exclude the possibility.   -  Mitral valve: Although there was no diagnostic evidence for vegetation, this study is not adequate to completely exclude the possibility.   -  Tricuspid valve:  Although there was no diagnostic evidence for vegetation, this study is not adequate to completely exclude the possibility.     SEE [facility] FOR MULTIPLE CULTURES     Assessment/Plan:   Sepsis due to MSSA bacteremia: source most likely lung              ID on board:  Changed to ancef 11/4, continue               Blood cultures done 11/5 with NGTD              MNB chest tube site wound culture with light staph aureus               Pleural fluid culture final result pending      Sinus tachycardia:  Resolving      Leukocytosis:  Continue abx as ordered   [address] [facility] [address] [address] back up to 13.6 today              JUAN JEFF board, appreciate   Continue ancef     Left side loculated Hydropneumothorax:  Due to fall               Chest tube left, replaced per IR 11/4              tPa given through tube 11/5              May have empyema              Pulmonary on board: greatly appreciate               11/7:  Found with CT clamped on intermittent suction:              Opened with full suction continuous:  900 ml bloody             pleural fluid evacuated. 11/8:  No drainage from chest tube, worsening resp               distress with stable oxygenation.      CXR 11/8 worsening loculating fluid along left wall    Repeat tPa administration per IR 11/9                            Lactic acidosis:  Resolved      Hyponatremia/Hypokalemia:  Replace and recheck     Alcoholism: drinks a pint of liquor daily              Confused at night               Continue librium, de escalate dose 11/7 to 25 mg tid,    10 mg qid on 11/9              Continue prn ativan and haldol               Ammonia normal 11/7              Continue to monitor for DTs    Care Plan discussed with: Patient and Nurse    Signed By: Georgi Parra NP     November 9, 2020

## 2020-11-09 NOTE — PROGRESS NOTES
Infectious Disease Progress Note    Today's Date: 2020   Admit Date: 11/3/2020    Impression:   · MSSA bacteremia (11/3 - );  [address] NGTD;  TTE negative  · Left pleural empyema s/p chest tube, 20; Cx from pleural drainage, : MSSA  · ETOH withdrawal on librium taper    Plan:   · Continue cefazolin for MSSA. Planning 4-6 weeks of treatment. Anti-infectives:   · Cefazolin -  · Vanc 11/3-  · Augmentin 11/3-  · CTX   · Cefepime 11/3    Subjective: Interval history: afebrile; sitting in chair; responsive but mildly delirious; No Known Allergies     Review of Systems:  A comprehensive review of systems was negative except for that written in the History of Present Illness. Objective:     Visit Vitals  /81   Pulse 97   Temp 98.3 °F (36.8 °C)   Resp 20   Ht 5' 10\" (1.778 m)   Wt 83.9 kg (185 lb)   SpO2 99%   BMI 26.54 kg/m²     Temp (24hrs), Av °F (36.7 °C), Min:97.7 °F (36.5 °C), Max:98.3 °F (36.8 °C)       Lines:  Peripheral IV:   Left arm    Physical Exam:    General:  In no acute distress;   Eyes:  Sclera anicteric   Mouth/Throat: oral pharynx clear   Neck: Supple   Lungs:   Diminished breath sounds on left; left chest tube in place;   CV:  Regular rate and rhythm,no murmur   Abdomen:   Mildly distended; active bowel sounds;    Extremities: No cyanosis or edema   Skin: No rash noted   Lymph nodes:    Musculoskeletal: No swelling or deformity   Lines/Devices:  Intact, no erythema, drainage or tenderness   Psych: Alert and responsive       Data Review:     CBC:  Recent Labs     20  0316 20  0346 20  1256 20  0526   WBC 13.4* 9.5  --  9.6   GRANS 76 73  --  74   MONOS 7 10  --  14*   EOS 1 2  --  0*   ANEU 10.2* 6.9  --  7.0   ABL 1.4 1.0  --  1.0   HGB 11.6* 10.9* 11.3* 12.1*   HCT 34.0* 31.2* 32.0* 33.4*    249  --  183       BMP:  Recent Labs     20  0316 20  0346 20  1256   CREA 0.47* 0.49* 0.52*   BUN 5* 6 7  135* 134*   K 4.0 3.4* 3.2*   CL 99 97* 97*   CO2 34* 33* 31   AGAP 4* 5* 6*   * 114* 136*       LFTS:  Recent Labs     11/09/20  0316 11/08/20  0346 11/07/20  1256   TBILI 1.1 1.3* 1.5*   ALT 60 56 46   * 257* 229*   TP 6.2* 5.5* 5.6*   ALB 1.8* 1.7* 1.8*       Microbiology:     All Micro Results     Procedure Component Value Units Date/Time    CULTURE, ANAEROBIC [[id number]] Collected:  11/06/20 1737    Order Status:  Completed Specimen:  Wound Drainage Updated:  11/09/20 0925     Special Requests: NO SPECIAL REQUESTS        Culture result:       SUBCULTURE IS NECESSARY TO DETERMINE PRESENCE OR ABSENCE OF ANAEROBIC BACTERIA IN THIS CULTURE. FURTHER REPORT TO FOLLOW AFTER INCUBATION OF SUBCULTURE. CULTURE, [place] STAIN [[id number]]  (Abnormal)  (Susceptibility) Collected:  11/06/20 1737    Order Status:  Completed Specimen:  Wound from Drainage Updated:  11/09/20 0744     Special Requests: NO SPECIAL REQUESTS        GRAM STAIN 0 TO 3 WBC'S SEEN PER OIF      NO DEFINITE ORGANISM SEEN        Culture result:       LIGHT STAPHYLOCOCCUS AUREUS          CULTURE, BLOOD [[id number]] Collected:  11/05/20 1159    Order Status:  Completed Specimen:  Blood Updated:  11/09/20 0740     Special Requests: --        RIGHT  HAND       Culture result: NO GROWTH 4 DAYS       CULTURE, BODY FLUID W Aydin Lehman [[id number]] Collected:  11/04/20 1525    Order Status:  Completed Specimen:   Body Fluid from Pleural Fluid Updated:  11/07/20 0907     Special Requests: NO SPECIAL REQUESTS        GRAM STAIN NO WBC'S SEEN         NO DEFINITE ORGANISM SEEN        Culture result: NO GROWTH 2 DAYS       CULTURE, BLOOD [[id number]]  (Abnormal) Collected:  11/04/20 0136    Order Status:  Completed Specimen:  Blood Updated:  11/07/20 0733     Special Requests: --        RIGHT  ARM       GRAM STAIN GRAM POSITIVE COCCI         AEROBIC BOTTLE POSITIVE               RESULTS VERIFIED, PHONED TO AND READ BACK BY OSMANY LAZO AT 2055 11.5.2020 EOR           Culture result: STAPHYLOCOCCUS AUREUS         REFER TO [address] [id number]            For Susceptibility Refer to Culture  Accession [id number]      CULTURE, BLOOD [[id number]]  (Abnormal) Collected:  11/03/20 1205    Order Status:  Completed Specimen:  Blood Updated:  11/07/20 0731     Special Requests: --        RIGHT  HAND       GRAM STAIN       GRAM POS COCCI IN CLUSTERS            AEROBIC BOTTLE POSITIVE               CRITICAL RESULT NOT CALLED DUE TO PREVIOUS NOTIFICATION OF CRITICAL RESULT WITHIN THE LAST 24 HOURS. Culture result: STAPHYLOCOCCUS AUREUS         REFER TO ACCESSION [id number] FOR SUSCEPTIBILITY    CULTURE, BLOOD [[id number]]  (Abnormal)  (Susceptibility) Collected:  11/03/20 1145    Order Status:  Completed Specimen:  Blood Updated:  11/07/20 0730     Special Requests: --        LEFT  Antecubital       GRAM STAIN GRAM POSITIVE COCCI               AEROBIC AND ANAEROBIC BOTTLES                  RESULTS VERIFIED, PHONED TO AND READ BACK BY OSMANY MCDONOUGH @ 0030 11/4/20 MM           Culture result: STAPHYLOCOCCUS AUREUS         REFER TO [facility] PANEL ACCESSION [id number]    BLOOD CULTURE ID PANEL [[id number]]  (Abnormal) Collected:  11/04/20 0136    Order Status:  Completed Specimen:  Blood Updated:  11/06/20 0823     Acc. no. from Micro Order [id number]     Staphylococcus Detected        Staphylococcus aureus Detected        Comment: CRITICAL RESULT NOT CALLED DUE TO PREVIOUS NOTIFICATION OF CRITICAL RESULT WITHIN THE LAST 24 HOURS. mecA (Methicillin-Resistance Genes) NOT DETECTED        INTERPRETATION       Gram positive cocci in clusters, identified in realtime PCR as probable MSSA. Comment: Recommend discontinuing IV vancomycin starting cefazolin or nafcillin if patient not on beta-lactam therapy. Infectious Diseases Consult recommended in adult patients. THIS TEST DOES NOT REPLACE SENSITIVITY TESTING.        CULTURE, BLOOD [[id number]] Order Status:  Canceled Specimen:  Blood     CULTURE, BLOOD [[id number]]     Order Status:  Canceled Specimen:  Blood     CULTURE, BLOOD [[id number]]     Order Status:  Canceled Specimen:  Blood     CULTURE, BLOOD [[id number]]     Order Status:  Canceled Specimen:  Blood     BLOOD CULTURE ID PANEL [[id number]]  (Abnormal) Collected:  20 1145    Order Status:  Completed Specimen:  Blood Updated:  20 08     Acc. no. from Micro Order [id number]     Staphylococcus Detected        Staphylococcus aureus Detected        Comment: RESULTS VERIFIED, PHONED TO AND READ BACK BY  Amilcar tejada RN @ 4809 ON 2020 AK. mecA (Methicillin-Resistance Genes) NOT DETECTED        INTERPRETATION       Gram positive cocci in clusters, identified in realtime PCR as probable MSSA. Comment: Recommend discontinuing IV vancomycin starting cefazolin or nafcillin if patient not on beta-lactam therapy. Infectious Diseases Consult recommended in adult patients. THIS TEST DOES NOT REPLACE SENSITIVITY TESTING. Imagin/8/20 CXR: IMPRESSION: Left-sided pigtail catheter again seen with no clear pneumothorax  noted but there is been an increase in the loculated fluid along the left chest  wall.     Signed By: Caty Hilario MD     2020

## 2020-11-09 NOTE — PROGRESS NOTES
END OF SHIFT NOTE:    INTAKE/OUTPUT  11/08 0701 - 11/09 0700  In: 240 [P.O.:240]  Out: 925 [Urine:925]  Voiding: YES  Catheter: NO  Drain:              Flatus: Patient does have flatus present. Stool:  0 occurrences. Characteristics:  Stool Assessment  Stool Color: Other (Comment)(have not observed)    Emesis: 0 occurrences. Characteristics:        VITAL SIGNS  Patient Vitals for the past 12 hrs:   Temp Pulse Resp BP SpO2   11/09/20 0331 98 °F (36.7 °C) 90 20 113/74 93 %   11/08/20 2259 98 °F (36.7 °C) 98 20 119/69 96 %   11/08/20 2138 97.9 °F (36.6 °C) 95 20 126/86 95 %       Pain Assessment  Pain Intensity 1: 7 (11/08/20 1937)  Pain Location 1: Flank, Back  Pain Intervention(s) 1: Medication (see MAR)  Patient Stated Pain Goal: 2    Ambulating  Yes    Shift report given to oncoming nurse at the bedside.     Franchesca Santa RN

## 2020-11-09 NOTE — PROGRESS NOTES
[address]  Admission Date: 11/3/2020             Daily Progress Note: 11/9/2020    The patient's chart is reviewed and the patient is discussed with the staff. Patient is a 50 y.o.  male with a PMHx of ETOH abuse, GERD, and smoker. He who was initially admitted per the surgical service on 10/30 for traumatic PTX and rib fractures after falling from the attic while attempting to do some home repairs.  CT was placed in the ER.  Unfortunately, that weekend the CT was accidentally removed by the patient. He was discharged home on 11/2. Alona Zavala returned to the ER on 11/3/20 with c/o pain. Dereje Rendon was deemed by surgery at that time that no surgical intervention nor chest tube was necessary and that hemothorax should resolve spontaneously.  The pt was admitted per the hospitalist for further monitoring.   11/3- Chest CT showed loculated hydropneumothorax where a superimposed infection could not be excluded.  Blood cultures were + for MSSA as was the wound culture.  Alona Zavala was started on cefepime, Augmentin, and vanc during that time. ID has since been consulted and patient is being treated with Ancef. On 11/4--CT was placed per IR. The chest tube remains and TPA has been instilled to help with drainage. 11/6 PM--he became confused and unhooked his CT from suction and walked around in hallway. Alona Zavala has been treated with prn Haldol and scheduled Librium. 11/7--AM nurse noted pt with increase cough and SOB, CT was found partially clamped, reported 0 output overnight from CT.  When unclamped, CT with immediate sanguinous return of 900 ml. CXR 11/8 showed increase in opacification on the left. Subjective:      No complaints at present. Discussed chest tube and plan for IR to follow up.      Current Facility-Administered Medications   Medication Dose Route Frequency    calcium carbonate (TUMS) chewable tablet 200 mg [elemental]  200 mg Oral TID PRN    chlordiazePOXIDE (LIBRIUM) capsule 25 mg  25 mg Oral TID    polyethylene glycol (MIRALAX) packet 17 g  17 g Oral DAILY    LORazepam (ATIVAN) injection 1 mg  1 mg IntraVENous Q4H PRN    sodium chloride (NS) flush 5-40 mL  5-40 mL IntraVENous PRN    LORazepam (ATIVAN) tablet 1 mg  1 mg Oral I5N PRN    folic acid (FOLVITE) tablet 1 mg  1 mg Oral DAILY    thiamine HCL (B-1) tablet 100 mg  100 mg Oral DAILY    multivitamin, tx-iron-ca-min (THERA-M w/ IRON) tablet 1 Tab  1 Tab Oral DAILY    nicotine (NICODERM CQ) 21 mg/24 hr patch 1 Patch  1 Patch TransDERmal Q24H    ceFAZolin (ANCEF) 2 g/20 mL in sterile water IV syringe  2 g IntraVENous Q8H    midazolam (VERSED) injection 0.5-2 mg  0.5-2 mg IntraVENous Multiple    oxyCODONE-acetaminophen (PERCOCET 10)  mg per tablet 1 Tab  1 Tab Oral Q4H PRN    naloxone (NARCAN) injection 0.4 mg  0.4 mg IntraVENous PRN    HYDROmorphone (PF) (DILAUDID) injection 0.5 mg  0.5 mg IntraVENous Q3H PRN    sodium chloride (NS) flush 5-40 mL  5-40 mL IntraVENous Q8H    sodium chloride (NS) flush 5-40 mL  5-40 mL IntraVENous PRN    polyethylene glycol (MIRALAX) packet 17 g  17 g Oral DAILY PRN    ondansetron (ZOFRAN ODT) tablet 4 mg  4 mg Oral Q8H PRN    Or    ondansetron (ZOFRAN) injection 4 mg  4 mg IntraVENous Q6H PRN         Objective:     Vitals:    11/08/20 2259 11/09/20 0331 11/09/20 0731 11/09/20 0828   BP: 119/69 113/74 108/67    Pulse: 98 90 85    Resp: 20 20 20    Temp: 98 °F (36.7 °C) 98 °F (36.7 °C) 97.7 °F (36.5 °C)    SpO2: 96% 93% 100% 98%   Weight:       Height:         Intake and Output:   11/07 1901 - 11/09 0700  In: 240 [P.O.:240]  Out: Yogesh Rosa [[id number]]  11/09 0701 - 11/09 1900  In: -   Out: 125 [Urine:125]    Physical Exam:   Constitutional:  the patient is well developed and in no acute distress  HEENT:  Sclera clear, pupils equal, oral mucosa moist  Lungs: crackles on the left. Clear on the right.  Chest tube to suction - no output or leak  Cardiovascular:  RRR without M,G,R  Abd/GI: soft and non-tender; with positive bowel sounds. Ext: warm without cyanosis. There is no lower leg edema. Musculoskeletal: moves all four extremities with equal strength  Skin:  no jaundice or rashes, no wounds   Neuro: no gross neuro deficits   Musculoskeletal: can ambulate. No deformity  Psychiatric: Calm. Review of Systems - General ROS: positive for  - none  Respiratory ROS: positive for - shortness of breath  Cardiovascular ROS: positive for - none  Gastrointestinal ROS: no abdominal pain, change in bowel habits, or black or bloody stools  Neurological ROS: positive for - confusion here in the hospital   Lines: peripheral IV, left chest tube        CHEST XRAY:       LAB  Recent Labs     11/09/20  0316 11/08/20  0346 11/07/20  1256 11/07/20  0526   WBC 13.4* 9.5  --  9.6   HGB 11.6* 10.9* 11.3* 12.1*   HCT 34.0* 31.2* 32.0* 33.4*    249  --  183     Recent Labs     11/09/20 0316 11/08/20  0346 11/07/20  1256    135* 134*   K 4.0 3.4* 3.2*   CL 99 97* 97*   CO2 34* 33* 31   * 114* 136*   BUN 5* 6 7   CREA 0.47* 0.49* 0.52*   MG 2.2 2.2 2.3   ALB 1.8* 1.7* 1.8*       Assessment:  (Medical Decision Making)     Hospital Problems  Date Reviewed: 11/3/2020          Codes Class Noted POA    Hypomagnesemia ICD-10-CM: E83.42  ICD-9-CM: 275.2  11/7/2020 Unknown    corrected    MSSA bacteremia ICD-10-CM: R78.81, B95.61  ICD-9-CM: 790.7, 041.11  11/4/2020 Unknown    Ancef per ID    Sinus tachycardia ICD-10-CM: R00.0  ICD-9-CM: 427.89  11/3/2020 Yes    resolved    Leukocytosis ICD-10-CM: K37.803  ICD-9-CM: 288.60  11/3/2020 Yes    Worse today.  CXR worse as well    Hydropneumothorax ICD-10-CM: J94.8  ICD-9-CM: 511.89  11/3/2020 Yes    CT remains but no output over at least 24 hours    Loculated pleural effusion ICD-10-CM: J90  ICD-9-CM: 511.9  11/3/2020 Yes    As above    Lactic acidosis ICD-10-CM: E87.2  ICD-9-CM: 276.2  11/3/2020 Yes    Associated with bacteremia    Hyponatremia ICD-10-CM: E87.1  ICD-9-CM: 276.1  11/3/2020 Yes    corrected    * (Principal) Sepsis ([facility] [address] ICD-10-CM: A41.9  ICD-9-CM: 038.9, 995.91  11/3/2020 Yes    As above          Plan:  (Medical Decision Making)   1. IR to see about chest tube not draining - ? Repeat TPA  2. Scheduled Librium with recent confusion - ? etoh withdrawal. On thiamine  3. Day 6 ancef. ID following. Wound site + for MSSA as well and pleural fluid negative. Repeat blood cultures neg. WBC up more today - ? Secondary to CT not draining - as above - IR to see  4. Low flow oxygen - wean as tolerated    Bailey Ace, NP   Lungs: decreased breath sounds on the left  Heart:  RRR with no Murmur/Rubs/Gallops    Additional Comments:  tpa per chest tube,  No drainage last 24 hours    I have spoken with and examined the patient. I agree with the above assessment and plan as documented. Amarjit Lopez MD      More than 50% of time documented was spent face-to-face contact with the patient and in the care of the patient on the floor/unit where the patient is located.

## 2020-11-09 NOTE — PROGRESS NOTES
CM continues to follow for discharge planning and/or CM needs. Per chart review, pt may need 4-6 weeks of antibiotics - unsure if iv or po antibiotics anticipated. Pt is currently uninsured and does not meet criteria for disability per [facility]. Pt would be self-pay for any outpatient medication needs. CM to continue to follow and update as needed.

## 2020-11-09 NOTE — PROGRESS NOTES
END OF SHIFT NOTE:    No CT output this shift. INTAKE/OUTPUT  11/07 0701 - 11/08 0700  In: -   Out: 2413 [Urine:1225]  Voiding: YES  Catheter: NO  Drain:  CT L upper chest with no output this shift. Flatus: Patient does have flatus present. Stool:  0 occurrences. Characteristics:  Stool Assessment  Stool Color: Other (Comment)(have not observed)    Emesis: 0 occurrences. Characteristics:        VITAL SIGNS  Patient Vitals for the past 12 hrs:   Temp Pulse Resp BP SpO2   11/08/20 1600 97.8 °F (36.6 °C) 96 20 120/70 96 %   11/08/20 1146 97.8 °F (36.6 °C) 99 20 116/74 98 %   11/08/20 0744 97.8 °F (36.6 °C) 85 20 107/71 98 %       Pain Assessment  Pain Intensity 1: 7 (11/08/20 1705)  Pain Location 1: Flank  Pain Intervention(s) 1: Medication (see MAR)  Patient Stated Pain Goal: 2    Ambulating  Yes    Shift report given to oncoming nurse at the bedside.     Toya Prince RN

## 2020-11-09 NOTE — PROGRESS NOTES
Department of Interventional Radiology  [phone]                                 Progress Note  Patient: Doc Fields MRN: [id number]  SSN: [id number]    YOB: 1970  Age: 48 y.o. Sex: male      Subjective:   C/o mild dyspnea with activity. Review of Systems:    Pertinent items are noted in the History of Present Illness. Objective:     Vitals:    11/08/20 2259 11/09/20 0331 11/09/20 0731 11/09/20 0828   BP: 119/69 113/74 108/67    Pulse: 98 90 85    Resp: 20 20 20    Temp: 98 °F (36.7 °C) 98 °F (36.7 °C) 97.7 °F (36.5 °C)    SpO2: 96% 93% 100% 98%   Weight:       Height:            Pain Assessment  Pain Intensity 1: 0 (11/09/20 0800)  Pain Location 1: Flank, Back  Pain Intervention(s) 1: Medication (see MAR)  Patient Stated Pain Goal: 2    Intake and Output:   1188 ml chest tube output 48 hours ago. Physical Exam:   chest tube site and dressing dry and intact. Lab/Data Review:  cxr increased loculated pleural fluid  Assessment:   Loculated pleural effusion  bacteremia    Plan:   TPA injected into chest to dwell again today.   Return to suction 1200    Signed By: Vicky Haq PA-C     November 9, 2020 Bneito Pedro M.D. Pager Number [phone]    Patient is a 68y old  Male who presents with a chief complaint of falls (2020 04:21)      SUBJECTIVE / OVERNIGHT EVENTS:  Pt seen and examined at bedside. No acute events overnight.  Pt denies cp, palpitations, sob, abd pain, N/V, fever, chills.    ROS:  All other review of systems negative    Allergies    Biaxin (Other)  Lidoderm (Unknown)  morphine (Short breath; Rash)    Intolerances        MEDICATIONS  (STANDING):  amLODIPine   Tablet 5 milliGRAM(s) Oral <User Schedule>  citalopram 40 milliGRAM(s) Oral daily  senna 2 Tablet(s) Oral at bedtime  tamsulosin 0.4 milliGRAM(s) Oral at bedtime  traZODone 200 milliGRAM(s) Oral at bedtime    MEDICATIONS  (PRN):  acetaminophen   Tablet .. 650 milliGRAM(s) Oral every 6 hours PRN Mild Pain (1 - 3)  ALPRAZolam 1 milliGRAM(s) Oral every 6 hours PRN Anxiety  bisacodyl Suppository 10 milliGRAM(s) Rectal once PRN Constipation  HYDROmorphone   Tablet 4 milliGRAM(s) Oral every 6 hours PRN Severe Pain (7 - 10)  HYDROmorphone   Tablet 2 milliGRAM(s) Oral every 6 hours PRN Moderate Pain (4 - 6)  polyethylene glycol 3350 17 Gram(s) Oral two times a day PRN Constipation  zolpidem 5 milliGRAM(s) Oral at bedtime PRN Insomnia  zolpidem 5 milliGRAM(s) Oral at bedtime PRN Insomnia      Vital Signs Last 24 Hrs  T(C): 37.4 (2020 09:45), Max: 37.7 (2020 17:40)  T(F): 99.3 (2020 09:45), Max: 99.8 (2020 17:40)  HR: 89 (2020 09:45) (75 - 89)  BP: 124/69 (2020 09:45) (109/69 - 132/65)  BP(mean): --  RR: 16 (2020 09:45) (16 - 18)  SpO2: 97% (2020 09:45) (96% - 98%)  CAPILLARY BLOOD GLUCOSE        I&O's Summary    2020 07:01  -  2020 07:00  --------------------------------------------------------  IN: 0 mL / OUT: 200 mL / NET: -200 mL        PHYSICAL EXAM:  GENERAL: NAD, well-developed  HEAD:  Atraumatic, Normocephalic  EYES: EOMI, PERRLA, conjunctiva and sclera clear  NECK: Supple, No JVD  CHEST/LUNG: Clear to auscultation bilaterally; No wheeze  HEART: Regular rate and rhythm; No murmurs, rubs, or gallops  ABDOMEN: Soft, Nontender, Nondistended; Bowel sounds present  EXTREMITIES:  2+ Peripheral Pulses, No clubbing, cyanosis, or edema  NEUROLOGY: AAOx3, non-focal  PSYCH: calm  SKIN: No rashes or lesions    LABS:                        8.2    12.02 )-----------( 530      ( 2020 18:33 )             27.4     02-07    139  |  97  |  13  ----------------------------<  98  3.1<L>   |  28  |  1.27    Ca    8.7      2020 18:33    TPro  6.7  /  Alb  3.5  /  TBili  0.4  /  DBili  x   /  AST  25  /  ALT  39  /  AlkPhos  113  02-07    PT/INR - ( 2020 18:33 )   PT: 15.4 sec;   INR: 1.34 ratio         PTT - ( 2020 18:33 )  PTT:27.3 sec      Urinalysis Basic - ( 2020 03:00 )    Color: Yellow / Appearance: Clear / S.022 / pH: x  Gluc: x / Ketone: Negative  / Bili: Negative / Urobili: 4 mg/dL   Blood: x / Protein: Trace / Nitrite: Negative   Leuk Esterase: Negative / RBC: 9 /hpf / WBC 2 /HPF   Sq Epi: x / Non Sq Epi: 0 /hpf / Bacteria: Negative        RADIOLOGY & ADDITIONAL TESTS:  Results Reviewed:   Imaging Personally Reviewed:  Electrocardiogram Personally Reviewed:    COORDINATION OF CARE:  Care Discussed with Consultants/Other Providers [Y/N]:  Prior or Outpatient Records Reviewed [Y/N]:

## 2020-11-10 ENCOUNTER — APPOINTMENT (OUTPATIENT)
Dept: GENERAL RADIOLOGY | Age: 50
DRG: 871 | End: 2020-11-10
Attending: NURSE PRACTITIONER

## 2020-11-10 LAB
ALBUMIN SERPL-MCNC: 1.6 G/DL (ref 3.5–5)
ALBUMIN/GLOB SERPL: 0.4 {RATIO} (ref 1.2–3.5)
ALP SERPL-CCNC: 296 U/L (ref 50–136)
ALT SERPL-CCNC: 39 U/L (ref 12–65)
ANION GAP SERPL CALC-SCNC: 6 MMOL/L (ref 7–16)
AST SERPL-CCNC: 55 U/L (ref 15–37)
BACTERIA SPEC CULT: NORMAL
BASOPHILS # BLD: 0.1 K/UL (ref 0–0.2)
BASOPHILS NFR BLD: 1 % (ref 0–2)
BILIRUB SERPL-MCNC: 1.1 MG/DL (ref 0.2–1.1)
BUN SERPL-MCNC: 4 MG/DL (ref 6–23)
CALCIUM SERPL-MCNC: 8.8 MG/DL (ref 8.3–10.4)
CHLORIDE SERPL-SCNC: 94 MMOL/L (ref 98–107)
CO2 SERPL-SCNC: 36 MMOL/L (ref 21–32)
CREAT SERPL-MCNC: 0.49 MG/DL (ref 0.8–1.5)
DIFFERENTIAL METHOD BLD: ABNORMAL
EOSINOPHIL # BLD: 0.2 K/UL (ref 0–0.8)
EOSINOPHIL NFR BLD: 1 % (ref 0.5–7.8)
ERYTHROCYTE [DISTWIDTH] IN BLOOD BY AUTOMATED COUNT: 14.7 % (ref 11.9–14.6)
GLOBULIN SER CALC-MCNC: 4.2 G/DL (ref 2.3–3.5)
GLUCOSE SERPL-MCNC: 105 MG/DL (ref 65–100)
HCT VFR BLD AUTO: 31.1 % (ref 41.1–50.3)
HGB BLD-MCNC: 10.5 G/DL (ref 13.6–17.2)
IMM GRANULOCYTES # BLD AUTO: 0.5 K/UL (ref 0–0.5)
IMM GRANULOCYTES NFR BLD AUTO: 3 % (ref 0–5)
LYMPHOCYTES # BLD: 1.2 K/UL (ref 0.5–4.6)
LYMPHOCYTES NFR BLD: 9 % (ref 13–44)
MCH RBC QN AUTO: 39.6 PG (ref 26.1–32.9)
MCHC RBC AUTO-ENTMCNC: 33.8 G/DL (ref 31.4–35)
MCV RBC AUTO: 117.4 FL (ref 79.6–97.8)
MONOCYTES # BLD: 1 K/UL (ref 0.1–1.3)
MONOCYTES NFR BLD: 7 % (ref 4–12)
NEUTS SEG # BLD: 11.7 K/UL (ref 1.7–8.2)
NEUTS SEG NFR BLD: 80 % (ref 43–78)
NRBC # BLD: 0 K/UL (ref 0–0.2)
PLATELET # BLD AUTO: 430 K/UL (ref 150–450)
PMV BLD AUTO: 10.2 FL (ref 9.4–12.3)
POTASSIUM SERPL-SCNC: 4.2 MMOL/L (ref 3.5–5.1)
PROT SERPL-MCNC: 5.8 G/DL (ref 6.3–8.2)
RBC # BLD AUTO: 2.65 M/UL (ref 4.23–5.6)
SERVICE CMNT-IMP: NORMAL
SODIUM SERPL-SCNC: 136 MMOL/L (ref 138–145)
WBC # BLD AUTO: 14.6 K/UL (ref 4.3–11.1)

## 2020-11-10 PROCEDURE — 36415 COLL VENOUS BLD VENIPUNCTURE: CPT

## 2020-11-10 PROCEDURE — 74011250637 HC RX REV CODE- 250/637: Performed by: INTERNAL MEDICINE

## 2020-11-10 PROCEDURE — 71046 X-RAY EXAM CHEST 2 VIEWS: CPT

## 2020-11-10 PROCEDURE — 99232 SBSQ HOSP IP/OBS MODERATE 35: CPT | Performed by: INTERNAL MEDICINE

## 2020-11-10 PROCEDURE — 85025 COMPLETE CBC W/AUTO DIFF WBC: CPT

## 2020-11-10 PROCEDURE — 74011250636 HC RX REV CODE- 250/636: Performed by: INTERNAL MEDICINE

## 2020-11-10 PROCEDURE — 65270000029 HC RM PRIVATE

## 2020-11-10 PROCEDURE — 74011000250 HC RX REV CODE- 250: Performed by: PHYSICIAN ASSISTANT

## 2020-11-10 PROCEDURE — 80053 COMPREHEN METABOLIC PANEL: CPT

## 2020-11-10 PROCEDURE — 74011250637 HC RX REV CODE- 250/637: Performed by: NURSE PRACTITIONER

## 2020-11-10 PROCEDURE — 74011250636 HC RX REV CODE- 250/636: Performed by: PHYSICIAN ASSISTANT

## 2020-11-10 PROCEDURE — 74011250636 HC RX REV CODE- 250/636: Performed by: NURSE PRACTITIONER

## 2020-11-10 PROCEDURE — 74011000250 HC RX REV CODE- 250: Performed by: NURSE PRACTITIONER

## 2020-11-10 RX ORDER — WATER FOR INJECTION,STERILE
VIAL (ML) INJECTION ONCE
Status: COMPLETED | OUTPATIENT
Start: 2020-11-10 | End: 2020-11-10

## 2020-11-10 RX ADMIN — POLYETHYLENE GLYCOL 3350 17 G: 17 POWDER, FOR SOLUTION ORAL at 08:54

## 2020-11-10 RX ADMIN — ALTEPLASE 4 MG: 2.2 INJECTION, POWDER, LYOPHILIZED, FOR SOLUTION INTRAVENOUS at 16:00

## 2020-11-10 RX ADMIN — CEFAZOLIN SODIUM 2 G: 100 INJECTION, POWDER, LYOPHILIZED, FOR SOLUTION INTRAVENOUS at 13:06

## 2020-11-10 RX ADMIN — Medication 10 ML: at 13:06

## 2020-11-10 RX ADMIN — NALOXONE HYDROCHLORIDE 0.4 MG: 0.4 INJECTION, SOLUTION INTRAMUSCULAR; INTRAVENOUS; SUBCUTANEOUS at 01:21

## 2020-11-10 RX ADMIN — OXYCODONE HYDROCHLORIDE AND ACETAMINOPHEN 1 TABLET: 10; 325 TABLET ORAL at 14:43

## 2020-11-10 RX ADMIN — WATER 20 ML: 1 INJECTION INTRAMUSCULAR; INTRAVENOUS; SUBCUTANEOUS at 16:00

## 2020-11-10 RX ADMIN — MULTIPLE VITAMINS W/ MINERALS TAB 1 TABLET: TAB at 08:51

## 2020-11-10 RX ADMIN — CEFAZOLIN SODIUM 2 G: 100 INJECTION, POWDER, LYOPHILIZED, FOR SOLUTION INTRAVENOUS at 05:36

## 2020-11-10 RX ADMIN — OXYCODONE HYDROCHLORIDE AND ACETAMINOPHEN 1 TABLET: 10; 325 TABLET ORAL at 05:44

## 2020-11-10 RX ADMIN — CEFAZOLIN SODIUM 2 G: 100 INJECTION, POWDER, LYOPHILIZED, FOR SOLUTION INTRAVENOUS at 20:28

## 2020-11-10 RX ADMIN — Medication 10 ML: at 22:00

## 2020-11-10 RX ADMIN — LORAZEPAM 1 MG: 1 TABLET ORAL at 10:45

## 2020-11-10 RX ADMIN — FOLIC ACID 1 MG: 1 TABLET ORAL at 08:51

## 2020-11-10 RX ADMIN — LORAZEPAM 1 MG: 1 TABLET ORAL at 22:16

## 2020-11-10 RX ADMIN — Medication 100 MG: at 08:52

## 2020-11-10 NOTE — PROGRESS NOTES
END OF SHIFT NOTE: TOTAL OF 650cc SANG DRAINAGE OUT IN CT TO SXN OVERNIGHT. INTAKE/OUTPUT  11/09 0701 - 11/10 0700  In: 1000 [P.O.:1000]  Out: 3200 [Urine:1300]  Voiding: YES  Catheter: NO  Drain:              Flatus: Patient does have flatus present. Stool:  0 occurrences. Characteristics:  Stool Assessment  Stool Color: Other (Comment)(have not observed)    Emesis: 0 occurrences. Characteristics:        VITAL SIGNS  Patient Vitals for the past 12 hrs:   Temp Pulse Resp BP SpO2   11/10/20 0417 98.2 °F (36.8 °C) 89 20 116/76 99 %   11/09/20 2357 98.3 °F (36.8 °C) (!) 105 20 121/72 95 %   11/09/20 2155 -- (!) 102 -- -- 95 %   11/09/20 2017 98.3 °F (36.8 °C) (!) 101 20 103/62 98 %       Pain Assessment  Pain Intensity 1: 7 (11/10/20 0543)  Pain Location 1: Chest, Back  Pain Intervention(s) 1: Medication (see MAR)  Patient Stated Pain Goal: 2    Ambulating  Yes    Shift report given to oncoming nurse at the bedside.     Cedric Werner RN

## 2020-11-10 NOTE — PROGRESS NOTES
Hospitalist Progress Note    Subjective:   Daily Progress Note: 11/10/2020 8:13 AM    Patient was admitted to surgical service from 10/30-11/2 after mechanical fall through attic floor sustaining rib fractures and left pneumothorax.  Chest tube placed in ER at that time, then reportedly removed chest tube about 4 days PTA. Lu Waters sustained a coracoid fracture, seen by Ortho with sling/conservative management.  Progressive pain and SOB since discharge, then presented to ER again 11/3. New effusion on CXR.  CT chest with loculated hydropneumothorax. Tachycardic on arrival.  Leukocytosis   11/4:  Blood culture with Gram Positive Cocci in Anaerobic bottle.  Empiric vanc begun, changed to cefazolin after culture returned with Biofire MSSA.  To IR for chest tube/drain placement.  Concern for developing empyema.  Second set of cultures ordered.  ID in for consult. 11/5:  Cultures repeated.  TTE below, study not adequate to completely exclude the possibility of vegetation.  Received tPa in tube   11/6:  Confused early this am, apparently disconnected chest tube and was up walking around halls. [place] with librium and haldol.  Currently alert and oriented, reports feeling somewhat better.  Chest tube draining bloody fluid.  Old chest tube site with small amount purulent drainage, culture to lab.  Blood cultures done 11/5 with NGTD   11/7:  Called to room urgently per RN at 0800 for respiratory distress. Found patient sitting up in tripod position in obvious respiratory distress. Oxygen sat 96% on RA.  Found with suction on intermittent setting and chest tube partially clamped, unknown how long. Immediately unclamped with return 970 ml bloody pleural fluid. Suction set to continuous.  Left lung sounds diminished.  Stat labs ordered including another set of blood cultures. CXR with effusion entire lower half of lung. Pulmonary consulted for assist, in immediately.  Librium de escalated to 25 mg tid. Continued HS confusion.    11/8: Kylee Ng worsening all day, oxygen saturation remains stable 96-98%. Dry cough. Minimal breath sounds auscultated on left.  CXR worse on left today. 80 ml OP in chest tube drainage container x 24 hours.  Pulmonary with request for IR consult today for additional tPa placement.    11/9:  IR in, administered second dose of tPa in chest tube at bedside. Tolerated well, 320 ml out by 1500. ID in, continue cefazolin for MSSA x 4-6 weeks. WBC up to 13.4 today. Remains SOB with stable oxygen sat on 2 liters oxygen.      11/10:  CXR improved today. Continues to breathe heavily, however oxygen saturation stable on 2 liters and states he feels better. Per ID, will need 4-6 weeks of abx. Pleural fluid culture remains negative. Old chest tube wound site with +MSSA. 1.3 liters bloody pleural fluid OP after tPa administration yesterday. Extremely sleepy yesterday, hard to rouse. RNs found loose hydrocodone  pills in patient possessions, gave narcan, loose meds sent to pharmacy. Per RNs, not much difference in mentation after narcan. Sitting up in bed eating breakfast this am, reports feeling better.         Current Facility-Administered Medications   Medication Dose Route Frequency    calcium carbonate (TUMS) chewable tablet 200 mg [elemental]  200 mg Oral TID PRN    chlordiazePOXIDE (LIBRIUM) capsule 10 mg  10 mg Oral QID    polyethylene glycol (MIRALAX) packet 17 g  17 g Oral DAILY    LORazepam (ATIVAN) injection 1 mg  1 mg IntraVENous Q4H PRN    sodium chloride (NS) flush 5-40 mL  5-40 mL IntraVENous PRN    LORazepam (ATIVAN) tablet 1 mg  1 mg Oral Q8T PRN    folic acid (FOLVITE) tablet 1 mg  1 mg Oral DAILY    thiamine HCL (B-1) tablet 100 mg  100 mg Oral DAILY    multivitamin, tx-iron-ca-min (THERA-M w/ IRON) tablet 1 Tab  1 Tab Oral DAILY    nicotine (NICODERM CQ) 21 mg/24 hr patch 1 Patch  1 Patch TransDERmal Q24H    ceFAZolin (ANCEF) 2 g/20 mL in sterile water IV syringe  2 g IntraVENous Q8H  midazolam (VERSED) injection 0.5-2 mg  0.5-2 mg IntraVENous Multiple    oxyCODONE-acetaminophen (PERCOCET 10)  mg per tablet 1 Tab  1 Tab Oral Q4H PRN    naloxone (NARCAN) injection 0.4 mg  0.4 mg IntraVENous PRN    HYDROmorphone (PF) (DILAUDID) injection 0.5 mg  0.5 mg IntraVENous Q3H PRN    sodium chloride (NS) flush 5-40 mL  5-40 mL IntraVENous Q8H    sodium chloride (NS) flush 5-40 mL  5-40 mL IntraVENous PRN    polyethylene glycol (MIRALAX) packet 17 g  17 g Oral DAILY PRN    ondansetron (ZOFRAN ODT) tablet 4 mg  4 mg Oral Q8H PRN    Or    ondansetron (ZOFRAN) injection 4 mg  4 mg IntraVENous Q6H PRN     Review of Systems  A comprehensive review of systems was negative except for that written in the HPI. Objective:     Visit Vitals  /76   Pulse 89   Temp 98.2 °F (36.8 °C)   Resp 20   Ht 5' 10\" (1.778 m)   Wt 83.9 kg (185 lb)   SpO2 99%   BMI 26.54 kg/m²    O2 Flow Rate (L/min): 4 l/min O2 Device: Nasal cannula    Temp (24hrs), Av.3 °F (36.8 °C), Min:98.2 °F (36.8 °C), Max:98.6 °F (37 °C)     1901 - 11/10 0700  In: 1000 [P.O.:1000]  Out: 1691 [Urine:]    General appearance: Oriented and alert, cooperative, continued SOB, but denies same.  Sitting up eating breakfast.       Head: Normocephalic, without obvious abnormality, atraumatic  Throat: Lips, mucosa, and tongue normal. Teeth and gums normal  Neck: supple, symmetrical, trachea midline,  no JVD  Lungs: Painful left ribs. Right Lung clear. Able to hear breath sounds minimally in the left upper lung. Rest of lung with diminished sounds throughout.       Heart: Mild tachycardia, regular rate and rhythm, S1, S2 normal, no murmur, click, rub or gallop  Abdomen: soft, non-tender.  Bowel sounds normal. No masses,  no organomegaly  Extremities: All extremities normal, atraumatic, no cyanosis or edema  Skin: Skin color, texture, turgor normal. No rashes or lesions  Neurologic: Grossly normal     Additional comments: Notes,orders, test results, vitals reviewed    Data Review  Recent Results (from the past 24 hour(s))   CBC WITH AUTOMATED DIFF    Collection Time: 11/10/20  5:34 AM   Result Value Ref Range    WBC 14.6 (H) 4.3 - 11.1 K/uL    RBC 2.65 (L) 4.23 - 5.6 M/uL    HGB 10.5 (L) 13.6 - 17.2 g/dL    HCT 31.1 (L) 41.1 - 50.3 %    .4 (H) 79.6 - 97.8 FL    MCH 39.6 (H) 26.1 - 32.9 PG    MCHC 33.8 31.4 - 35.0 g/dL    RDW 14.7 (H) 11.9 - 14.6 %    PLATELET 054 579 - 786 K/uL    MPV 10.2 9.4 - 12.3 FL    ABSOLUTE NRBC 0.00 0.0 - 0.2 K/uL    DF AUTOMATED      NEUTROPHILS 80 (H) 43 - 78 %    LYMPHOCYTES 9 (L) 13 - 44 %    MONOCYTES 7 4.0 - 12.0 %    EOSINOPHILS 1 0.5 - 7.8 %    BASOPHILS 1 0.0 - 2.0 %    IMMATURE GRANULOCYTES 3 0.0 - 5.0 %    ABS. NEUTROPHILS 11.7 (H) 1.7 - 8.2 K/UL    ABS. LYMPHOCYTES 1.2 0.5 - 4.6 K/UL    ABS. MONOCYTES 1.0 0.1 - 1.3 K/UL    ABS. EOSINOPHILS 0.2 0.0 - 0.8 K/UL    ABS. BASOPHILS 0.1 0.0 - 0.2 K/UL    ABS. IMM. GRANS. 0.5 0.0 - 0.5 K/UL   METABOLIC PANEL, COMPREHENSIVE    Collection Time: 11/10/20  5:34 AM   Result Value Ref Range    Sodium 136 (L) 138 - 145 mmol/L    Potassium 4.2 3.5 - 5.1 mmol/L    Chloride 94 (L) 98 - 107 mmol/L    CO2 36 (H) 21 - 32 mmol/L    Anion gap 6 (L) 7 - 16 mmol/L    Glucose 105 (H) 65 - 100 mg/dL    BUN 4 (L) 6 - 23 MG/DL    Creatinine 0.49 (L) 0.8 - 1.5 MG/DL    GFR est AA >60 >60 ml/min/1.73m2    GFR est non-AA >60 >60 ml/min/1.73m2    Calcium 8.8 8.3 - 10.4 MG/DL    Bilirubin, total 1.1 0.2 - 1.1 MG/DL    ALT (SGPT) 39 12 - 65 U/L    AST (SGOT) 55 (H) 15 - 37 U/L    Alk. phosphatase 296 (H) 50 - 136 U/L    Protein, total 5.8 (L) 6.3 - 8.2 g/dL    Albumin 1.6 (L) 3.5 - 5.0 g/dL    Globulin 4.2 (H) 2.3 - 3.5 g/dL    A-G Ratio 0.4 (L) 1.2 - 3.5        11/3:  CXR:  Increasing left pleural effusion and left infiltrates.     11/3:  CT CHEST:   Negative for pulmonary embolism.  Loculated hydropneumothorax on the left.  Superimposed infection cannot be excluded     11/4:  CXR:  Right lung remains clear. Multiple densities left lung with air-fluid levels, possibly empyema.     11/4:  CHEST TUBE INSERTION LEFT:  Uncomplicated percutaneous drain placement into the likely left pleural effusion.   Plan: Chest tube suction drainage. We will follow. The tube may benefit from TPA administration in the near future.     11/7:  KXY 0033:  Decrease in the complex presumed pleural effusion in the left hemithorax with a pigtail chest tube now in place     11/7:  CXR:  Lung volumes again low with some crowding evident. Left-sided chest tube remains in place with no pneumothorax or increasing pleural fluid.  There is persistent loculated pleural fluid and opacities at the left lower lung  with a small air-fluid level noted at this region today as well.  No new findings elsewhere.      11/8:  CXR:  Left-sided pigtail catheter again seen with no clear pneumothorax noted but there is been an increase in the loculated fluid along the left chest wall.       11/10:  CXR:  No pneumothorax. Slightly improved pleural disease in the interval.     11/5:  TTE:   -  Left ventricle: Systolic function was normal. Ejection fraction was estimated in the range of 55 % to 60 %. There were no regional wall motion abnormalities.    -  Aortic valve: Although there was no diagnostic evidence for vegetation, this study is not adequate to completely exclude the possibility.   -  Mitral valve: Although there was no diagnostic evidence for vegetation, this study is not adequate to completely exclude the possibility.   -  Tricuspid valve:  Although there was no diagnostic evidence for vegetation, this study is not adequate to completely exclude the possibility.     SEE [facility] FOR MULTIPLE CULTURE RESULTS    Assessment/Plan:   Sepsis due to MSSA bacteremia: source most likely lung              ID on board:  Changed to ancef 11/4, continue x 4-6 weeks per ID              Blood cultures done 11/5 with NGTD              FEW chest tube site wound culture with MSSA               Pleural fluid culture final result pending      Sinus tachycardia:  Resolving      Leukocytosis:  Continue abx as ordered   [address] back up to 13.6 today              HN RL board, appreciate              Continue ancef     Left side loculated Hydropneumothorax:  Due to fall               Chest tube left, replaced per IR 11/4              tPa given through tube 11/5 AND 11/9              May have empyema              Pulmonary on board: greatly appreciate               11/7:  Found with CT clamped on intermittent suction:              ILMECJ with full suction continuous:  900 ml bloody pleural fluid evacuated.              11/8:  No drainage from chest tube, worsening resp distress with stable oxygenation. CXR 11/8 worsening loculating fluid along left wall   11/10:  CT has drained 1.5 liters since adding second dose of tPa   CXR improved 11/10                            Lactic acidosis:  Resolved      Hyponatremia/Hypokalemia:  Replace and recheck     Alcoholism: drinks a pint of liquor daily              Confused at night               Pulmonary discontinued scheduled librium              Continue prn ativan and haldol               Ammonia normal 11/7              Continue to monitor for DTs   ID plans for 4 to 6 weeks of abx for now. Wound site + for MSSA as well and pleural fluid  negative. Repeat blood cultures neg. WBC up more today - monitoring  4. Oxygen up to 4 liters today but sat 99%.  Oxygen turned up last night when he was noted to be drowsy - rewean today     Care Plan discussed with: Patient and Nurse    Signed By: Pamela Phillips NP     November 10, 2020

## 2020-11-10 NOTE — PROGRESS NOTES
[facility]  Admission Date: 11/3/2020             Daily Progress Note: 11/10/2020    The patient's chart is reviewed and the patient is discussed with the staff. Patient is a 50 y.o.  male with a PMHx of ETOH abuse, GERD, and smoker. He who was initially admitted per the surgical service on 10/30 for traumatic PTX and rib fractures after falling from the attic while attempting to do some home repairs.  CT was placed in the ER.  Unfortunately, that weekend the CT was accidentally removed by the patient. He was discharged home on 11/2. Olga Shoemaker returned to the ER on 11/3/20 with c/o pain. Paul Naidu was deemed by surgery at that time that no surgical intervention nor chest tube was necessary and that hemothorax should resolve spontaneously.  The pt was admitted per the hospitalist for further monitoring.   11/3- Chest CT showed loculated hydropneumothorax where a superimposed infection could not be excluded.  Blood cultures were + for MSSA as was the wound culture.  Olga Shoemaker was started on cefepime, Augmentin, and vanc during that time. ID has since been consulted and patient is being treated with Ancef. On 11/4--CT was placed per IR. The chest tube remains and TPA has been instilled to help with drainage. 11/6 PM--he became confused and unhooked his CT from suction and walked around in hallway. Olga Shoemaker was been treated with prn Haldol and scheduled Librium. 11/7--AM nurse noted pt with increase cough and SOB, CT was found partially clamped, reported 0 output overnight from CT.  When unclamped, CT with immediate sanguinous return of 900 ml. CXR 11/8 showed increase in opacification on the left - TPA repeated with improved output/CXR. Subjective:     Wants to go home. Discussed antibiotics and chest tube. He seems drowsy and nurse states that Librium was held last night.  The nurse also found two Norco tablets in his wallet and there was concern that he had taken some of his own medication. Current Facility-Administered Medications   Medication Dose Route Frequency    calcium carbonate (TUMS) chewable tablet 200 mg [elemental]  200 mg Oral TID PRN    chlordiazePOXIDE (LIBRIUM) capsule 10 mg  10 mg Oral QID    polyethylene glycol (MIRALAX) packet 17 g  17 g Oral DAILY    LORazepam (ATIVAN) injection 1 mg  1 mg IntraVENous Q4H PRN    sodium chloride (NS) flush 5-40 mL  5-40 mL IntraVENous PRN    LORazepam (ATIVAN) tablet 1 mg  1 mg Oral U8B PRN    folic acid (FOLVITE) tablet 1 mg  1 mg Oral DAILY    thiamine HCL (B-1) tablet 100 mg  100 mg Oral DAILY    multivitamin, tx-iron-ca-min (THERA-M w/ IRON) tablet 1 Tab  1 Tab Oral DAILY    nicotine (NICODERM CQ) 21 mg/24 hr patch 1 Patch  1 Patch TransDERmal Q24H    ceFAZolin (ANCEF) 2 g/20 mL in sterile water IV syringe  2 g IntraVENous Q8H    midazolam (VERSED) injection 0.5-2 mg  0.5-2 mg IntraVENous Multiple    oxyCODONE-acetaminophen (PERCOCET 10)  mg per tablet 1 Tab  1 Tab Oral Q4H PRN    naloxone (NARCAN) injection 0.4 mg  0.4 mg IntraVENous PRN    HYDROmorphone (PF) (DILAUDID) injection 0.5 mg  0.5 mg IntraVENous Q3H PRN    sodium chloride (NS) flush 5-40 mL  5-40 mL IntraVENous Q8H    sodium chloride (NS) flush 5-40 mL  5-40 mL IntraVENous PRN    polyethylene glycol (MIRALAX) packet 17 g  17 g Oral DAILY PRN    ondansetron (ZOFRAN ODT) tablet 4 mg  4 mg Oral Q8H PRN    Or    ondansetron (ZOFRAN) injection 4 mg  4 mg IntraVENous Q6H PRN         Objective:     Vitals:    11/09/20 2017 11/09/20 2155 11/09/20 2357 11/10/20 0417   BP: 103/62  121/72 116/76   Pulse: (!) 101 (!) 102 (!) 105 89   Resp: 20 20 20   Temp: 98.3 °F (36.8 °C)  98.3 °F (36.8 °C) 98.2 °F (36.8 °C)   SpO2: 98% 95% 95% 99%   Weight:       Height:         Intake and Output:   11/08 1901 - 11/10 0700  In: 1000 [P.O.:1000]  Out: 1597 [Urine:2025]  No intake/output data recorded.     Physical Exam: Constitutional:  the patient is obese, well developed and in no acute distress  HEENT:  Sclera clear, pupils equal, oral mucosa moist  Lungs: crackles on the left. Clear on the right. Chest tube to suction - no output or leak. Oxygen up to 6 liters  Cardiovascular:  RRR without M,G,R  Abd/GI: soft and non-tender; with positive bowel sounds. Ext: warm without cyanosis. There is no lower leg edema. Musculoskeletal: moves all four extremities with equal strength  Skin:  no jaundice or rashes, no wounds   Neuro: drowsy but alert and able to communicate  Musculoskeletal: can ambulate. No deformity  Psychiatric: Calm.      Review of Systems - General ROS: positive for  - none  Respiratory ROS: positive for - shortness of breath  Cardiovascular ROS: positive for - none  Gastrointestinal ROS: no abdominal pain, change in bowel habits, or black or bloody stools  Neurological ROS: positive for - confusion here in the hospital   Lines: peripheral IV, left chest tube        CHEST XRAY:         LAB  Recent Labs     11/10/20  0534 11/09/20 0316 11/08/20  0346 11/07/20  1256   WBC 14.6* 13.4* 9.5  --    HGB 10.5* 11.6* 10.9* 11.3*   HCT 31.1* 34.0* 31.2* 32.0*    330 249  --      Recent Labs     11/10/20  0534 11/09/20 0316 11/08/20  0346 11/07/20  1256   * 137 135* 134*   K 4.2 4.0 3.4* 3.2*   CL 94* 99 97* 97*   CO2 36* 34* 33* 31   * 119* 114* 136*   BUN 4* 5* 6 7   CREA 0.49* 0.47* 0.49* 0.52*   MG  --  2.2 2.2 2.3   ALB 1.6* 1.8* 1.7* 1.8*       Assessment:  (Medical Decision Making)     Hospital Problems  Date Reviewed: 11/3/2020          Codes Class Noted POA    Hypomagnesemia ICD-10-CM: E83.42  ICD-9-CM: 275.2  11/7/2020 Unknown    corrected    MSSA bacteremia ICD-10-CM: R78.81, B95.61  ICD-9-CM: 790.7, 041.11  11/4/2020 Unknown    Ancef per ID - plan at present 4 to 6 weeks    Sinus tachycardia ICD-10-CM: R00.0  ICD-9-CM: 427.89  11/3/2020 Yes    resolved    Leukocytosis ICD-10-CM: G73.660  ICD-9-CM: 288.60  11/3/2020 Yes    Worse today. CXR improved after TPA. cultures with MSSA - on ancel    Hydropneumothorax ICD-10-CM: J94.8  ICD-9-CM: 511.89  11/3/2020 Yes    CT remains - 1.3 liters output after TPA yesterday    Loculated pleural effusion ICD-10-CM: J90  ICD-9-CM: 511.9  11/3/2020 Yes    As above    Lactic acidosis ICD-10-CM: E87.2  ICD-9-CM: 276.2  11/3/2020 Yes    Associated with bacteremia    Hyponatremia ICD-10-CM: E87.1  ICD-9-CM: 276.1  11/3/2020 Yes    corrected    * (Principal) Sepsis ([facility] [address] ICD-10-CM: A41.9  ICD-9-CM: 038.9, 995.91  11/3/2020 Yes    As above          Plan:  (Medical Decision Making)   1. TPA repeated yesterday - 1.3 liters output of CT after this. CXR improved. Leave CT to suction and monitor output  2. He is drowsy today and nurse held Librium last night . Will stop scheduled Librium and can use Haldol if needed. ? ETOH withdrawal with recent confusion. On Thiamine  3. Day 7 ancef. - ID following and plans for 4 to 6 weeks of abx for now. Wound site + for MSSA as well and pleural fluid negative. Repeat blood cultures neg. WBC up more today - monitoring  4. Oxygen up to 4 liters today but sat 99%. Oxygen turned up last night when he was noted to be drowsy - rewean today    Sravani Hall, NP   Lungs: crackles on the left  Heart:  RRR with no Murmur/Rubs/Gallops    Additional Comments:  cxr some better after tpa and increased drainage    I have spoken with and examined the patient. I agree with the above assessment and plan as documented. Michela Gao MD      More than 50% of time documented was spent face-to-face contact with the patient and in the care of the patient on the floor/unit where the patient is located.

## 2020-11-10 NOTE — PROGRESS NOTES
Department of Interventional Radiology  [phone]                                 Progress Note  Patient: Noreen Granda MRN: [id number]  SSN: [id number]    YOB: 1970  Age: 48 y.o. Sex: male      Subjective:   Resting in bed. Review of Systems:    Pertinent items are noted in the History of Present Illness. Objective:     Vitals:    11/10/20 0815 11/10/20 1134 11/10/20 1147 11/10/20 1505   BP: 107/66 125/83  136/86   Pulse: 84 93  (!) 103   Resp:    20   Temp: 98.6 °F (37 °C) 98.1 °F (36.7 °C)  99.1 °F (37.3 °C)   SpO2: 99%  96% 96%   Weight:       Height:            Pain Assessment  Pain Intensity 1: 8 (11/10/20 1443)  Pain Location 1: Chest, Back  Pain Intervention(s) 1: Medication (see MAR)  Patient Stated Pain Goal: 2    Intake and Output:     1900 ml chest tube output 24 hrs.         Physical Exam:   chest tube flushes easily, dressing intact  Lab/Data Review:  cxr improved  Assessment:   Loculated pleural effusion    Plan:   tpa chest tube  Return to suction at 1730  CT scan tomorrow    Signed By: Abran Mallory PA-C     November 10, 2020

## 2020-11-10 NOTE — PROGRESS NOTES
END OF SHIFT NOTE:    INTAKE/OUTPUT  11/08 0701 - 11/09 0700  In: 240 [P.O.:240]  Out: 925 [Urine:925]  Voiding: YES  Catheter: NO  Drain:              Flatus: Patient does have flatus present. Stool:  0 occurrences. Characteristics:  Stool Assessment  Stool Color: Other (Comment)(have not observed)    Emesis: 0 occurrences. Characteristics:        VITAL SIGNS  Patient Vitals for the past 12 hrs:   Temp Pulse Resp BP SpO2   11/09/20 1507 98.6 °F (37 °C) 87 20 106/62 100 %   11/09/20 1140 98.3 °F (36.8 °C) 97 20 120/81 99 %   11/09/20 0828 -- -- -- -- 98 %   11/09/20 0731 97.7 °F (36.5 °C) 85 20 108/67 100 %       Pain Assessment  Pain Intensity 1: 8 (11/09/20 1832)  Pain Location 1: Chest  Pain Intervention(s) 1: Medication (see MAR)  Patient Stated Pain Goal: 2    Ambulating  Yes    Shift report given to oncoming nurse at the bedside.     Marla Parikh RN

## 2020-11-10 NOTE — PROGRESS NOTES
Pt has been very drowsy and hard to arouse all day per report and increasingly so this shift. Belongings searched and loose hydrocodone pills found, unknown how many have been taken. Hospitalist notified and narcan given. Per PHARMD medication taken, placed in bottle and locked at RN station w/ pt information. To be returned at d/c.

## 2020-11-11 ENCOUNTER — APPOINTMENT (OUTPATIENT)
Dept: CT IMAGING | Age: 50
DRG: 871 | End: 2020-11-11
Attending: PHYSICIAN ASSISTANT

## 2020-11-11 PROBLEM — E87.20 LACTIC ACIDOSIS: Status: RESOLVED | Noted: 2020-11-03 | Resolved: 2020-11-11

## 2020-11-11 PROBLEM — R00.0 SINUS TACHYCARDIA: Status: RESOLVED | Noted: 2020-11-03 | Resolved: 2020-11-11

## 2020-11-11 PROBLEM — E83.42 HYPOMAGNESEMIA: Status: RESOLVED | Noted: 2020-11-07 | Resolved: 2020-11-11

## 2020-11-11 PROBLEM — R00.0 SINUS TACHYCARDIA: Status: ACTIVE | Noted: 2020-11-11

## 2020-11-11 PROBLEM — A41.9 SEPSIS (HCC): Status: RESOLVED | Noted: 2020-11-03 | Resolved: 2020-11-11

## 2020-11-11 LAB
ANION GAP SERPL CALC-SCNC: 4 MMOL/L (ref 7–16)
BASOPHILS # BLD: 0 K/UL (ref 0–0.2)
BASOPHILS NFR BLD: 0 % (ref 0–2)
BUN SERPL-MCNC: 7 MG/DL (ref 6–23)
CALCIUM SERPL-MCNC: 7.8 MG/DL (ref 8.3–10.4)
CHLORIDE SERPL-SCNC: 94 MMOL/L (ref 98–107)
CO2 SERPL-SCNC: 36 MMOL/L (ref 21–32)
CREAT SERPL-MCNC: 0.44 MG/DL (ref 0.8–1.5)
DIFFERENTIAL METHOD BLD: ABNORMAL
EOSINOPHIL # BLD: 0.1 K/UL (ref 0–0.8)
EOSINOPHIL NFR BLD: 1 % (ref 0.5–7.8)
ERYTHROCYTE [DISTWIDTH] IN BLOOD BY AUTOMATED COUNT: 15.2 % (ref 11.9–14.6)
GLUCOSE SERPL-MCNC: 125 MG/DL (ref 65–100)
HCT VFR BLD AUTO: 30.3 % (ref 41.1–50.3)
HGB BLD-MCNC: 10.3 G/DL (ref 13.6–17.2)
IMM GRANULOCYTES # BLD AUTO: 0.3 K/UL (ref 0–0.5)
IMM GRANULOCYTES NFR BLD AUTO: 2 % (ref 0–5)
LYMPHOCYTES # BLD: 0.8 K/UL (ref 0.5–4.6)
LYMPHOCYTES NFR BLD: 6 % (ref 13–44)
MCH RBC QN AUTO: 39.2 PG (ref 26.1–32.9)
MCHC RBC AUTO-ENTMCNC: 34 G/DL (ref 31.4–35)
MCV RBC AUTO: 115.2 FL (ref 79.6–97.8)
MONOCYTES # BLD: 0.6 K/UL (ref 0.1–1.3)
MONOCYTES NFR BLD: 4 % (ref 4–12)
NEUTS SEG # BLD: 11.9 K/UL (ref 1.7–8.2)
NEUTS SEG NFR BLD: 87 % (ref 43–78)
NRBC # BLD: 0 K/UL (ref 0–0.2)
PLATELET # BLD AUTO: 457 K/UL (ref 150–450)
PMV BLD AUTO: 10 FL (ref 9.4–12.3)
POTASSIUM SERPL-SCNC: 4.1 MMOL/L (ref 3.5–5.1)
RBC # BLD AUTO: 2.63 M/UL (ref 4.23–5.6)
SODIUM SERPL-SCNC: 134 MMOL/L (ref 136–145)
WBC # BLD AUTO: 13.7 K/UL (ref 4.3–11.1)

## 2020-11-11 PROCEDURE — 71250 CT THORAX DX C-: CPT

## 2020-11-11 PROCEDURE — 36415 COLL VENOUS BLD VENIPUNCTURE: CPT

## 2020-11-11 PROCEDURE — 80048 BASIC METABOLIC PNL TOTAL CA: CPT

## 2020-11-11 PROCEDURE — 74011250637 HC RX REV CODE- 250/637: Performed by: INTERNAL MEDICINE

## 2020-11-11 PROCEDURE — 74011000250 HC RX REV CODE- 250: Performed by: NURSE PRACTITIONER

## 2020-11-11 PROCEDURE — 74011250636 HC RX REV CODE- 250/636: Performed by: NURSE PRACTITIONER

## 2020-11-11 PROCEDURE — 94760 N-INVAS EAR/PLS OXIMETRY 1: CPT

## 2020-11-11 PROCEDURE — 85025 COMPLETE CBC W/AUTO DIFF WBC: CPT

## 2020-11-11 PROCEDURE — 74011250637 HC RX REV CODE- 250/637: Performed by: NURSE PRACTITIONER

## 2020-11-11 PROCEDURE — 99232 SBSQ HOSP IP/OBS MODERATE 35: CPT | Performed by: INTERNAL MEDICINE

## 2020-11-11 PROCEDURE — 77010033678 HC OXYGEN DAILY

## 2020-11-11 PROCEDURE — 36573 INSJ PICC RS&I 5 YR+: CPT | Performed by: INTERNAL MEDICINE

## 2020-11-11 PROCEDURE — 65270000029 HC RM PRIVATE

## 2020-11-11 RX ORDER — LORAZEPAM 0.5 MG/1
0.5 TABLET ORAL
Status: DISCONTINUED | OUTPATIENT
Start: 2020-11-11 | End: 2020-11-12 | Stop reason: HOSPADM

## 2020-11-11 RX ADMIN — OXYCODONE HYDROCHLORIDE AND ACETAMINOPHEN 1 TABLET: 10; 325 TABLET ORAL at 01:12

## 2020-11-11 RX ADMIN — OXYCODONE HYDROCHLORIDE AND ACETAMINOPHEN 1 TABLET: 10; 325 TABLET ORAL at 23:13

## 2020-11-11 RX ADMIN — Medication 10 ML: at 14:34

## 2020-11-11 RX ADMIN — Medication 10 ML: at 21:04

## 2020-11-11 RX ADMIN — MULTIPLE VITAMINS W/ MINERALS TAB 1 TABLET: TAB at 08:43

## 2020-11-11 RX ADMIN — OXYCODONE HYDROCHLORIDE AND ACETAMINOPHEN 1 TABLET: 10; 325 TABLET ORAL at 11:04

## 2020-11-11 RX ADMIN — CEFAZOLIN SODIUM 2 G: 100 INJECTION, POWDER, LYOPHILIZED, FOR SOLUTION INTRAVENOUS at 21:04

## 2020-11-11 RX ADMIN — OXYCODONE HYDROCHLORIDE AND ACETAMINOPHEN 1 TABLET: 10; 325 TABLET ORAL at 14:30

## 2020-11-11 RX ADMIN — CEFAZOLIN SODIUM 2 G: 100 INJECTION, POWDER, LYOPHILIZED, FOR SOLUTION INTRAVENOUS at 04:37

## 2020-11-11 RX ADMIN — Medication 10 ML: at 06:00

## 2020-11-11 RX ADMIN — Medication 100 MG: at 08:43

## 2020-11-11 RX ADMIN — FOLIC ACID 1 MG: 1 TABLET ORAL at 08:43

## 2020-11-11 RX ADMIN — OXYCODONE HYDROCHLORIDE AND ACETAMINOPHEN 1 TABLET: 10; 325 TABLET ORAL at 18:19

## 2020-11-11 RX ADMIN — POLYETHYLENE GLYCOL 3350 17 G: 17 POWDER, FOR SOLUTION ORAL at 08:43

## 2020-11-11 RX ADMIN — CEFAZOLIN SODIUM 2 G: 100 INJECTION, POWDER, LYOPHILIZED, FOR SOLUTION INTRAVENOUS at 11:07

## 2020-11-11 NOTE — PROGRESS NOTES
Comprehensive Nutrition Assessment    Type and Reason for Visit: Initial, RD nutrition re-screen/LOS    Nutrition Recommendations/Plan:   Continue current diet  Start Ensure Enlive TID    Nutrition Assessment:  Patient admitted after fall from attic found to have traumatic PTX and rib fractures. He was discharged 11/2. he presented again 11/3 with progressive pain and shortness of breath. CXR found new pleural effusion. Blood cultures from previous chest tube growing MSSA. PMH only significant for GERD. Per ID note, likely inpatient while on IV abx. Chest tube pulled in IR and pt on NC. Met with patient. He is alert but seems slightly confused. Observed lunch tray with ~50% of each item consumed. He states that is about all he is eating for each meal. He reports a decreased appetite for at least 1 month due to depression. He states that he only occasionally eats a full meal at home, and mostly just snacks. He denies any associated weight loss. He is agreeable to Ensure while inpatient to help meet needs. Estimated Daily Nutrient Needs:  Energy (kcal): 4721-2480(62-32 kcal/kg (83.9 kg))   Protein (g): (20% kcal); Current Nutrition Therapies:  DIET REGULAR    Anthropometric Measures:  · Height:  5' 10\" (177.8 cm)  · Current Body Wt:  85.9 kg (189 lb 6 oz)(unknonw weight source)   Body mass index is 26.54 kg/m². · BMI Category: Overweight (BMI 25.0-29. 9)   · Limited weight history. Current weight appears copied from previous admission last month, but unknown weight source. Last weight prior to previous admission was ~200# in 2018.     Nutrition Diagnosis:   · Inadequate oral intake related to (poor appetite) as evidenced by (patient reported barrier to PO, intake as above)    Nutrition Interventions:   Food and/or Nutrient Delivery: Continue current diet, Start oral nutrition supplement  Coordination of Nutrition Care: (Discussed with OSMANY Castaneda)    Goals:  Meet at least 75% nutrition needs within 7 days       Nutrition Monitoring and Evaluation:   Food/Nutrient Intake Outcomes: Food and nutrient intake    Discharge Planning:     Too soon to determine     [address] [place], LD on 11/11/2020 at 4:05 PM  Contact: [phone]

## 2020-11-11 NOTE — PROGRESS NOTES
Progress Note    2020  Admit Date: 11/3/2020 11:31 AM   NAME: Doc Fields   :  1970   MRN:  [id number]   Attending: Francesca Figueroa MD  PCP:  None  Treatment Team: Attending Provider: Francesca Figueroa MD; Consulting Provider: Desmond Garcia MD; Care Manager: Sundar Nguyen; Utilization Review: Bandar Escobar RN; Care Manager: Chris Sanchez; Consulting Provider: Benny Hart; Consulting Provider: Woody Crystal MD; Hospitalist: Ana Maria Olson NP; Utilization Review: Mayito Rosado RN; Nurse Practitioner: Kacey Chen NP    Full Code   SUBJECTIVE:   Mr. Faina Whitfield is a 49 yo male with PMH of ETOH abuse, GERD, smoker, who presented 10/30 for traumatic pneumothorax and rib fx after falling from the attic. Chest tube placed in ED. After accidental removal of chest tube patient was DC . He returned 11/3 with c/o pain. Pt was admitted and Surgery consulted, deemed no surgical intervention current needed. Chest CT showed loculated hydropneumothorax where a superimposed infection could not be excluded. BC/wound cx +MSSA. Started on cefepime, augmentin and Vanc. ID consulted and pt currently on ancef.  chest tube placed by IR. Pt has received TPA per IR for chest tube drainage. Pulmonary following. Plans to continue abx for at least 2 weeks IV then oral/IV combo for total 4-6 weeks. Chest tube drained 600 ml since yesterday. CT chest this morning showed decrease in left pleural effusion. C/o SOB this morning. Denies CP.         Abx  · Cefazolin -  · Vanc 11/3-  · Augmentin 11/3-  · CTX   · Cefepime 11/    Past Medical History:   Diagnosis Date    GERD (gastroesophageal reflux disease)     once a week OTC med     Recent Results (from the past 24 hour(s))   METABOLIC PANEL, BASIC    Collection Time: 20  3:02 AM   Result Value Ref Range    Sodium 134 (L) 136 - 145 mmol/L    Potassium 4.1 3.5 - 5.1 mmol/L    Chloride 94 (L) 98 - 107 mmol/L    CO2 36 (H) 21 - 32 mmol/L    Anion gap 4 (L) 7 - 16 mmol/L    Glucose 125 (H) 65 - 100 mg/dL    BUN 7 6 - 23 MG/DL    Creatinine 0.44 (L) 0.8 - 1.5 MG/DL    GFR est AA >60 >60 ml/min/1.73m2    GFR est non-AA >60 >60 ml/min/1.73m2    Calcium 7.8 (L) 8.3 - 10.4 MG/DL   CBC WITH AUTOMATED DIFF    Collection Time: 11/11/20  3:02 AM   Result Value Ref Range    WBC 13.7 (H) 4.3 - 11.1 K/uL    RBC 2.63 (L) 4.23 - 5.6 M/uL    HGB 10.3 (L) 13.6 - 17.2 g/dL    HCT 30.3 (L) 41.1 - 50.3 %    .2 (H) 79.6 - 97.8 FL    MCH 39.2 (H) 26.1 - 32.9 PG    MCHC 34.0 31.4 - 35.0 g/dL    RDW 15.2 (H) 11.9 - 14.6 %    PLATELET 083 (H) 245 - 450 K/uL    MPV 10.0 9.4 - 12.3 FL    ABSOLUTE NRBC 0.00 0.0 - 0.2 K/uL    DF AUTOMATED      NEUTROPHILS 87 (H) 43 - 78 %    LYMPHOCYTES 6 (L) 13 - 44 %    MONOCYTES 4 4.0 - 12.0 %    EOSINOPHILS 1 0.5 - 7.8 %    BASOPHILS 0 0.0 - 2.0 %    IMMATURE GRANULOCYTES 2 0.0 - 5.0 %    ABS. NEUTROPHILS 11.9 (H) 1.7 - 8.2 K/UL    ABS. LYMPHOCYTES 0.8 0.5 - 4.6 K/UL    ABS. MONOCYTES 0.6 0.1 - 1.3 K/UL    ABS. EOSINOPHILS 0.1 0.0 - 0.8 K/UL    ABS. BASOPHILS 0.0 0.0 - 0.2 K/UL    ABS. IMM.  GRANS. 0.3 0.0 - 0.5 K/UL     No Known Allergies  Current Facility-Administered Medications   Medication Dose Route Frequency Provider Last Rate Last Dose    LORazepam (ATIVAN) tablet 0.5 mg  0.5 mg Oral Q12H PRN Alveria Sole, NP        calcium carbonate (TUMS) chewable tablet 200 mg [elemental]  200 mg Oral TID PRN Andressa Cabello MD        polyethylene glycol (MIRALAX) packet 17 g  17 g Oral DAILY Issa Ramirez MD   17 g at 11/11/20 0843    sodium chloride (NS) flush 5-40 mL  5-40 mL IntraVENous PRN Munira Johansen, NP        folic acid (FOLVITE) tablet 1 mg  1 mg Oral DAILY Munira Fragmin, NP   1 mg at 11/11/20 0716    thiamine HCL (B-1) tablet 100 mg  100 mg Oral DAILY Munira Fragmin, NP   100 mg at 11/11/20 0843    multivitamin, tx-iron-ca-min (THERA-M w/ IRON) tablet 1 Tab  1 Tab Oral DAILY Munira Fragmin, NP 1 Tab at 20 0843    nicotine (NICODERM CQ) 21 mg/24 hr patch 1 Patch  1 Patch TransDERmal Q24H Jose Luis Cordova, NP   1 Patch at 20 1106    ceFAZolin (ANCEF) 2 g/20 mL in sterile water IV syringe  2 g IntraVENous Q8H Gage To, JENNA   2 g at 20 1107    oxyCODONE-acetaminophen (PERCOCET 10)  mg per tablet 1 Tab  1 Tab Oral Q4H PRN Aileen Freitas MD   1 Tab at 20 1104    naloxone (NARCAN) injection 0.4 mg  0.4 mg IntraVENous PRN Aileen Freitas MD   0.4 mg at 11/10/20 0121    sodium chloride (NS) flush 5-40 mL  5-40 mL IntraVENous Q8H Aileen Freitas MD   10 mL at 20 0600    sodium chloride (NS) flush 5-40 mL  5-40 mL IntraVENous PRN Aileen Freitas MD        polyethylene glycol [facility]) packet 17 g  17 g Oral DAILY PRN Aileen Freitas MD        ondansetron (ZOFRAN ODT) tablet 4 mg  4 mg Oral Q8H PRN Aileen Freitas MD        Or    ondansetron [facility]) injection 4 mg  4 mg IntraVENous Q6H PRN Aileen Freitas MD   4 mg at 20 1554       Review of Systems negative with exception of pertinent positives noted above  PHYSICAL EXAM     Visit Vitals  /81   Pulse 96   Temp 98.4 °F (36.9 °C)   Resp 17   Ht 5' 10\" (1.778 m)   Wt 83.9 kg (185 lb)   SpO2 99%   BMI 26.54 kg/m²      Temp (24hrs), Av.3 °F (36.8 °C), Min:97.6 °F (36.4 °C), Max:99.1 °F (37.3 °C)    Oxygen Therapy  O2 Sat (%): 99 % (20 1122)  Pulse via Oximetry: 82 beats per minute (20 0828)  O2 Device: Nasal cannula (11/10/20 1930)  O2 Flow Rate (L/min): 3 l/min (11/10/20 1930)  FIO2 (%): 28 % (20 0828)  ETCO2 (mmHg): 34 mmHg (20 1534)    Intake/Output Summary (Last 24 hours) at 2020 1354  Last data filed at 2020 4885  Gross per 24 hour   Intake 240 ml   Output 1805 ml   Net -1565 ml      General: Appears acutely ill, pale  Lungs: Decreased bases bilaterally. Chest tube in place, draining serosanguineous.   On 3 L NC O2    Heart:  S1S2 present without murmurs rubs gallops. RRR. 1+ bilateral LE edema   Abdomen: Soft, non tender, non distended. BS present  Extremities: Moves ext spontaneously. No cyanosis  Neurologic:  A/O X3.  No focal deficits     Results summary of Diagnostic Studies/Procedures copied from within [facility] EMR:        Dedrick Chu 96 Problems    Diagnosis Date Noted    Sinus tachycardia 11/11/2020    MSSA bacteremia 11/04/2020    Leukocytosis 11/03/2020    Hydropneumothorax 11/03/2020    Loculated pleural effusion 11/03/2020    Hyponatremia 11/03/2020     Plan:    Sepsis secondary to MSSA bacteremia lung as source  ID following on ancef, will need 2 weeks IV then IV/po combo  BC MSSA, repeat 11/5 NGTD  Wound cx with MSSA  Pulmonary following    Leeft sided loculated hydropneumothorax secondary to trauma  Chest tube in place  IR gave TPA via tube X2 doses  Pulmonary following  CT chest today showed improved effusion, plans to pull CT per IR    Alcoholism  Continue prn ativan/haldol      Notes, labs, VS, diagnostic testing reviewed    DVT Prophylaxis: SCD  Plan of Care Discussed with: Supervising MD Dr. Coni Boxer, care team, pt      Evans Dior, JENNA

## 2020-11-11 NOTE — PROGRESS NOTES
CM continues to follow for discharge planning and/or CM needs. None noted at this time. Will continue to monitor and update as needed.

## 2020-11-11 NOTE — PROGRESS NOTES
Infectious Disease Progress Note    Today's Date: 2020   Admit Date: 11/3/2020    Impression:   · MSSA bacteremia (11/3 - );  [address] NGTD;  TTE negative  · Left pleural empyema s/p chest tube, 20; Cx from pleural drainage, : MSSA  · ETOH withdrawal on librium taper    Plan:   · Continue cefazolin for MSSA. At least 2 weeks of that IV then can do orals/IV combo thereafter for 4-6 weeks total duration   · Dispo likely inpatient while on IV     Anti-infectives:   · Cefazolin -  · Vanc 11/3-  · Augmentin 11/3-  · CTX   · Cefepime 11/3    Subjective: Interval history: afebrile, oriented. Sounded like a anxiety induced dyspnea event this AM. + constipation. No rashes   No Known Allergies     Review of Systems:  A comprehensive review of systems was negative except for that written in the History of Present Illness. Objective:     Visit Vitals  /66   Pulse 94   Temp 97.6 °F (36.4 °C)   Resp 18   Ht 5' 10\" (1.778 m)   Wt 83.9 kg (185 lb)   SpO2 99%   BMI 26.54 kg/m²     Temp (24hrs), Av.3 °F (36.8 °C), Min:97.6 °F (36.4 °C), Max:99.1 °F (37.3 °C)       Lines:  Peripheral IV:   Left arm    Physical Exam:    General:  In no acute distress; Ox3   Eyes:  Sclera anicteric   Mouth/Throat: oral pharynx clear   Neck: Supple   Lungs:   Diminished breath sounds on left; left chest tube in place;   CV:  Regular rate and rhythm,no murmur   Abdomen:   Mildly distended; active bowel sounds;nontender    Extremities: No cyanosis or edema   Skin: No rash noted   Lymph nodes:    Musculoskeletal: No swelling or deformity   Lines/Devices:  Intact, no erythema, drainage or tenderness   Psych: Alert and responsive       Data Review:     CBC:  Recent Labs     20  0302 11/10/20  0534 20  0316   WBC 13.7* 14.6* 13.4*   GRANS 87* 80* 76   MONOS 4 7 7   EOS 1 1 1   ANEU 11.9* 11.7* 10.2*   ABL 0.8 1.2 1.4   HGB 10.3* 10.5* 11.6*   HCT 30.3* 31.1* 34.0*   * 430 330 BMP:  Recent Labs     11/11/20  0302 11/10/20  0534 11/09/20  0316   CREA 0.44* 0.49* 0.47*   BUN 7 4* 5*   * 136* 137   K 4.1 4.2 4.0   CL 94* 94* 99   CO2 36* 36* 34*   AGAP 4* 6* 4*   * 105* 119*       LFTS:  Recent Labs     11/10/20  0534 11/09/20  0316   TBILI 1.1 1.1   ALT 39 60   * 343*   TP 5.8* 6.2*   ALB 1.6* 1.8*       Microbiology:     All Micro Results     Procedure Component Value Units Date/Time    CULTURE, BLOOD [[id number]] Collected:  11/05/20 1159    Order Status:  Completed Specimen:  Blood Updated:  11/10/20 0826     Special Requests: --        RIGHT  HAND       Culture result: NO GROWTH 5 DAYS       CULTURE, ANAEROBIC [[id number]] Collected:  11/06/20 1737    Order Status:  Completed Specimen:  Wound Drainage Updated:  11/09/20 0925     Special Requests: NO SPECIAL REQUESTS        Culture result:       SUBCULTURE IS NECESSARY TO DETERMINE PRESENCE OR ABSENCE OF ANAEROBIC BACTERIA IN THIS CULTURE. FURTHER REPORT TO FOLLOW AFTER INCUBATION OF SUBCULTURE. CULTURE, Jodie Miek STAIN [[id number]]  (Abnormal)  (Susceptibility) Collected:  11/06/20 1737    Order Status:  Completed Specimen:  Wound from Drainage Updated:  11/09/20 0744     Special Requests: NO SPECIAL REQUESTS        GRAM STAIN 0 TO 3 WBC'S SEEN PER OIF      NO DEFINITE ORGANISM SEEN        Culture result:       LIGHT STAPHYLOCOCCUS AUREUS          CULTURE, BODY FLUID W Annie Hdez [[id number]] Collected:  11/04/20 1525    Order Status:  Completed Specimen:   Body Fluid from Pleural Fluid Updated:  11/07/20 0907     Special Requests: NO SPECIAL REQUESTS        GRAM STAIN NO WBC'S SEEN         NO DEFINITE ORGANISM SEEN        Culture result: NO GROWTH 2 DAYS       CULTURE, BLOOD [[id number]]  (Abnormal) Collected:  11/04/20 0136    Order Status:  Completed Specimen:  Blood Updated:  11/07/20 0733     Special Requests: --        RIGHT  ARM       GRAM STAIN GRAM POSITIVE COCCI         AEROBIC BOTTLE POSITIVE RESULTS VERIFIED, PHONED TO AND READ BACK BY [facility] AT 2055 11.5.2020 EOR           Culture result: STAPHYLOCOCCUS AUREUS         REFER TO Stretch Breeding ACCESSION [id number]            For Susceptibility Refer to Culture  Accession [id number]      CULTURE, BLOOD [[id number]]  (Abnormal) Collected:  11/03/20 1205    Order Status:  Completed Specimen:  Blood Updated:  11/07/20 0731     Special Requests: --        RIGHT  HAND       GRAM STAIN       GRAM POS COCCI IN CLUSTERS            AEROBIC BOTTLE POSITIVE               CRITICAL RESULT NOT CALLED DUE TO PREVIOUS NOTIFICATION OF CRITICAL RESULT WITHIN THE LAST 24 HOURS. Culture result: STAPHYLOCOCCUS AUREUS         REFER TO ACCESSION [id number] FOR SUSCEPTIBILITY    CULTURE, BLOOD [[id number]]  (Abnormal)  (Susceptibility) Collected:  11/03/20 1145    Order Status:  Completed Specimen:  Blood Updated:  11/07/20 0730     Special Requests: --        LEFT  Antecubital       GRAM STAIN GRAM POSITIVE COCCI               AEROBIC AND ANAEROBIC BOTTLES                  RESULTS VERIFIED, PHONED TO AND READ BACK BY OSMANY MCDONOUGH @ 0030 11/4/20 MM           Culture result: STAPHYLOCOCCUS AUREUS         REFER TO Piehole PANEL ACCESSION [id number]    BLOOD CULTURE ID PANEL [[id number]]  (Abnormal) Collected:  11/04/20 0136    Order Status:  Completed Specimen:  Blood Updated:  11/06/20 0823     Acc. no. from Micro Order [id number]     Staphylococcus Detected        Staphylococcus aureus Detected        Comment: CRITICAL RESULT NOT CALLED DUE TO PREVIOUS NOTIFICATION OF CRITICAL RESULT WITHIN THE LAST 24 HOURS. mecA (Methicillin-Resistance Genes) NOT DETECTED        INTERPRETATION       Gram positive cocci in clusters, identified in realtime PCR as probable MSSA. Comment: Recommend discontinuing IV vancomycin starting cefazolin or nafcillin if patient not on beta-lactam therapy. Infectious Diseases Consult recommended in adult patients.   THIS TEST DOES NOT REPLACE SENSITIVITY TESTING. CULTURE, BLOOD [[id number]]     Order Status:  Canceled Specimen:  Blood     CULTURE, BLOOD [[id number]]     Order Status:  Canceled Specimen:  Blood     CULTURE, BLOOD [[id number]]     Order Status:  Canceled Specimen:  Blood     CULTURE, BLOOD [[id number]]     Order Status:  Canceled Specimen:  Blood     BLOOD CULTURE ID PANEL [[id number]]  (Abnormal) Collected:  20 1145    Order Status:  Completed Specimen:  Blood Updated:  20     Acc. no. from Micro Order [id number]     Staphylococcus Detected        Staphylococcus aureus Detected        Comment: RESULTS VERIFIED, PHONED TO AND READ BACK BY  Amilcar tejada RN @ 0060 ON 2020 AK. mecA (Methicillin-Resistance Genes) NOT DETECTED        INTERPRETATION       Gram positive cocci in clusters, identified in realtime PCR as probable MSSA. Comment: Recommend discontinuing IV vancomycin starting cefazolin or nafcillin if patient not on beta-lactam therapy. Infectious Diseases Consult recommended in adult patients. THIS TEST DOES NOT REPLACE SENSITIVITY TESTING. Imagin/8/20 CXR: IMPRESSION: Left-sided pigtail catheter again seen with no clear pneumothorax  noted but there is been an increase in the loculated fluid along the left chest  wall.     Signed By: Irma Gamino NP     2020

## 2020-11-11 NOTE — PROGRESS NOTES
Spiritual care visit with patient and his mother. Listened as patient spoke of his illness. Prayed for him to be healed. Halima Ren.

## 2020-11-11 NOTE — PROGRESS NOTES
END OF SHIFT NOTE:    INTAKE/OUTPUT  11/09 0701 - 11/10 0700  In: 1000 [P.O.:1000]  Out: 3200 [Urine:1300]  Voiding: YES  Catheter: NO  Drain:              Flatus: Patient does have flatus present. Stool:  1 occurrences. Characteristics:      Emesis: 0 occurrences. Characteristics:        VITAL SIGNS  Patient Vitals for the past 12 hrs:   Temp Pulse Resp BP SpO2   11/10/20 1505 99.1 °F (37.3 °C) (!) 103 20 136/86 96 %   11/10/20 1147 -- -- -- -- 96 %   11/10/20 1134 98.1 °F (36.7 °C) 93 -- 125/83 --   11/10/20 0815 98.6 °F (37 °C) 84 -- 107/66 99 %       Pain Assessment  Pain Intensity 1: 0 (11/10/20 1615)  Pain Location 1: Chest, Back  Pain Intervention(s) 1: Medication (see MAR), Family Support, Position, Rest  Patient Stated Pain Goal: 2    Ambulating  Yes with assistance    Shift report given to oncoming nurse at the bedside.     Olinda Melchor RN

## 2020-11-11 NOTE — PROGRESS NOTES
Boubacar Philip  Admission Date: 11/3/2020             Daily Progress Note: 11/11/2020    The patient's chart is reviewed and the patient is discussed with the staff. Patient is a 50 y.o.  male with a PMHx of ETOH abuse, GERD, and smoker. He who was initially admitted per the surgical service on 10/30 for traumatic PTX and rib fractures after falling from the attic while attempting to do some home repairs.  CT was placed in the ER.  Unfortunately, that weekend the CT was accidentally removed by the patient. He was discharged home on 11/2. Berny Montesinos returned to the ER on 11/3/20 with c/o pain. Patti Tabor was deemed by surgery at that time that no surgical intervention nor chest tube was necessary and that hemothorax should resolve spontaneously.  The pt was admitted per the hospitalist for further monitoring.   11/3- Chest CT showed loculated hydropneumothorax where a superimposed infection could not be excluded.  Blood cultures were + for MSSA as was the wound culture.  Berny Montesinos was started on cefepime, Augmentin, and vanc during that time. ID has since been consulted and patient is being treated with Ancef. On 11/4--CT was placed per IR. The chest tube remains and TPA has been instilled to help with drainage. 11/6 PM--he became confused and unhooked his CT from suction and walked around in hallway. Berny Montesinos was been treated with prn Haldol and scheduled Librium. 11/7--AM nurse noted pt with increase cough and SOB, CT was found partially clamped, reported 0 output overnight from CT.  When unclamped, CT with immediate sanguinous return of 900 ml. CXR 11/8 showed increase in opacification on the left - TPA repeated with improved output/CXR. Subjective:     IR pulled chest tube.  On 3 lpm   Remains on IV ancef      Review of Systems -   Respiratory ROS: positive for - shortness of breath  Cardiovascular ROS: positive for - none  Gastrointestinal ROS: positive for- none      Current Facility-Administered Medications   Medication Dose Route Frequency    calcium carbonate (TUMS) chewable tablet 200 mg [elemental]  200 mg Oral TID PRN    polyethylene glycol (MIRALAX) packet 17 g  17 g Oral DAILY    LORazepam (ATIVAN) injection 1 mg  1 mg IntraVENous Q4H PRN    sodium chloride (NS) flush 5-40 mL  5-40 mL IntraVENous PRN    LORazepam (ATIVAN) tablet 1 mg  1 mg Oral A7K PRN    folic acid (FOLVITE) tablet 1 mg  1 mg Oral DAILY    thiamine HCL (B-1) tablet 100 mg  100 mg Oral DAILY    multivitamin, tx-iron-ca-min (THERA-M w/ IRON) tablet 1 Tab  1 Tab Oral DAILY    nicotine (NICODERM CQ) 21 mg/24 hr patch 1 Patch  1 Patch TransDERmal Q24H    ceFAZolin (ANCEF) 2 g/20 mL in sterile water IV syringe  2 g IntraVENous Q8H    midazolam (VERSED) injection 0.5-2 mg  0.5-2 mg IntraVENous Multiple    oxyCODONE-acetaminophen (PERCOCET 10)  mg per tablet 1 Tab  1 Tab Oral Q4H PRN    naloxone (NARCAN) injection 0.4 mg  0.4 mg IntraVENous PRN    HYDROmorphone (PF) (DILAUDID) injection 0.5 mg  0.5 mg IntraVENous Q3H PRN    sodium chloride (NS) flush 5-40 mL  5-40 mL IntraVENous Q8H    sodium chloride (NS) flush 5-40 mL  5-40 mL IntraVENous PRN    polyethylene glycol (MIRALAX) packet 17 g  17 g Oral DAILY PRN    ondansetron (ZOFRAN ODT) tablet 4 mg  4 mg Oral Q8H PRN    Or    ondansetron (ZOFRAN) injection 4 mg  4 mg IntraVENous Q6H PRN         Objective:     Vitals:    11/11/20 0101 11/11/20 0404 11/11/20 0713 11/11/20 1122   BP: (!) 142/87 115/79 102/66 130/81   Pulse: (!) 120 (!) 105 94 96   Resp: 24 20 18 17   Temp: 98 °F (36.7 °C) 98.8 °F (37.1 °C) 97.6 °F (36.4 °C) 98.4 °F (36.9 °C)   SpO2: 90% 97% 99% 99%   Weight:       Height:         Intake and Output:   11/09 1901 - 11/11 0700  In: 7166 [P.O.:1240]  Out: 8134 [Urine:1630]  11/11 0701 - 11/11 1900  In: -   Out: 780 [Urine:750]    Physical Exam:   Constitutional:  the patient is well developed and in no acute distress  HEENT:  Sclera clear, pupils equal, oral mucosa moist  Lungs: decreased bases on 3 lpm  Cardiovascular:  RRR without M,G,R  Abd/GI: soft and non-tender; with positive bowel sounds. Ext: warm without cyanosis. There is no lower leg edema. Musculoskeletal: moves all four extremities with equal strength  Skin:  no jaundice or rashes, no wounds   Neuro: alert, oriented, eating      Lines: peripheral IV, left chest tube        CHEST CT          LAB  Recent Labs     11/11/20  0302 11/10/20  0534 11/09/20  0316   WBC 13.7* 14.6* 13.4*   HGB 10.3* 10.5* 11.6*   HCT 30.3* 31.1* 34.0*   * 430 330     Recent Labs     11/11/20  0302 11/10/20  0534 11/09/20  0316   * 136* 137   K 4.1 4.2 4.0   CL 94* 94* 99   CO2 36* 36* 34*   * 105* 119*   BUN 7 4* 5*   CREA 0.44* 0.49* 0.47*   MG  --   --  2.2   ALB  --  1.6* 1.8*       Echo    SUMMARY:     -  Left ventricle: Systolic function was normal. Ejection fraction was  estimated in the range of 55 % to 60 %. There were no regional wall motion  abnormalities.     -  Aortic valve: Although there was no diagnostic evidence for vegetation,   this  study is not adequate to completely exclude the possibility.     -  Mitral valve: Although there was no diagnostic evidence for vegetation,   this  study is not adequate to completely exclude the possibility.     -  Tricuspid valve: Although there was no diagnostic evidence for vegetation,  this study is not adequate to completely exclude the possibility.         Assessment:  (Medical Decision Making)     Hospital Problems  Date Reviewed: 11/3/2020          Codes Class Noted POA    Sinus tachycardia ICD-10-CM: R00.0  ICD-9-CM: 427.89  11/11/2020 Yes        MSSA bacteremia ICD-10-CM: R78.81, B95.61  ICD-9-CM: 790.7, 041.11  11/4/2020 Yes        Leukocytosis ICD-10-CM: D72.829  ICD-9-CM: 288.60  11/3/2020 Yes        Hydropneumothorax ICD-10-CM: J94.8  ICD-9-CM: 511.89  11/3/2020 Yes        Loculated pleural effusion ICD-10-CM: J90  ICD-9-CM: 511.9  11/3/2020 Yes        Hyponatremia ICD-10-CM: E87.1  ICD-9-CM: 276.1  11/3/2020 Yes                   Here with hydropneumothorax and bacteremia. CHest tube inserted, drained and removed. Intermittent confusion and concern for ETOH withdrawals vs over medication here. Seen by ID and will need extended IV abx coarse 4-6 weeks. TTE completed. Plan:  (Medical Decision Making)   --chest tube is now out  -- stop IV pain medications, taking percocet  -- oral ativan PRN for possible ETOH withdrawals  -- Day 8/14 ancef every 8 hours. - ID following and plans for at least 2 weeks of that IV then can do orals/IV combo thereafter for 4-6 weeks total duration. 4 to 6 weeks of abx. Wound site and blood + for MSSA. Pleural fluid negative. Repeat blood cultures neg.   -- SW following, ? Home after 2 weeks IV and infusion at cancer center thereafter with oral combo  -- On  3 lpm O2, assess O2 needs on RA  --watch heart rate    Booker Vazquez, NP   Lungs:  clear  Heart:  RRR with no Murmur/Rubs/Gallops    Additional Comments:  Chest tube is out, check ra sat and d/c O2 if ok, ? Home soon  But needs 4-6 weeks of iv antibx  I have spoken with and examined the patient. I agree with the above assessment and plan as documented.     Lenore Cormier MD

## 2020-11-11 NOTE — PROGRESS NOTES
11/11/20 1520 11/11/20 1526   Oxygen Therapy   O2 Sat (%) (!) 85 % 91 %   O2 Device Room air  (placed on 1L NC) Nasal cannula   O2 Flow Rate (L/min)  --  1 l/min

## 2020-11-11 NOTE — PROGRESS NOTES
Department of Interventional Radiology  [phone]                                 Progress Note  Patient: Reji Aaron MRN: [id number]  SSN: [id number]    YOB: 1970  Age: 48 y.o. Sex: male      Subjective:   No new complaints  Review of Systems:    Pertinent items are noted in the History of Present Illness. Objective:     Vitals:    11/11/20 0101 11/11/20 0404 11/11/20 0713 11/11/20 1122   BP: (!) 142/87 115/79 102/66 130/81   Pulse: (!) 120 (!) 105 94 96   Resp: 24 20 18 17   Temp: 98 °F (36.7 °C) 98.8 °F (37.1 °C) 97.6 °F (36.4 °C) 98.4 °F (36.9 °C)   SpO2: 90% 97% 99% 99%   Weight:       Height:            Pain Assessment  Pain Intensity 1: 8 (11/11/20 1104)  Pain Location 1: Abdomen  Pain Intervention(s) 1: Medication (see MAR)  Patient Stated Pain Goal: 2    Intake and Output:             Physical Exam:   chest tube : 600 ml since yesterday. Lab/Data Review:  CT scan, small loculated posterior fluid collection persists; otherwise, much improved  Assessment:   Loculated pleural effusion, nearly resolved. Remaining fluid collection is loculated and not in proximity to chest tube    Plan:   Chest tube removed.     Signed By: Rancho Cantu PA-C     November 11, 2020

## 2020-11-12 VITALS
TEMPERATURE: 99 F | HEART RATE: 94 BPM | OXYGEN SATURATION: 90 % | SYSTOLIC BLOOD PRESSURE: 119 MMHG | WEIGHT: 206.57 LBS | BODY MASS INDEX: 29.57 KG/M2 | HEIGHT: 70 IN | RESPIRATION RATE: 19 BRPM | DIASTOLIC BLOOD PRESSURE: 80 MMHG

## 2020-11-12 LAB
ANION GAP SERPL CALC-SCNC: 3 MMOL/L (ref 7–16)
BASOPHILS # BLD: 0 K/UL (ref 0–0.2)
BASOPHILS NFR BLD: 1 % (ref 0–2)
BUN SERPL-MCNC: 6 MG/DL (ref 6–23)
CALCIUM SERPL-MCNC: 8.1 MG/DL (ref 8.3–10.4)
CHLORIDE SERPL-SCNC: 94 MMOL/L (ref 98–107)
CO2 SERPL-SCNC: 39 MMOL/L (ref 21–32)
CREAT SERPL-MCNC: 0.56 MG/DL (ref 0.8–1.5)
DIFFERENTIAL METHOD BLD: ABNORMAL
EOSINOPHIL # BLD: 0.2 K/UL (ref 0–0.8)
EOSINOPHIL NFR BLD: 2 % (ref 0.5–7.8)
ERYTHROCYTE [DISTWIDTH] IN BLOOD BY AUTOMATED COUNT: 15.5 % (ref 11.9–14.6)
GLUCOSE SERPL-MCNC: 93 MG/DL (ref 65–100)
HCT VFR BLD AUTO: 31.6 % (ref 41.1–50.3)
HGB BLD-MCNC: 10.8 G/DL (ref 13.6–17.2)
IMM GRANULOCYTES # BLD AUTO: 0.1 K/UL (ref 0–0.5)
IMM GRANULOCYTES NFR BLD AUTO: 2 % (ref 0–5)
LYMPHOCYTES # BLD: 0.8 K/UL (ref 0.5–4.6)
LYMPHOCYTES NFR BLD: 11 % (ref 13–44)
MCH RBC QN AUTO: 39.3 PG (ref 26.1–32.9)
MCHC RBC AUTO-ENTMCNC: 34.2 G/DL (ref 31.4–35)
MCV RBC AUTO: 114.9 FL (ref 79.6–97.8)
MONOCYTES # BLD: 0.3 K/UL (ref 0.1–1.3)
MONOCYTES NFR BLD: 5 % (ref 4–12)
NEUTS SEG # BLD: 5.8 K/UL (ref 1.7–8.2)
NEUTS SEG NFR BLD: 80 % (ref 43–78)
NRBC # BLD: 0 K/UL (ref 0–0.2)
PLATELET # BLD AUTO: 449 K/UL (ref 150–450)
PMV BLD AUTO: 10.2 FL (ref 9.4–12.3)
POTASSIUM SERPL-SCNC: 3.7 MMOL/L (ref 3.5–5.1)
RBC # BLD AUTO: 2.75 M/UL (ref 4.23–5.6)
SODIUM SERPL-SCNC: 136 MMOL/L (ref 138–145)
WBC # BLD AUTO: 7.3 K/UL (ref 4.3–11.1)

## 2020-11-12 PROCEDURE — 74011250636 HC RX REV CODE- 250/636: Performed by: NURSE PRACTITIONER

## 2020-11-12 PROCEDURE — 74011250637 HC RX REV CODE- 250/637: Performed by: NURSE PRACTITIONER

## 2020-11-12 PROCEDURE — C1751 CATH, INF, PER/CENT/MIDLINE: HCPCS

## 2020-11-12 PROCEDURE — 36415 COLL VENOUS BLD VENIPUNCTURE: CPT

## 2020-11-12 PROCEDURE — 99232 SBSQ HOSP IP/OBS MODERATE 35: CPT | Performed by: INTERNAL MEDICINE

## 2020-11-12 PROCEDURE — 85025 COMPLETE CBC W/AUTO DIFF WBC: CPT

## 2020-11-12 PROCEDURE — 74011250637 HC RX REV CODE- 250/637: Performed by: INTERNAL MEDICINE

## 2020-11-12 PROCEDURE — 74011000250 HC RX REV CODE- 250: Performed by: NURSE PRACTITIONER

## 2020-11-12 PROCEDURE — 80048 BASIC METABOLIC PNL TOTAL CA: CPT

## 2020-11-12 RX ORDER — OXYCODONE AND ACETAMINOPHEN 5; 325 MG/1; MG/1
1 TABLET ORAL
Status: DISCONTINUED | OUTPATIENT
Start: 2020-11-12 | End: 2020-11-12 | Stop reason: HOSPADM

## 2020-11-12 RX ORDER — NALOXONE HYDROCHLORIDE 4 MG/.1ML
SPRAY NASAL
Qty: 1 EACH | Refills: 1 | Status: SHIPPED | OUTPATIENT
Start: 2020-11-12 | End: 2020-11-13 | Stop reason: SDUPTHER

## 2020-11-12 RX ORDER — ACETAMINOPHEN 325 MG/1
650 TABLET ORAL
Status: DISCONTINUED | OUTPATIENT
Start: 2020-11-12 | End: 2020-11-12 | Stop reason: HOSPADM

## 2020-11-12 RX ORDER — OXYCODONE AND ACETAMINOPHEN 5; 325 MG/1; MG/1
1 TABLET ORAL
Qty: 10 TAB | Refills: 0 | Status: SHIPPED | OUTPATIENT
Start: 2020-11-12 | End: 2020-11-13

## 2020-11-12 RX ADMIN — MULTIPLE VITAMINS W/ MINERALS TAB 1 TABLET: TAB at 07:57

## 2020-11-12 RX ADMIN — FOLIC ACID 1 MG: 1 TABLET ORAL at 07:57

## 2020-11-12 RX ADMIN — Medication 10 ML: at 13:29

## 2020-11-12 RX ADMIN — OXYCODONE HYDROCHLORIDE AND ACETAMINOPHEN 1 TABLET: 5; 325 TABLET ORAL at 12:14

## 2020-11-12 RX ADMIN — OXYCODONE HYDROCHLORIDE AND ACETAMINOPHEN 1 TABLET: 10; 325 TABLET ORAL at 04:12

## 2020-11-12 RX ADMIN — CEFAZOLIN SODIUM 2 G: 100 INJECTION, POWDER, LYOPHILIZED, FOR SOLUTION INTRAVENOUS at 05:42

## 2020-11-12 RX ADMIN — POLYETHYLENE GLYCOL 3350 17 G: 17 POWDER, FOR SOLUTION ORAL at 07:58

## 2020-11-12 RX ADMIN — Medication 10 ML: at 05:29

## 2020-11-12 RX ADMIN — CEFAZOLIN SODIUM 2 G: 100 INJECTION, POWDER, LYOPHILIZED, FOR SOLUTION INTRAVENOUS at 12:15

## 2020-11-12 RX ADMIN — Medication 100 MG: at 07:58

## 2020-11-12 NOTE — PROGRESS NOTES
Problem: Falls - Risk of  Goal: *Absence of Falls  Description: Document Daphen Ronquillo Fall Risk and appropriate interventions in the flowsheet. Outcome: Progressing Towards Goal  Note: Fall Risk Interventions:  Mobility Interventions: Communicate number of staff needed for ambulation/transfer    Mentation Interventions: Door open when patient unattended, More frequent rounding, Room close to nurse's station    Medication Interventions: Evaluate medications/consider consulting pharmacy, Patient to call before getting OOB    Elimination Interventions: Patient to call for help with toileting needs    History of Falls Interventions: Room close to nurse's station         Problem: Patient Education: Go to Patient Education Activity  Goal: Patient/Family Education  Outcome: Progressing Towards Goal     Problem: Pressure Injury - Risk of  Goal: *Prevention of pressure injury  Description: Document Carroll Scale and appropriate interventions in the flowsheet.   Outcome: Progressing Towards Goal  Note: Pressure Injury Interventions:  Sensory Interventions: Assess changes in LOC    Moisture Interventions: Absorbent underpads    Activity Interventions: Increase time out of bed    Mobility Interventions: Pressure redistribution bed/mattress (bed type)    Nutrition Interventions: Offer support with meals,snacks and hydration, Document food/fluid/supplement intake                     Problem: Patient Education: Go to Patient Education Activity  Goal: Patient/Family Education  Outcome: Progressing Towards Goal

## 2020-11-12 NOTE — PROGRESS NOTES
D/C instructions reviewed with pt. Pt verbalized understanding. Signed copy placed in chart. Rx and information for [employer] given to pt and PIV removed.

## 2020-11-12 NOTE — PROGRESS NOTES
Progress Note    2020  Admit Date: 11/3/2020 11:31 AM   NAME: Daljit Mckay   :  1970   MRN:  [id number]   Attending: Flores Bearden MD  PCP:  None  Treatment Team: Attending Provider: Flores Bearden MD; Consulting Provider: Natacha Wakefield MD; Care Manager: Tuan Contreras; Utilization Review: Vicky Hill RN; Care Manager: Buddy Sampson; Consulting Provider: Nata Mercado; Consulting Provider: Kwadwo Bearden MD; Hospitalist: Kelly Ng NP; Utilization Review: Akira Roman RN; Nurse Practitioner: Macario Smith NP    Full Code   SUBJECTIVE:   Mr. Gertrudis Bhakta is a 49 yo male with PMH of ETOH abuse, GERD, smoker, who presented 10/30 for traumatic pneumothorax and rib fx after falling from the attic. Chest tube placed in ED. After accidental removal of chest tube patient was DC . He returned 11/3 with c/o pain. Pt was admitted and Surgery consulted, deemed no surgical intervention current needed. Chest CT showed loculated hydropneumothorax where a superimposed infection could not be excluded. BC/wound cx +MSSA. Started on cefepime, augmentin and Vanc. ID consulted and pt currently on ancef.  chest tube placed by IR. Pt has received TPA per IR for chest tube drainage. Pulmonary following. Plans to continue abx for at least 2 weeks IV then oral/IV combo for total 4-6 weeks. CT chest yesterday showed decrease in left pleural effusion. Chest tube pulled yesterday. On RA. Case management setting up financial assistance for outpatient IV abx. Also needs PICC placed. Denies SOB CP.       Past Medical History:   Diagnosis Date    GERD (gastroesophageal reflux disease)     once a week OTC med     Recent Results (from the past 24 hour(s))   METABOLIC PANEL, BASIC    Collection Time: 20  3:47 AM   Result Value Ref Range    Sodium 136 (L) 138 - 145 mmol/L    Potassium 3.7 3.5 - 5.1 mmol/L    Chloride 94 (L) 98 - 107 mmol/L    CO2 39 (H) 21 - 32 mmol/L    Anion gap 3 (L) 7 - 16 mmol/L    Glucose 93 65 - 100 mg/dL    BUN 6 6 - 23 MG/DL    Creatinine 0.56 (L) 0.8 - 1.5 MG/DL    GFR est AA >60 >60 ml/min/1.73m2    GFR est non-AA >60 >60 ml/min/1.73m2    Calcium 8.1 (L) 8.3 - 10.4 MG/DL   CBC WITH AUTOMATED DIFF    Collection Time: 11/12/20  3:47 AM   Result Value Ref Range    WBC 7.3 4.3 - 11.1 K/uL    RBC 2.75 (L) 4.23 - 5.6 M/uL    HGB 10.8 (L) 13.6 - 17.2 g/dL    HCT 31.6 (L) 41.1 - 50.3 %    .9 (H) 79.6 - 97.8 FL    MCH 39.3 (H) 26.1 - 32.9 PG    MCHC 34.2 31.4 - 35.0 g/dL    RDW 15.5 (H) 11.9 - 14.6 %    PLATELET 732 879 - 657 K/uL    MPV 10.2 9.4 - 12.3 FL    ABSOLUTE NRBC 0.00 0.0 - 0.2 K/uL    DF AUTOMATED      NEUTROPHILS 80 (H) 43 - 78 %    LYMPHOCYTES 11 (L) 13 - 44 %    MONOCYTES 5 4.0 - 12.0 %    EOSINOPHILS 2 0.5 - 7.8 %    BASOPHILS 1 0.0 - 2.0 %    IMMATURE GRANULOCYTES 2 0.0 - 5.0 %    ABS. NEUTROPHILS 5.8 1.7 - 8.2 K/UL    ABS. LYMPHOCYTES 0.8 0.5 - 4.6 K/UL    ABS. MONOCYTES 0.3 0.1 - 1.3 K/UL    ABS. EOSINOPHILS 0.2 0.0 - 0.8 K/UL    ABS. BASOPHILS 0.0 0.0 - 0.2 K/UL    ABS. IMM.  GRANS. 0.1 0.0 - 0.5 K/UL     No Known Allergies  Current Facility-Administered Medications   Medication Dose Route Frequency Provider Last Rate Last Dose    acetaminophen (TYLENOL) tablet 650 mg  650 mg Oral Q6H PRN Unique Janie, NP        oxyCODONE-acetaminophen (PERCOCET) 5-325 mg per tablet 1 Tab  1 Tab Oral Q12H PRN Unique Peek, NP   1 Tab at 11/12/20 1214    LORazepam (ATIVAN) tablet 0.5 mg  0.5 mg Oral Q12H PRN Rigoberto Paiz NP        calcium carbonate (TUMS) chewable tablet 200 mg [elemental]  200 mg Oral TID PRN Ct Cox MD        polyethylene glycol (MIRALAX) packet 17 g  17 g Oral DAILY Joon Ramirez MD   17 g at 11/12/20 0758    sodium chloride (NS) flush 5-40 mL  5-40 mL IntraVENous PRN Howie Trejo NP        folic acid (FOLVITE) tablet 1 mg  1 mg Oral DAILY Howie Trejo NP   1 mg at 11/12/20 0757    thiamine HCL (B-1) tablet 100 mg  100 mg Oral DAILY Sergio Calixto NP   100 mg at 20 0758    multivitamin, tx-iron-ca-min (THERA-M w/ IRON) tablet 1 Tab  1 Tab Oral DAILY Sergio Calixto NP   1 Tab at 20 0757    nicotine (NICODERM CQ) 21 mg/24 hr patch 1 Patch  1 Patch TransDERmal Q24H Sergio Calixto NP   1 Patch at 20 3966    ceFAZolin (ANCEF) 2 g/20 mL in sterile water IV syringe  2 g IntraVENous Q8H dainocente Anderson NP   2 g at 20 1215    naloxone [facility]) injection 0.4 mg  0.4 mg IntraVENous PRDOMINICK Dodson MD   0.4 mg at 11/10/20 0121    sodium chloride (NS) flush 5-40 mL  5-40 mL IntraVENous Q8H Fawad Dodson MD   10 mL at 20 1329    sodium chloride (NS) flush 5-40 mL  5-40 mL IntraVENous PRN Fawad Dodson MD        polyethylene glycol [facility]) packet 17 g  17 g Oral DAILY PRN Fawad Dodson MD        ondansetron (ZOFRAN ODT) tablet 4 mg  4 mg Oral Q8H PRN Fawad Dodson MD        Or    ondansetron [facility]) injection 4 mg  4 mg IntraVENous Q6H BRADY Dodson MD   4 mg at 20 7155       Review of Systems negative with exception of pertinent positives noted above  PHYSICAL EXAM     Visit Vitals  /69   Pulse 94   Temp 98.6 °F (37 °C)   Resp 18   Ht 5' 10\" (1.778 m)   Wt 93.7 kg (206 lb 9.1 oz)   SpO2 90%   BMI 29.64 kg/m²      Temp (24hrs), Av.3 °F (36.8 °C), Min:97.9 °F (36.6 °C), Max:98.6 °F (37 °C)    Oxygen Therapy  O2 Sat (%): 90 % (20 1100)  Pulse via Oximetry: 82 beats per minute (20 0828)  O2 Device: Nasal cannula (20)  O2 Flow Rate (L/min): 1 l/min (20)  FIO2 (%): 28 % (20 08)  ETCO2 (mmHg): 34 mmHg (20 153)    Intake/Output Summary (Last 24 hours) at 2020 1344  Last data filed at 2020 0753  Gross per 24 hour   Intake --   Output 1050 ml   Net -1050 ml      General: No acute distress    Lungs: Decreased bases bilaterally. Resp even and nonlabored.  On RA  Heart:  S1S2 present without murmurs rubs gallops. RRR. No LE edema  Abdomen: Soft, non tender, non distended. BS present  Extremities: Moves ext spontaneously. No cyanosis  Neurologic:  A/O X3.  No focal deficits     Results summary of Diagnostic Studies/Procedures copied from within [facility] EMR:        Dedrick Chu 96 Problems    Diagnosis Date Noted    Sinus tachycardia 11/11/2020    MSSA bacteremia 11/04/2020    Leukocytosis 11/03/2020    Hydropneumothorax 11/03/2020    Loculated pleural effusion 11/03/2020    Hyponatremia 11/03/2020     Plan:         Sepsis secondary to MSSA bacteremia lung as source  ID following on ancef, will need 2 weeks IV then IV/po combo  BC MSSA, repeat 11/5 NGTD  Wound cx with MSSA  Pulmonary following  Needs PICC  Case management securing financial assistance for IV abx outpatient     Left sided loculated hydropneumothorax secondary to trauma  Chest tube in place  IR gave TPA via tube X2 doses  Pulmonary following  CT chest yesterdayshowed improved effusion, plans to pull CT per IR     Alcoholism  Continue prn ativan/haldol        Notes, labs, VS, diagnostic testing reviewed     DVT Prophylaxis: SCD  Plan of Care Discussed with: Supervising MD Dr. Roel Carter, care team, pt        Merary Jean Baptiste, NP

## 2020-11-12 NOTE — DISCHARGE SUMMARY
Discharge Summary   Patient ID:  Brigido Art  [id number]  10 y.o.  1970  Admit date: 11/3/2020 11:31 AM  Discharge date and time: 11/12/2020  Attending: Vic Joseph MD  PCP:  None  Treatment Team: Attending Provider: Vic Joseph MD; Consulting Provider: Chung Piedra MD; Care Manager: Magdaleno Cadet; Utilization Review: Shira Atkins RN; Care Manager: Wong Ortiz;  Consulting Provider: Reggie Eason; Consulting Provider: Qi Varner MD; Hospitalist: Xavi Boggs NP; Utilization Review: [place] RN; Nurse Practitioner: Pablo Kirby NP  Principal Diagnosis Sepsis [facility])   Active Problems:    Leukocytosis (11/3/2020)      Hydropneumothorax (11/3/2020)      Loculated pleural effusion (11/3/2020)      Hyponatremia (11/3/2020)      MSSA bacteremia (11/4/2020)      Sinus tachycardia (11/11/2020)       * Admission Diagnoses: Hydropneumothorax [J94.8]  * Discharge Diagnoses:    Hospital Problems as of 11/12/2020 Date Reviewed: 11/3/2020          Codes Class Noted - Resolved POA    Sinus tachycardia ICD-10-CM: R00.0  ICD-9-CM: 427.89  11/11/2020 - Present Yes        MSSA bacteremia ICD-10-CM: R78.81, B95.61  ICD-9-CM: 790.7, 041.11  11/4/2020 - Present Yes        Leukocytosis ICD-10-CM: D72.829  ICD-9-CM: 288.60  11/3/2020 - Present Yes        Hydropneumothorax ICD-10-CM: J94.8  ICD-9-CM: 511.89  11/3/2020 - Present Yes        Loculated pleural effusion ICD-10-CM: J90  ICD-9-CM: 511.9  11/3/2020 - Present Yes        Hyponatremia ICD-10-CM: E87.1  ICD-9-CM: 276.1  11/3/2020 - Present Yes        RESOLVED: Hypomagnesemia ICD-10-CM: E83.42  ICD-9-CM: 275.2  11/7/2020 - 11/11/2020 Yes        RESOLVED: Sinus tachycardia ICD-10-CM: R00.0  ICD-9-CM: 427.89  11/3/2020 - 11/11/2020 Yes        RESOLVED: Lactic acidosis ICD-10-CM: E87.2  ICD-9-CM: 276.2  11/3/2020 - 11/11/2020 Yes        RESOLVED: Hypokalemia ICD-10-CM: E87.6  ICD-9-CM: 276.8  11/3/2020 - 11/7/2020 Yes * (Principal) RESOLVED: Sepsis ([facility] [address] ICD-10-CM: A41.9  ICD-9-CM: 038.9, 995.91  11/3/2020 - 11/11/2020 Yes                Hospital Course:  Mr. Meryl Cross is a 47 yo male with PMH of ETOH abuse, GERD, smoker, who presented 10/30 for traumatic pneumothorax and rib fx after falling from the attic.  Chest tube placed in ED.  After accidental removal of chest tube patient was DC 11/2. [facility] returned 11/3 with c/o pain.  Pt was admitted and Surgery consulted, deemed no surgical intervention current needed.  Chest CT showed loculated hydropneumothorax where a superimposed infection could not be excluded.  BC/wound cx +MSSA.  Started on cefepime, augmentin and Vanc. ID consulted and pt currently on ancef.  11/4 chest tube placed by IR.  Pt has received TPA per IR for chest tube drainage.  Pulmonary following.  Plans to continue abx for at least 2 weeks IV then oral/IV combo for total 4-6 weeks.  CT chest yesterday showed decrease in left pleural effusion. Chest tube pulled yesterday. On RA. Case management has set up outpatient IV abx, financial assistance in process. Denies SOB CP.      Diagnostic Study/Procedure results summary copied from within [facility] EMR:    PE protocol chest CT 11/3/2020     Comparison: Chest x-ray earlier today     Indication: Fever, shortness of breath, recent fracture.     Technique: Multiple contiguous 2.5 mm axial images were obtained through the  pulmonary vasculature following uneventful administration of IV contrast, Isovue  370 100 cc. Intravenous contrast was used for better evaluation of solid organs  and vascular structures. Radiation dose reduction techniques were used for this  study:  Our CT scanners use one or all of the following: Automated exposure  control, adjustment of the mA and/or kVp according to patient's size, iterative  reconstruction.        Findings: An adequate bolus opacifies the pulmonary vasculature. No evidence of pulmonary  embolism.  No evidence of aortic dissection. The main pulmonary artery is normal  in caliber.     Multilevel nondisplaced lateral left rib fractures. Extensive subcutaneous  emphysema along the left chest wall. In addition, there appears to be loculated  hydropneumothorax on the left with adjacent atelectasis. Right lung is clear. Heart size normal for age.     Limited arterial phase evaluation of the upper abdomen is unremarkable. Review  of bone windows demonstrates no aggressive appearing bony lesions.     IMPRESSION  IMPRESSION:  1. Negative for pulmonary embolism. 2. Loculated hydropneumothorax on the left. Superimposed infection cannot be  excluded. Title: Percutaneous drain placement.     History: History of left rib fractures with recurrent loculated left pleural  effusion, possibly empyema      Consent: Informed written and oral consent was obtained from the patient after  explanation of benefits and risks (including, but not limitted to: Infection,  Hemorrhage, Visceral Injury). The patient's questions were answered to their  satisfaction. The patient stated understanding and requested that we proceed.       Technique: All CT scans at this facility are performed using dose  reduction/dose modulation techniques, as appropriate the performed exam,  including the following:  Automated Exposure Control; Adjustment of the mA  and/or kV according to patient size (this includes techniques or standardized  protocols for targeted exams where dose is matched to indication/reason for  exam); and Use of Iterative Reconstruction Technique.     Procedure:  Sterile barrier technique (including:  cap, mask, sterile gloves,  sterile sheet, hand hygiene, and chlorhexidene for cutaneous antisepsis) were  used. A a preliminary CT scan was performed with radio-opaque markers on the  skin. Following routine prep and drape of the left anterior chest, a local  field block with 1% lidocaine was achieved. A needle was advanced into the  cavity.    The needle was exchanged over an Amplatz wire for a 12 [employer] multipurpose  drain. . The catheter was secured with a suture. A atrium chest tube was  attached.        Specimen:  Microbiology.     Radiation Exposure Indices: Total Exam DLP = 1168 mGy-cm     Medications:  Under physician supervision, 31 minutes of moderate intravenous  conscious sedation was performed by administering Versed, Fentanyl  intravenously. Continuous pulse oximetry, heart rate, and blood pressure  monitoring was performed with an independent, trained observer present.       IMPRESSION  Impression: Uncomplicated percutaneous drain placement into the likely left  pleural effusion.     Plan: Chest tube suction drainage. We will follow. The tube may benefit from TPA  administration in the near future.         CT of the chest without contrast.     CLINICAL INDICATION: Left pleural empyema     PROCEDURE: Serial thin section axial images obtained from the thoracic inlet  through the upper abdomen without the administration of intravenous contrast.   Radiation dose reduction techniques were used for this study. Our CT scanners  use one or all of the following: Automated exposure control, adjusted of the mA  and/or kV according to patient size, iterative reconstruction     COMPARISON: Chest CT dated 11/4/2020     FINDINGS: A pigtail drainage catheter has been placed anteriorly in the left  hemithorax in the pleural space. There is decrease in the complex pleural  effusion that does contain scattered pockets of air in keeping with the  patient's known empyema. Pleural effusion appears more loculated along the  posterior lateral aspect of the hemithorax. There is an air-fluid level in this  location. There is a small collection of pleural fluid that accumulates within  the major fissure. Mild left lung atelectasis present. No lung parenchymal  airspace opacity appreciated. There is dependent atelectasis at the right lung  base.  The right lung is well-expanded with only mild hazy groundglass opacity in  the central aspect of the right upper lobe. Shotty mediastinal lymph nodes again  present and not significantly change. There is a small pericardial effusion.     Limited evaluation of the upper abdomen is unremarkable.     No aggressive osseous is identified.     IMPRESSION  IMPRESSION:  1. Decrease in the complex left pleural effusion in keeping with the patient's  known empyema. There is loculation of the effusion suspected posterior and  laterally in the left hemithorax with an additional loculation of pleural fluid  that accumulates within the major fissure. 2. Nonspecific new hazy groundglass opacity in the central aspect of the right  upper lobe. This may represent pneumonitis. 3. Stable mediastinal adenopathy with a small pericardial effusion.       Portable view of the chest 11/3/2020     Comparison: Chest x-ray 11/02/2020 and prior     Indication: Shortness of breath     FINDINGS: Mild cardiac enlargement progressive infiltrates throughout the left  lung particularly peripherally of the left upper lobe. Suspected increase in  left pleural effusion. No discrete pneumothorax identified. Left chest wall  subcutaneous emphysema slightly improved. No discrete acute osseous lesion seen.     IMPRESSION  IMPRESSION:  Increasing left pleural effusion and left infiltrates. CHEST X-RAY, 2 views.     HISTORY:  Pleural effusion     TECHNIQUE: PA and lateral views.     COMPARISON: Multiple recent exams.     FINDINGS:   Lungs: Right lung is clear. Large densities left lung remains with air-fluid  levels. Costophrenic angles: Blunted on the left. Heart size: is normal.   Pulmonary vasculature: is unremarkable. Aorta: Unremarkable. Included portion of the upper abdomen: is unremarkable. Bones: Right rib fractures  Other: None.     IMPRESSION  IMPRESSION: Right lung remains clear.  Multiple densities left lung with  air-fluid levels, possibly empyema.         Portable chest x-ray     CLINICAL INDICATION: Shortness of breath and coughing.     FINDINGS: Single AP view the chest compared to a similar exam dated 11/4/2020  shows marked decompression of the complex pleural fluid collection in the left  hemithorax with a pigtail drainage chest tube now in place. There remains  persistent peripheral airspace density which may be residual effusion. The right  lung is clear. The cardiac silhouette and mediastinum are unremarkable. There is  no pneumothorax.     IMPRESSION  IMPRESSION: Decrease in the complex presumed pleural effusion in the left  hemithorax with a pigtail chest tube now in place. 1 View portable chest x-ray   11/7/2020 11:14 AM     Indication: Cough and shortness of breath with history of pleural effusion. Has  left chest tube.     Comparison: 11/7/2020 at 08 26     Findings: This portable erect AP chest of 1102 shows the lung volumes to be low  with crowding. Left-sided chest tube pigtail catheter remains in place. No increased seen  pneumothorax. Some gas is again evident in the left lateral chest wall. There is persistent increased opacities and pleural fluid laterally extending up  toward the apex on this side. This is similar to before, a small air-fluid level  can be identified in this region today though. .  Cardiomediastinal silhouette stable. Vascularity normal. No vascular congestion.     IMPRESSION  IMPRESSION:   1. Lung volumes again low with some crowding evident. Left-sided chest tube remains in place with no pneumothorax or increasing  pleural fluid. There is persistent loculated pleural fluid and opacities at the left lower lung  with a small air-fluid level noted at this region today as well.     2. No new findings elsewhere.         Single View portable upright chest x-ray dated   November 8, 2020     Reference Exam: November 7, 2020     Indication: Left-sided chest tube      Findings:   The cardiac silhouette is partially obscured by hazy opacity in the  left lung with lungs underinflated, pigtail catheter over the left mid chest is  again seen, with apparent loculated fluid laterally in the left chest appears to  be increased from the reference study, rib fractures are again noted on the  left. Some soft tissue gas along the left chest wall is seen.     IMPRESSION  IMPRESSION: Left-sided pigtail catheter again seen with no clear pneumothorax  noted but there is been an increase in the loculated fluid along the left chest  Wall. History: Left chest tube.     FINDINGS:     Frontal and lateral chest. Comparison the . There is improved aeration  of the left lung in the interval from the prior exam. Chest tube is unchanged. No pneumothorax is demonstrated. Small right lung volume persists.     IMPRESSION  IMPRESSION:     No pneumothorax. Slightly improved pleural disease in the interval.      [address] [facility], [address] [facility]  [phone]     Transthoracic Echocardiogram  2D, M-mode, Doppler, and Color Doppler     Patient: Madeline Loza  MR #: [id number]  : 1970  Age: 48 years  Gender: Male  Study date: 2020  Account #: [de-identified]  Height: 70 in  Weight: 184.6 lb  BSA: 2.02 mï¾²  Status:Routine  Location: 222  BP: 120/ 70     Allergies: NO KNOWN ALLERGENS     Sonographer:  Kosta Castro RDCS  Group:  [facility]  Referring Physician:  Troy Gordon NP  Reading Physician:  Ana Gonzalez MD     INDICATIONS: MSSA bacteremia.     * Technically difficult due to patient body habitus and inability to turn on  his side due to left drain tube. *     PROCEDURE: This was a routine study. A transthoracic echocardiogram was  performed. The study included complete 2D imaging, M-mode, complete spectral  Doppler, and color Doppler. Intravenous contrast (Definity) was administered.   Echocardiographic views were limited by restricted patient mobility and poor  acoustic window availability. This was a technically difficult study.     LEFT VENTRICLE: Size was normal. Systolic function was normal. Ejection  fraction was estimated in the range of 55 % to 60 %. There were no regional  wall motion abnormalities. Wall thickness was normal. Avg. E/e': 8.4. Left  ventricular diastolic function parameters were normal.     RIGHT VENTRICLE: The size was normal. Systolic function was normal. The  tricuspid jet envelope definition was inadequate for estimation of RV   systolic  pressure.     LEFT ATRIUM: Appears normal in size, although unable to quanity volume index  due to off axis imaging.     RIGHT ATRIUM: Size appeared normal.     SYSTEMIC VEINS: IVC: The inferior vena cava was normal in size and course.     AORTIC VALVE: Although there was no diagnostic evidence for vegetation, this  study is not adequate to completely exclude the possibility. The valve was   not  well visualized. There was no evidence for stenosis. There was no   insufficiency.     MITRAL VALVE: Valve structure appeared normal. Although there was no   diagnostic  evidence for vegetation, this study is not adequate to completely exclude the  possibility. There was no evidence for stenosis. There was no regurgitation.     TRICUSPID VALVE: The valve structure appeared normal. Although there was no  diagnostic evidence for vegetation, this study is not adequate to completely  exclude the possibility. There was no evidence for stenosis. There was no  regurgitation.     PULMONIC VALVE: Not well visualized.     PERICARDIUM: There was no pericardial effusion.     AORTA: The root exhibited normal size.     SUMMARY:     -  Left ventricle: Systolic function was normal. Ejection fraction was  estimated in the range of 55 % to 60 %. There were no regional wall motion  abnormalities.     -  Aortic valve:  Although there was no diagnostic evidence for vegetation,   this  study is not adequate to completely exclude the possibility.     -  Mitral valve: Although there was no diagnostic evidence for vegetation,   this  study is not adequate to completely exclude the possibility.     -  Tricuspid valve: Although there was no diagnostic evidence for vegetation,  this study is not adequate to completely exclude the possibility.     SYSTEM MEASUREMENT TABLES     2D mode  AoR Diam (2D): 3.9 cm  IVS/LVPW (2D): 1  IVSd (2D): 0.8 cm  LVIDd (2D): 4.8 cm  LVIDs (2D): 3 cm  LVOT Area (2D): 3.8 cm2  LVPWd (2D): 0.8 cm     Unspecified Scan Mode  Peak Grad; Mean; Antegrade Flow: 5 mm[Hg]  Vmax; Antegrade Flow: 116 cm/s  LVOT Diam: 2.2 cm     Prepared and signed by  Mary Chapin MD  Signed [id number] 13:21:03             Labs: Results:       Chemistry Recent Labs     11/12/20  0347 11/11/20  0302 11/10/20  0534   GLU 93 125* 105*   * 134* 136*   K 3.7 4.1 4.2   CL 94* 94* 94*   CO2 39* 36* 36*   BUN 6 7 4*   CREA 0.56* 0.44* 0.49*   CA 8.1* 7.8* 8.8   AGAP 3* 4* 6*   TBILI  --   --  1.1   ALT  --   --  39   AP  --   --  296*   TP  --   --  5.8*   ALB  --   --  1.6*   GLOB  --   --  4.2*   AGRAT  --   --  0.4*      CBC w/Diff Recent Labs     11/12/20  0347 11/11/20  0302 11/10/20  0534   WBC 7.3 13.7* 14.6*   RBC 2.75* 2.63* 2.65*   HGB 10.8* 10.3* 10.5*   HCT 31.6* 30.3* 31.1*    457* 430   GRANS 80* 87* 80*   LYMPH 11* 6* 9*   EOS 2 1 1      Cardiac Enzymes No results for input(s): CPK, CKND1, EDITH in the last 72 hours. No lab exists for component: CKRMB, TROIP   Coagulation No results for input(s): PTP, INR, APTT, INREXT in the last 72 hours. Lipid Panel @BRIEFLAB(CHOL,CHOLPOCT,290093,[id number],CHOLX,CHOLP,CHLST,CHOLV,402439,HDL,HDLPOC,HDLPOCT,096516,NHDLCT,[id number],HDLC,HDLP,LDL,LDLPOCT,LDLCPOC,000679,NLDLCT,DLDL,LDLC,DLDLP,370298,VLDLC,VLDL,TGL,TGLX,TRIGL,[id number],TRIGP,TGLPOCT,709003,252840,CHHD,CHHDX)@   BNP No results for input(s): BNPP in the last 72 hours.    Liver Enzymes Recent Labs 11/10/20  0534   TP 5.8*   ALB 1.6*   *      Thyroid Studies No results found for: T4, T3U, TSH, TSHEXT         Discharge Exam:  Visit Vitals  /80   Pulse 94   Temp 99 °F (37.2 °C)   Resp 19   Ht 5' 10\" (1.778 m)   Wt 93.7 kg (206 lb 9.1 oz)   SpO2 90%   BMI 29.64 kg/m²         General:       No acute distress    Lungs:          Decreased bases bilaterally. Resp even and nonlabored. On RA  Heart:            S1S2 present without murmurs rubs gallops. RRR. No LE edema  Abdomen:    Soft, non tender, non distended. BS present  Extremities: Moves ext spontaneously. No cyanosis  Neurologic:  A/O X3. No focal deficits       Disposition: home  Discharge Condition: stable  Patient Instructions:   Current Discharge Medication List      START taking these medications    Details   oxyCODONE-acetaminophen (PERCOCET) 5-325 mg per tablet Take 1 Tab by mouth every twelve (12) hours as needed for Pain for up to 3 days. Max Daily Amount: 2 Tabs. Qty: 10 Tab, Refills: 0    Associated Diagnoses: Closed fracture of multiple ribs of left side with delayed healing, subsequent encounter; Hydropneumothorax      naloxone (Narcan) 4 mg/actuation nasal spray Use 1 spray intranasally, then discard. Repeat with new spray every 2 min as needed for opioid overdose symptoms, alternating nostrils. Qty: 1 Each, Refills: 1         CONTINUE these medications which have NOT CHANGED    Details   cyclobenzaprine (FLEXERIL) 10 mg tablet Take 1 Tab by mouth three (3) times daily as needed for Muscle Spasm(s).   Qty: 15 Tab, Refills: 0    Associated Diagnoses: Acute left flank pain         STOP taking these medications       HYDROcodone-acetaminophen (Norco) 5-325 mg per tablet Comments:   Reason for Stopping:               Activity: No driving while on analgesics  Diet: Regular Diet  Wound Care: Keep wound clean and dry and Reinforce dressing PRN      Full Code     Pneumonia and flu vaccine to be administered at discharge per hospital protocol Follow-up  ·   Establish PCP 1 week  ·  [facility]  · Case management has set up outpatient IV abx  · FU ID at [facility]      Notes, labs, VS, diagnostic testing reviewed  Case discussed with pt, care team, Dr. Jennifer Velez  Discussed not driving with controlled substances, pt verbalized understanding. Narcotics prescribed in accordance with [facility] aware guidelines.        Time spent to discharge patient  45 min    Signed:  Dony Aguila NP  11/12/2020  4:18 PM

## 2020-11-12 NOTE — PROGRESS NOTES
Infectious Disease Progress Note    Today's Date: 2020   Admit Date: 11/3/2020    Impression:   · MSSA bacteremia (11/3 - );  [address] NG;  TTE negative  · Left pleural empyema s/p chest tube, 20; Cx from pleural drainage, : MSSA  · ETOH withdrawal on librium taper. Denies illicit drugs     Plan:   · Continue Cefazolin while here but will discharge on Rocephin 2g IV q 24h until  (4 weeks). IF there are any issues with OPAT, then we can use Dalbavancin or oral abx to complete duration. · Place PICC   · Dispo home soon with daily Infusion center appts. ID appt near eot     35 min spent with patient/ 50% c/c/c as in discussing outpt abx plans with case management and patient, coordinating ID plans. Anti-infectives:   · Cefazolin -  · Vanc 11/3-  · Augmentin 11/3-  · CTX   · Cefepime 11/3    Subjective: Interval history: Afebrile, Ct removed yesterday. Up to chair now on room air. Updated on ID plans. No Known Allergies     Review of Systems:  A comprehensive review of systems was negative except for that written in the History of Present Illness. Objective:     Visit Vitals  /74   Pulse 92   Temp 97.9 °F (36.6 °C)   Resp 19   Ht 5' 10\" (1.778 m)   Wt 93.7 kg (206 lb 9.1 oz)   SpO2 90%   BMI 29.64 kg/m²     Temp (24hrs), Av.2 °F (36.8 °C), Min:97.9 °F (36.6 °C), Max:98.6 °F (37 °C)     No changes from my previous exam     Lines:  Peripheral IV:   Left arm    Physical Exam:    General:  In no acute distress; Ox3   Eyes:  Sclera anicteric   Mouth/Throat: oral pharynx clear   Neck: Supple   Lungs:   Diminished breath sounds on left   CV:  Regular rate and rhythm,no murmur   Abdomen:   Mildly distended; active bowel sounds;nontender    Extremities: No cyanosis.  + 2 edema    Skin: No rash noted   Lymph nodes:    Musculoskeletal: No swelling or deformity   Lines/Devices:  Intact, no erythema, drainage or tenderness   Psych: Alert and responsive       Data Review:     CBC:  Recent Labs     11/12/20 0347 11/11/20 0302 11/10/20  0534   WBC 7.3 13.7* 14.6*   GRANS 80* 87* 80*   MONOS 5 4 7   EOS 2 1 1   ANEU 5.8 11.9* 11.7*   ABL 0.8 0.8 1.2   HGB 10.8* 10.3* 10.5*   HCT 31.6* 30.3* 31.1*    457* 430       BMP:  Recent Labs     11/12/20 0347 11/11/20 0302 11/10/20  0534   CREA 0.56* 0.44* 0.49*   BUN 6 7 4*   * 134* 136*   K 3.7 4.1 4.2   CL 94* 94* 94*   CO2 39* 36* 36*   AGAP 3* 4* 6*   GLU 93 125* 105*       LFTS:  Recent Labs     11/10/20  0534   TBILI 1.1   ALT 39   *   TP 5.8*   ALB 1.6*       Microbiology:     All Micro Results     Procedure Component Value Units Date/Time    CULTURE, ANAEROBIC [[id number]] Collected:  11/06/20 1737    Order Status:  Completed Specimen:  Wound Drainage Updated:  11/11/20 1013     Special Requests: NO SPECIAL REQUESTS        Culture result:       NO ANAEROBES ISOLATED 4 DAYS          CULTURE, BLOOD [[id number]] Collected:  11/05/20 1159    Order Status:  Completed Specimen:  Blood Updated:  11/10/20 0826     Special Requests: --        RIGHT  HAND       Culture result: NO GROWTH 5 DAYS       CULTURE, WOUND Roma Fulton STAIN [[id number]]  (Abnormal)  (Susceptibility) Collected:  11/06/20 1737    Order Status:  Completed Specimen:  Wound from Drainage Updated:  11/09/20 0744     Special Requests: NO SPECIAL REQUESTS        GRAM STAIN 0 TO 3 WBC'S SEEN PER OIF      NO DEFINITE ORGANISM SEEN        Culture result:       LIGHT STAPHYLOCOCCUS AUREUS          CULTURE, BODY FLUID W Stephani Gao [[id number]] Collected:  11/04/20 1525    Order Status:  Completed Specimen:   Body Fluid from Pleural Fluid Updated:  11/07/20 0907     Special Requests: NO SPECIAL REQUESTS        GRAM STAIN NO WBC'S SEEN         NO DEFINITE ORGANISM SEEN        Culture result: NO GROWTH 2 DAYS       CULTURE, BLOOD [[id number]]  (Abnormal) Collected:  11/04/20 0136    Order Status:  Completed Specimen:  Blood Updated:  11/07/20 0733     Special Requests: --        RIGHT  ARM       GRAM STAIN GRAM POSITIVE COCCI         AEROBIC BOTTLE POSITIVE               RESULTS VERIFIED, PHONED TO AND READ BACK BY OSMANY LAZO AT 2055 11.5.2020 EOR           Culture result: STAPHYLOCOCCUS AUREUS         REFER TO [address] [id number]            For Susceptibility Refer to Culture  Accession [id number]      CULTURE, BLOOD [[id number]]  (Abnormal) Collected:  11/03/20 1205    Order Status:  Completed Specimen:  Blood Updated:  11/07/20 0731     Special Requests: --        RIGHT  HAND       GRAM STAIN       GRAM POS COCCI IN CLUSTERS            AEROBIC BOTTLE POSITIVE               CRITICAL RESULT NOT CALLED DUE TO PREVIOUS NOTIFICATION OF CRITICAL RESULT WITHIN THE LAST 24 HOURS. Culture result: STAPHYLOCOCCUS AUREUS         REFER TO ACCESSION [id number] FOR SUSCEPTIBILITY    CULTURE, BLOOD [[id number]]  (Abnormal)  (Susceptibility) Collected:  11/03/20 1145    Order Status:  Completed Specimen:  Blood Updated:  11/07/20 0730     Special Requests: --        LEFT  Antecubital       GRAM STAIN GRAM POSITIVE COCCI               AEROBIC AND ANAEROBIC BOTTLES                  RESULTS VERIFIED, PHONED TO AND READ BACK BY OSMANY MCDONOUGH @ 0030 11/4/20 MM           Culture result: STAPHYLOCOCCUS AUREUS         REFER TO [facility] PANEL ACCESSION [id number]    BLOOD CULTURE ID PANEL [[id number]]  (Abnormal) Collected:  11/04/20 0136    Order Status:  Completed Specimen:  Blood Updated:  11/06/20 0823     Acc. no. from Micro Order [id number]     Staphylococcus Detected        Staphylococcus aureus Detected        Comment: CRITICAL RESULT NOT CALLED DUE TO PREVIOUS NOTIFICATION OF CRITICAL RESULT WITHIN THE LAST 24 HOURS. mecA (Methicillin-Resistance Genes) NOT DETECTED        INTERPRETATION       Gram positive cocci in clusters, identified in realtime PCR as probable MSSA.            Comment: Recommend discontinuing IV vancomycin starting cefazolin or nafcillin if patient not on beta-lactam therapy. Infectious Diseases Consult recommended in adult patients. THIS TEST DOES NOT REPLACE SENSITIVITY TESTING. CULTURE, BLOOD [[id number]]     Order Status:  Canceled Specimen:  Blood     CULTURE, BLOOD [[id number]]     Order Status:  Canceled Specimen:  Blood     CULTURE, BLOOD [[id number]]     Order Status:  Canceled Specimen:  Blood     CULTURE, BLOOD [[id number]]     Order Status:  Canceled Specimen:  Blood     BLOOD CULTURE ID PANEL [[id number]]  (Abnormal) Collected:  20 1145    Order Status:  Completed Specimen:  Blood Updated:  20     Acc. no. from Micro Order [id number]     Staphylococcus Detected        Staphylococcus aureus Detected        Comment: RESULTS VERIFIED, PHONED TO AND READ BACK BY  Amilcar tejada RN @ 3046 ON 2020 AK. mecA (Methicillin-Resistance Genes) NOT DETECTED        INTERPRETATION       Gram positive cocci in clusters, identified in realtime PCR as probable MSSA. Comment: Recommend discontinuing IV vancomycin starting cefazolin or nafcillin if patient not on beta-lactam therapy. Infectious Diseases Consult recommended in adult patients. THIS TEST DOES NOT REPLACE SENSITIVITY TESTING. Imagin/8/20 CXR: IMPRESSION: Left-sided pigtail catheter again seen with no clear pneumothorax  noted but there is been an increase in the loculated fluid along the left chest  wall.     Signed By: Rosaura Hernandez NP     2020

## 2020-11-12 NOTE — DISCHARGE INSTRUCTIONS
Patient Education        Collapsed Lung: Care Instructions  Your Care Instructions     A collapsed lung (pneumothorax) is a buildup of air in the space between the lung and the chest wall. As more air builds up in this space, the pressure against the lung makes the lung collapse. This causes shortness of breath and chest pain because your lung cannot fully expand. A collapsed lung is usually caused by an injury to the chest, but it may also occur suddenly without an injury because of a lung illness, such as emphysema or lung fibrosis. Your lung may collapse after lung surgery or another medical procedure. Sometimes it happens for no known reason in an otherwise healthy person (spontaneous pneumothorax). Treatment depends on the cause of the collapse. It may heal with rest, although your doctor will want to keep track of your progress. It can take several days for the lung to expand again. Your doctor may have drained the air with a needle or tube inserted into the space between your chest and the collapsed lung. If you have a chest tube, be sure to follow your doctor's instructions about how to care for the tube. You may need further treatment if you are not getting better. Surgery is sometimes needed to keep the lung inflated. The doctor will want to keep track of your progress, so you will need a follow-up exam within a few days. The doctor has checked you carefully, but problems can develop later. If you notice any problems or new symptoms, get medical treatment right away. Follow-up care is a key part of your treatment and safety. Be sure to make and go to all appointments, and call your doctor if you are having problems. It's also a good idea to know your test results and keep a list of the medicines you take. How can you care for yourself at home? · Get plenty of rest and sleep. You may feel weak and tired for a while, but your energy level will improve with time.   · Hold a pillow against your chest when you cough or take deep breaths. This will support your chest and decrease your pain. · Take pain medicines exactly as directed. ? If the doctor gave you a prescription medicine for pain, take it as prescribed. ? If you are not taking a prescription pain medicine, ask your doctor if you can take an over-the-counter medicine. · If your doctor prescribed antibiotics, take them as directed. Do not stop taking them just because you feel better. You need to take the full course of antibiotics. · If you have a bandage over your chest tube, or the place where the chest tube was inserted, keep it clean and dry. Follow your doctor's instructions on bandage care. · If you go home with a tube in place, follow the doctor's directions. Do not adjust the tube in any way. This could break the seal or cause other problems. Keep the tube dry. · Avoid any movements that require your muscles, especially your chest muscles, to strain. Such movements include laughing hard, bearing down to have a bowel movement, and heavy lifting. Try not to cough. · Do not fly in an airplane until your doctor tells you it is okay. Avoid any situations where there is increased air pressure. · Do not smoke or allow others to smoke around you. If you need help quitting, talk to your doctor about stop-smoking programs and medicines. These can increase your chances of quitting for good. When should you call for help? Call 911 anytime you think you may need emergency care. For example, call if:    · You have severe trouble breathing.     · You passed out (lost consciousness). Call your doctor now or seek immediate medical care if:    · You have new or worse trouble breathing.     · You have new pain or your pain gets worse.     · You have a fever.     · You cough up blood.     · Your chest tube starts to come out or falls out.     · You are bleeding through the bandage where the tube was put in.    Watch closely for changes in your health, and be sure to contact your doctor if:    · The skin around the place where the chest tube was put in is red or irritated.     · You do not get better as expected. Where can you learn more? Go to http://www.gray.com/  Enter Q132 in the search box to learn more about \"Collapsed Lung: Care Instructions. \"  Current as of: February 24, 2020               Content Version: 12.6  © 2006-2020 [employer]. Care instructions adapted under license by [employer] (which disclaims liability or warranty for this information). If you have questions about a medical condition or this instruction, always ask your healthcare professional. [place] [address] any warranty or liability for your use of this information. DISCHARGE SUMMARY from Nurse    PATIENT INSTRUCTIONS:    After general anesthesia or intravenous sedation, for 24 hours or while taking prescription Narcotics:  · Limit your activities  · Do not drive and operate hazardous machinery  · Do not make important personal or business decisions  · Do  not drink alcoholic beverages  · If you have not urinated within 8 hours after discharge, please contact your surgeon on call. Report the following to your surgeon:  · Excessive pain, swelling, redness or odor of or around the surgical area  · Temperature over 100.5  · Nausea and vomiting lasting longer than 4 hours or if unable to take medications  · Any signs of decreased circulation or nerve impairment to extremity: change in color, persistent  numbness, tingling, coldness or increase pain  · Any questions    What to do at Home:  Recommended activity: Activity as tolerated and no driving for today. If you experience any of the following symptoms fever of 101 or greater, pain or nausea unrelieved by medication, green/tan drainage from incision, please follow up with Dr. Patricia Burgos.     *  Please give a list of your current medications to your Primary Care Provider. *  Please update this list whenever your medications are discontinued, doses are      changed, or new medications (including over-the-counter products) are added. *  Please carry medication information at all times in case of emergency situations. These are general instructions for a healthy lifestyle:    No smoking/ No tobacco products/ Avoid exposure to second hand smoke  Surgeon General's Warning:  Quitting smoking now greatly reduces serious risk to your health. Obesity, smoking, and sedentary lifestyle greatly increases your risk for illness    A healthy diet, regular physical exercise & weight monitoring are important for maintaining a healthy lifestyle    You may be retaining fluid if you have a history of heart failure or if you experience any of the following symptoms:  Weight gain of 3 pounds or more overnight or 5 pounds in a week, increased swelling in our hands or feet or shortness of breath while lying flat in bed. Please call your doctor as soon as you notice any of these symptoms; do not wait until your next office visit. The discharge information has been reviewed with the patient. The patient verbalized understanding. Discharge medications reviewed with the patient and appropriate educational materials and side effects teaching were provided. ___________________________________________________________________________________________________________________________________    Patient Education        Peripherally Inserted Central Catheter [facility]): Care Instructions  Your Care Instructions     A peripherally inserted central catheter (PICC) is a soft, flexible tube that runs under your skin from a vein in your arm to a large vein near your heart. One end of the catheter stays outside your body. It is a type of central venous catheter, or central venous line. You may have it for weeks or months.   A PICC is used to give you medicine, blood products, nutrients, or fluids. A PICC makes doing these things more comfortable for you because they are put directly into the catheter. So you will not be stuck with a needle every time. A PICC may be used to draw blood for tests only if another vein, such as in the hand or arm, can't be used. The end of the PICC sometimes has two or three openings so that you can get more than one type of fluid or medicine at a time. Your doctor may give you medicine to make you feel relaxed. You may feel a little pain when your doctor numbs your arm. Your doctor will then thread the catheter up a vein in your arm to a larger vein. You will not feel any pain. The doctor may use stitches or other devices to hold the catheter in place where it exits your arm. After the procedure, the site may be sore for a day or two. Follow-up care is a key part of your treatment and safety. Be sure to make and go to all appointments, and call your doctor if you are having problems. It's also a good idea to know your test results and keep a list of the medicines you take. How can you care for yourself at home? · Do not wear jewelry, such as necklaces, that can catch on the catheter. · If the catheter breaks, follow the instructions your doctor gave you. If you have no instructions, clamp or tie off the catheter. Then see a doctor as soon as possible. · To help prevent infection, take a shower instead of a bath. Do not go swimming with the catheter. · Try to keep the area dry. When you shower, cover the area with waterproof material, such as plastic wrap. · Never touch the open end of the catheter if the cap is off. · Never use scissors, knives, pins, or other sharp objects near the catheter or other tubing. · If your catheter has a clamp, keep it clamped when you are not using it. · Fasten or tape the catheter to your body to prevent pulling or dangling. · Avoid clothing that rubs or pulls on your catheter.   · Avoid bending or crimping your catheter. · Always wash your hands before you touch your catheter. · Wear loose clothing over the catheter for the first 10 to 14 days. When getting dressed, be careful not to pull on the catheter. How to change the dressing  Since the PICC is in one of your arms, you won't be able to change the dressing on your own. You'll need someone to help you change the dressing using the same instructions that your doctor or nurse gave you. Your PICC dressing should be changed at least once a week. If the dressing gets loose, wet, or dirty, it must be changed more often to prevent infection. Your doctor may also give you instructions for when to change the dressing. Be sure you have all your supplies ready. These include medical tape, a surgical mask, sterile gloves, and your dressing kit. The names and brands of the items will vary. Your doctor or nurse may give you specific instructions for changing the dressing. Here are basic tips for how to change the dressing. 1. Wash your hands with soap and water. Do this for 15 seconds. Dry them with paper towels. 2. Put on the surgical mask. 3. Loosen and remove your old dressing. Peel it toward the PICC, not away from it. You may need to use an adhesive remover if it doesn't come off easily. 4. Look at the site carefully for redness, swelling, drainage, tenderness, or warmth. If you notice any of these, call your doctor. 5. Wash your hands again. 6. Open your dressing kit, and put on the sterile gloves. 7. Clean the site with the supplies in the dressing kit. 8. Use the dressing that your doctor gave you, and place it over the site. 9. Tape the PICC tubing to your skin. Make sure it doesn't dangle or pull. When should you call for help? Call 911 anytime you think you may need emergency care.  For example, call if:    · You passed out (lost consciousness).     · You have severe trouble breathing.     · You have sudden chest pain and shortness of breath, or you cough up blood.     · You have a fast or uneven pulse. Call your doctor now or seek immediate medical care if:    · You have signs of infection, such as:  ? Increased pain, swelling, warmth, or redness. ? Red streaks leading from the area. ? Pus or blood draining from the area. ? A fever.     · You have swelling in your face, chest, neck, or arm on the side where the catheter is.     · You have signs of a blood clot, such as bulging veins near the catheter.     · Your catheter is leaking, cracked, or clogged.     · You feel resistance when you inject medicine or fluids into your catheter.     · Your catheter is out of place. This may happen after severe coughing or vomiting, or if you pull on the catheter.     · You have chest pain or shortness of breath. Watch closely for changes in your health, and be sure to contact your doctor if:    · You have any concerns about your catheter. Where can you learn more? Go to http://www.gray.com/  Enter L935 in the search box to learn more about \"Peripherally Inserted Central Catheter (PICC): Care Instructions. \"  Current as of: June 26, 2019               Content Version: 12.6  © 7653-1429 [employer]. Care instructions adapted under license by [employer] (which disclaims liability or warranty for this information). If you have questions about a medical condition or this instruction, always ask your healthcare professional. [place] [address] any warranty or liability for your use of this information.

## 2020-11-12 NOTE — PROGRESS NOTES
Kyleigh Penaloza  Admission Date: 11/3/2020             Daily Progress Note: 11/12/2020    The patient's chart is reviewed and the patient is discussed with the staff. Patient is a 50 y.o.  male with a PMHx of ETOH abuse, GERD, and smoker. He who was initially admitted per the surgical service on 10/30 for traumatic PTX and rib fractures after falling from the attic while attempting to do some home repairs. 11/3- Chest CT showed loculated hydropneumothorax.   Blood cultures were + for MSSA as was the wound culture. ID has since been consulted and patient is being treated with a 6 week course of abx - currently on Ancef. On 11/4--CT was placed per IR. CT was removed on 11/11 with only a small amount of remaining fluid noted on follow up CT. He is uninsured so  will need to assist with antibiotic coverage. Subjective:     Seen sitting up in the chair. Feels pretty good and he wants to go home but he does not have insurance. Review of Systems -   Respiratory ROS: positive for none.  He reports breathing feels good, minimal soreness from recent chest tube  Cardiovascular ROS: positive for - none  Gastrointestinal ROS: positive for- none      Current Facility-Administered Medications   Medication Dose Route Frequency    LORazepam (ATIVAN) tablet 0.5 mg  0.5 mg Oral Q12H PRN    calcium carbonate (TUMS) chewable tablet 200 mg [elemental]  200 mg Oral TID PRN    polyethylene glycol (MIRALAX) packet 17 g  17 g Oral DAILY    sodium chloride (NS) flush 5-40 mL  5-40 mL IntraVENous PRN    folic acid (FOLVITE) tablet 1 mg  1 mg Oral DAILY    thiamine HCL (B-1) tablet 100 mg  100 mg Oral DAILY    multivitamin, tx-iron-ca-min (THERA-M w/ IRON) tablet 1 Tab  1 Tab Oral DAILY    nicotine (NICODERM CQ) 21 mg/24 hr patch 1 Patch  1 Patch TransDERmal Q24H    ceFAZolin (ANCEF) 2 g/20 mL in sterile water IV syringe  2 g IntraVENous Q8H    oxyCODONE-acetaminophen (PERCOCET 10)  mg per tablet 1 Tab  1 Tab Oral Q4H PRN    naloxone (NARCAN) injection 0.4 mg  0.4 mg IntraVENous PRN    sodium chloride (NS) flush 5-40 mL  5-40 mL IntraVENous Q8H    sodium chloride (NS) flush 5-40 mL  5-40 mL IntraVENous PRN    polyethylene glycol (MIRALAX) packet 17 g  17 g Oral DAILY PRN    ondansetron (ZOFRAN ODT) tablet 4 mg  4 mg Oral Q8H PRN    Or    ondansetron (ZOFRAN) injection 4 mg  4 mg IntraVENous Q6H PRN         Objective:     Vitals:    11/11/20 2330 11/12/20 0409 11/12/20 0644 11/12/20 0735   BP: 100/64 118/68  114/74   Pulse: 89 91  92   Resp: 17 20 19   Temp: 98.1 °F (36.7 °C) 98 °F (36.7 °C)  97.9 °F (36.6 °C)   SpO2: 95% 93%  90%   Weight:   206 lb 9.1 oz (93.7 kg)    Height:         Intake and Output:   11/10 1901 - 11/12 0700  In: -   Out: 5703 [XFCZN:[id number]]  11/12 0701 - 11/12 1900  In: -   Out: 150 [Urine:150]    Physical Exam:   Constitutional:  the patient is well developed and in no acute distress  HEENT:  Sclera clear, pupils equal, oral mucosa moist  Lungs: crackles from posterior - left > right. Oxygen on 1/2 liter so removed  Cardiovascular:  RRR without M,G,R  Abd/GI: soft and non-tender; with positive bowel sounds. Ext: warm without cyanosis. There is no lower leg edema.   Musculoskeletal: moves all four extremities with equal strength  Skin:  no jaundice or rashes, no wounds   Neuro: alert, oriented - looks a little groggy      Lines: peripheral IV        CHEST CT          LAB  Recent Labs     11/12/20  0347 11/11/20  0302 11/10/20  0534   WBC 7.3 13.7* 14.6*   HGB 10.8* 10.3* 10.5*   HCT 31.6* 30.3* 31.1*    457* 430     Recent Labs     11/12/20  0347 11/11/20  0302 11/10/20  0534   * 134* 136*   K 3.7 4.1 4.2   CL 94* 94* 94*   CO2 39* 36* 36*   GLU 93 125* 105*   BUN 6 7 4*   CREA 0.56* 0.44* 0.49*   ALB  --   --  1.6*       Echo    SUMMARY:   -  Left ventricle: Systolic function was normal. Ejection fraction was  estimated in the range of 55 % to 60 %. There were no regional wall motion  abnormalities.   -  Aortic valve: Although there was no diagnostic evidence for vegetation,   This study is not adequate to completely exclude the possibility.   -  Mitral valve: Although there was no diagnostic evidence for vegetation,   This study is not adequate to completely exclude the possibility.   -  Tricuspid valve: Although there was no diagnostic evidence for vegetation,  this study is not adequate to completely exclude the possibility. Assessment:  (Medical Decision Making)     Hospital Problems  Date Reviewed: 11/3/2020          Codes Class Noted POA    Sinus tachycardia ICD-10-CM: R00.0  ICD-9-CM: 427.89  11/11/2020 Yes    resolved    MSSA bacteremia ICD-10-CM: R78.81, B95.61  ICD-9-CM: 790.7, 041.11  11/4/2020 Yes    Suspect secondary to chest wound    Leukocytosis ICD-10-CM: D72.829  ICD-9-CM: 288.60  11/3/2020 Yes    resolved    Hydropneumothorax ICD-10-CM: J94.8  ICD-9-CM: 511.89  11/3/2020 Yes    CT removed yesterday    Loculated pleural effusion ICD-10-CM: J90  ICD-9-CM: 511.9  11/3/2020 Yes    As above    Hyponatremia ICD-10-CM: E87.1  ICD-9-CM: 276.1  11/3/2020 Yes    corrected                 Plan:  (Medical Decision Making)   1. CT removed yesterday. About 200 mls fluid remaining and loculated  2. Took 5 doses of Percocet yesterday. Discussed pain control with him now that chest tube is out - will order prn Tylenol for mild pain and Percocet for severe, uncontrolled pain   3. Day 9/14 of Ancef - ID plans 2 week course of IV abx then 2 to 4 weeks of oral. Patient is uninsured so  will need to assist with meds. 4. Check room air sat  5.  Stop daily labs    Shaina Cornelius NP  Lungs:  clear  Heart:  RRR with no Murmur/Rubs/Gallops    Additional Comments:  Can go home and get outpt iv antibx with rocephine 2 gm q day until 12/4  Will need follow up cxr    I have spoken with and examined the patient. I agree with the above assessment and plan as documented.     Vijay Santos MD

## 2020-11-12 NOTE — PROGRESS NOTES
END OF SHIFT NOTE:    INTAKE/OUTPUT  11/11 0701 - 11/12 0700  In: -   Out: 1700 [Urine:1650]  Voiding: YES  Catheter: NO  Drain:              Flatus: Patient does have flatus present. Stool:  0 occurrences. Characteristics:  Stool Assessment  Stool Color: Other (Comment)(not observed)    Emesis: 0 occurrences. Characteristics:        VITAL SIGNS  Patient Vitals for the past 12 hrs:   Temp Pulse Resp BP SpO2   11/12/20 0409 98 °F (36.7 °C) 91 20 118/68 93 %   11/11/20 2330 98.1 °F (36.7 °C) 89 17 100/64 95 %   11/11/20 2047 98.3 °F (36.8 °C) 96 17 111/76 90 %       Pain Assessment  Pain Intensity 1: 0 (11/12/20 0500)  Pain Location 1: Chest  Pain Intervention(s) 1: Medication (see MAR)  Patient Stated Pain Goal: 0    Ambulating  Yes up to bathroom    Shift report given to oncoming nurse at the bedside.     Leonard Bangura

## 2020-11-12 NOTE — PROGRESS NOTES
PICC Placement Note    PRE-PROCEDURE VERIFICATION  Correct Procedure: yes. Time out completed with assistant Daryn Haywood RN, VAT and all persons present in agreement with time out. Correct Site:  yes  Temperature: Temp: 99 °F (37.2 °C), Temperature Source: Temp Source: Oral  Recent Labs     11/12/20  0347   BUN 6   CREA 0.56*      WBC 7.3     Allergies: Patient has no known allergies. Education materials for [facility] given to patient or family. PROCEDURE DETAIL  A single lumen PICC line was started for antibiotic therapy. The following documentation is in addition to the PICC properties in the lines/airways flowsheet :  Lot #: [id number]  xylocaine used: yes  Mid-Arm Circumference: 35 (cm)  Internal Catheter Length: 42 (cm)  Internal Catheter Total Length: 42 (cm)  Vein Selection for PICC:right basilic  Central Line Bundle followed yes  Complication Related to Insertion: none  Both the insertion guidewire and ECG guidewire were removed intact all ports have positive blood return and were flush well with normal saline. The location of the tip of the PICC is verified using ECG technology. The tip is in the SVC per ECG reading. See image below.      Line is okay to use: yes      Taylor Hannon RN, VAT

## 2020-11-12 NOTE — PROGRESS NOTES
UPDATE 4:01PM: This CM spoke with Usha Palacios at [facility]. She confirms pt can be seen for first outpatient appointment tomorrow at [facility].  His information has been passed to financial assistance department at the [facility]. This CM spoke with pt regarding outpatient IV antibiotics - he remains agreeable with plan. Updated him on first appointment - he is agreeable with no voiced concerns. Discussed if he felt need for [place] at discharge - he reports his girlfriend is a nurse and assists him as needed. We discussed the need for a PCP - he is agreeable for this CM to make referral to [facility]. This CM called appointment line at [facility] but had to leave VM. This CM to arrange appointment. Printed out new pt information for [facility] for pt to have at discharge. Pt to return home with girlfriend. No additional CM needs at this time. Will continue to follow. Case management consult for need for pt to have outpatient IV antibiotics at discharge. This CM emailed Radha Peña and Usha Palacios at [facility] to inquire about financial assistance with cost of IV antibiotics. [facility] emailed this CM stating information is going to be forwarded to their financial assistance team.  Order/referral faxed to [facility] this day. No additional CM needs at this time. Will continue to follow and update as needed.

## 2020-11-13 ENCOUNTER — HOSPITAL ENCOUNTER (OUTPATIENT)
Dept: INFUSION THERAPY | Age: 50
Discharge: HOME | End: 2020-11-13

## 2020-11-13 VITALS
RESPIRATION RATE: 22 BRPM | OXYGEN SATURATION: 94 % | TEMPERATURE: 98.7 F | SYSTOLIC BLOOD PRESSURE: 115 MMHG | HEART RATE: 90 BPM | DIASTOLIC BLOOD PRESSURE: 68 MMHG

## 2020-11-13 PROCEDURE — 74011000258 HC RX REV CODE- 258: Performed by: INTERNAL MEDICINE

## 2020-11-13 PROCEDURE — 96365 THER/PROPH/DIAG IV INF INIT: CPT

## 2020-11-13 PROCEDURE — 74011250636 HC RX REV CODE- 250/636: Performed by: INTERNAL MEDICINE

## 2020-11-13 RX ORDER — SODIUM CHLORIDE 0.9 % (FLUSH) 0.9 %
10 SYRINGE (ML) INJECTION EVERY 8 HOURS
Status: DISCONTINUED | OUTPATIENT
Start: 2020-11-13 | End: 2020-11-15 | Stop reason: HOSPADM

## 2020-11-13 RX ORDER — NALOXONE HYDROCHLORIDE 4 MG/.1ML
SPRAY NASAL
Qty: 1 EACH | Refills: 1 | Status: SHIPPED | OUTPATIENT
Start: 2020-11-13

## 2020-11-13 RX ORDER — OXYCODONE AND ACETAMINOPHEN 5; 325 MG/1; MG/1
1 TABLET ORAL
Qty: 9 TAB | Refills: 0 | Status: SHIPPED | OUTPATIENT
Start: 2020-11-13 | End: 2020-12-03

## 2020-11-13 RX ADMIN — CEFTRIAXONE 2 G: 2 INJECTION, POWDER, FOR SOLUTION INTRAMUSCULAR; INTRAVENOUS at 17:18

## 2020-11-13 RX ADMIN — Medication 10 ML: at 17:50

## 2020-11-13 NOTE — PROGRESS NOTES
Arrived to the [facility]. Assessment completed Rocephin completed. Patient tolerated without problems. Any issues or concerns during appointment: None  Instructed to call provider  with any side effects or concerns  Patient aware of next infusion appointment on 11/14/20 (date) at 11 PM (time).   Discharged via W/C with spouse

## 2020-11-13 NOTE — PROGRESS NOTES
This CM received call from [facility] regarding hospital follow up PCP appointment. Informed them pt has discharged already. Provided them his name and contact information. They report they will call him directly and arrange PCP appointment with him.

## 2020-11-14 ENCOUNTER — HOSPITAL ENCOUNTER (INPATIENT)
Age: 50
LOS: 18 days | Discharge: HOME | DRG: 163 | End: 2020-12-03
Attending: STUDENT IN AN ORGANIZED HEALTH CARE EDUCATION/TRAINING PROGRAM | Admitting: INTERNAL MEDICINE

## 2020-11-14 ENCOUNTER — APPOINTMENT (OUTPATIENT)
Dept: CT IMAGING | Age: 50
DRG: 163 | End: 2020-11-14
Attending: STUDENT IN AN ORGANIZED HEALTH CARE EDUCATION/TRAINING PROGRAM

## 2020-11-14 ENCOUNTER — APPOINTMENT (OUTPATIENT)
Dept: GENERAL RADIOLOGY | Age: 50
DRG: 163 | End: 2020-11-14
Attending: STUDENT IN AN ORGANIZED HEALTH CARE EDUCATION/TRAINING PROGRAM

## 2020-11-14 ENCOUNTER — HOSPITAL ENCOUNTER (OUTPATIENT)
Dept: INFUSION THERAPY | Age: 50
Discharge: HOME | End: 2020-11-14

## 2020-11-14 VITALS
TEMPERATURE: 96.6 F | RESPIRATION RATE: 18 BRPM | OXYGEN SATURATION: 96 % | DIASTOLIC BLOOD PRESSURE: 62 MMHG | SYSTOLIC BLOOD PRESSURE: 110 MMHG | HEART RATE: 86 BPM

## 2020-11-14 DIAGNOSIS — J86.9 LOCULATED EMPYEMA (HCC): ICD-10-CM

## 2020-11-14 DIAGNOSIS — J90 LOCULATED PLEURAL EFFUSION: ICD-10-CM

## 2020-11-14 DIAGNOSIS — F10.10 ALCOHOL ABUSE: ICD-10-CM

## 2020-11-14 DIAGNOSIS — R09.02 HYPOXIA: Primary | ICD-10-CM

## 2020-11-14 DIAGNOSIS — S22.42XG CLOSED FRACTURE OF MULTIPLE RIBS OF LEFT SIDE WITH DELAYED HEALING, SUBSEQUENT ENCOUNTER: ICD-10-CM

## 2020-11-14 DIAGNOSIS — B95.61 MSSA BACTEREMIA: ICD-10-CM

## 2020-11-14 DIAGNOSIS — R78.81 MSSA BACTEREMIA: ICD-10-CM

## 2020-11-14 DIAGNOSIS — J86.9 EMPYEMA LUNG (HCC): ICD-10-CM

## 2020-11-14 DIAGNOSIS — J96.01 ACUTE RESPIRATORY FAILURE WITH HYPOXIA (HCC): ICD-10-CM

## 2020-11-14 LAB
ALBUMIN SERPL-MCNC: 1.6 G/DL (ref 3.5–5)
ALBUMIN/GLOB SERPL: 0.3 {RATIO} (ref 1.2–3.5)
ALP SERPL-CCNC: 269 U/L (ref 50–136)
ALT SERPL-CCNC: 12 U/L (ref 12–65)
ANION GAP SERPL CALC-SCNC: 0 MMOL/L (ref 7–16)
ARTERIAL PATENCY WRIST A: YES
AST SERPL-CCNC: 40 U/L (ref 15–37)
BASE EXCESS BLD CALC-SCNC: 11 MMOL/L
BASOPHILS # BLD: 0 K/UL (ref 0–0.2)
BASOPHILS NFR BLD: 0 % (ref 0–2)
BDY SITE: ABNORMAL
BILIRUB SERPL-MCNC: 0.7 MG/DL (ref 0.2–1.1)
BNP SERPL-MCNC: 391 PG/ML (ref 5–125)
BUN SERPL-MCNC: 7 MG/DL (ref 6–23)
CALCIUM SERPL-MCNC: 7.6 MG/DL (ref 8.3–10.4)
CHLORIDE SERPL-SCNC: 99 MMOL/L (ref 98–107)
CO2 BLD-SCNC: 36 MMOL/L
CO2 SERPL-SCNC: 38 MMOL/L (ref 21–32)
COLLECT TIME,HTIME: 2141
CREAT SERPL-MCNC: 0.74 MG/DL (ref 0.8–1.5)
DIFFERENTIAL METHOD BLD: ABNORMAL
EOSINOPHIL # BLD: 0.2 K/UL (ref 0–0.8)
EOSINOPHIL NFR BLD: 2 % (ref 0.5–7.8)
ERYTHROCYTE [DISTWIDTH] IN BLOOD BY AUTOMATED COUNT: 15.6 % (ref 11.9–14.6)
FLOW RATE ISTAT,IFRATE: 15 L/MIN
GAS FLOW.O2 O2 DELIVERY SYS: ABNORMAL L/MIN
GLOBULIN SER CALC-MCNC: 4.7 G/DL (ref 2.3–3.5)
GLUCOSE SERPL-MCNC: 159 MG/DL (ref 65–100)
HCO3 BLD-SCNC: 35.2 MMOL/L (ref 22–26)
HCT VFR BLD AUTO: 31.3 % (ref 41.1–50.3)
HGB BLD-MCNC: 10.4 G/DL (ref 13.6–17.2)
IMM GRANULOCYTES # BLD AUTO: 0.1 K/UL (ref 0–0.5)
IMM GRANULOCYTES NFR BLD AUTO: 1 % (ref 0–5)
LACTATE SERPL-SCNC: 1.5 MMOL/L (ref 0.4–2)
LYMPHOCYTES # BLD: 1.1 K/UL (ref 0.5–4.6)
LYMPHOCYTES NFR BLD: 14 % (ref 13–44)
MAGNESIUM SERPL-MCNC: 1.8 MG/DL (ref 1.8–2.4)
MCH RBC QN AUTO: 38.2 PG (ref 26.1–32.9)
MCHC RBC AUTO-ENTMCNC: 33.2 G/DL (ref 31.4–35)
MCV RBC AUTO: 115.1 FL (ref 79.6–97.8)
MONOCYTES # BLD: 0.4 K/UL (ref 0.1–1.3)
MONOCYTES NFR BLD: 4 % (ref 4–12)
NEUTS SEG # BLD: 6.5 K/UL (ref 1.7–8.2)
NEUTS SEG NFR BLD: 79 % (ref 43–78)
NRBC # BLD: 0 K/UL (ref 0–0.2)
PCO2 BLD: 42.8 MMHG (ref 35–45)
PH BLD: 7.52 [PH] (ref 7.35–7.45)
PLATELET # BLD AUTO: 469 K/UL (ref 150–450)
PMV BLD AUTO: 10.3 FL (ref 9.4–12.3)
PO2 BLD: 101 MMHG (ref 75–100)
POTASSIUM SERPL-SCNC: 3.6 MMOL/L (ref 3.5–5.1)
PROT SERPL-MCNC: 6.3 G/DL (ref 6.3–8.2)
RBC # BLD AUTO: 2.72 M/UL (ref 4.23–5.6)
SAO2 % BLD: 98 % (ref 95–98)
SERVICE CMNT-IMP: ABNORMAL
SERVICE CMNT-IMP: ABNORMAL
SODIUM SERPL-SCNC: 137 MMOL/L (ref 136–145)
SPECIMEN TYPE: ABNORMAL
TROPONIN-HIGH SENSITIVITY: 3.2 PG/ML (ref 0–14)
WBC # BLD AUTO: 8.3 K/UL (ref 4.3–11.1)

## 2020-11-14 PROCEDURE — 99285 EMERGENCY DEPT VISIT HI MDM: CPT

## 2020-11-14 PROCEDURE — 71045 X-RAY EXAM CHEST 1 VIEW: CPT

## 2020-11-14 PROCEDURE — 94640 AIRWAY INHALATION TREATMENT: CPT

## 2020-11-14 PROCEDURE — 74011000250 HC RX REV CODE- 250: Performed by: STUDENT IN AN ORGANIZED HEALTH CARE EDUCATION/TRAINING PROGRAM

## 2020-11-14 PROCEDURE — 96374 THER/PROPH/DIAG INJ IV PUSH: CPT

## 2020-11-14 PROCEDURE — 74011000636 HC RX REV CODE- 636: Performed by: STUDENT IN AN ORGANIZED HEALTH CARE EDUCATION/TRAINING PROGRAM

## 2020-11-14 PROCEDURE — 82803 BLOOD GASES ANY COMBINATION: CPT

## 2020-11-14 PROCEDURE — 85025 COMPLETE CBC W/AUTO DIFF WBC: CPT

## 2020-11-14 PROCEDURE — 0W9900Z DRAINAGE OF RIGHT PLEURAL CAVITY WITH DRAINAGE DEVICE, OPEN APPROACH: ICD-10-PCS | Performed by: SURGERY

## 2020-11-14 PROCEDURE — 0BNK0ZZ RELEASE RIGHT LUNG, OPEN APPROACH: ICD-10-PCS | Performed by: SURGERY

## 2020-11-14 PROCEDURE — 83605 ASSAY OF LACTIC ACID: CPT

## 2020-11-14 PROCEDURE — 96365 THER/PROPH/DIAG IV INF INIT: CPT

## 2020-11-14 PROCEDURE — 80053 COMPREHEN METABOLIC PANEL: CPT

## 2020-11-14 PROCEDURE — 36600 WITHDRAWAL OF ARTERIAL BLOOD: CPT

## 2020-11-14 PROCEDURE — 93005 ELECTROCARDIOGRAM TRACING: CPT | Performed by: STUDENT IN AN ORGANIZED HEALTH CARE EDUCATION/TRAINING PROGRAM

## 2020-11-14 PROCEDURE — 74011250636 HC RX REV CODE- 250/636: Performed by: INTERNAL MEDICINE

## 2020-11-14 PROCEDURE — 96375 TX/PRO/DX INJ NEW DRUG ADDON: CPT

## 2020-11-14 PROCEDURE — 74011250636 HC RX REV CODE- 250/636: Performed by: STUDENT IN AN ORGANIZED HEALTH CARE EDUCATION/TRAINING PROGRAM

## 2020-11-14 PROCEDURE — 83880 ASSAY OF NATRIURETIC PEPTIDE: CPT

## 2020-11-14 PROCEDURE — 83735 ASSAY OF MAGNESIUM: CPT

## 2020-11-14 PROCEDURE — 84484 ASSAY OF TROPONIN QUANT: CPT

## 2020-11-14 PROCEDURE — 74011000258 HC RX REV CODE- 258: Performed by: INTERNAL MEDICINE

## 2020-11-14 PROCEDURE — 74011000258 HC RX REV CODE- 258: Performed by: STUDENT IN AN ORGANIZED HEALTH CARE EDUCATION/TRAINING PROGRAM

## 2020-11-14 PROCEDURE — 71260 CT THORAX DX C+: CPT

## 2020-11-14 RX ORDER — IPRATROPIUM BROMIDE AND ALBUTEROL SULFATE 2.5; .5 MG/3ML; MG/3ML
3 SOLUTION RESPIRATORY (INHALATION)
Status: COMPLETED | OUTPATIENT
Start: 2020-11-14 | End: 2020-11-14

## 2020-11-14 RX ORDER — SODIUM CHLORIDE 0.9 % (FLUSH) 0.9 %
10 SYRINGE (ML) INJECTION AS NEEDED
Status: DISCONTINUED | OUTPATIENT
Start: 2020-11-14 | End: 2020-11-16 | Stop reason: HOSPADM

## 2020-11-14 RX ORDER — SODIUM CHLORIDE 0.9 % (FLUSH) 0.9 %
10 SYRINGE (ML) INJECTION
Status: COMPLETED | OUTPATIENT
Start: 2020-11-14 | End: 2020-11-14

## 2020-11-14 RX ORDER — KETOROLAC TROMETHAMINE 30 MG/ML
30 INJECTION, SOLUTION INTRAMUSCULAR; INTRAVENOUS
Status: COMPLETED | OUTPATIENT
Start: 2020-11-14 | End: 2020-11-14

## 2020-11-14 RX ADMIN — CEFTRIAXONE 2 G: 2 INJECTION, POWDER, FOR SOLUTION INTRAMUSCULAR; INTRAVENOUS at 16:56

## 2020-11-14 RX ADMIN — SODIUM CHLORIDE 100 ML: 900 INJECTION, SOLUTION INTRAVENOUS at 23:53

## 2020-11-14 RX ADMIN — IPRATROPIUM BROMIDE AND ALBUTEROL SULFATE 3 ML: .5; 3 SOLUTION RESPIRATORY (INHALATION) at 21:37

## 2020-11-14 RX ADMIN — Medication 10 ML: at 23:53

## 2020-11-14 RX ADMIN — Medication 10 ML: at 17:23

## 2020-11-14 RX ADMIN — SODIUM CHLORIDE 500 ML: 900 INJECTION, SOLUTION INTRAVENOUS at 22:11

## 2020-11-14 RX ADMIN — KETOROLAC TROMETHAMINE 30 MG: 30 INJECTION, SOLUTION INTRAMUSCULAR at 22:10

## 2020-11-14 RX ADMIN — IOPAMIDOL 100 ML: 755 INJECTION, SOLUTION INTRAVENOUS at 23:52

## 2020-11-14 RX ADMIN — Medication 10 ML: at 16:52

## 2020-11-14 NOTE — PROGRESS NOTES
Arrived to the [facility]. 2 G rocephin completed. Provided education on side effects. Patient instructed to report any side affects to ordering provider. Patient tolerated well. Any issues or concerns during appointment: none. Patient aware of next infusion appointment on 11/15/20 (date) at 56 (time). Discharged via wheelchair with significant other.

## 2020-11-15 PROBLEM — J86.9 LOCULATED EMPYEMA (HCC): Status: ACTIVE | Noted: 2020-11-15

## 2020-11-15 PROBLEM — J96.01 ACUTE RESPIRATORY FAILURE WITH HYPOXIA (HCC): Status: ACTIVE | Noted: 2020-11-15

## 2020-11-15 LAB
APPEARANCE UR: ABNORMAL
ATRIAL RATE: 102 BPM
BACTERIA URNS QL MICRO: 0 /HPF
BILIRUB UR QL: ABNORMAL
CALCULATED P AXIS, ECG09: 59 DEGREES
CALCULATED R AXIS, ECG10: 17 DEGREES
CALCULATED T AXIS, ECG11: 58 DEGREES
CASTS URNS QL MICRO: ABNORMAL /LPF
COLOR UR: ABNORMAL
DIAGNOSIS, 93000: NORMAL
EPI CELLS #/AREA URNS HPF: ABNORMAL /HPF
GLUCOSE UR STRIP.AUTO-MCNC: NEGATIVE MG/DL
HGB UR QL STRIP: NEGATIVE
KETONES UR QL STRIP.AUTO: NEGATIVE MG/DL
LEUKOCYTE ESTERASE UR QL STRIP.AUTO: NEGATIVE
NITRITE UR QL STRIP.AUTO: NEGATIVE
P-R INTERVAL, ECG05: 140 MS
PH UR STRIP: 6 [PH] (ref 5–9)
PROT UR STRIP-MCNC: ABNORMAL MG/DL
Q-T INTERVAL, ECG07: 310 MS
QRS DURATION, ECG06: 88 MS
QTC CALCULATION (BEZET), ECG08: 404 MS
RBC #/AREA URNS HPF: ABNORMAL /HPF
SP GR UR REFRACTOMETRY: 1.08 (ref 1–1.02)
TROPONIN-HIGH SENSITIVITY: 4.1 PG/ML (ref 0–14)
UROBILINOGEN UR QL STRIP.AUTO: 1 EU/DL (ref 0.2–1)
VENTRICULAR RATE, ECG03: 102 BPM
WBC URNS QL MICRO: ABNORMAL /HPF

## 2020-11-15 PROCEDURE — 84484 ASSAY OF TROPONIN QUANT: CPT

## 2020-11-15 PROCEDURE — 74011250637 HC RX REV CODE- 250/637: Performed by: NURSE PRACTITIONER

## 2020-11-15 PROCEDURE — 74011250637 HC RX REV CODE- 250/637: Performed by: INTERNAL MEDICINE

## 2020-11-15 PROCEDURE — 99223 1ST HOSP IP/OBS HIGH 75: CPT | Performed by: INTERNAL MEDICINE

## 2020-11-15 PROCEDURE — 81003 URINALYSIS AUTO W/O SCOPE: CPT

## 2020-11-15 PROCEDURE — 74011000258 HC RX REV CODE- 258: Performed by: INTERNAL MEDICINE

## 2020-11-15 PROCEDURE — 36415 COLL VENOUS BLD VENIPUNCTURE: CPT

## 2020-11-15 PROCEDURE — 65270000029 HC RM PRIVATE

## 2020-11-15 PROCEDURE — 74011250636 HC RX REV CODE- 250/636: Performed by: INTERNAL MEDICINE

## 2020-11-15 PROCEDURE — 74011250636 HC RX REV CODE- 250/636: Performed by: STUDENT IN AN ORGANIZED HEALTH CARE EDUCATION/TRAINING PROGRAM

## 2020-11-15 PROCEDURE — 87040 BLOOD CULTURE FOR BACTERIA: CPT

## 2020-11-15 RX ORDER — LANOLIN ALCOHOL/MO/W.PET/CERES
100 CREAM (GRAM) TOPICAL DAILY
Status: COMPLETED | OUTPATIENT
Start: 2020-11-15 | End: 2020-11-17

## 2020-11-15 RX ORDER — PROMETHAZINE HYDROCHLORIDE 25 MG/1
12.5 TABLET ORAL
Status: DISCONTINUED | OUTPATIENT
Start: 2020-11-15 | End: 2020-12-03 | Stop reason: HOSPADM

## 2020-11-15 RX ORDER — ONDANSETRON 2 MG/ML
4 INJECTION INTRAMUSCULAR; INTRAVENOUS
Status: COMPLETED | OUTPATIENT
Start: 2020-11-15 | End: 2020-11-15

## 2020-11-15 RX ORDER — ACETAMINOPHEN 650 MG/1
650 SUPPOSITORY RECTAL
Status: DISCONTINUED | OUTPATIENT
Start: 2020-11-15 | End: 2020-12-03 | Stop reason: HOSPADM

## 2020-11-15 RX ORDER — SODIUM CHLORIDE 0.9 % (FLUSH) 0.9 %
10 SYRINGE (ML) INJECTION AS NEEDED
Status: CANCELLED | OUTPATIENT
Start: 2020-11-16

## 2020-11-15 RX ORDER — FOLIC ACID 1 MG/1
1 TABLET ORAL DAILY
Status: COMPLETED | OUTPATIENT
Start: 2020-11-15 | End: 2020-11-17

## 2020-11-15 RX ORDER — ONDANSETRON 2 MG/ML
4 INJECTION INTRAMUSCULAR; INTRAVENOUS
Status: DISCONTINUED | OUTPATIENT
Start: 2020-11-15 | End: 2020-12-03 | Stop reason: HOSPADM

## 2020-11-15 RX ORDER — SODIUM CHLORIDE 0.9 % (FLUSH) 0.9 %
5-40 SYRINGE (ML) INJECTION EVERY 8 HOURS
Status: DISCONTINUED | OUTPATIENT
Start: 2020-11-15 | End: 2020-12-03 | Stop reason: HOSPADM

## 2020-11-15 RX ORDER — HYDROMORPHONE HYDROCHLORIDE 1 MG/ML
1 INJECTION, SOLUTION INTRAMUSCULAR; INTRAVENOUS; SUBCUTANEOUS
Status: COMPLETED | OUTPATIENT
Start: 2020-11-15 | End: 2020-11-15

## 2020-11-15 RX ORDER — FUROSEMIDE 10 MG/ML
40 INJECTION INTRAMUSCULAR; INTRAVENOUS DAILY
Status: COMPLETED | OUTPATIENT
Start: 2020-11-15 | End: 2020-11-15

## 2020-11-15 RX ORDER — POLYETHYLENE GLYCOL 3350 17 G/17G
17 POWDER, FOR SOLUTION ORAL DAILY PRN
Status: DISCONTINUED | OUTPATIENT
Start: 2020-11-15 | End: 2020-12-03 | Stop reason: HOSPADM

## 2020-11-15 RX ORDER — ACETAMINOPHEN 325 MG/1
650 TABLET ORAL
Status: DISCONTINUED | OUTPATIENT
Start: 2020-11-15 | End: 2020-12-03 | Stop reason: HOSPADM

## 2020-11-15 RX ORDER — OXYCODONE AND ACETAMINOPHEN 5; 325 MG/1; MG/1
1 TABLET ORAL
Status: DISCONTINUED | OUTPATIENT
Start: 2020-11-15 | End: 2020-11-17

## 2020-11-15 RX ORDER — LORAZEPAM 1 MG/1
1 TABLET ORAL
Status: DISCONTINUED | OUTPATIENT
Start: 2020-11-15 | End: 2020-11-17

## 2020-11-15 RX ORDER — SODIUM CHLORIDE 0.9 % (FLUSH) 0.9 %
5-40 SYRINGE (ML) INJECTION AS NEEDED
Status: DISCONTINUED | OUTPATIENT
Start: 2020-11-15 | End: 2020-12-03 | Stop reason: HOSPADM

## 2020-11-15 RX ADMIN — FOLIC ACID 1 MG: 1 TABLET ORAL at 11:24

## 2020-11-15 RX ADMIN — ZINC 1 TABLET: TAB ORAL at 16:53

## 2020-11-15 RX ADMIN — OXYCODONE HYDROCHLORIDE AND ACETAMINOPHEN 1 TABLET: 5; 325 TABLET ORAL at 09:36

## 2020-11-15 RX ADMIN — LORAZEPAM 1 MG: 1 TABLET ORAL at 11:26

## 2020-11-15 RX ADMIN — Medication 100 MG: at 11:24

## 2020-11-15 RX ADMIN — CEFTRIAXONE 2 G: 2 INJECTION, POWDER, FOR SOLUTION INTRAMUSCULAR; INTRAVENOUS at 16:53

## 2020-11-15 RX ADMIN — OXYCODONE HYDROCHLORIDE AND ACETAMINOPHEN 1 TABLET: 5; 325 TABLET ORAL at 16:05

## 2020-11-15 RX ADMIN — ONDANSETRON 4 MG: 2 INJECTION INTRAMUSCULAR; INTRAVENOUS at 00:39

## 2020-11-15 RX ADMIN — LORAZEPAM 1 MG: 1 TABLET ORAL at 20:22

## 2020-11-15 RX ADMIN — Medication 10 ML: at 04:10

## 2020-11-15 RX ADMIN — HYDROMORPHONE HYDROCHLORIDE 1 MG: 1 INJECTION, SOLUTION INTRAMUSCULAR; INTRAVENOUS; SUBCUTANEOUS at 00:39

## 2020-11-15 RX ADMIN — OXYCODONE HYDROCHLORIDE AND ACETAMINOPHEN 1 TABLET: 5; 325 TABLET ORAL at 21:14

## 2020-11-15 RX ADMIN — OXYCODONE HYDROCHLORIDE AND ACETAMINOPHEN 1 TABLET: 5; 325 TABLET ORAL at 03:59

## 2020-11-15 RX ADMIN — FUROSEMIDE 40 MG: 10 INJECTION, SOLUTION INTRAMUSCULAR; INTRAVENOUS at 09:36

## 2020-11-15 RX ADMIN — Medication 10 ML: at 13:39

## 2020-11-15 NOTE — H&P
Hospitalist H&P Note     Admit Date:  2020  9:21 PM   Name:  Julio Woodruff  Age: 48 y.o.  : 1970   MRN:  [id number]   PCP:  None    HPI/Subjective:   Julio Woodruff is a 48 y.o. male with medical history significant for EtOH abuse, GERD,  Smoker, recent hospitalization for traumatic pneumothorax and rib fracture complicated by loculated hydropneumothorax due to empyema currently on outpatient IV rocephin via R PICC line who presented to the ED due to acute on set of severe shortness of breath. Patient had a fall resulting in thorax and rib fracture on 10/30 and was discharged on  to be managed with chest tube by surgical service. Patient returned on 11/3 due to pain and noted to have loculated hydropneumothorax on CT chest.  BCx and WCx were positive for MSSA and is recommended Rocephin IV until . Patient was discharged on  after removal of chest tube with only a small amount of remaining fluid noted on follow-up CT. Reports that he has been doing well since discharge. However, after he returned home from getting outpatient IV antibiotics infusion today, he developed sudden onset of severe shortness of breath. He felt he was having a severe panic attack and unable to breath. EMS arrival, patient was found to be saturating in the 80s. In the ED, he is hemodynamically stable tachypneic. His oxygen requirement has been titrated down to 6L nasal cannula. Labs are notable for WBC 8.3, hemoglobin 10.4, proBNP 391. ABG shows 7.52/42.8/101 on 15 L nonrebreather. CT chest with no PE, enlarged multilobulated left empyema, unchanged left lower lobe consolidation with small internal air bubbles and multiple left rib fractures. Patient reports improved breathing while on 6 L nasal cannula. He denies any fever, chills, abdominal pain, nausea, vomiting. Have left-sided chest pain at the site where his chest tube was placed during his first admission.   Reports swelling of his lower extremity for about 2 weeks. Reports rashes in his lower extremity which started after getting a sponge bath to 3 days ago. Rashes are nonpruritic, nonpainful. 10 systems reviewed and negative except as noted in HPI. Past Medical History:   Diagnosis Date    GERD (gastroesophageal reflux disease)     once a week OTC med      Past Surgical History:   Procedure Laterality Date    HX HEENT      wisdom teeth      No Known Allergies   Social History     Tobacco Use    Smoking status: Current Every Day Smoker     Packs/day: 1.00     Years: 20.00     Pack years: 20.00    Smokeless tobacco: Never Used   Substance Use Topics    Alcohol use: Yes     Alcohol/week: 6.7 standard drinks     Types: 8 Shots of liquor per week      No family history on file. Immunization History   Administered Date(s) Administered    TDAP Vaccine 09/01/2011     PTA Medications:  Prior to Admission Medications   Prescriptions Last Dose Informant Patient Reported? Taking? cyclobenzaprine (FLEXERIL) 10 mg tablet   No No   Sig: Take 1 Tab by mouth three (3) times daily as needed for Muscle Spasm(s). naloxone (Narcan) 4 mg/actuation nasal spray   No No   Sig: Use 1 spray intranasally, then discard. Repeat with new spray every 2 min as needed for opioid overdose symptoms, alternating nostrils. oxyCODONE-acetaminophen (PERCOCET) 5-325 mg per tablet   No No   Sig: Take 1 Tab by mouth every eight (8) hours as needed for Pain for up to 3 days. Max Daily Amount: 3 Tabs.       Facility-Administered Medications: None       Objective:     Patient Vitals for the past 24 hrs:   Temp Pulse Resp BP SpO2   11/15/20 0221  83 18 114/66 100 %   11/15/20 0121  85 13 114/65 98 %   11/15/20 0101  87 14 114/68 99 %   11/15/20 0043  88 26 (!) 113/51 99 %   11/14/20 2337  90 18  100 %   11/14/20 2321  95 14 113/68 98 %   11/14/20 2301  95 13 109/68 98 %   11/14/20 2221  96 16 134/73 98 %   11/14/20 2149     99 %   11/14/20 2137     100 %   11/14/20 2119 99 °F (37.2 °C) 100  125/83 100 %     Oxygen Therapy  O2 Sat (%): 100 % (11/15/20 0221)  Pulse via Oximetry: 82 beats per minute (11/15/20 0221)  O2 Device: Hi flow nasal cannula (11/15/20 0121)  O2 Flow Rate (L/min): 6 l/min (11/15/20 0121)    Estimated body mass index is 28.7 kg/m² as calculated from the following:    Height as of this encounter: 5' 10\" (1.778 m). Weight as of this encounter: 90.7 kg (200 lb). Intake/Output Summary (Last 24 hours) at 11/15/2020 0240  Last data filed at 11/15/2020 0044  Gross per 24 hour   Intake 600 ml   Output    Net 600 ml       *Note that automatically entered I/Os may not be accurate; dependent on patient compliance with collection and accurate  by assistants. Physical Exam:  General:     alert, awake but somewhat lethargic. Ill appearing  Head:   normocephalic, atraumatic  Eyes, Ears, nose: PERRL, EOMI. Normal Conjunctiva. Neck:    supple, non-tender. Trachea midline. Lungs: No wheezing. Slightly coarse breath sounds on the left. Some ecchymosis on the left chest wall with gauze taped over prior chest tube entry sites(anterior upper chest and left lateral chest wall). Cardiac:   RRR, Normal S1 and S2. Abdomen:   Soft, non distended, nontender, +BS  Extremities:   Warm, dry. +1-2 LE pitting edema   Skin:   No rashes, no jaundice, multiple small pin-point erythematous macules all over his lower extremities. Neuro:  AAOx 3 .  No gross focal neurological deficit  Psychiatric:  No anxiety, calm, cooperative      Data Review:   Recent Results (from the past 24 hour(s))   LACTIC ACID    Collection Time: 11/14/20  9:22 PM   Result Value Ref Range    Lactic acid 1.5 0.4 - 2.0 MMOL/L   CBC WITH AUTOMATED DIFF    Collection Time: 11/14/20  9:23 PM   Result Value Ref Range    WBC 8.3 4.3 - 11.1 K/uL    RBC 2.72 (L) 4.23 - 5.6 M/uL    HGB 10.4 (L) 13.6 - 17.2 g/dL    HCT 31.3 (L) 41.1 - 50.3 %    .1 (H) 79.6 - 97.8 FL MCH 38.2 (H) 26.1 - 32.9 PG    MCHC 33.2 31.4 - 35.0 g/dL    RDW 15.6 (H) 11.9 - 14.6 %    PLATELET 448 (H) 484 - 450 K/uL    MPV 10.3 9.4 - 12.3 FL    ABSOLUTE NRBC 0.00 0.0 - 0.2 K/uL    DF AUTOMATED      NEUTROPHILS 79 (H) 43 - 78 %    LYMPHOCYTES 14 13 - 44 %    MONOCYTES 4 4.0 - 12.0 %    EOSINOPHILS 2 0.5 - 7.8 %    BASOPHILS 0 0.0 - 2.0 %    IMMATURE GRANULOCYTES 1 0.0 - 5.0 %    ABS. NEUTROPHILS 6.5 1.7 - 8.2 K/UL    ABS. LYMPHOCYTES 1.1 0.5 - 4.6 K/UL    ABS. MONOCYTES 0.4 0.1 - 1.3 K/UL    ABS. EOSINOPHILS 0.2 0.0 - 0.8 K/UL    ABS. BASOPHILS 0.0 0.0 - 0.2 K/UL    ABS. IMM. GRANS. 0.1 0.0 - 0.5 K/UL   MAGNESIUM    Collection Time: 11/14/20  9:23 PM   Result Value Ref Range    Magnesium 1.8 1.8 - 2.4 mg/dL   METABOLIC PANEL, COMPREHENSIVE    Collection Time: 11/14/20  9:23 PM   Result Value Ref Range    Sodium 137 136 - 145 mmol/L    Potassium 3.6 3.5 - 5.1 mmol/L    Chloride 99 98 - 107 mmol/L    CO2 38 (H) 21 - 32 mmol/L    Anion gap 0 (L) 7 - 16 mmol/L    Glucose 159 (H) 65 - 100 mg/dL    BUN 7 6 - 23 MG/DL    Creatinine 0.74 (L) 0.8 - 1.5 MG/DL    GFR est AA >60 >60 ml/min/1.73m2    GFR est non-AA >60 >60 ml/min/1.73m2    Calcium 7.6 (L) 8.3 - 10.4 MG/DL    Bilirubin, total 0.7 0.2 - 1.1 MG/DL    ALT (SGPT) 12 12 - 65 U/L    AST (SGOT) 40 (H) 15 - 37 U/L    Alk.  phosphatase 269 (H) 50 - 136 U/L    Protein, total 6.3 6.3 - 8.2 g/dL    Albumin 1.6 (L) 3.5 - 5.0 g/dL    Globulin 4.7 (H) 2.3 - 3.5 g/dL    A-G Ratio 0.3 (L) 1.2 - 3.5     TROPONIN-HIGH SENSITIVITY    Collection Time: 11/14/20  9:23 PM   Result Value Ref Range    Troponin-High Sensitivity 3.2 0 - 14 pg/mL   NT-PRO BNP    Collection Time: 11/14/20  9:23 PM   Result Value Ref Range    NT pro- (H) 5 - 125 PG/ML   POC G3    Collection Time: 11/14/20  9:44 PM   Result Value Ref Range    Device: Non rebreather      pH (POC) 7.52 (H) 7.35 - 7.45      pCO2 (POC) 42.8 35 - 45 MMHG    pO2 (POC) 101 (H) 75 - 100 MMHG    HCO3 (POC) 35.2 (H) 22 - 26 MMOL/L    sO2 (POC) 98 95 - 98 %    Base excess (POC) 11 mmol/L    Allens test (POC) YES      Site LEFT RADIAL      Specimen type (POC) ARTERIAL      Performed by Cooper     CO2, POC 36 MMOL/L    Flow rate (POC) 15.000 L/min    Respiratory comment: NurseNotified     COLLECT TIME 2,141     TROPONIN-HIGH SENSITIVITY    Collection Time: 11/15/20 12:33 AM   Result Value Ref Range    Troponin-High Sensitivity 4.1 0 - 14 pg/mL       All Micro Results     Procedure Component Value Units Date/Time    CULTURE, BLOOD [[id number]] Collected:  11/15/20 0033    Order Status:  Completed Specimen:  Blood Updated:  11/15/20 0108    CULTURE, BLOOD [[id number]] Collected:  11/15/20 0035    Order Status:  Completed Specimen:  Blood Updated:  11/15/20 0108          Current Facility-Administered Medications   Medication Dose Route Frequency    sodium chloride (NS) flush 5-40 mL  5-40 mL IntraVENous Q8H    sodium chloride (NS) flush 5-40 mL  5-40 mL IntraVENous PRN    acetaminophen (TYLENOL) tablet 650 mg  650 mg Oral Q6H PRN    Or    acetaminophen (TYLENOL) suppository 650 mg  650 mg Rectal Q6H PRN    polyethylene glycol (MIRALAX) packet 17 g  17 g Oral DAILY PRN    promethazine (PHENERGAN) tablet 12.5 mg  12.5 mg Oral Q6H PRN    Or    ondansetron (ZOFRAN) injection 4 mg  4 mg IntraVENous Q6H PRN    furosemide (LASIX) injection 40 mg  40 mg IntraVENous DAILY    oxyCODONE-acetaminophen (PERCOCET) 5-325 mg per tablet 1 Tab  1 Tab Oral Q6H PRN    LORazepam (ATIVAN) tablet 1 mg  1 mg Oral Q6H PRN    cefTRIAXone (ROCEPHIN) 2 g in 0.9% sodium chloride (MBP/ADV) 50 mL MBP  2 g IntraVENous Q24H     Current Outpatient Medications   Medication Sig    oxyCODONE-acetaminophen (PERCOCET) 5-325 mg per tablet Take 1 Tab by mouth every eight (8) hours as needed for Pain for up to 3 days. Max Daily Amount: 3 Tabs.  naloxone (Narcan) 4 mg/actuation nasal spray Use 1 spray intranasally, then discard.  Repeat with new spray every 2 min as needed for opioid overdose symptoms, alternating nostrils.  cyclobenzaprine (FLEXERIL) 10 mg tablet Take 1 Tab by mouth three (3) times daily as needed for Muscle Spasm(s). Facility-Administered Medications Ordered in Other Encounters   Medication Dose Route Frequency    cefTRIAXone (ROCEPHIN) 2 g in 0.9% sodium chloride (MBP/ADV) 50 mL MBP  2 g IntraVENous Q24H    central line flush (saline) syringe 10 mL  10 mL InterCATHeter PRN       Other Studies:  Ct Chest W Cont    Result Date: 11/15/2020  EXAM: CT Chest with IV contrast - PE protocol. INDICATION: Hypoxia. COMPARISON: Prior CT chest on November 11, 2020. TECHNIQUE: Axial CT images of the chest were obtained after the intravenous injection of 100 mL Isovue 370 CT contrast. Radiation dose reduction techniques were used for this study. Our CT scanners use one or all of the following: Automated exposure control, adjustment of the mA and/or kV according to patient size, iterative reconstruction. FINDINGS: - Pleura/pericardium: There has been mild enlargement of the previous lobulated and pineal along the posterior left lung, particularly along its upper component. A small pericardial effusion is unchanged. - Lungs: Bibasilar lung atelectasis is unchanged, as is a 3.3 cm focus of left lower lobe consolidation with internal bubbles of air which may relate to pneumatoceles or early abscess formation. - Alexandrea/Mediastinum: Within normal limits. - Tracheobronchial tree: Within normal limits. - Aorta/pulmonary arteries: Within normal limits. - Heart: Within normal limits. - Coronary arteries: No coronary artery calcifications. - Chest wall: Within normal limits. - Spine/bones: Multiple mildly displaced acute appearing left rib fractures are unchanged. - Additional comments: None. IMPRESSION: 1. No evidence of pulmonary embolism. 2. Mildly enlarged multilobulated left empyema.  3. Unchanged small focus of left lower lobe consolidation with small internal bubbles of air which may relate to pneumatoceles although an early lung abscess is not excluded. 4. Multiple left rib fractures, several of which appear segmental suspect for a flail chest.     Xr Chest Port    Result Date: 11/14/2020  EXAM: Chest x-ray. INDICATION: Dyspnea. COMPARISON: Prior CT chest on November 11, 2020. TECHNIQUE: Frontal view chest x-ray. FINDINGS: Allowing for differences in technique, there has been no significant change in the previously described left-sided empyema. The right lung and pleural space are clear. No pneumothorax is identified. There are several left-sided rib fractures. The right arm PICC line tip is at the cavoatrial junction. IMPRESSION: Unchanged left-sided empyema. Assessment:     Active Hospital Problems    Diagnosis Date Noted    Acute respiratory failure with hypoxia ([address] 11/15/2020    Loculated empyema ([facility] [address] 11/15/2020    MSSA bacteremia 11/04/2020    Alcohol abuse 08/17/2016       Plan:     Acute respiratory failure with hypoxia   Left sided Loculated empyema   Noted to be saturating in 80s per EMS, tachypnic on arrival to ED. CT as per above  -O2 supplements at needed wean as tolerated  -Pulmonary consult for evaluation of empyema  -Continue with Rocephin IV via PICC line    MSSA bacteremia  -Continue with Rocephin IV via PICC line    Traumatic rib fractures  Appears stable. No signs of flail chest on exam.  - monitor  - pain control    B/L LE edema  Echo on 11/4 shows EF 55-60%. pBNP of 391  - follow up doppler  - lasix 40mg IV x 1 in the AM    B/L LE rash  Possible contact dermatitis as pt reports it started after getting a sponge bath prior to DC from the hospital. Non pruritis, and non painful  - monitor  - if continues to worsen, consider derm consult and prednisone    Alcoholism  - PRN ativan     Diet:  DIET REGULAR  DVT PPx:   Code: Full Code  Estimated LOS:  Greater than 2 midnights    Labs/Imaging Reviewed.    Risk:  HIGH risk due to current condition and comorbid conditions as well as requiring frequent monitoring and high risk of decline. Plan discussed with staff, patient/family and are in agreement. Part of this note was written by using a voice dictation software and the note has been proof read but may still contain some some grammatical/other typographical errors.      Signed:  Quiana Luke MD

## 2020-11-15 NOTE — ED NOTES
Patient very difficult IV stick and only has NESSA PICC- cannot draw blood cultures from. Lab to come and attempt blood culture collection.

## 2020-11-15 NOTE — PROGRESS NOTES
48year old CM PMH ETOH abuse, GERD, tobacco use, recently hospitalized 11/3-11/12 for MSSA bacteremia and loculated empyema right side s/p fall from attic with right rib fx admitted after midnight for acute onset SOB. Per review of discharge on 11/12, he was to have daily Rocephin until 12/4 which he has been compliant with. Yesterday he reports doing well until he arrived home from infusion center and SOB began. Ems noted saturations in the 80s. He is afebrile, no leukocytosis. Pulmonology and ID consulted on case. I have seen the patient and reviewed the chart. I agree with current plan. Patient alert and oriented x3. Saturations high 90s with recent saturation 92% on 6 liters. No charge for visit. Of note, this is 3rd admission for patient. He was seen by surgery with last admission and no indication for surgery at that time. Will re-consult surgery and IR; per pulmonology, IR will need to do CT due to location of empyema. **[employer] message sent to IR-Dr. Serjio Fernando about need to chest tube placement today-he states will look into today. I also spoke with Ade Norwood NP for surgery about re-consult and need for surgery.

## 2020-11-15 NOTE — CONSULTS
Department of Interventional Radiology  [phone]        Consult Note--No charge to patient     Patient: Massimo Isbell MRN: [id number]  SSN: [id number]    YOB: 1970  Age: 48 y.o. Sex: male      Referring: Justice Silva NP    Consult Date: 11/15/2020          EMR and CT scans (10/30/20 thru 11/14/20) reviewed. Post-traumatic, multiloculated, apparently infected, hemothorax. Recurrent SOB. Multiple left rib fractures, pneumothorax, subcutaneous emphysema initially treated w large bore chest tube on 10/30/20. This chest tube was removed on or about 11/2/20. A Left Anterior Chest Tube placed 11/4/20. As best as I can tell, the cultures obtained at time of placement were \"No Growth\". In any event, the anterior chest tube was removed because the anterior collection was completely evacuated after tPA administration x 3. The posterior collections never communicated with this anterior loculation, and are only slightly smaller than they were on 11/3/20. There are other cultures labeled \"Wound\" that show Staph Aureus. I am unsure where that sample originated. There have been gas bubbles within the loculated hematoma since 10/31/20. I am not sure that gas bubbles alone will be an accurate measure of whether or not the loculated collections are infected. The WBC was elevated as recently as 11/11/20, but is now normal.     If the multiloculated intra-plueral hematoma is thought to be infected, recommend surgical consultation for VATS/Drain placement as percutaneous treatment would require three chest tubes to reach all of the pockets. As well as multiple days of tPA administration to liquify what is undoubtedly thick fluid/hemtoma.      Sonu Leigh MD          Past Medical History:   Diagnosis Date    GERD (gastroesophageal reflux disease)     once a week OTC med     Past Surgical History:   Procedure Laterality Date    HX HEENT      wisdom teeth      No family history on file. Social History     Tobacco Use    Smoking status: Current Every Day Smoker     Packs/day: 1.00     Years: 20.00     Pack years: 20.00    Smokeless tobacco: Never Used   Substance Use Topics    Alcohol use:  Yes     Alcohol/week: 6.7 standard drinks     Types: 8 Shots of liquor per week      No Known Allergies  Current Facility-Administered Medications   Medication Dose Route Frequency Provider Last Rate Last Dose    sodium chloride (NS) flush 5-40 mL  5-40 mL IntraVENous Q8H Alex Graham MD   10 mL at 11/15/20 1339    sodium chloride (NS) flush 5-40 mL  5-40 mL IntraVENous PRN Alex Graham MD        acetaminophen (TYLENOL) tablet 650 mg  650 mg Oral Q6H PRN Alex Graham MD        Or    acetaminophen (TYLENOL) suppository 650 mg  650 mg Rectal Q6H PRN Alex Graham MD        polyethylene glycol (MIRALAX) packet 17 g  17 g Oral DAILY PRN Alex Graham MD        promethazine (PHENERGAN) tablet 12.5 mg  12.5 mg Oral Q6H PRN Alex Graham MD        Or    ondansetron (ZOFRAN) injection 4 mg  4 mg IntraVENous Q6H PRN Alex Graham MD        oxyCODONE-acetaminophen (PERCOCET) 5-325 mg per tablet 1 Tab  1 Tab Oral Q6H PRN Alex Graham MD   1 Tab at 11/15/20 0936    LORazepam (ATIVAN) tablet 1 mg  1 mg Oral Q6H PRN Alex Graham MD   1 mg at 11/15/20 1126    cefTRIAXone (ROCEPHIN) 2 g in 0.9% sodium chloride (MBP/ADV) 50 mL MBP  2 g IntraVENous Q24H Alex Graham MD        thiamine HCL (B-1) tablet 100 mg  100 mg Oral DAILY Olena , NP   100 mg at 97/92/91 7119    folic acid (FOLVITE) tablet 1 mg  1 mg Oral DAILY Olena , NP   1 mg at 11/15/20 1124    multivitamin w ZN ([address]) tablet  1 Tab Oral DAILY Olena , NP         Facility-Administered Medications Ordered in Other Encounters   Medication Dose Route Frequency Provider Last Rate Last Dose    central line flush (saline) syringe 10 mL  10 mL InterCATHeter PRN Imer Candelario MD   10 mL at 11/14/20 1323 Medications Prior to Admission   Medication Sig    oxyCODONE-acetaminophen (PERCOCET) 5-325 mg per tablet Take 1 Tab by mouth every eight (8) hours as needed for Pain for up to 3 days. Max Daily Amount: 3 Tabs.  cyclobenzaprine (FLEXERIL) 10 mg tablet Take 1 Tab by mouth three (3) times daily as needed for Muscle Spasm(s).  naloxone (Narcan) 4 mg/actuation nasal spray Use 1 spray intranasally, then discard. Repeat with new spray every 2 min as needed for opioid overdose symptoms, alternating nostrils.      No Known Allergies    Objective:     Physical Exam:  Vitals:    11/15/20 0258 11/15/20 0350 11/15/20 0756 11/15/20 1218   BP:  116/77 94/61 101/70   Pulse: 88 85 91 96   Resp: 16 28 20 30   Temp:  96.8 °F (36 °C) 98 °F (36.7 °C) 98.1 °F (36.7 °C)   SpO2: 98% 100% 92% 93%   Weight:  103.2 kg (227 lb 8.2 oz)     Height:          Pain Assessment  Pain Intensity 1: 6 (11/15/20 0935)  Pain Location 1: Flank  Pain Intervention(s) 1: Medication (see MAR)  Patient Stated Pain Goal: 0    Lab/Data Review:  CBC:   Lab Results   Component Value Date/Time    WBC 8.3 11/14/2020 09:23 PM    HGB 10.4 (L) 11/14/2020 09:23 PM    HCT 31.3 (L) 11/14/2020 09:23 PM     (H) 11/14/2020 09:23 PM         Assessment:     Hospital Problems  Date Reviewed: 11/3/2020          Codes Class Noted POA    * (Principal) Acute respiratory failure with hypoxia ([facility] [address] ICD-10-CM: J96.01  ICD-9-CM: 518.81  11/15/2020 Unknown        Loculated empyema ([facility] [address] ICD-10-CM: J86.9  ICD-9-CM: 510.9  11/15/2020 Unknown        MSSA bacteremia ICD-10-CM: R78.81, B95.61  ICD-9-CM: 790.7, 041.11  11/4/2020 Yes        Alcohol abuse ICD-10-CM: F10.10  ICD-9-CM: 305.00  8/17/2016 Yes

## 2020-11-15 NOTE — ED PROVIDER NOTES
59-year-old male patient presents with reports of worsening shortness of breath that has been progressive for the past several days. He notes shortness of breath at rest but significant increase with any exertion. He reports generalized fatigue as well. Patient reports minimal cough and denies any fever or chills. He reports no significant pain related to his symptoms. EMS reports saturations in the 80s on arrival, requiring nonrebreather in route to normalize his oxygen saturations. Patient denies baseline oxygen requirement at home. Of note, patient suffered a left-sided chest wall injury at the beginning of this month after a fall. This resulted in multiple rib fractures and hemopneumothorax that required chest tube placement. Following this injury, patient developed a loculated empyema and pneumonia on the same side. He has been placed on antibiotics outpatient via PICC line. His first dose occurred today. Past Medical History:   Diagnosis Date    GERD (gastroesophageal reflux disease)     once a week OTC med       Past Surgical History:   Procedure Laterality Date    HX HEENT      wisdom teeth         No family history on file. Social History     Socioeconomic History    Marital status: SINGLE     Spouse name: Not on file    Number of children: Not on file    Years of education: Not on file    Highest education level: Not on file   Occupational History    Occupation:  at [address] [place] [address] resource strain: Not on file    Food insecurity     Worry: Not on file     Inability: Not on file   [employer] needs     Medical: Not on file     Non-medical: Not on file   Tobacco Use    Smoking status: Current Every Day Smoker     Packs/day: 1.00     Years: 20.00     Pack years: 20.00    Smokeless tobacco: Never Used   Substance and Sexual Activity    Alcohol use: Yes     Alcohol/week: 6.7 standard drinks     Types: 8 Shots of liquor per week    Drug use:  No  Sexual activity: Not on file   Lifestyle    Physical activity     Days per week: Not on file     Minutes per session: Not on file    Stress: Not on file   Relationships    Social connections     Talks on phone: Not on file     Gets together: Not on file     Attends Church service: Not on file     Active member of club or organization: Not on file     Attends meetings of clubs or organizations: Not on file     Relationship status: Not on file    Intimate partner violence     Fear of current or ex partner: Not on file     Emotionally abused: Not on file     Physically abused: Not on file     Forced sexual activity: Not on file   Other Topics Concern    Not on file   Social History Narrative    Not on file         ALLERGIES: Patient has no known allergies. Review of Systems   Constitutional: Positive for fatigue. Negative for chills, diaphoresis and fever. HENT: Negative for congestion, sneezing and sore throat. Eyes: Negative for visual disturbance. Respiratory: Positive for cough, chest tightness and shortness of breath. Negative for wheezing. Cardiovascular: Negative for chest pain and leg swelling. Gastrointestinal: Negative for abdominal pain, blood in stool, diarrhea, nausea and vomiting. Endocrine: Negative for polyuria. Genitourinary: Negative for difficulty urinating, dysuria, flank pain, hematuria and urgency. Musculoskeletal: Negative for back pain, myalgias, neck pain and neck stiffness. Skin: Negative for color change and rash. Neurological: Negative for dizziness, syncope, speech difficulty, weakness, light-headedness, numbness and headaches. Psychiatric/Behavioral: Negative for behavioral problems. All other systems reviewed and are negative.       Vitals:    11/14/20 2119 11/14/20 2137   BP: 125/83    Pulse: 100    Resp: (!) 35    Temp: 99 °F (37.2 °C)    SpO2: 100% 100%   Weight: 90.7 kg (200 lb)    Height: 5' 10\" (1.778 m)             Physical Exam  Vitals signs and nursing note reviewed. Constitutional:       General: He is not in acute distress. Appearance: He is well-developed. He is not diaphoretic. Comments: Mildly ill-appearing male patient, alert and oriented to person place and time. Mild acute distress, speaks in clear, fluid sentences. HENT:      Head: Normocephalic and atraumatic. Right Ear: External ear normal.      Left Ear: External ear normal.      Nose: Nose normal.   Eyes:      Pupils: Pupils are equal, round, and reactive to light. Neck:      Musculoskeletal: Normal range of motion. Cardiovascular:      Rate and Rhythm: Normal rate and regular rhythm. Heart sounds: Normal heart sounds. No murmur. No friction rub. No gallop. Pulmonary:      Effort: Pulmonary effort is normal. No respiratory distress. Breath sounds: Normal breath sounds. No stridor. No decreased breath sounds, wheezing, rhonchi or rales. Comments: Course breath sounds throughout the left side. I do auscultate breath sounds of both the left and right side however. Patient slightly tachypneic but able to speak in clear, fluid sentences. Chest:      Chest wall: No tenderness. Comments: Bandages in place to the left upper chest wall and left lateral chest consistent with chest tube sites. Bandages appear clean dry and intact. Abdominal:      General: There is no distension. Palpations: Abdomen is soft. There is no mass. Tenderness: There is no abdominal tenderness. There is no guarding or rebound. Hernia: No hernia is present. Musculoskeletal: Normal range of motion. General: No tenderness or deformity. Skin:     General: Skin is warm and dry. Neurological:      Mental Status: He is alert and oriented to person, place, and time. Cranial Nerves: No cranial nerve deficit.           MDM  Number of Diagnoses or Management Options  Diagnosis management comments: Patient has been titrated to a nasal cannula at 6 L from 25 L nonrebreather placed by EMS. He is maintaining oxygen saturations at 100%. His initial ABG is consistent with respiratory acidosis though his oxygen saturations are stable. Received a DuoNeb treatment. Labs are pending as well as final x-ray interpretation. Amount and/or Complexity of Data Reviewed  Clinical lab tests: ordered and reviewed  Tests in the radiology section of CPT®: ordered and reviewed  Tests in the medicine section of CPT®: ordered and reviewed  Independent visualization of images, tracings, or specimens: yes    Risk of Complications, Morbidity, and/or Mortality  Presenting problems: high  Diagnostic procedures: high  Management options: high    Patient Progress  Patient progress: stable    ED Course as of Nov 14 2148   Sat Nov 14, 2020 2143 A obtained on arrival shows tachycardia with a rate of 102. No evidence of acute ischemic change. [BR]   2146 Visualized x-ray at bedside, lung markings clear on both sides of the chest.  The left side does appear hazy diffusely. r    [BR]      ED Course User Index  [BR] Moisés Amaro DO       Procedures

## 2020-11-15 NOTE — CONSULTS
See full consult by NP Lisandro Ortega. Note won't let me access to addend. Patient seen and examined. Diminished breath sounds on R. RRR. CT is worse with air in fluid. He has failed conservative therapy and recommend VATS with surgery. Surgery has not yet seen, but consult has been placed. IR chest tube could be a back up plan, but doubt it will be adequate. Consider anaerobic coverage given air in fluid, but ID consulted, so will defer for now.     Mouna Khan MD

## 2020-11-15 NOTE — PROGRESS NOTES
Hourly rounds performed. All needs meet. Pain and anxiety managed per MAR. Dressing to left flank changed. Bed low/locked. Call light within reach. Patient denies needs at this time.   Will continue to monitor and report to oncoming RN

## 2020-11-15 NOTE — PROGRESS NOTES
11/15/20 0358   Dual Skin Pressure Injury Assessment   Dual Skin Pressure Injury Assessment WDL   Second Care Provider (Based on [address] [place] [address]) Shira Reed RN    Skin Integumentary   Skin Integumentary (WDL) X   Skin Color Appropriate for ethnicity   Skin Condition/Temp Warm;Dry;Petechiae;Rash   Skin Integrity Incision (comment)  (left chest)   Turgor Non-tenting

## 2020-11-15 NOTE — CONSULTS
PULMONARY/CCM CONSULT :  11/15/2020    Date of Admission:  11/14/2020    The patient's chart has been reviewed and the chart has been discussed with nursing staff. Subjective: This patient has been seen and evaluated at the request of Dr. Lord Reece. Patient is a 48 y.o.  male presents with a PMHx of smoker, ETOH abuse, and recent hospitalizations for traumatic PTX and rib fractures. Prior to this admission, he has been admitted twice r/t traumatic PTX. First admission was 10/30-11/2>>during that admission he required a CT for the PTX. It was deemed that he was not in need for surgery at that time. Initial CT was accidentally removed per pt, and he was discharge stable to his home a few days later. He returned 11/3-11/12 where surgery again was consulted, but felt he did not need surgery or a chest tube at that time, hospitalist was asked to admit. A Chest CT on 11/3 showed a loculated hydropneumothorax, where a superimposed infection could not be excluded. MSSA bactermia was also noted during that stay in his blood cultures (11/3) and from L chest wound drainage (11/9), with ?source from lung (pleural fluid was negative for growth). He was placed on cefepmine, augmentin, and vanc, but ultimately changed to cefazolin per ID. Ultimately a CT was placed per IR (11-4). CT was removed on 11/11, with only a small amount of remaining fluid noted on follow up CT. We followed during this admission, until his discharge on 11/12. He was discharged per ID on rocephin 2 gm IVq24h to be given at the daily infusion center, which was suppose to be continued until 12/4, he has been compliant with this. Late yesterday, he presented to the ED with worsening SOB via EMS with marginal O2 sats, even on NRB. CT chest with no PE, but showed enlarged multilobulated left empyema, unchanged left lower lobe consolidations with small internal air bubbles and multiple left rib fractures.  He reports still having chest pain from old CT insertion site. Dressing site changed using 4x4 and paper tape. Area noted to be ecchymotic, slightly swollen, reddened around site. No drainage from site noted. He does have some 1+ pitting BLE edema noted, which he states he has had approx 2 weeks. Rash noted to JUAN, which started after getting a sponge bath 3 days ago. Site is nonpuritic, nonpainful. He was on RA when I entered the room, but notably SOB. He states \"I took the oxygen off, and couldn't find it\". Assisted with placing O2 back, with noted improvement. He reports no fevers, states he did have some mild chills yesterday. Reports old CT site chest pain, especially with deep breathing, and SOB. No cough or sputum production. He denies, n/v/d. Denies sick contacts. We were consulted to assist with left sided loculated empyema. Past Surgical History:   Procedure Laterality Date    HX HEENT      wisdom teeth      Social History     Tobacco Use    Smoking status: Current Every Day Smoker     Packs/day: 1.00     Years: 20.00     Pack years: 20.00    Smokeless tobacco: Never Used   Substance Use Topics    Alcohol use: Yes     Alcohol/week: 6.7 standard drinks     Types: 8 Shots of liquor per week      No family history on file. No Known Allergies   Prior to Admission Medications   Prescriptions Last Dose Informant Patient Reported? Taking? cyclobenzaprine (FLEXERIL) 10 mg tablet 10/15/2020 at Unknown time  No Yes   Sig: Take 1 Tab by mouth three (3) times daily as needed for Muscle Spasm(s). naloxone (Narcan) 4 mg/actuation nasal spray Not Taking at Unknown time  No No   Sig: Use 1 spray intranasally, then discard. Repeat with new spray every 2 min as needed for opioid overdose symptoms, alternating nostrils. oxyCODONE-acetaminophen (PERCOCET) 5-325 mg per tablet 11/14/2020 at Unknown time  No Yes   Sig: Take 1 Tab by mouth every eight (8) hours as needed for Pain for up to 3 days. Max Daily Amount: 3 Tabs. Facility-Administered Medications: None       MEDS SCHEDULED:    Current Facility-Administered Medications   Medication Dose Route Frequency    sodium chloride (NS) flush 5-40 mL  5-40 mL IntraVENous Q8H    sodium chloride (NS) flush 5-40 mL  5-40 mL IntraVENous PRN    acetaminophen (TYLENOL) tablet 650 mg  650 mg Oral Q6H PRN    Or    acetaminophen (TYLENOL) suppository 650 mg  650 mg Rectal Q6H PRN    polyethylene glycol (MIRALAX) packet 17 g  17 g Oral DAILY PRN    promethazine (PHENERGAN) tablet 12.5 mg  12.5 mg Oral Q6H PRN    Or    ondansetron (ZOFRAN) injection 4 mg  4 mg IntraVENous Q6H PRN    furosemide (LASIX) injection 40 mg  40 mg IntraVENous DAILY    oxyCODONE-acetaminophen (PERCOCET) 5-325 mg per tablet 1 Tab  1 Tab Oral Q6H PRN    LORazepam (ATIVAN) tablet 1 mg  1 mg Oral Q6H PRN    cefTRIAXone (ROCEPHIN) 2 g in 0.9% sodium chloride (MBP/ADV) 50 mL MBP  2 g IntraVENous Q24H     Facility-Administered Medications Ordered in Other Encounters   Medication Dose Route Frequency    central line flush (saline) syringe 10 mL  10 mL InterCATHeter PRN         Review of Systems  Pertinent items are noted in HPI. Objective:     Vitals:    11/15/20 0240 11/15/20 0258 11/15/20 0350 11/15/20 0756   BP: 115/66  116/77 94/61   Pulse: 83 88 85 91   Resp: 11 16 28 20   Temp:   96.8 °F (36 °C) 98 °F (36.7 °C)   SpO2: 98% 98% 100% 92%   Weight:   227 lb 8.2 oz (103.2 kg)    Height:         No intake/output data recorded. 11/13 1901 - 11/15 0700  In: 600 [I.V.:600]  Out: 100 [Urine:100]      PHYSICAL EXAM     Physical Exam:   General:  Alert, cooperative, no acute distress, appears stated age. Eyes:  Conjunctivae/corneas clear. Nose: Nares patent and moist.    Mouth/Throat: Lips, mucosa, and tongue pink and intact. Neck: Supple, symmetrical.   Respiratory:    R clear, L diminished On 6 LPM HFNC. Cardiovascular:  Regular rate and rhythm, S1, S2, no murmur, click, rub or gallop.      GI:   Abdomen soft, non-tender. Bowel sounds active X 4 Q. No masses,     Musculoskeletal: Extremities symmetrical, atraumatic, no cyanosis, 1+ edema. Pulses: 2+ and symmetric all extremities. Skin: Skin color, texture, turgor normal. No rashes or lesions       Neurologic: 2+ strength bilateral upper and lower extremities, sensation throughout appropriate. Alert and oriented. Chest CT:  11/15/2020          CULTURES:  Results     Procedure Component Value Units Date/Time    CULTURE, BLOOD [[id number]] Collected:  11/15/20 0035    Order Status:  Completed Specimen:  Blood Updated:  11/15/20 0108    CULTURE, BLOOD [[id number]] Collected:  11/15/20 0033    Order Status:  Completed Specimen:  Blood Updated:  11/15/20 0108    CULTURE, WOUND Parveen Balder STAIN [[id number]]  (Abnormal)  (Susceptibility) Collected:  11/06/20 1737    Order Status:  Completed Specimen:  Wound from Drainage Updated:  11/09/20 0744     Special Requests: NO SPECIAL REQUESTS        GRAM STAIN 0 TO 3 WBC'S SEEN PER OIF      NO DEFINITE ORGANISM SEEN        Culture result:       LIGHT STAPHYLOCOCCUS AUREUS          Susceptibility      Staphylococcus aureus     RO     Cefazolin ($) Susceptible     Clindamycin ($) Susceptible     Oxacillin Susceptible     Rifampin ($$$$) Susceptible [1]      Tetracycline Susceptible     Trimeth-Sulfamethoxa Susceptible     Vancomycin ($) Susceptible            [1]   Rifampin is not to be used for mono-therapy.                  CULTURE, ANAEROBIC [[id number]] Collected:  11/06/20 1737    Order Status:  Completed Specimen:  Wound Drainage Updated:  11/13/20 0843     Special Requests: NO SPECIAL REQUESTS        Culture result:       NO ANAEROBES ISOLATED 6 DAYS          CULTURE, BLOOD [[id number]] Collected:  11/05/20 1159    Order Status:  Completed Specimen:  Blood Updated:  11/10/20 0826     Special Requests: --        RIGHT  HAND       Culture result: NO GROWTH 5 DAYS       CULTURE, BLOOD [[id number]]     Order Status: Canceled Specimen:  Blood     CULTURE, BLOOD [[id number]]     Order Status:  Canceled Specimen:  Blood     CULTURE, BLOOD [[id number]]     Order Status:  Canceled Specimen:  Blood     CULTURE, BLOOD [[id number]]     Order Status:  Canceled Specimen:  Blood     CULTURE, BODY FLUID PAT Hdez [[id number]] Collected:  11/04/20 1525    Order Status:  Completed Specimen: Body Fluid from Pleural Fluid Updated:  11/07/20 0907     Special Requests: NO SPECIAL REQUESTS        GRAM STAIN NO WBC'S SEEN         NO DEFINITE ORGANISM SEEN        Culture result: NO GROWTH 2 DAYS       CULTURE, BLOOD [[id number]]  (Abnormal) Collected:  11/04/20 0136    Order Status:  Completed Specimen:  Blood Updated:  11/07/20 0733     Special Requests: --        RIGHT  ARM       GRAM STAIN GRAM POSITIVE COCCI         AEROBIC BOTTLE POSITIVE               RESULTS VERIFIED, PHONED TO AND READ BACK BY OSMANY LAZO AT 2055 11.5.2020 EOR           Culture result: STAPHYLOCOCCUS AUREUS         REFER TO Gayle [address]rera Dr ACCESSION [id number]            For Susceptibility Refer to Culture  Accession [id number]      BLOOD CULTURE ID PANEL [[id number]]  (Abnormal) Collected:  11/04/20 0136    Order Status:  Completed Specimen:  Blood Updated:  11/06/20 0823     Acc. no. from Micro Order [id number]     Staphylococcus Detected        Staphylococcus aureus Detected        Comment: CRITICAL RESULT NOT CALLED DUE TO PREVIOUS NOTIFICATION OF CRITICAL RESULT WITHIN THE LAST 24 HOURS. mecA (Methicillin-Resistance Genes) NOT DETECTED        INTERPRETATION       Gram positive cocci in clusters, identified in realtime PCR as probable MSSA. Comment: Recommend discontinuing IV vancomycin starting cefazolin or nafcillin if patient not on beta-lactam therapy. Infectious Diseases Consult recommended in adult patients. THIS TEST DOES NOT REPLACE SENSITIVITY TESTING.        CULTURE, BLOOD [[id number]]  (Abnormal) Collected:  11/03/20 1205    Order Status:  Completed Specimen:  Blood Updated:  11/07/20 0731     Special Requests: --        RIGHT  HAND       GRAM STAIN       GRAM POS COCCI IN CLUSTERS            AEROBIC BOTTLE POSITIVE               CRITICAL RESULT NOT CALLED DUE TO PREVIOUS NOTIFICATION OF CRITICAL RESULT WITHIN THE LAST 24 HOURS. Culture result: STAPHYLOCOCCUS AUREUS         REFER TO ACCESSION [id number] FOR SUSCEPTIBILITY    CULTURE, BLOOD [[id number]]  (Abnormal)  (Susceptibility) Collected:  11/03/20 1145    Order Status:  Completed Specimen:  Blood Updated:  11/07/20 0730     Special Requests: --        LEFT  Antecubital       GRAM STAIN GRAM POSITIVE COCCI               AEROBIC AND ANAEROBIC BOTTLES                  RESULTS VERIFIED, PHONED TO AND READ BACK BY OSMANY MCDONOUGH @ 0030 11/4/20 MM           Culture result: STAPHYLOCOCCUS AUREUS         REFER TO HOSTEX PANEL ACCESSION [id number]    Susceptibility      Staphylococcus aureus     RO     Cefazolin ($) Susceptible     Clindamycin ($) Susceptible     Oxacillin Susceptible     Rifampin ($$$$) Susceptible [1]      Tetracycline Susceptible     Trimeth-Sulfamethoxa Susceptible     Vancomycin ($) Susceptible            [1]   Rifampin is not to be used for mono-therapy. BLOOD CULTURE ID PANEL [[id number]]  (Abnormal) Collected:  11/03/20 1145    Order Status:  Completed Specimen:  Blood Updated:  11/04/20 0806     Acc. no. from Micro Order [id number]     Staphylococcus Detected        Staphylococcus aureus Detected        Comment: RESULTS VERIFIED, PHONED TO AND READ BACK BY  Amilcar tejada RN @ 2796 ON 11/04/2020 AK. mecA (Methicillin-Resistance Genes) NOT DETECTED        INTERPRETATION       Gram positive cocci in clusters, identified in realtime PCR as probable MSSA. Comment: Recommend discontinuing IV vancomycin starting cefazolin or nafcillin if patient not on beta-lactam therapy. Infectious Diseases Consult recommended in adult patients.   THIS TEST DOES NOT REPLACE SENSITIVITY TESTING. LABS    Recent Labs     11/14/20 2123   WBC 8.3   HGB 10.4*   HCT 31.3*   *     Recent Labs     11/14/20 2123      K 3.6   CL 99   *   CO2 38*   BUN 7   CREA 0.74*   MG 1.8     No results for input(s): PH, PCO2, PO2, HCO3 in the last 72 hours. Assessment:     Hospital Problems  Date Reviewed: 11/3/2020          Codes Class Noted POA    * (Principal) Acute respiratory failure with hypoxia ([facility] [address] ICD-10-CM: J96.01  ICD-9-CM: 518.81  11/15/2020 Unknown        Loculated empyema ([facility] [address] ICD-10-CM: J86.9  ICD-9-CM: 510.9  11/15/2020 Unknown        MSSA bacteremia ICD-10-CM: R78.81, B95.61  ICD-9-CM: 790.7, 041.11  11/4/2020 Yes        Alcohol abuse ICD-10-CM: F10.10  ICD-9-CM: 305.00  8/17/2016 Yes              Plan:   --cont supplemental O2 as needed  --cont rocephin IV and would re-consult ID to assist with care, as this is his third admission  --blood cultures pending  --recommend to consult IR for CT placement, as this is a loculated empyema. --May consider reconsult to surgery to see if he could benefit from this, as CT previous CT have not resolved issue. This will be his 3rd chest tube since first admission. --pain control>>per primary  --no further recommendations at this time from pulmonary, we will sign off, thank you for this consult. We can see Mr Ran Conrad in the future if needed. Miguelangel Araujo,  NP-C    More than 50% of time documented was spent in face-to-face contact with the patient and in the care of the patient on the floor/unit where the patient is located.

## 2020-11-15 NOTE — CONSULTS
H&P/Consult Note/Progress Note/Office Note:   Mia Sharif  MRN: [id number]  MTF:[birth year]  Age:50 y.o. General Surgery Consult ordered by: Meron Thayer NP  Reason  For General Surgery Consult: loculated empyema; 3rd admission    As previously noted - HPI: Mia Sharif is a 48 y.o. male with a past medical history of EtOH use, GERD, and tobacco abuse who presented to the ED 11/14/20 with c/o worsening shortness of breath and fatigue over the last several days. Pt reports SOB at rest which worsens significantly with activity. Patient experienced a left-sided chest wall injury at the beginning of this month when he fell through his ceiling. The injury resulted in multiple rib fractures and hemopneumothorax that required chest tube placement. Pt was hospitalized 11/3-11/12 for MSSA bacteremia and loculated empyema right side s/p fall from attic with right rib fx admitted after midnight for acute onset SOB. Per review of discharge on 11/12, he was to have daily Rocephin until 12/4 which he has been compliant with.          Past Medical History:   Diagnosis Date    GERD (gastroesophageal reflux disease)     once a week OTC med     Past Surgical History:   Procedure Laterality Date    HX HEENT      wisdom teeth     Current Facility-Administered Medications   Medication Dose Route Frequency    sodium chloride (NS) flush 5-40 mL  5-40 mL IntraVENous Q8H    sodium chloride (NS) flush 5-40 mL  5-40 mL IntraVENous PRN    acetaminophen (TYLENOL) tablet 650 mg  650 mg Oral Q6H PRN    Or    acetaminophen (TYLENOL) suppository 650 mg  650 mg Rectal Q6H PRN    polyethylene glycol (MIRALAX) packet 17 g  17 g Oral DAILY PRN    promethazine (PHENERGAN) tablet 12.5 mg  12.5 mg Oral Q6H PRN    Or    ondansetron (ZOFRAN) injection 4 mg  4 mg IntraVENous Q6H PRN    oxyCODONE-acetaminophen (PERCOCET) 5-325 mg per tablet 1 Tab  1 Tab Oral Q6H PRN    LORazepam (ATIVAN) tablet 1 mg  1 mg Oral Q6H PRN    cefTRIAXone (ROCEPHIN) 2 g in 0.9% sodium chloride (MBP/ADV) 50 mL MBP  2 g IntraVENous Q24H    thiamine HCL (B-1) tablet 100 mg  100 mg Oral DAILY    folic acid (FOLVITE) tablet 1 mg  1 mg Oral DAILY    multivitamin w ZN (STRESSTABS W ZINC) tablet  1 Tab Oral DAILY     Facility-Administered Medications Ordered in Other Encounters   Medication Dose Route Frequency    central line flush (saline) syringe 10 mL  10 mL InterCATHeter PRN     Patient has no known allergies. Social History     Socioeconomic History    Marital status: SINGLE     Spouse name: Not on file    Number of children: Not on file    Years of education: Not on file    Highest education level: Not on file   Occupational History    Occupation:  at [employer]. Smoking status: Current Every Day Smoker     Packs/day: 1.00     Years: 20.00     Pack years: 20.00    Smokeless tobacco: Never Used   Substance and Sexual Activity    Alcohol use: Yes     Alcohol/week: 6.7 standard drinks     Types: 8 Shots of liquor per week    Drug use: No     Social History     Tobacco Use   Smoking Status Current Every Day Smoker    Packs/day: 1.00    Years: 20.00    Pack years: 20.00   Smokeless Tobacco Never Used     No family history on file. ROS: Comprehensive review of systems was otherwise unremarkable except as noted above. Physical Exam:   Visit Vitals  /70   Pulse 96   Temp 98.1 °F (36.7 °C)   Resp 30   Ht 5' 10\" (1.778 m)   Wt 227 lb 8.2 oz (103.2 kg)   SpO2 93%   BMI 32.65 kg/m²     Vitals:    11/15/20 0258 11/15/20 0350 11/15/20 0756 11/15/20 1218   BP:  116/77 94/61 101/70   Pulse: 88 85 91 96   Resp: 16 28 20 30   Temp:  96.8 °F (36 °C) 98 °F (36.7 °C) 98.1 °F (36.7 °C)   SpO2: 98% 100% 92% 93%   Weight:  227 lb 8.2 oz (103.2 kg)     Height:         No intake/output data recorded. 11/13 1901 - 11/15 0700  In: 600 [I.V.:600]  Out: 100 [Urine:100]    Constitutional: Alert, cooperative, NAD.    Eyes: Sclera are clear  ENMT: no external lesions gross hearing normal; no obvious neck masses, no ear or lip lesions, nares normal  CV: RRR. Normal perfusion  Resp: No JVD. Breathing is  non-labored; no audible wheezing. GI: soft and non-distended, non-tender   Musculoskeletal: No embolic signs or cyanosis.   +edema BLE  Neuro:  Oriented; moves all 4; no focal deficits  Psychiatric: normal affect and mood, no memory impairment    Recent vitals (if inpt):  Patient Vitals for the past 24 hrs:   BP Temp Pulse Resp SpO2 Height Weight   11/15/20 1218 101/70 98.1 °F (36.7 °C) 96 30 93 %     11/15/20 0756 94/61 98 °F (36.7 °C) 91 20 92 %     11/15/20 0350 116/77 96.8 °F (36 °C) 85 28 100 %  227 lb 8.2 oz (103.2 kg)   11/15/20 0258   88 16 98 %     11/15/20 0240 115/66  83 11 98 %     11/15/20 0221 114/66  83 18 100 %     11/15/20 0121 114/65  85 13 98 %     11/15/20 0101 114/68  87 14 99 %     11/15/20 0043 (!) 113/51  88 26 99 %     11/14/20 2337   90 18 100 %     11/14/20 2321 113/68  95 14 98 %     11/14/20 2301 109/68  95 13 98 %     11/14/20 2221 134/73  96 16 98 %     11/14/20 2149     99 %     11/14/20 2137     100 %     11/14/20 2119 125/83 99 °F (37.2 °C) 100  100 % 5' 10\" (1.778 m) 200 lb (90.7 kg)       Labs:  Recent Labs     11/14/20 2123   WBC 8.3   HGB 10.4*   *      K 3.6   CL 99   CO2 38*   BUN 7   CREA 0.74*   *   TBILI 0.7   ALT 12   *       Lab Results   Component Value Date/Time    WBC 8.3 11/14/2020 09:23 PM    HGB 10.4 (L) 11/14/2020 09:23 PM    PLATELET 353 (H) 98/09/5754 09:23 PM    Sodium 137 11/14/2020 09:23 PM    Potassium 3.6 11/14/2020 09:23 PM    Chloride 99 11/14/2020 09:23 PM    CO2 38 (H) 11/14/2020 09:23 PM    BUN 7 11/14/2020 09:23 PM    Creatinine 0.74 (L) 11/14/2020 09:23 PM    Glucose 159 (H) 11/14/2020 09:23 PM    INR 1.2 11/03/2020 11:48 AM    aPTT 35.7 (H) 11/03/2020 11:48 AM    Bilirubin, total 0.7 11/14/2020 09:23 PM    Bilirubin, direct 0.7 (H) 08/17/2016 04:10 PM    ALT (SGPT) 12 11/14/2020 09:23 PM    Alk. phosphatase 269 (H) 11/14/2020 09:23 PM    Lipase 29 (L) 11/03/2020 11:48 AM    Ammonia 24 11/07/2020 12:56 PM       CT Results  (Last 48 hours)               11/15/20 0002  CT CHEST W CONT Final result    Impression:  IMPRESSION:    1. No evidence of pulmonary embolism. 2. Mildly enlarged multilobulated left empyema. 3. Unchanged small focus of left lower lobe consolidation with small internal   bubbles of air which may relate to pneumatoceles although an early lung abscess   is not excluded. 4. Multiple left rib fractures, several of which appear segmental suspect for a   flail chest.           Narrative:  EXAM: CT Chest with IV contrast - PE protocol. INDICATION: Hypoxia. COMPARISON: Prior CT chest on November 11, 2020. TECHNIQUE: Axial CT images of the chest were obtained after the intravenous   injection of 100 mL Isovue 370 CT contrast. Radiation dose reduction techniques   were used for this study. Our CT scanners use one or all of the following:    Automated exposure control, adjustment of the mA and/or kV according to patient   size, iterative reconstruction. FINDINGS:   - Pleura/pericardium: There has been mild enlargement of the previous lobulated   and pineal along the posterior left lung, particularly along its upper   component. A small pericardial effusion is unchanged. - Lungs: Bibasilar lung atelectasis is unchanged, as is a 3.3 cm focus of left   lower lobe consolidation with internal bubbles of air which may relate to   pneumatoceles or early abscess formation.   - Alexandrea/Mediastinum: Within normal limits. - Tracheobronchial tree: Within normal limits. - Aorta/pulmonary arteries: Within normal limits. - Heart: Within normal limits. - Coronary arteries: No coronary artery calcifications. - Chest wall: Within normal limits.    - Spine/bones: Multiple mildly displaced acute appearing left rib fractures are   unchanged. - Additional comments: None. chest X-ray      I reviewed recent labs, recent radiologic studies, and pertinent records including other doctor notes if needed. I independently reviewed radiology images for studies I described above or studies I have ordered. Admission date (for inpatients): 11/14/2020   * No surgery found *  * No surgery found *    ASSESSMENT/PLAN:  Problem List  Date Reviewed: 11/3/2020          Codes Class Noted    * (Principal) Acute respiratory failure with hypoxia [facility]) ICD-10-CM: J96.01  ICD-9-CM: 518.81  11/15/2020        Loculated empyema ([facility] [address] ICD-10-CM: J86.9  ICD-9-CM: 510.9  11/15/2020        Sinus tachycardia ICD-10-CM: R00.0  ICD-9-CM: 427.89  11/11/2020        MSSA bacteremia ICD-10-CM: R78.81, B95.61  ICD-9-CM: 790.7, 041.11  11/4/2020        Leukocytosis ICD-10-CM: P25.071  ICD-9-CM: 288.60  11/3/2020        Hydropneumothorax ICD-10-CM: J94.8  ICD-9-CM: 511.89  11/3/2020        Loculated pleural effusion ICD-10-CM: J90  ICD-9-CM: 511.9  11/3/2020        Hyponatremia ICD-10-CM: E87.1  ICD-9-CM: 276.1  11/3/2020        Fall with significant injury ICD-10-CM: W19. Ruthe Fish  ICD-9-CM: M788.8  10/30/2020        Acute blood loss anemia ICD-10-CM: D62  ICD-9-CM: 285.1  8/17/2016        Alcohol abuse ICD-10-CM: F10.10  ICD-9-CM: 305.00  8/17/2016        Tobacco abuse ICD-10-CM: Z72.0  ICD-9-CM: 305.1  8/17/2016            Principal Problem:    Acute respiratory failure with hypoxia ([facility] [address] (11/15/2020)    Active Problems:    Alcohol abuse (8/17/2016)      MSSA bacteremia (11/4/2020)      Loculated empyema ([facility] [address] (11/15/2020)       Plan:  Care Management per Hospitalist  Seen by Pulmonary  ID Consulted  Seen by IR 11/15/20  General Surgery will follow  Further orders pending evaluation by Dr. Sharri Orta, NP

## 2020-11-15 NOTE — CONSULTS
Infectious Disease Consult    Today's Date: 11/15/2020   Admit Date: 11/14/2020    Impression:   · MSSA bacteremia (11/3 - 11/4); 11/5 [address] NG; 11/4 TTE negative  · Left pleural empyema s/p chest tube, 11/4/20; Cx from pleural drainage, 11/6: MSSA; discharged 11/12/20 on IV Rocephin X 4 weeks with EOT 12/4/20  · ETOH withdrawal on librium taper. Denies illicit drugs     Plan:   · Continue IV Rocephin 2 gm Q 24 hrs  · IR now recommending surgical evaluation   · ID will continue to follow    Anti-infectives:   · Cefazolin 11/4-11/12  · Vanc 11/3-11/4  · Augmentin 11/3-11/4  · CTX 11/4  Restarted 11/13-  · Cefepime 11/3       Subjective:   Date of Consultation:  November 15, 2020  Referring Physician: Umair Waters NP     Patient is a 48 y.o. male with medical history significant for EtOH abuse, GERD,  Smoker, recent hospitalization for traumatic pneumothorax and rib fracture complicated by loculated hydropneumothorax due to empyema currently on outpatient IV rocephin via R PICC line who presented to the ED due to acute on set of severe shortness of breath.      Patient had a fall resulting in thorax and rib fracture on 10/30 and was discharged on 11/2 to be managed with chest tube by surgical service. Patient returned on 11/3 due to pain and noted to have loculated hydropneumothorax on CT chest.  BCx and WCx were positive for MSSA and is recommended Rocephin IV until 12/4. Patient was discharged on 11/12 after removal of chest tube with only a small amount of remaining fluid noted on follow-up CT. Reports that he has been doing well since discharge. However, after he returned home from getting outpatient IV antibiotics infusion today, he developed sudden onset of severe shortness of breath. He felt he was having a severe panic attack and unable to breath. EMS arrival, patient was found to be saturating in the 80s.    In the ED, he is hemodynamically stable tachypneic.   His oxygen requirement has been titrated down to 6L nasal cannula. Labs are notable for WBC 8.3, hemoglobin 10.4, proBNP 391. ABG shows 7.52/42.8/101 on 15 L nonrebreather. CT chest with no PE, enlarged multilobulated left empyema/hematoma, unchanged left lower lobe consolidation with small internal air bubbles and multiple left rib fractures. Patient reports improved breathing while on 6 L nasal cannula. He denies any fever, chills, abdominal pain, nausea, vomiting. Have left-sided chest pain at the site where his chest tube was placed during his first admission. Reports swelling of his lower extremity for about 2 weeks. Reports rashes in his lower extremity which started after getting a sponge bath to 3 days ago. Rashes are nonpruritic, nonpainful. IR recommend surgical consultation for VATS/Drain placement as percutaneous treatment would require three chest tubes to reach all of the pockets. As well as multiple days of tPA administration to liquify what is undoubtedly thick fluid/hemtoma. Patient was continue d on IV Rocephin. ID is asked to evaluate for treatment recommendations. Patient Active Problem List   Diagnosis Code    Acute blood loss anemia D62    Alcohol abuse F10.10    Tobacco abuse Z72.0    Fall with significant injury W19. XXXA    Leukocytosis D72.829    Hydropneumothorax J94.8    Loculated pleural effusion J90    Hyponatremia E87.1    MSSA bacteremia R78.81, B95.61    Sinus tachycardia R00.0    Acute respiratory failure with hypoxia (HCC) J96.01    Loculated empyema (HCC) J86.9     Past Medical History:   Diagnosis Date    GERD (gastroesophageal reflux disease)     once a week OTC med      No family history on file. Social History     Tobacco Use    Smoking status: Current Every Day Smoker     Packs/day: 1.00     Years: 20.00     Pack years: 20.00    Smokeless tobacco: Never Used   Substance Use Topics    Alcohol use:  Yes     Alcohol/week: 6.7 standard drinks     Types: 8 Shots of liquor per week     Past Surgical History:   Procedure Laterality Date    HX HEENT      wisdom teeth      Prior to Admission medications    Medication Sig Start Date End Date Taking? Authorizing Provider   oxyCODONE-acetaminophen (PERCOCET) 5-325 mg per tablet Take 1 Tab by mouth every eight (8) hours as needed for Pain for up to 3 days. Max Daily Amount: 3 Tabs. 20 Yes Pamela Miller NP   cyclobenzaprine (FLEXERIL) 10 mg tablet Take 1 Tab by mouth three (3) times daily as needed for Muscle Spasm(s). 18  Yes Rodger Ledesma MD   naloxone (Narcan) 4 mg/actuation nasal spray Use 1 spray intranasally, then discard. Repeat with new spray every 2 min as needed for opioid overdose symptoms, alternating nostrils. 20   Pamela Miller NP       No Known Allergies     Review of Systems:  A comprehensive review of systems was negative except for that written in the History of Present Illness. Objective:     Visit Vitals  /70   Pulse 96   Temp 98.1 °F (36.7 °C)   Resp 30   Ht 5' 10\" (1.778 m)   Wt 103.2 kg (227 lb 8.2 oz)   SpO2 93%   BMI 32.65 kg/m²     Temp (24hrs), Av.7 °F (36.5 °C), Min:96.6 °F (35.9 °C), Max:99 °F (37.2 °C)       Lines:  RUE PICC:       Physical Exam:    General:  Alert, cooperative, well noursished, well developed, obese, appears very uncomfortable,appears stated age   Eyes:  Sclera anicteric. Pupils equally round and reactive to light. Mouth/Throat: Mucous membranes normal, oral pharynx clear   Neck: Supple   Lungs:   Clear to auscultation bilaterally, good effort   CV:  Regular rate and rhythm,no murmur, click, rub or gallop   Abdomen:   Soft, non-tender.  bowel sounds normal. non-distended   Extremities: No cyanosis or edema   Skin: Skin color, texture, turgor normal. no acute rash or lesions   Lymph nodes: Cervical and supraclavicular normal   Musculoskeletal: No swelling or deformity   Lines/Devices:  Intact, no erythema, drainage or tenderness   Psych: Alert and oriented, normal mood affect given the setting       Data Review:     CBC:  Recent Labs     11/14/20 2123   WBC 8.3   GRANS 79*   MONOS 4   EOS 2   ANEU 6.5   ABL 1.1   HGB 10.4*   HCT 31.3*   *       BMP:  Recent Labs     11/14/20 2123   CREA 0.74*   BUN 7      K 3.6   CL 99   CO2 38*   AGAP 0*   *       LFTS:  Recent Labs     11/14/20 2123   TBILI 0.7   ALT 12   *   TP 6.3   ALB 1.6*       Microbiology:     All Micro Results     Procedure Component Value Units Date/Time    CULTURE, BLOOD [[id number]] Collected:  11/15/20 0033    Order Status:  Completed Specimen:  Blood Updated:  11/15/20 0108    CULTURE, BLOOD [[id number]] Collected:  11/15/20 0035    Order Status:  Completed Specimen:  Blood Updated:  11/15/20 0108          Imaging:   IMPRESSION:   1. No evidence of pulmonary embolism. 2. Mildly enlarged multilobulated left empyema. 3. Unchanged small focus of left lower lobe consolidation with small internal  bubbles of air which may relate to pneumatoceles although an early lung abscess  is not excluded.   4. Multiple left rib fractures, several of which appear segmental suspect for a  flail chest.       Signed By: Sandra Salvador NP     November 15, 2020

## 2020-11-15 NOTE — ED TRIAGE NOTES
Arrives with NRB in place. Arrives from home via [facility] with reports shortness of breath. Upon EMS arrival sats in [de-identified] RA, EMS reports difficulty brinGing 02 sats up despite NRB. sats 96-98% NRB at 25L. S/p fall through attic 10/29, approx 16 foot fall. Seen here 10/30 with admission, rib fractures x5 to left. Hemothorax and pneumothorax to left, chest tube placed. Discharged however returned 11/3 with re-admission for pneumonia. Currently receiving IV abx through PICC line to ALYSE. tachypneic on arrival, difficulty speaking in full sentences. Rash noted to lower extremities, states \"sponge bath 2 days ago\" and has been there since.

## 2020-11-16 ENCOUNTER — APPOINTMENT (OUTPATIENT)
Dept: ULTRASOUND IMAGING | Age: 50
DRG: 163 | End: 2020-11-16
Attending: INTERNAL MEDICINE

## 2020-11-16 ENCOUNTER — HOSPITAL ENCOUNTER (OUTPATIENT)
Dept: INFUSION THERAPY | Age: 50
End: 2020-11-16

## 2020-11-16 ENCOUNTER — ANESTHESIA EVENT (OUTPATIENT)
Dept: SURGERY | Age: 50
DRG: 163 | End: 2020-11-16

## 2020-11-16 LAB
ANION GAP SERPL CALC-SCNC: 4 MMOL/L (ref 7–16)
BACTERIA SPEC CULT: NORMAL
BUN SERPL-MCNC: 7 MG/DL (ref 6–23)
CALCIUM SERPL-MCNC: 7.9 MG/DL (ref 8.3–10.4)
CHLORIDE SERPL-SCNC: 100 MMOL/L (ref 98–107)
CO2 SERPL-SCNC: 34 MMOL/L (ref 21–32)
CREAT SERPL-MCNC: 0.56 MG/DL (ref 0.8–1.5)
ERYTHROCYTE [DISTWIDTH] IN BLOOD BY AUTOMATED COUNT: 16 % (ref 11.9–14.6)
GLUCOSE SERPL-MCNC: 87 MG/DL (ref 65–100)
HCT VFR BLD AUTO: 27.5 % (ref 41.1–50.3)
HGB BLD-MCNC: 8.8 G/DL (ref 13.6–17.2)
MCH RBC QN AUTO: 37.9 PG (ref 26.1–32.9)
MCHC RBC AUTO-ENTMCNC: 32 G/DL (ref 31.4–35)
MCV RBC AUTO: 118.5 FL (ref 79.6–97.8)
NRBC # BLD: 0 K/UL (ref 0–0.2)
PLATELET # BLD AUTO: 379 K/UL (ref 150–450)
PMV BLD AUTO: 10 FL (ref 9.4–12.3)
POTASSIUM SERPL-SCNC: 4.1 MMOL/L (ref 3.5–5.1)
RBC # BLD AUTO: 2.32 M/UL (ref 4.23–5.6)
SERVICE CMNT-IMP: NORMAL
SODIUM SERPL-SCNC: 138 MMOL/L (ref 138–145)
WBC # BLD AUTO: 7.1 K/UL (ref 4.3–11.1)

## 2020-11-16 PROCEDURE — 80048 BASIC METABOLIC PNL TOTAL CA: CPT

## 2020-11-16 PROCEDURE — 74011000250 HC RX REV CODE- 250: Performed by: INTERNAL MEDICINE

## 2020-11-16 PROCEDURE — 74011250636 HC RX REV CODE- 250/636: Performed by: INTERNAL MEDICINE

## 2020-11-16 PROCEDURE — 65270000029 HC RM PRIVATE

## 2020-11-16 PROCEDURE — 74011250637 HC RX REV CODE- 250/637: Performed by: INTERNAL MEDICINE

## 2020-11-16 PROCEDURE — 2709999900 HC NON-CHARGEABLE SUPPLY

## 2020-11-16 PROCEDURE — 99232 SBSQ HOSP IP/OBS MODERATE 35: CPT | Performed by: INTERNAL MEDICINE

## 2020-11-16 PROCEDURE — 85027 COMPLETE CBC AUTOMATED: CPT

## 2020-11-16 PROCEDURE — 74011250637 HC RX REV CODE- 250/637: Performed by: NURSE PRACTITIONER

## 2020-11-16 PROCEDURE — 93970 EXTREMITY STUDY: CPT

## 2020-11-16 RX ORDER — CEFAZOLIN SODIUM/WATER 2 G/20 ML
2 SYRINGE (ML) INTRAVENOUS EVERY 8 HOURS
Status: DISCONTINUED | OUTPATIENT
Start: 2020-11-16 | End: 2020-12-03 | Stop reason: HOSPADM

## 2020-11-16 RX ADMIN — FOLIC ACID 1 MG: 1 TABLET ORAL at 09:41

## 2020-11-16 RX ADMIN — ZINC 1 TABLET: TAB ORAL at 09:41

## 2020-11-16 RX ADMIN — CEFAZOLIN SODIUM 2 G: 100 INJECTION, POWDER, LYOPHILIZED, FOR SOLUTION INTRAVENOUS at 10:00

## 2020-11-16 RX ADMIN — LORAZEPAM 1 MG: 1 TABLET ORAL at 12:58

## 2020-11-16 RX ADMIN — Medication 100 MG: at 09:41

## 2020-11-16 RX ADMIN — OXYCODONE HYDROCHLORIDE AND ACETAMINOPHEN 1 TABLET: 5; 325 TABLET ORAL at 20:59

## 2020-11-16 RX ADMIN — Medication 10 ML: at 21:22

## 2020-11-16 RX ADMIN — OXYCODONE HYDROCHLORIDE AND ACETAMINOPHEN 1 TABLET: 5; 325 TABLET ORAL at 03:23

## 2020-11-16 RX ADMIN — CEFAZOLIN SODIUM 2 G: 100 INJECTION, POWDER, LYOPHILIZED, FOR SOLUTION INTRAVENOUS at 18:35

## 2020-11-16 RX ADMIN — OXYCODONE HYDROCHLORIDE AND ACETAMINOPHEN 1 TABLET: 5; 325 TABLET ORAL at 15:34

## 2020-11-16 RX ADMIN — Medication 10 ML: at 15:02

## 2020-11-16 RX ADMIN — OXYCODONE HYDROCHLORIDE AND ACETAMINOPHEN 1 TABLET: 5; 325 TABLET ORAL at 09:41

## 2020-11-16 RX ADMIN — Medication 10 ML: at 05:38

## 2020-11-16 NOTE — CONSULTS
Infectious Disease Progress Note    Today's Date: 11/16/2020   Admit Date: 11/14/2020    Impression:   · MSSA bacteremia (11/3 - 11/4); 11/5 [address] [place]; 11/4 TTE negative  · Left pleural empyema s/p chest tube, 11/4/20; Cx from pleural drainage, 11/6: MSSA; discharged 11/12/20 on IV Rocephin X 4 weeks with EOT 12/4/20  · ETOH withdrawal on librium taper. Denies illicit drugs     Plan:   · Change to high dose Ancef for now  · IR now recommending surgical evaluation   · ID will continue to follow    Anti-infectives:   · Cefazolin 11/4-11/12  · Vanc 11/3-11/4  · Augmentin 11/3-11/4  · CTX 11/4  Restarted 11/13-  · Cefepime 11/3       Subjective: Interval History: Afebrile, WBC normal.   Blood cx 11/15 remain negative. Just back from Radiology for Dopplers. Reports moving bowels, no diarrhea. Patient is a 48 y.o. male with medical history significant for EtOH abuse, GERD,  Smoker, recent hospitalization for traumatic pneumothorax and rib fracture complicated by loculated hydropneumothorax due to empyema currently on outpatient IV rocephin via R PICC line who presented to the ED due to acute on set of severe shortness of breath.      Patient had a fall resulting in thorax and rib fracture on 10/30 and was discharged on 11/2 to be managed with chest tube by surgical service. Patient returned on 11/3 due to pain and noted to have loculated hydropneumothorax on CT chest.  BCx and WCx were positive for MSSA and is recommended Rocephin IV until 12/4. Patient was discharged on 11/12 after removal of chest tube with only a small amount of remaining fluid noted on follow-up CT. Reports that he has been doing well since discharge. However, after he returned home from getting outpatient IV antibiotics infusion today, he developed sudden onset of severe shortness of breath. He felt he was having a severe panic attack and unable to breath. EMS arrival, patient was found to be saturating in the 80s.      In the ED, he is hemodynamically stable tachypneic. His oxygen requirement has been titrated down to 6L nasal cannula. Labs are notable for WBC 8.3, hemoglobin 10.4, proBNP 391. ABG shows 7.52/42.8/101 on 15 L nonrebreather. CT chest with no PE, enlarged multilobulated left empyema/hematoma, unchanged left lower lobe consolidation with small internal air bubbles and multiple left rib fractures. Patient reports improved breathing while on 6 L nasal cannula. He denies any fever, chills, abdominal pain, nausea, vomiting. Have left-sided chest pain at the site where his chest tube was placed during his first admission. Reports swelling of his lower extremity for about 2 weeks. Reports rashes in his lower extremity which started after getting a sponge bath to 3 days ago. Rashes are nonpruritic, nonpainful. IR recommend surgical consultation for VATS/Drain placement as percutaneous treatment would require three chest tubes to reach all of the pockets. As well as multiple days of tPA administration to liquify what is undoubtedly thick fluid/hemtoma. Patient was continue d on IV Rocephin. ID is asked to evaluate for treatment recommendations. Patient Active Problem List   Diagnosis Code    Acute blood loss anemia D62    Alcohol abuse F10.10    Tobacco abuse Z72.0    Fall with significant injury W19. XXXA    Leukocytosis D72.829    Hydropneumothorax J94.8    Loculated pleural effusion J90    Hyponatremia E87.1    MSSA bacteremia R78.81, B95.61    Sinus tachycardia R00.0    Acute respiratory failure with hypoxia (HCC) J96.01    Loculated empyema (HCC) J86.9     Past Medical History:   Diagnosis Date    GERD (gastroesophageal reflux disease)     once a week OTC med      No family history on file. Social History     Tobacco Use    Smoking status: Current Every Day Smoker     Packs/day: 1.00     Years: 20.00     Pack years: 20.00    Smokeless tobacco: Never Used   Substance Use Topics    Alcohol use:  Yes Alcohol/week: 6.7 standard drinks     Types: 8 Shots of liquor per week     Past Surgical History:   Procedure Laterality Date    HX HEENT      wisdom teeth      Prior to Admission medications    Medication Sig Start Date End Date Taking? Authorizing Provider   oxyCODONE-acetaminophen (PERCOCET) 5-325 mg per tablet Take 1 Tab by mouth every eight (8) hours as needed for Pain for up to 3 days. Max Daily Amount: 3 Tabs. 20 Yes Bee Sue NP   cyclobenzaprine (FLEXERIL) 10 mg tablet Take 1 Tab by mouth three (3) times daily as needed for Muscle Spasm(s). 18  Yes Gerardo Marino MD   naloxone (Narcan) 4 mg/actuation nasal spray Use 1 spray intranasally, then discard. Repeat with new spray every 2 min as needed for opioid overdose symptoms, alternating nostrils. 20   Bee Sue NP       No Known Allergies     Review of Systems:  A comprehensive review of systems was negative except for that written in the History of Present Illness. Objective:     Visit Vitals  /67   Pulse 85   Temp 97.6 °F (36.4 °C)   Resp 20   Ht 5' 10\" (1.778 m)   Wt 103.2 kg (227 lb 8.2 oz)   SpO2 91%   BMI 32.65 kg/m²     Temp (24hrs), Av.9 °F (36.6 °C), Min:97.5 °F (36.4 °C), Max:98.3 °F (36.8 °C)       Lines:  RUE PICC:       Physical Exam:    General:  Alert, cooperative, well noursished, well developed, obese, appears very uncomfortable,appears stated age   Eyes:  Sclera anicteric. Pupils equally round and reactive to light. Mouth/Throat: Mucous membranes normal, oral pharynx clear   Neck: Supple   Lungs:   Clear to auscultation bilaterally, good effort   CV:  Regular rate and rhythm,no murmur, click, rub or gallop   Abdomen:   Soft, non-tender.  bowel sounds normal. non-distended   Extremities: No cyanosis or edema   Skin: Skin color, texture, turgor normal. no acute rash or lesions   Lymph nodes: Cervical and supraclavicular normal   Musculoskeletal: No swelling or deformity   Lines/Devices: Intact, no erythema, drainage or tenderness   Psych: Alert and oriented, normal mood affect given the setting       Data Review:     CBC:  Recent Labs     11/16/20 0507 11/14/20 2123   WBC 7.1 8.3   GRANS  --  79*   MONOS  --  4   EOS  --  2   ANEU  --  6.5   ABL  --  1.1   HGB 8.8* 10.4*   HCT 27.5* 31.3*    469*       BMP:  Recent Labs     11/16/20 0507 11/14/20 2123   CREA 0.56* 0.74*   BUN 7 7    137   K 4.1 3.6    99   CO2 34* 38*   AGAP 4* 0*   GLU 87 159*       LFTS:  Recent Labs     11/14/20 2123   TBILI 0.7   ALT 12   *   TP 6.3   ALB 1.6*       Microbiology:     All Micro Results     Procedure Component Value Units Date/Time    CULTURE, BLOOD [[id number]] Collected:  11/15/20 0033    Order Status:  Completed Specimen:  Blood Updated:  11/16/20 0746     Special Requests: --        RIGHT  ARM       Culture result: NO GROWTH 1 DAY       CULTURE, BLOOD [[id number]] Collected:  11/15/20 0035    Order Status:  Completed Specimen:  Blood Updated:  11/16/20 0746     Special Requests: --        LEFT  ARM       Culture result: NO GROWTH 1 DAY             Imaging:   IMPRESSION:   1. No evidence of pulmonary embolism. 2. Mildly enlarged multilobulated left empyema. 3. Unchanged small focus of left lower lobe consolidation with small internal  bubbles of air which may relate to pneumatoceles although an early lung abscess  is not excluded.   4. Multiple left rib fractures, several of which appear segmental suspect for a  flail chest.       Signed By: Paulie Becerra MD     November 16, 2020

## 2020-11-16 NOTE — PROGRESS NOTES
Hourly rounds completed. All needs met. PRN meds given per request. Pt NPO since MN. Will give report to the oncoming day shift nurse.

## 2020-11-16 NOTE — PROGRESS NOTES
Ginette Fall  Admission Date: 11/14/2020             Daily Progress Note: 11/16/2020    The patient's chart is reviewed and the patient is discussed with the staff.    48 y.o.  male presents with a PMHx of smoker, ETOH abuse, and recent hospitalizations for traumatic PTX and rib fractures. Prior to this admission, he has been admitted twice r/t traumatic PTX. First admission was 10/30-11/2>>during that admission he required a CT for the PTX. It was deemed that he was not in need for surgery at that time. He returned 11/3-11/12 where surgery again was consulted, but felt he still did not need surgery. A chest CT on 11/3 showed a loculated hydropneumothorax. MSSA bactermia was diagnosed per blood cultures on 11/3. He also grew MSSA from the left chest wound. A left chest tube was placed by IR on 11-4 and was removed on 11/11 (had to have TPA to assist with drainage), with only a small amount of remaining fluid noted on follow up CT (estimated about 200 mls). The infectious disease team has been involved and he was to be treated with IV Rocephin for 4 to 6 weeks with EOT on 12/4. He is now readmitted with worsening shortness of breath. The CT now looks worse with air in loculated fluid collections. IR and Surgery have been consulted.      .   Subjective:     Still having pain left chest. Discussed probable need for surgery.      Current Facility-Administered Medications   Medication Dose Route Frequency    ceFAZolin (ANCEF) 2 g/20 mL in sterile water IV syringe  2 g IntraVENous Q8H    sodium chloride (NS) flush 5-40 mL  5-40 mL IntraVENous Q8H    sodium chloride (NS) flush 5-40 mL  5-40 mL IntraVENous PRN    acetaminophen (TYLENOL) tablet 650 mg  650 mg Oral Q6H PRN    Or    acetaminophen (TYLENOL) suppository 650 mg  650 mg Rectal Q6H PRN    polyethylene glycol (MIRALAX) packet 17 g  17 g Oral DAILY PRN    promethazine (PHENERGAN) tablet 12.5 mg  12.5 mg Oral Q6H PRN    Or    ondansetron (ZOFRAN) injection 4 mg  4 mg IntraVENous Q6H PRN    oxyCODONE-acetaminophen (PERCOCET) 5-325 mg per tablet 1 Tab  1 Tab Oral Q6H PRN    LORazepam (ATIVAN) tablet 1 mg  1 mg Oral Q6H PRN    thiamine HCL (B-1) tablet 100 mg  100 mg Oral DAILY    folic acid (FOLVITE) tablet 1 mg  1 mg Oral DAILY    multivitamin w ZN (STRESSTABS W ZINC) tablet  1 Tab Oral DAILY         Objective:     Vitals:    11/15/20 1946 11/15/20 2347 11/16/20 0454 11/16/20 0911   BP: (!) 107/59 119/77 97/64 100/67   Pulse: 99 83 79 85   Resp: 22 20 20 20   Temp: 97.5 °F (36.4 °C) 98 °F (36.7 °C) 97.9 °F (36.6 °C) 97.6 °F (36.4 °C)   SpO2: 93% 99% 97% 91%   Weight:       Height:         Intake and Output:   11/14 1901 - 11/16 0700  In: 600 [I.V.:600]  Out: 1125 [Urine:1125]  11/16 0701 - 11/16 1900  In: -   Out: 500 [Urine:500]    Physical Exam:   Constitutional:  the patient is well developed and in no acute distress  HEENT:  Sclera clear, pupils equal, oral mucosa moist  Lungs: crackles left chest and diminished. Clear on the right. Cardiovascular:  RRR without M,G,R  Abd/GI: soft and non-tender; with positive bowel sounds. Ext: warm without cyanosis. There is 1+ lower leg edema. Musculoskeletal: moves all four extremities with equal strength  Skin:  no jaundice or rashes, left chest wound  Neuro: no gross neuro deficits   Musculoskeletal: can ambulate. No deformity  Psychiatric: Calm.      Review of Systems - General ROS: positive for  - fatigue and malaise  Respiratory ROS: positive for - shortness of breath  Cardiovascular ROS: positive for - edema  Gastrointestinal ROS: no abdominal pain, change in bowel habits, or black or bloody stools  Musculoskeletal ROS: negative   Lines: PICC line right arm    Duplex of legs: negative for DVT    CHEST XRAY:       LAB  Recent Labs     11/16/20  0507 11/14/20 2123   WBC 7.1 8.3   HGB 8.8* 10.4*   HCT 27.5* 31.3*    469*     Recent Labs     11/16/20  0504 11/14/20 2123    137   K 4.1 3.6    99   CO2 34* 38*   GLU 87 159*   BUN 7 7   CREA 0.56* 0.74*   MG  --  1.8   ALB  --  1.6*       Assessment:  (Medical Decision Making)     Hospital Problems  Date Reviewed: 11/3/2020          Codes Class Noted POA    * (Principal) Acute respiratory failure with hypoxia ([facility] ICD-10-CM: J96.01  ICD-9-CM: 518.81  11/15/2020 Unknown        Loculated empyema ([facility] ICD-10-CM: J86.9  ICD-9-CM: 510.9  11/15/2020 Unknown        MSSA bacteremia ICD-10-CM: R78.81, B95.61  ICD-9-CM: 790.7, 041.11  11/4/2020 Yes        Alcohol abuse ICD-10-CM: F10.10  ICD-9-CM: 305.00  8/17/2016 Yes              Plan:  (Medical Decision Making)   1. Surgery to evaluate for VATS  2. Ancef per ID for now - had planned 4 weeks of Rx previously. To follow here  3. Radiology seeing as well but had chest tube with TPA last week and CT now looks worse    Bailey Godoy NP    More than 50% of time documented was spent face-to-face contact with the patient and in the care of the patient on the floor/unit where the patient is located. Lungs:  Coarse   Heart:  RRR with o Murmur/Rubs/Gallops    Additional Comments: For surgery tomorrw      I have spoken with and examined the patient. I agree with the above assessment and plan as documented.     Hugh Lima MD

## 2020-11-16 NOTE — PROGRESS NOTES
Met with patient to complete assessment - social distance + ppe maintained. Patient stated he lives with his girlfriend - Reeda Homans. He is independent with ADLS - no use of assistive devices - no home health services. Ev Emery takes him to appts. Patient was readmitted after being discharged about 4 days ago. Patient was previously d/c'd with outpatient iv abx infusions arranged @ the [facility] infusion center. He stated he was able to go to 2 of those appts but then was readmitted to the hospital.   He stated he was able to get to those appointments appropriately. I have spoken with Jonatan Sanchez @ the infusion center - she wishes for an update later today - which I will call back to speak with her [phone]. Patient was also given [place] information to arrange a PCP appointment - patient stated he has not had time to do this but will. Discharge planning ongoing at this time - patient's information may need to be sent to the [facility] infusion center if abx type/duration change. CM will continue to follow.      Care Management Interventions  PCP Verified by CM: No  Mode of Transport at Discharge: Self  Transition of Care Consult (CM Consult): Discharge Planning, [id number] [address]: Lives with Spouse(Bindu Jackson )  Confirm Follow Up Transport: Family  Discharge Location  Discharge Placement: Unable to determine at this time

## 2020-11-16 NOTE — PROGRESS NOTES
Per surgery scheduler, Pt scheduled for procedure with Dr Megan Hawthorne at 1200. Will update primary RN.

## 2020-11-16 NOTE — PROGRESS NOTES
Progress Note    2020  Admit Date: 2020  9:21 PM   NAME: Scott Huntley   :  1970   MRN:  [id number]   Attending: Huy Alvarez MD  PCP:  None  Treatment Team: Attending Provider: Huy Alvarez MD; Consulting Provider: Varinder Carpenter MD; Consulting Provider: Barrett More MD; Nurse Practitioner: Aspen Arias NP; Primary Nurse: Eliezer Morrow; Consulting Provider: Javed Yung MD    Full Code   SUBJECTIVE:   Mr. Radha Ramirez is a 47 yo male with PMH of smoker, ETOH abuse, recent hospitalizations for traumatic pneumothorax and rib fx. He has had 2 recent admissions for same requiring chest tubes and TPA to assist with drainage, deemed no need for surgery at that time. Pt found to have MSSA bacteremia and left chest wound. ID consulted and pt tx with IV rocephin for 4-6 weeks with EOT 20. He presented to the ER by EMS after being found hypoxic with SOB at the infusion center. CXR showed unchanged left sided empyema. CT chest showed mildly enlarged multilobulated left empyema, multiple rib fx. IR consulted. Pulm consulted. General Surgery consulted. Plans for VAT tomorrow. Pt reports pain controlled however overall feels poor.       Past Medical History:   Diagnosis Date    GERD (gastroesophageal reflux disease)     once a week OTC med     Recent Results (from the past 24 hour(s))   METABOLIC PANEL, BASIC    Collection Time: 20  5:07 AM   Result Value Ref Range    Sodium 138 138 - 145 mmol/L    Potassium 4.1 3.5 - 5.1 mmol/L    Chloride 100 98 - 107 mmol/L    CO2 34 (H) 21 - 32 mmol/L    Anion gap 4 (L) 7 - 16 mmol/L    Glucose 87 65 - 100 mg/dL    BUN 7 6 - 23 MG/DL    Creatinine 0.56 (L) 0.8 - 1.5 MG/DL    GFR est AA >60 >60 ml/min/1.73m2    GFR est non-AA >60 >60 ml/min/1.73m2    Calcium 7.9 (L) 8.3 - 10.4 MG/DL   CBC W/O DIFF    Collection Time: 20  5:07 AM   Result Value Ref Range    WBC 7.1 4.3 - 11.1 K/uL    RBC 2.32 (L) 4.23 - 5.6 M/uL    HGB 8.8 (L) 13.6 - 17.2 g/dL    HCT 27.5 (L) 41.1 - 50.3 %    .5 (H) 79.6 - 97.8 FL    MCH 37.9 (H) 26.1 - 32.9 PG    MCHC 32.0 31.4 - 35.0 g/dL    RDW 16.0 (H) 11.9 - 14.6 %    PLATELET 405 058 - 192 K/uL    MPV 10.0 9.4 - 12.3 FL    ABSOLUTE NRBC 0.00 0.0 - 0.2 K/uL     No Known Allergies  Current Facility-Administered Medications   Medication Dose Route Frequency Provider Last Rate Last Dose    ceFAZolin (ANCEF) 2 g/20 mL in sterile water IV syringe  2 g IntraVENous Q8H Steffi Romo MD   2 g at 11/16/20 1000    sodium chloride (NS) flush 5-40 mL  5-40 mL IntraVENous Q8H Johana Garduno MD   10 mL at 11/16/20 0538    sodium chloride (NS) flush 5-40 mL  5-40 mL IntraVENous PRN Johana Garduno MD        acetaminophen (TYLENOL) tablet 650 mg  650 mg Oral Q6H PRN Johana Garduno MD        Or    acetaminophen (TYLENOL) suppository 650 mg  650 mg Rectal Q6H PRN Johana Garduno MD        polyethylene glycol (MIRALAX) packet 17 g  17 g Oral DAILY PRN Johana Garduno MD        promethazine (PHENERGAN) tablet 12.5 mg  12.5 mg Oral Q6H PRN Johana Garduno MD        Or    ondansetron (ZOFRAN) injection 4 mg  4 mg IntraVENous Q6H PRN Johana Garduno MD        oxyCODONE-acetaminophen (PERCOCET) 5-325 mg per tablet 1 Tab  1 Tab Oral Q6H PRN Johana Garduno MD   1 Tab at 11/16/20 0941    LORazepam (ATIVAN) tablet 1 mg  1 mg Oral Q6H PRN Johana Garduno MD   1 mg at 11/16/20 1258    thiamine HCL (B-1) tablet 100 mg  100 mg Oral DAILY Bjorn Vega NP   100 mg at 51/09/64 5617    folic acid (FOLVITE) tablet 1 mg  1 mg Oral DAILY Bjorn Vega NP   1 mg at 11/16/20 0941    multivitamin w ZN ([address]) tablet  1 Tab Oral DAILY Bjorn eVga, NP   1 Tab at 11/16/20 0941       Review of Systems negative with exception of pertinent positives noted above  PHYSICAL EXAM     Visit Vitals  /67   Pulse 85   Temp 97.6 °F (36.4 °C)   Resp 20   Ht 5' 10\" (1.778 m)   Wt 103.2 kg (227 lb 8.2 oz)   SpO2 91%   BMI 32.65 kg/m²      Temp (24hrs), Av.9 °F (36.6 °C), Min:97.5 °F (36.4 °C), Max:98.3 °F (36.8 °C)    Oxygen Therapy  O2 Sat (%): 91 % (20 09)  Pulse via Oximetry: 88 beats per minute (11/15/20 0258)  O2 Device: Room air (20 09)  O2 Flow Rate (L/min): 6 l/min (11/15/20 0121)    Intake/Output Summary (Last 24 hours) at 2020 1419  Last data filed at 2020 1259  Gross per 24 hour   Intake    Output 1625 ml   Net -1625 ml      General: No acute distress, appears ill, pale    Lungs: Diminished left side. resp even and nonlabored  Heart:  S1S2 present without murmurs rubs gallops. RRR. No LE edema  Abdomen: Soft, non tender, non distended. BS present  Extremities: Moves ext spontaneously. No cyanosis  Neurologic:  A/O X3. No focal deficits.       Results summary of Diagnostic Studies/Procedures copied from within [facility] EMR:      [address] Problems    Diagnosis Date Noted    Acute respiratory failure with hypoxia ([facility] 11/15/2020    Loculated empyema ([facility] 11/15/2020    MSSA bacteremia 2020    Alcohol abuse 2016     Plan:    Acute resp failure with hypoxia/loculated empyema/rib fx with pneumothorax  Pulm following  IR consulted  General Surgery consulted, plan for VAT tomorrow  On rocephin EOT 20  ID following    Alcohol abuse  Multivitamin  Folate  Thiamine  Ativan prn        Notes, labs, VS, diagnostic testing reviewed    DVT Prophylaxis: SCD  Plan of Care Discussed with: Supervising MD Dr. Lorena Coleman, care team, pt      Humza Plascencia NP

## 2020-11-16 NOTE — ANESTHESIA PREPROCEDURE EVALUATION
Relevant Problems   No relevant active problems       Anesthetic History               Review of Systems / Medical History  Patient summary reviewed, nursing notes reviewed and pertinent labs reviewed    Pulmonary                   Neuro/Psych              Cardiovascular                  Exercise tolerance: >4 METS     GI/Hepatic/Renal                Endo/Other             Other Findings              Physical Exam    Airway  Mallampati: III  TM Distance: 4 - 6 cm  Neck ROM: normal range of motion   Mouth opening: Normal     Cardiovascular  Regular rate and rhythm,  S1 and S2 normal,  no murmur, click, rub, or gallop             Dental  No notable dental hx       Pulmonary  Breath sounds clear to auscultation               Abdominal         Other Findings            Anesthetic Plan    ASA: 2  Anesthesia type: general    Monitoring Plan: Arterial line  Post-op pain plan if not by surgeon: indwelling epidural catheter          Fall withy multiple rib fractures and pneumo requiring chest tubes x 2    Discussed thoracic level epidural, will place preoperatively

## 2020-11-16 NOTE — INTERDISCIPLINARY ROUNDS
Interdisciplinary team rounds were held 11/16/2020 with the following team members:Care Management, Nursing, Nurse Practitioner and Occupational Therapy and the patient. Plan of care discussed. See clinical pathway and/or care plan for interventions and desired outcomes. Pt scheduled for VAT procedure tomorrow. PICC in place for IV abx which Pt has been receiving OP and should continue until 12/4/20.

## 2020-11-17 ENCOUNTER — APPOINTMENT (OUTPATIENT)
Dept: GENERAL RADIOLOGY | Age: 50
DRG: 163 | End: 2020-11-17
Attending: SURGERY

## 2020-11-17 ENCOUNTER — ANESTHESIA (OUTPATIENT)
Dept: SURGERY | Age: 50
DRG: 163 | End: 2020-11-17

## 2020-11-17 ENCOUNTER — HOSPITAL ENCOUNTER (OUTPATIENT)
Dept: INFUSION THERAPY | Age: 50
End: 2020-11-17

## 2020-11-17 LAB
ALBUMIN SERPL-MCNC: 1.4 G/DL (ref 3.5–5)
ALBUMIN SERPL-MCNC: 1.6 G/DL (ref 3.5–5)
ALBUMIN/GLOB SERPL: 0.3 {RATIO} (ref 1.2–3.5)
ALBUMIN/GLOB SERPL: 0.3 {RATIO} (ref 1.2–3.5)
ALP SERPL-CCNC: 222 U/L (ref 50–136)
ALP SERPL-CCNC: 228 U/L (ref 50–136)
ALT SERPL-CCNC: 8 U/L (ref 12–65)
ALT SERPL-CCNC: 9 U/L (ref 12–65)
ANION GAP SERPL CALC-SCNC: 0 MMOL/L (ref 7–16)
ANION GAP SERPL CALC-SCNC: 3 MMOL/L (ref 7–16)
AST SERPL-CCNC: 38 U/L (ref 15–37)
AST SERPL-CCNC: 40 U/L (ref 15–37)
BASE EXCESS BLD CALC-SCNC: 6 MMOL/L
BASOPHILS # BLD: 0 K/UL (ref 0–0.2)
BASOPHILS NFR BLD: 1 % (ref 0–2)
BILIRUB DIRECT SERPL-MCNC: 0.2 MG/DL
BILIRUB SERPL-MCNC: 0.6 MG/DL (ref 0.2–1.1)
BILIRUB SERPL-MCNC: 0.6 MG/DL (ref 0.2–1.1)
BUN SERPL-MCNC: 5 MG/DL (ref 6–23)
BUN SERPL-MCNC: 6 MG/DL (ref 6–23)
CA-I BLD-MCNC: 1.2 MMOL/L (ref 1.12–1.32)
CALCIUM SERPL-MCNC: 7.6 MG/DL (ref 8.3–10.4)
CALCIUM SERPL-MCNC: 7.7 MG/DL (ref 8.3–10.4)
CHLORIDE SERPL-SCNC: 102 MMOL/L (ref 98–107)
CHLORIDE SERPL-SCNC: 103 MMOL/L (ref 98–107)
CO2 SERPL-SCNC: 34 MMOL/L (ref 21–32)
CO2 SERPL-SCNC: 34 MMOL/L (ref 21–32)
CREAT SERPL-MCNC: 0.61 MG/DL (ref 0.8–1.5)
CREAT SERPL-MCNC: 0.61 MG/DL (ref 0.8–1.5)
DIFFERENTIAL METHOD BLD: ABNORMAL
EOSINOPHIL # BLD: 0.2 K/UL (ref 0–0.8)
EOSINOPHIL NFR BLD: 3 % (ref 0.5–7.8)
ERYTHROCYTE [DISTWIDTH] IN BLOOD BY AUTOMATED COUNT: 15.5 % (ref 11.9–14.6)
ERYTHROCYTE [DISTWIDTH] IN BLOOD BY AUTOMATED COUNT: 15.6 % (ref 11.9–14.6)
ERYTHROCYTE [DISTWIDTH] IN BLOOD BY AUTOMATED COUNT: 15.7 % (ref 11.9–14.6)
GLOBULIN SER CALC-MCNC: 5 G/DL (ref 2.3–3.5)
GLOBULIN SER CALC-MCNC: 5.1 G/DL (ref 2.3–3.5)
GLUCOSE BLD STRIP.AUTO-MCNC: 96 MG/DL (ref 65–100)
GLUCOSE SERPL-MCNC: 83 MG/DL (ref 65–100)
GLUCOSE SERPL-MCNC: 90 MG/DL (ref 65–100)
HCO3 BLD-SCNC: 31 MMOL/L (ref 22–26)
HCT VFR BLD AUTO: 27.3 % (ref 41.1–50.3)
HCT VFR BLD AUTO: 27.5 % (ref 41.1–50.3)
HCT VFR BLD AUTO: 28.1 % (ref 41.1–50.3)
HGB BLD-MCNC: 9 G/DL (ref 13.6–17.2)
HGB BLD-MCNC: 9.1 G/DL (ref 13.6–17.2)
HGB BLD-MCNC: 9.1 G/DL (ref 13.6–17.2)
IMM GRANULOCYTES # BLD AUTO: 0 K/UL (ref 0–0.5)
IMM GRANULOCYTES NFR BLD AUTO: 1 % (ref 0–5)
LYMPHOCYTES # BLD: 1.2 K/UL (ref 0.5–4.6)
LYMPHOCYTES NFR BLD: 20 % (ref 13–44)
MCH RBC QN AUTO: 37.8 PG (ref 26.1–32.9)
MCH RBC QN AUTO: 38.3 PG (ref 26.1–32.9)
MCH RBC QN AUTO: 38.4 PG (ref 26.1–32.9)
MCHC RBC AUTO-ENTMCNC: 32.4 G/DL (ref 31.4–35)
MCHC RBC AUTO-ENTMCNC: 32.7 G/DL (ref 31.4–35)
MCHC RBC AUTO-ENTMCNC: 33.3 G/DL (ref 31.4–35)
MCV RBC AUTO: 115.2 FL (ref 79.6–97.8)
MCV RBC AUTO: 116.6 FL (ref 79.6–97.8)
MCV RBC AUTO: 117 FL (ref 79.6–97.8)
MONOCYTES # BLD: 0.4 K/UL (ref 0.1–1.3)
MONOCYTES NFR BLD: 7 % (ref 4–12)
NEUTS SEG # BLD: 4.3 K/UL (ref 1.7–8.2)
NEUTS SEG NFR BLD: 69 % (ref 43–78)
NRBC # BLD: 0 K/UL (ref 0–0.2)
PCO2 BLD: 47.8 MMHG (ref 35–45)
PH BLD: 7.42 [PH] (ref 7.35–7.45)
PLATELET # BLD AUTO: 307 K/UL (ref 150–450)
PLATELET # BLD AUTO: 360 K/UL (ref 150–450)
PLATELET # BLD AUTO: 378 K/UL (ref 150–450)
PMV BLD AUTO: 10.1 FL (ref 9.4–12.3)
PMV BLD AUTO: 11.3 FL (ref 9.4–12.3)
PMV BLD AUTO: 9.7 FL (ref 9.4–12.3)
PO2 BLD: 122 MMHG (ref 75–100)
POTASSIUM BLD-SCNC: 4.7 MMOL/L (ref 3.5–5.1)
POTASSIUM SERPL-SCNC: 4.1 MMOL/L (ref 3.5–5.1)
POTASSIUM SERPL-SCNC: 4.4 MMOL/L (ref 3.5–5.1)
PROT SERPL-MCNC: 6.4 G/DL (ref 6.3–8.2)
PROT SERPL-MCNC: 6.7 G/DL (ref 6.3–8.2)
RBC # BLD AUTO: 2.35 M/UL (ref 4.23–5.6)
RBC # BLD AUTO: 2.37 M/UL (ref 4.23–5.6)
RBC # BLD AUTO: 2.41 M/UL (ref 4.23–5.6)
SAO2 % BLD: 99 % (ref 95–98)
SODIUM BLD-SCNC: 136 MMOL/L (ref 136–145)
SODIUM SERPL-SCNC: 137 MMOL/L (ref 136–145)
SODIUM SERPL-SCNC: 139 MMOL/L (ref 136–145)
WBC # BLD AUTO: 6 K/UL (ref 4.3–11.1)
WBC # BLD AUTO: 6.2 K/UL (ref 4.3–11.1)
WBC # BLD AUTO: 8.5 K/UL (ref 4.3–11.1)

## 2020-11-17 PROCEDURE — 85025 COMPLETE CBC W/AUTO DIFF WBC: CPT

## 2020-11-17 PROCEDURE — 82803 BLOOD GASES ANY COMBINATION: CPT

## 2020-11-17 PROCEDURE — 77030041524 HC SEALNT FIBRN VITASEAL J&J -C: Performed by: SURGERY

## 2020-11-17 PROCEDURE — 77030016151 HC PROTCTR LNS DFOG COVD -B: Performed by: SURGERY

## 2020-11-17 PROCEDURE — 74011250636 HC RX REV CODE- 250/636: Performed by: ANESTHESIOLOGY

## 2020-11-17 PROCEDURE — 77030020407 HC IV BLD WRMR ST 3M -A: Performed by: ANESTHESIOLOGY

## 2020-11-17 PROCEDURE — 85027 COMPLETE CBC AUTOMATED: CPT

## 2020-11-17 PROCEDURE — 36415 COLL VENOUS BLD VENIPUNCTURE: CPT

## 2020-11-17 PROCEDURE — 00HU33Z INSERTION OF INFUSION DEVICE INTO SPINAL CANAL, PERCUTANEOUS APPROACH: ICD-10-PCS | Performed by: ANESTHESIOLOGY

## 2020-11-17 PROCEDURE — 77030013794 HC KT TRNSDUC BLD EDWD -B: Performed by: ANESTHESIOLOGY

## 2020-11-17 PROCEDURE — 77030008518 HC TBNG INSUF ENDO STRY -B: Performed by: SURGERY

## 2020-11-17 PROCEDURE — 0W9B00Z DRAINAGE OF LEFT PLEURAL CAVITY WITH DRAINAGE DEVICE, OPEN APPROACH: ICD-10-PCS | Performed by: SURGERY

## 2020-11-17 PROCEDURE — 77030037088 HC TUBE ENDOTRACH ORAL NSL COVD-A: Performed by: ANESTHESIOLOGY

## 2020-11-17 PROCEDURE — 77030034850: Performed by: SURGERY

## 2020-11-17 PROCEDURE — 74011250637 HC RX REV CODE- 250/637: Performed by: NURSE PRACTITIONER

## 2020-11-17 PROCEDURE — 74011000258 HC RX REV CODE- 258: Performed by: INTERNAL MEDICINE

## 2020-11-17 PROCEDURE — 65270000029 HC RM PRIVATE

## 2020-11-17 PROCEDURE — 77030040506 HC DRN WND MDII -A: Performed by: SURGERY

## 2020-11-17 PROCEDURE — 77030008606 HC TRCR ENDOSC KII AMR -B: Performed by: SURGERY

## 2020-11-17 PROCEDURE — 77030014125 HC TY EPDRL BBMI -B: Performed by: ANESTHESIOLOGY

## 2020-11-17 PROCEDURE — 82947 ASSAY GLUCOSE BLOOD QUANT: CPT

## 2020-11-17 PROCEDURE — 74011000250 HC RX REV CODE- 250: Performed by: ANESTHESIOLOGY

## 2020-11-17 PROCEDURE — 76010000173 HC OR TIME 3 TO 3.5 HR INTENSV-TIER 1: Performed by: SURGERY

## 2020-11-17 PROCEDURE — 77030019908 HC STETH ESOPH SIMS -A: Performed by: ANESTHESIOLOGY

## 2020-11-17 PROCEDURE — 74011250637 HC RX REV CODE- 250/637: Performed by: INTERNAL MEDICINE

## 2020-11-17 PROCEDURE — 77030039425 HC BLD LARYNG TRULITE DISP TELE -A: Performed by: ANESTHESIOLOGY

## 2020-11-17 PROCEDURE — 4A133J1 MONITORING OF ARTERIAL PULSE, PERIPHERAL, PERCUTANEOUS APPROACH: ICD-10-PCS | Performed by: ANESTHESIOLOGY

## 2020-11-17 PROCEDURE — 77030031139 HC SUT VCRL2 J&J -A: Performed by: SURGERY

## 2020-11-17 PROCEDURE — 77030040361 HC SLV COMPR DVT MDII -B: Performed by: SURGERY

## 2020-11-17 PROCEDURE — 71045 X-RAY EXAM CHEST 1 VIEW: CPT

## 2020-11-17 PROCEDURE — 77030005401 HC CATH RAD ARRO -A: Performed by: ANESTHESIOLOGY

## 2020-11-17 PROCEDURE — 77030013292 HC BOWL MX PRSM J&J -A: Performed by: ANESTHESIOLOGY

## 2020-11-17 PROCEDURE — 74011000250 HC RX REV CODE- 250: Performed by: STUDENT IN AN ORGANIZED HEALTH CARE EDUCATION/TRAINING PROGRAM

## 2020-11-17 PROCEDURE — 74011250636 HC RX REV CODE- 250/636: Performed by: INTERNAL MEDICINE

## 2020-11-17 PROCEDURE — 74011250636 HC RX REV CODE- 250/636: Performed by: STUDENT IN AN ORGANIZED HEALTH CARE EDUCATION/TRAINING PROGRAM

## 2020-11-17 PROCEDURE — 77030003544 HC NDL EPDRL BD -A: Performed by: ANESTHESIOLOGY

## 2020-11-17 PROCEDURE — 77030008671 HC TU ENDO/BRNC CUF COVD -B: Performed by: ANESTHESIOLOGY

## 2020-11-17 PROCEDURE — 74011000250 HC RX REV CODE- 250: Performed by: INTERNAL MEDICINE

## 2020-11-17 PROCEDURE — 74011000258 HC RX REV CODE- 258: Performed by: SURGERY

## 2020-11-17 PROCEDURE — 74011250636 HC RX REV CODE- 250/636: Performed by: NURSE PRACTITIONER

## 2020-11-17 PROCEDURE — 77030040922 HC BLNKT HYPOTHRM STRY -A: Performed by: ANESTHESIOLOGY

## 2020-11-17 PROCEDURE — P9045 ALBUMIN (HUMAN), 5%, 250 ML: HCPCS | Performed by: STUDENT IN AN ORGANIZED HEALTH CARE EDUCATION/TRAINING PROGRAM

## 2020-11-17 PROCEDURE — 77030018673: Performed by: SURGERY

## 2020-11-17 PROCEDURE — 77030011266 HC ELECTRD BLD INSL COVD -A: Performed by: SURGERY

## 2020-11-17 PROCEDURE — C1729 CATH, DRAINAGE: HCPCS | Performed by: SURGERY

## 2020-11-17 PROCEDURE — 86923 COMPATIBILITY TEST ELECTRIC: CPT

## 2020-11-17 PROCEDURE — 76210000016 HC OR PH I REC 1 TO 1.5 HR: Performed by: SURGERY

## 2020-11-17 PROCEDURE — 76010010054 HC POST OP PAIN BLOCK: Performed by: SURGERY

## 2020-11-17 PROCEDURE — 2709999900 HC NON-CHARGEABLE SUPPLY

## 2020-11-17 PROCEDURE — 2709999900 HC NON-CHARGEABLE SUPPLY: Performed by: SURGERY

## 2020-11-17 PROCEDURE — 86900 BLOOD TYPING SEROLOGIC ABO: CPT

## 2020-11-17 PROCEDURE — 77030013658: Performed by: SURGERY

## 2020-11-17 PROCEDURE — 76060000038 HC ANESTHESIA 3.5 TO 4 HR: Performed by: SURGERY

## 2020-11-17 PROCEDURE — 77030002966 HC SUT PDS J&J -A: Performed by: SURGERY

## 2020-11-17 PROCEDURE — 77030008462 HC STPLR SKN PROX J&J -A: Performed by: SURGERY

## 2020-11-17 PROCEDURE — 74011250636 HC RX REV CODE- 250/636: Performed by: HOSPITALIST

## 2020-11-17 PROCEDURE — 77030000038 HC TIP SCIS LAPSCP SURI -B: Performed by: SURGERY

## 2020-11-17 PROCEDURE — 80053 COMPREHEN METABOLIC PANEL: CPT

## 2020-11-17 PROCEDURE — 4A133B1 MONITORING OF ARTERIAL PRESSURE, PERIPHERAL, PERCUTANEOUS APPROACH: ICD-10-PCS | Performed by: ANESTHESIOLOGY

## 2020-11-17 PROCEDURE — 80076 HEPATIC FUNCTION PANEL: CPT

## 2020-11-17 PROCEDURE — 77030037378 HC BRONCHOSCOPE DISP TRAN -D: Performed by: ANESTHESIOLOGY

## 2020-11-17 PROCEDURE — 74011000250 HC RX REV CODE- 250: Performed by: SURGERY

## 2020-11-17 PROCEDURE — 3E0R3BZ INTRODUCTION OF ANESTHETIC AGENT INTO SPINAL CANAL, PERCUTANEOUS APPROACH: ICD-10-PCS | Performed by: ANESTHESIOLOGY

## 2020-11-17 PROCEDURE — 03HY32Z INSERTION OF MONITORING DEVICE INTO UPPER ARTERY, PERCUTANEOUS APPROACH: ICD-10-PCS | Performed by: ANESTHESIOLOGY

## 2020-11-17 PROCEDURE — 77030002996 HC SUT SLK J&J -A: Performed by: SURGERY

## 2020-11-17 RX ORDER — ESMOLOL HYDROCHLORIDE 10 MG/ML
INJECTION INTRAVENOUS AS NEEDED
Status: DISCONTINUED | OUTPATIENT
Start: 2020-11-17 | End: 2020-11-17 | Stop reason: HOSPADM

## 2020-11-17 RX ORDER — HYDROMORPHONE HYDROCHLORIDE 2 MG/ML
INJECTION, SOLUTION INTRAMUSCULAR; INTRAVENOUS; SUBCUTANEOUS AS NEEDED
Status: DISCONTINUED | OUTPATIENT
Start: 2020-11-17 | End: 2020-11-17 | Stop reason: HOSPADM

## 2020-11-17 RX ORDER — LIDOCAINE HYDROCHLORIDE 20 MG/ML
INJECTION, SOLUTION EPIDURAL; INFILTRATION; INTRACAUDAL; PERINEURAL AS NEEDED
Status: DISCONTINUED | OUTPATIENT
Start: 2020-11-17 | End: 2020-11-17

## 2020-11-17 RX ORDER — DEXTROSE MONOHYDRATE AND SODIUM CHLORIDE 5; .45 G/100ML; G/100ML
100 INJECTION, SOLUTION INTRAVENOUS CONTINUOUS
Status: DISCONTINUED | OUTPATIENT
Start: 2020-11-17 | End: 2020-11-20

## 2020-11-17 RX ORDER — SODIUM CHLORIDE 0.9 % (FLUSH) 0.9 %
5-40 SYRINGE (ML) INJECTION AS NEEDED
Status: DISCONTINUED | OUTPATIENT
Start: 2020-11-17 | End: 2020-12-03 | Stop reason: HOSPADM

## 2020-11-17 RX ORDER — SODIUM CHLORIDE 0.9 % (FLUSH) 0.9 %
10 SYRINGE (ML) INJECTION AS NEEDED
Status: DISCONTINUED | OUTPATIENT
Start: 2020-11-17 | End: 2020-12-03 | Stop reason: HOSPADM

## 2020-11-17 RX ORDER — BUPIVACAINE HYDROCHLORIDE 5 MG/ML
INJECTION, SOLUTION EPIDURAL; INTRACAUDAL AS NEEDED
Status: DISCONTINUED | OUTPATIENT
Start: 2020-11-17 | End: 2020-11-17 | Stop reason: HOSPADM

## 2020-11-17 RX ORDER — ALBUMIN HUMAN 50 G/1000ML
SOLUTION INTRAVENOUS AS NEEDED
Status: DISCONTINUED | OUTPATIENT
Start: 2020-11-17 | End: 2020-11-17 | Stop reason: HOSPADM

## 2020-11-17 RX ORDER — NALOXONE HYDROCHLORIDE 0.4 MG/ML
0.2 INJECTION, SOLUTION INTRAMUSCULAR; INTRAVENOUS; SUBCUTANEOUS AS NEEDED
Status: DISCONTINUED | OUTPATIENT
Start: 2020-11-17 | End: 2020-11-18

## 2020-11-17 RX ORDER — CEFAZOLIN SODIUM/WATER 2 G/20 ML
SYRINGE (ML) INTRAVENOUS AS NEEDED
Status: DISCONTINUED | OUTPATIENT
Start: 2020-11-17 | End: 2020-11-17 | Stop reason: HOSPADM

## 2020-11-17 RX ORDER — GLYCOPYRROLATE 0.2 MG/ML
INJECTION INTRAMUSCULAR; INTRAVENOUS AS NEEDED
Status: DISCONTINUED | OUTPATIENT
Start: 2020-11-17 | End: 2020-11-17 | Stop reason: HOSPADM

## 2020-11-17 RX ORDER — NEOSTIGMINE METHYLSULFATE 1 MG/ML
INJECTION, SOLUTION INTRAVENOUS AS NEEDED
Status: DISCONTINUED | OUTPATIENT
Start: 2020-11-17 | End: 2020-11-17 | Stop reason: HOSPADM

## 2020-11-17 RX ORDER — KETAMINE HYDROCHLORIDE 50 MG/ML
INJECTION, SOLUTION INTRAMUSCULAR; INTRAVENOUS AS NEEDED
Status: DISCONTINUED | OUTPATIENT
Start: 2020-11-17 | End: 2020-11-17 | Stop reason: HOSPADM

## 2020-11-17 RX ORDER — SODIUM CHLORIDE, SODIUM LACTATE, POTASSIUM CHLORIDE, CALCIUM CHLORIDE 600; 310; 30; 20 MG/100ML; MG/100ML; MG/100ML; MG/100ML
75 INJECTION, SOLUTION INTRAVENOUS CONTINUOUS
Status: DISCONTINUED | OUTPATIENT
Start: 2020-11-17 | End: 2020-11-18

## 2020-11-17 RX ORDER — ROCURONIUM BROMIDE 10 MG/ML
INJECTION, SOLUTION INTRAVENOUS AS NEEDED
Status: DISCONTINUED | OUTPATIENT
Start: 2020-11-17 | End: 2020-11-17 | Stop reason: HOSPADM

## 2020-11-17 RX ORDER — HYDROMORPHONE HYDROCHLORIDE 1 MG/ML
1 INJECTION, SOLUTION INTRAMUSCULAR; INTRAVENOUS; SUBCUTANEOUS
Status: COMPLETED | OUTPATIENT
Start: 2020-11-17 | End: 2020-11-17

## 2020-11-17 RX ORDER — DEXAMETHASONE SODIUM PHOSPHATE 4 MG/ML
INJECTION, SOLUTION INTRA-ARTICULAR; INTRALESIONAL; INTRAMUSCULAR; INTRAVENOUS; SOFT TISSUE AS NEEDED
Status: DISCONTINUED | OUTPATIENT
Start: 2020-11-17 | End: 2020-11-17 | Stop reason: HOSPADM

## 2020-11-17 RX ORDER — LIDOCAINE HYDROCHLORIDE ANHYDROUS AND DEXTROSE MONOHYDRATE .4; 5 G/100ML; G/100ML
INJECTION, SOLUTION INTRAVENOUS
Status: DISCONTINUED | OUTPATIENT
Start: 2020-11-17 | End: 2020-11-17 | Stop reason: HOSPADM

## 2020-11-17 RX ORDER — LORAZEPAM 2 MG/ML
1 INJECTION INTRAMUSCULAR ONCE
Status: DISCONTINUED | OUTPATIENT
Start: 2020-11-17 | End: 2020-11-17

## 2020-11-17 RX ORDER — SODIUM CHLORIDE, SODIUM LACTATE, POTASSIUM CHLORIDE, CALCIUM CHLORIDE 600; 310; 30; 20 MG/100ML; MG/100ML; MG/100ML; MG/100ML
INJECTION, SOLUTION INTRAVENOUS
Status: DISCONTINUED | OUTPATIENT
Start: 2020-11-17 | End: 2020-11-17 | Stop reason: HOSPADM

## 2020-11-17 RX ORDER — MIDAZOLAM HYDROCHLORIDE 1 MG/ML
2 INJECTION, SOLUTION INTRAMUSCULAR; INTRAVENOUS
Status: COMPLETED | OUTPATIENT
Start: 2020-11-17 | End: 2020-11-17

## 2020-11-17 RX ORDER — PROPOFOL 10 MG/ML
INJECTION, EMULSION INTRAVENOUS AS NEEDED
Status: DISCONTINUED | OUTPATIENT
Start: 2020-11-17 | End: 2020-11-17 | Stop reason: HOSPADM

## 2020-11-17 RX ORDER — FENTANYL CITRATE 50 UG/ML
INJECTION, SOLUTION INTRAMUSCULAR; INTRAVENOUS AS NEEDED
Status: DISCONTINUED | OUTPATIENT
Start: 2020-11-17 | End: 2020-11-17 | Stop reason: HOSPADM

## 2020-11-17 RX ORDER — ONDANSETRON 2 MG/ML
INJECTION INTRAMUSCULAR; INTRAVENOUS AS NEEDED
Status: DISCONTINUED | OUTPATIENT
Start: 2020-11-17 | End: 2020-11-17 | Stop reason: HOSPADM

## 2020-11-17 RX ORDER — LORAZEPAM 2 MG/ML
0.5 INJECTION INTRAMUSCULAR ONCE
Status: COMPLETED | OUTPATIENT
Start: 2020-11-17 | End: 2020-11-17

## 2020-11-17 RX ORDER — SODIUM CHLORIDE 9 MG/ML
INJECTION, SOLUTION INTRAVENOUS
Status: DISCONTINUED | OUTPATIENT
Start: 2020-11-17 | End: 2020-11-17 | Stop reason: HOSPADM

## 2020-11-17 RX ORDER — LIDOCAINE HYDROCHLORIDE AND EPINEPHRINE 15; 5 MG/ML; UG/ML
INJECTION, SOLUTION EPIDURAL
Status: DISCONTINUED | OUTPATIENT
Start: 2020-11-17 | End: 2020-11-17 | Stop reason: HOSPADM

## 2020-11-17 RX ORDER — LORAZEPAM 2 MG/ML
1 INJECTION INTRAMUSCULAR
Status: COMPLETED | OUTPATIENT
Start: 2020-11-17 | End: 2020-11-17

## 2020-11-17 RX ORDER — SODIUM CHLORIDE 0.9 % (FLUSH) 0.9 %
5-40 SYRINGE (ML) INJECTION EVERY 8 HOURS
Status: DISCONTINUED | OUTPATIENT
Start: 2020-11-17 | End: 2020-12-03 | Stop reason: HOSPADM

## 2020-11-17 RX ADMIN — DEXAMETHASONE SODIUM PHOSPHATE 10 MG: 4 INJECTION, SOLUTION INTRAMUSCULAR; INTRAVENOUS at 16:54

## 2020-11-17 RX ADMIN — OXYCODONE HYDROCHLORIDE AND ACETAMINOPHEN 1 TABLET: 5; 325 TABLET ORAL at 08:54

## 2020-11-17 RX ADMIN — ROCURONIUM BROMIDE 5 MG: 10 INJECTION, SOLUTION INTRAVENOUS at 18:39

## 2020-11-17 RX ADMIN — PROPOFOL 200 MG: 10 INJECTION, EMULSION INTRAVENOUS at 16:02

## 2020-11-17 RX ADMIN — Medication 10 ML: at 21:11

## 2020-11-17 RX ADMIN — ROCURONIUM BROMIDE 10 MG: 10 INJECTION, SOLUTION INTRAVENOUS at 18:03

## 2020-11-17 RX ADMIN — ALBUMIN HUMAN 250 ML: 0.05 INJECTION, SOLUTION INTRAVENOUS at 18:03

## 2020-11-17 RX ADMIN — HYDROMORPHONE HYDROCHLORIDE 1 MG: 1 INJECTION, SOLUTION INTRAMUSCULAR; INTRAVENOUS; SUBCUTANEOUS at 14:58

## 2020-11-17 RX ADMIN — HYDROMORPHONE HYDROCHLORIDE 0.4 MG: 2 INJECTION INTRAMUSCULAR; INTRAVENOUS; SUBCUTANEOUS at 19:13

## 2020-11-17 RX ADMIN — LIDOCAINE HYDROCHLORIDE,EPINEPHRINE BITARTRATE 4.5 ML: 15; .005 INJECTION, SOLUTION EPIDURAL; INFILTRATION; INTRACAUDAL; PERINEURAL at 15:34

## 2020-11-17 RX ADMIN — LORAZEPAM 1 MG: 2 INJECTION INTRAMUSCULAR; INTRAVENOUS at 09:00

## 2020-11-17 RX ADMIN — ONDANSETRON 4 MG: 2 INJECTION INTRAMUSCULAR; INTRAVENOUS at 19:38

## 2020-11-17 RX ADMIN — CEFAZOLIN SODIUM 2 G: 100 INJECTION, POWDER, LYOPHILIZED, FOR SOLUTION INTRAVENOUS at 02:00

## 2020-11-17 RX ADMIN — DEXTROSE MONOHYDRATE AND SODIUM CHLORIDE 100 ML/HR: 5; .45 INJECTION, SOLUTION INTRAVENOUS at 22:37

## 2020-11-17 RX ADMIN — Medication 10 ML: at 05:40

## 2020-11-17 RX ADMIN — KETAMINE HYDROCHLORIDE 20 MG: 50 INJECTION INTRAMUSCULAR; INTRAVENOUS at 19:02

## 2020-11-17 RX ADMIN — ONDANSETRON 4 MG: 2 INJECTION INTRAMUSCULAR; INTRAVENOUS at 18:53

## 2020-11-17 RX ADMIN — CEFAZOLIN SODIUM 2 G: 100 INJECTION, POWDER, LYOPHILIZED, FOR SOLUTION INTRAVENOUS at 09:52

## 2020-11-17 RX ADMIN — ZINC 1 TABLET: TAB ORAL at 08:54

## 2020-11-17 RX ADMIN — LORAZEPAM 0.5 MG: 2 INJECTION, SOLUTION INTRAMUSCULAR; INTRAVENOUS at 06:00

## 2020-11-17 RX ADMIN — Medication 2 G: at 18:24

## 2020-11-17 RX ADMIN — ROCURONIUM BROMIDE 10 MG: 10 INJECTION, SOLUTION INTRAVENOUS at 17:43

## 2020-11-17 RX ADMIN — ROCURONIUM BROMIDE 30 MG: 10 INJECTION, SOLUTION INTRAVENOUS at 16:44

## 2020-11-17 RX ADMIN — ROPIVACAINE HYDROCHLORIDE: 10 INJECTION, SOLUTION EPIDURAL at 21:32

## 2020-11-17 RX ADMIN — PROMETHAZINE HYDROCHLORIDE 6.25 MG: 25 INJECTION INTRAMUSCULAR; INTRAVENOUS at 20:34

## 2020-11-17 RX ADMIN — HYDROMORPHONE HYDROCHLORIDE 0.4 MG: 2 INJECTION INTRAMUSCULAR; INTRAVENOUS; SUBCUTANEOUS at 18:04

## 2020-11-17 RX ADMIN — ESMOLOL HYDROCHLORIDE 10 MG: 10 INJECTION, SOLUTION INTRAVENOUS at 18:45

## 2020-11-17 RX ADMIN — ROCURONIUM BROMIDE 40 MG: 10 INJECTION, SOLUTION INTRAVENOUS at 16:03

## 2020-11-17 RX ADMIN — KETAMINE HYDROCHLORIDE 20 MG: 50 INJECTION INTRAMUSCULAR; INTRAVENOUS at 18:03

## 2020-11-17 RX ADMIN — FOLIC ACID 1 MG: 1 TABLET ORAL at 08:54

## 2020-11-17 RX ADMIN — Medication 100 MG: at 08:54

## 2020-11-17 RX ADMIN — KETAMINE HYDROCHLORIDE 20 MG: 50 INJECTION INTRAMUSCULAR; INTRAVENOUS at 17:07

## 2020-11-17 RX ADMIN — SODIUM CHLORIDE: 900 INJECTION, SOLUTION INTRAVENOUS at 16:15

## 2020-11-17 RX ADMIN — FENTANYL CITRATE 50 MCG: 50 INJECTION INTRAMUSCULAR; INTRAVENOUS at 17:07

## 2020-11-17 RX ADMIN — GLYCOPYRROLATE 0.3 MG: 0.2 INJECTION, SOLUTION INTRAMUSCULAR; INTRAVENOUS at 18:59

## 2020-11-17 RX ADMIN — OXYCODONE HYDROCHLORIDE AND ACETAMINOPHEN 1 TABLET: 5; 325 TABLET ORAL at 03:00

## 2020-11-17 RX ADMIN — Medication 3 MG: at 18:59

## 2020-11-17 RX ADMIN — KETAMINE HYDROCHLORIDE 30 MG: 50 INJECTION INTRAMUSCULAR; INTRAVENOUS at 16:02

## 2020-11-17 RX ADMIN — ESMOLOL HYDROCHLORIDE 10 MG: 10 INJECTION, SOLUTION INTRAVENOUS at 19:00

## 2020-11-17 RX ADMIN — SODIUM CHLORIDE, SODIUM LACTATE, POTASSIUM CHLORIDE, AND CALCIUM CHLORIDE: 600; 310; 30; 20 INJECTION, SOLUTION INTRAVENOUS at 15:54

## 2020-11-17 RX ADMIN — LIDOCAINE HYDROCHLORIDE 1 MG/KG/HR: 4 INJECTION, SOLUTION INTRAVENOUS at 16:57

## 2020-11-17 RX ADMIN — CEFTRIAXONE 2 G: 2 INJECTION, POWDER, FOR SOLUTION INTRAMUSCULAR; INTRAVENOUS at 23:00

## 2020-11-17 RX ADMIN — MIDAZOLAM 2 MG: 1 INJECTION INTRAMUSCULAR; INTRAVENOUS at 15:22

## 2020-11-17 RX ADMIN — ROCURONIUM BROMIDE 20 MG: 10 INJECTION, SOLUTION INTRAVENOUS at 17:18

## 2020-11-17 RX ADMIN — PHENYLEPHRINE HYDROCHLORIDE 50 MCG: 10 INJECTION INTRAVENOUS at 18:06

## 2020-11-17 RX ADMIN — FENTANYL CITRATE 50 MCG: 50 INJECTION INTRAMUSCULAR; INTRAVENOUS at 16:02

## 2020-11-17 RX ADMIN — HYDROMORPHONE HYDROCHLORIDE 0.4 MG: 2 INJECTION INTRAMUSCULAR; INTRAVENOUS; SUBCUTANEOUS at 17:19

## 2020-11-17 NOTE — INTERDISCIPLINARY ROUNDS
Interdisciplinary team rounds were held 11/17/2020 with the following team members:Care Management, Nursing, Nurse Practitioner and Physical Therapy and the patient. Plan of care discussed. See clinical pathway and/or care plan for interventions and desired outcomes. IR consulted. ID consulted. Pulm following. CM following. VATS procedure today. PICC in place for IV abx which are scheduled through 12/4/20.

## 2020-11-17 NOTE — PROGRESS NOTES
Hourly rounds completed. Medicated patient per [facility] r/t to complaints of left side of abdomen pain. Pt was very anxious, perfect serve hospitalist, gave an order of 0.5 ml Ativan IV. 02 @3L NC maintained. Pt is now asleep. Remained NPO. Will give report to oncoming RN.

## 2020-11-17 NOTE — PROGRESS NOTES
TRANSFER - OUT REPORT:    Verbal report given to OSMANY Viera on [facility]  being transferred to PACU for ordered procedure       Report consisted of patients Situation, Background, Assessment and   Recommendations(SBAR). Information from the following report(s) SBAR and Recent Results was reviewed with the receiving nurse. Lines:   PICC Single Lumen 71/68/20 Right;Basilic (Active)   Central Line Being Utilized Yes 11/17/20 0300   Criteria for Appropriate Use Long term IV/antibiotic administration 11/17/20 0300   Site Assessment Clean, dry, & intact 11/17/20 0300   Phlebitis Assessment 0 11/17/20 0300   Infiltration Assessment 0 11/17/20 0300   Date of Last Dressing Change 11/12/20 11/17/20 0300   Dressing Status Clean, dry, & intact 11/17/20 0300   Dressing Type Tape;Transparent 11/17/20 0300   Hub Color/Line Status Flushed 11/17/20 0300   Action Taken Blood drawn 11/16/20 1502   Positive Blood Return (Site #1) Yes 11/17/20 0300   Alcohol Cap Used No 11/17/20 0300        Opportunity for questions and clarification was provided.       Patient transported with:   O2 @ 4 liters

## 2020-11-17 NOTE — PERIOP NOTES
TRANSFER - IN REPORT:    Verbal report received from Bessie PURVIS, [facility] on Scott Huntley  being received from [phone] for ordered procedure      Report consisted of patients Situation, Background, Assessment and   Recommendations(SBAR). Information from the following report(s) SBAR and MAR was reviewed with the receiving nurse. Opportunity for questions and clarification was provided. Assessment will be completed upon patients arrival to unit and care assumed.

## 2020-11-17 NOTE — PROGRESS NOTES
Infectious Disease Progress Note    Today's Date: 2020   Admit Date: 2020    Impression:   · MSSA bacteremia (11/3 - );  [address] NG;  TTE negative  · MSSA Left pleural empyema s/p chest tube, 20 Cx from pleural drainage, positive:  · discharged 20 on IV Rocephin X 4 weeks with EOT 20  · ETOH withdrawal on librium taper. Denies illicit drugs   · S/p fall through attic with contusion/trauma     Plan:   · Continue Cefazolin  · VATS planned today  · Follow clinically     Anti-infectives:   · CTX  Restarted -  · Ancef -       Subjective: Interval History:   Reports continued pain to the left side/chest/abdomen. Denies fevers, chills, sweats, nausea, vomiting or diarrhea. No Known Allergies     Review of Systems:  A comprehensive review of systems was negative except for that written in the History of Present Illness. Objective:     Visit Vitals  /75 (BP 1 Location: Left arm, BP Patient Position: At rest)   Pulse 81   Temp 98.8 °F (37.1 °C)   Resp 20   Ht 5' 10\" (1.778 m)   Wt 103.2 kg (227 lb 8.2 oz)   SpO2 97%   BMI 32.65 kg/m²     Temp (24hrs), Av.1 °F (36.7 °C), Min:97.7 °F (36.5 °C), Max:98.8 °F (37.1 °C)     Patient examined 2020, exam remains unchanged except as noted below:    General:  Alert, cooperative, appears stated age, uncomfortable/tearful   Head:  Normocephalic, atraumatic    Eyes:  Anicteric, no drainage, not injected, EOMI   Throat: Mucus membranes moist OP clear   Neck: Supple, symmetrical, trachea midline, no JVD   Lungs:   Left base diminished, +crackles, room air   Heart:  Regular rate and rhythm, without audible murmur, rub, or gallop   Abdomen:   Soft, non-tender, no guarding, no distention, bowel sounds active   Extremities: Extremities normal, atraumatic, no cyanosis or edema   Pulses: 2+ and symmetric   Skin: Skin color, texture, turgor normal, no rashes or lesions.       Lines/Devices: RUE PICC intact         Data Review:     CBC:  Recent Labs     11/17/20  0904 11/17/20  0549 11/16/20  0507 11/14/20 2123   WBC 6.2 6.0 7.1 8.3   GRANS 69  --   --  79*   MONOS 7  --   --  4   EOS 3  --   --  2   ANEU 4.3  --   --  6.5   ABL 1.2  --   --  1.1   HGB 9.1* 9.0* 8.8* 10.4*   HCT 28.1* 27.5* 27.5* 31.3*    307 379 469*       BMP:  Recent Labs     11/17/20  0549 11/16/20  0507 11/14/20 2123   CREA 0.61* 0.56* 0.74*   BUN 6 7 7    138 137   K 4.1 4.1 3.6    100 99   CO2 34* 34* 38*   AGAP 3* 4* 0*   GLU 83 87 159*       LFTS:  Recent Labs     11/17/20  0904 11/14/20 2123   TBILI 0.6 0.7   ALT 9* 12   * 269*   TP 6.4 6.3   ALB 1.4* 1.6*       Microbiology:     All Micro Results     Procedure Component Value Units Date/Time    CULTURE, BLOOD [[id number]] Collected:  11/15/20 0033    Order Status:  Completed Specimen:  Blood Updated:  11/17/20 0854     Special Requests: --        RIGHT  ARM       Culture result: NO GROWTH 2 DAYS       CULTURE, BLOOD [[id number]] Collected:  11/15/20 0035    Order Status:  Completed Specimen:  Blood Updated:  11/17/20 0854     Special Requests: --        LEFT  ARM       Culture result: NO GROWTH 2 DAYS             Imaging:   IMPRESSION:   1. No evidence of pulmonary embolism. 2. Mildly enlarged multilobulated left empyema. 3. Unchanged small focus of left lower lobe consolidation with small internal  bubbles of air which may relate to pneumatoceles although an early lung abscess  is not excluded.   4. Multiple left rib fractures, several of which appear segmental suspect for a  flail chest.       Signed By: Tacho Barbour NP     November 17, 2020

## 2020-11-17 NOTE — PROGRESS NOTES
Progress Note    2020  Admit Date: 2020  9:21 PM   NAME: Tj Erazo   :  1970   MRN:  [id number]   Attending: Hardy Bagley DO  PCP:  None  Treatment Team: Attending Provider: Hardy Bagley DO; Consulting Provider: Maame Hernandez MD; Consulting Provider: Vanesa Chaudhry MD; Nurse Practitioner: Lan Edmonds NP; Consulting Provider: Michaelle Chacko MD; Care Manager: Lexi Huang RN; Primary Nurse: Elise Agosto    Full Code   SUBJECTIVE:     Mr. Anthony Gr is a 47 yo male with PMH of smoker, ETOH abuse, recent hospitalizations for traumatic pneumothorax and rib fx. He has had 2 recent admissions for same requiring chest tubes and TPA to assist with drainage, deemed no need for surgery at that time. Pt found to have MSSA bacteremia and left chest wound. ID consulted and pt tx with IV rocephin for 4-6 weeks with EOT 20. He presented to the ER by EMS after being found hypoxic with SOB at the infusion center. CXR showed unchanged left sided empyema. CT chest showed mildly enlarged multilobulated left empyema, multiple rib fx. IR consulted. Pulm consulted. General Surgery consulted. Plans for VATS today. Anxious.  Overall feels poor.       Past Medical History:   Diagnosis Date    GERD (gastroesophageal reflux disease)     once a week OTC med     Recent Results (from the past 24 hour(s))   METABOLIC PANEL, BASIC    Collection Time: 20  5:49 AM   Result Value Ref Range    Sodium 139 136 - 145 mmol/L    Potassium 4.1 3.5 - 5.1 mmol/L    Chloride 102 98 - 107 mmol/L    CO2 34 (H) 21 - 32 mmol/L    Anion gap 3 (L) 7 - 16 mmol/L    Glucose 83 65 - 100 mg/dL    BUN 6 6 - 23 MG/DL    Creatinine 0.61 (L) 0.8 - 1.5 MG/DL    GFR est AA >60 >60 ml/min/1.73m2    GFR est non-AA >60 >60 ml/min/1.73m2    Calcium 7.6 (L) 8.3 - 10.4 MG/DL   CBC W/O DIFF    Collection Time: 20  5:49 AM   Result Value Ref Range    WBC 6.0 4.3 - 11.1 K/uL    RBC 2.35 (L) 4.23 - 5.6 M/uL HGB 9.0 (L) 13.6 - 17.2 g/dL    HCT 27.5 (L) 41.1 - 50.3 %    .0 (H) 79.6 - 97.8 FL    MCH 38.3 (H) 26.1 - 32.9 PG    MCHC 32.7 31.4 - 35.0 g/dL    RDW 15.6 (H) 11.9 - 14.6 %    PLATELET 276 733 - 591 K/uL    MPV 11.3 9.4 - 12.3 FL    ABSOLUTE NRBC 0.00 0.0 - 0.2 K/uL   CBC WITH AUTOMATED DIFF    Collection Time: 11/17/20  9:04 AM   Result Value Ref Range    WBC 6.2 4.3 - 11.1 K/uL    RBC 2.41 (L) 4.23 - 5.6 M/uL    HGB 9.1 (L) 13.6 - 17.2 g/dL    HCT 28.1 (L) 41.1 - 50.3 %    .6 (H) 79.6 - 97.8 FL    MCH 37.8 (H) 26.1 - 32.9 PG    MCHC 32.4 31.4 - 35.0 g/dL    RDW 15.7 (H) 11.9 - 14.6 %    PLATELET 137 306 - 501 K/uL    MPV 10.1 9.4 - 12.3 FL    ABSOLUTE NRBC 0.00 0.0 - 0.2 K/uL    DF AUTOMATED      NEUTROPHILS 69 43 - 78 %    LYMPHOCYTES 20 13 - 44 %    MONOCYTES 7 4.0 - 12.0 %    EOSINOPHILS 3 0.5 - 7.8 %    BASOPHILS 1 0.0 - 2.0 %    IMMATURE GRANULOCYTES 1 0.0 - 5.0 %    ABS. NEUTROPHILS 4.3 1.7 - 8.2 K/UL    ABS. LYMPHOCYTES 1.2 0.5 - 4.6 K/UL    ABS. MONOCYTES 0.4 0.1 - 1.3 K/UL    ABS. EOSINOPHILS 0.2 0.0 - 0.8 K/UL    ABS. BASOPHILS 0.0 0.0 - 0.2 K/UL    ABS. IMM.  GRANS. 0.0 0.0 - 0.5 K/UL     No Known Allergies  Current Facility-Administered Medications   Medication Dose Route Frequency Provider Last Rate Last Dose    central line flush (saline) syringe 10 mL  10 mL InterCATHeter BRADY Grewal MD        ceFAZolin (ANCEF) 2 g/20 mL in sterile water IV syringe  2 g IntraVENous Q8H Trupti Saravia MD   2 g at 11/17/20 3232    sodium chloride (NS) flush 5-40 mL  5-40 mL IntraVENous Q8H Maximilian Mansfield MD   10 mL at 11/17/20 0540    sodium chloride (NS) flush 5-40 mL  5-40 mL IntraVENous PRN Maximilian Mansfield MD        acetaminophen (TYLENOL) tablet 650 mg  650 mg Oral Q6H PRN Maximilian Mansfield MD        Or    acetaminophen (TYLENOL) suppository 650 mg  650 mg Rectal Q6H PRN Maximilian Mansfield MD        polyethylene glycol (MIRALAX) packet 17 g  17 g Oral DAILY PRN Maximilian Mansfield MD  promethazine (PHENERGAN) tablet 12.5 mg  12.5 mg Oral Q6H PRN Charito Guerra MD        Or    ondansetron (ZOFRAN) injection 4 mg  4 mg IntraVENous Q6H PRN Charito Guerra MD        oxyCODONE-acetaminophen (PERCOCET) 5-325 mg per tablet 1 Tab  1 Tab Oral Q6H PRN Charito Guerra MD   1 Tab at 20 0854       Review of Systems negative with exception of pertinent positives noted above  PHYSICAL EXAM     Visit Vitals  /75 (BP 1 Location: Left arm, BP Patient Position: At rest)   Pulse 81   Temp 98.8 °F (37.1 °C)   Resp 20   Ht 5' 10\" (1.778 m)   Wt 103.2 kg (227 lb 8.2 oz)   SpO2 97%   BMI 32.65 kg/m²      Temp (24hrs), Av.1 °F (36.7 °C), Min:97.6 °F (36.4 °C), Max:98.8 °F (37.1 °C)    Oxygen Therapy  O2 Sat (%): 97 % (20 0742)  Pulse via Oximetry: 88 beats per minute (11/15/20 0258)  O2 Device: Nasal cannula (20)  O2 Flow Rate (L/min): 6 l/min (20)  ETCO2 (mmHg): 99 mmHg (20)    Intake/Output Summary (Last 24 hours) at 2020 1017  Last data filed at 2020 0907  Gross per 24 hour   Intake 720 ml   Output 1200 ml   Net -480 ml      General:       No acute distress, appears ill, pale    Lungs:          Diminished left side. tachypneic   Heart:            S1S2 present without murmurs rubs gallops. RRR. No LE edema  Abdomen:    Soft, non tender, non distended. BS present  Extremities: Moves ext spontaneously. No cyanosis  Neurologic:  A/O X3. No focal deficits.       Results summary of Diagnostic Studies/Procedures copied from within [facility] EMR:        [address] Problems    Diagnosis Date Noted    Acute respiratory failure with hypoxia ([facility] [address] 11/15/2020    Loculated empyema ([facility] [address] 11/15/2020    MSSA bacteremia 2020    Alcohol abuse 2016     Plan:    Acute resp failure with hypoxia/loculated empyema/rib fx with pneumothorax  Pulm following  IR consulted  General Surgery consulted, plan for VATS today  On rocephin EOT 12/4/20  ID following     Alcohol abuse  Multivitamin  Folate  Thiamine  Ativan prn           Notes, labs, VS, diagnostic testing reviewed        DVT Prophylaxis: SCD  Plan of Care Discussed with: Supervising MD Dr. Delane Duverney, care team, pt     Humza Plascencia NP

## 2020-11-18 ENCOUNTER — APPOINTMENT (OUTPATIENT)
Dept: GENERAL RADIOLOGY | Age: 50
DRG: 163 | End: 2020-11-18
Attending: SURGERY

## 2020-11-18 LAB
ANION GAP SERPL CALC-SCNC: 7 MMOL/L (ref 7–16)
BUN SERPL-MCNC: 7 MG/DL (ref 6–23)
CALCIUM SERPL-MCNC: 7.9 MG/DL (ref 8.3–10.4)
CHLORIDE SERPL-SCNC: 102 MMOL/L (ref 98–107)
CO2 SERPL-SCNC: 30 MMOL/L (ref 21–32)
CREAT SERPL-MCNC: 0.64 MG/DL (ref 0.8–1.5)
ERYTHROCYTE [DISTWIDTH] IN BLOOD BY AUTOMATED COUNT: 15.3 % (ref 11.9–14.6)
GLUCOSE SERPL-MCNC: 167 MG/DL (ref 65–100)
HCT VFR BLD AUTO: 23.6 % (ref 41.1–50.3)
HGB BLD-MCNC: 7.6 G/DL (ref 13.6–17.2)
HISTORY CHECKED?,CKHIST: NORMAL
MCH RBC QN AUTO: 37.4 PG (ref 26.1–32.9)
MCHC RBC AUTO-ENTMCNC: 32.2 G/DL (ref 31.4–35)
MCV RBC AUTO: 116.3 FL (ref 79.6–97.8)
NRBC # BLD: 0 K/UL (ref 0–0.2)
PLATELET # BLD AUTO: 328 K/UL (ref 150–450)
PMV BLD AUTO: 10.6 FL (ref 9.4–12.3)
POTASSIUM SERPL-SCNC: 4.7 MMOL/L (ref 3.5–5.1)
RBC # BLD AUTO: 2.03 M/UL (ref 4.23–5.6)
SODIUM SERPL-SCNC: 139 MMOL/L (ref 136–145)
WBC # BLD AUTO: 9.1 K/UL (ref 4.3–11.1)

## 2020-11-18 PROCEDURE — P9016 RBC LEUKOCYTES REDUCED: HCPCS

## 2020-11-18 PROCEDURE — 74011250636 HC RX REV CODE- 250/636: Performed by: SURGERY

## 2020-11-18 PROCEDURE — 77030040361 HC SLV COMPR DVT MDII -B

## 2020-11-18 PROCEDURE — 71045 X-RAY EXAM CHEST 1 VIEW: CPT

## 2020-11-18 PROCEDURE — 74011000250 HC RX REV CODE- 250: Performed by: SURGERY

## 2020-11-18 PROCEDURE — 2709999900 HC NON-CHARGEABLE SUPPLY

## 2020-11-18 PROCEDURE — 51798 US URINE CAPACITY MEASURE: CPT

## 2020-11-18 PROCEDURE — 80048 BASIC METABOLIC PNL TOTAL CA: CPT

## 2020-11-18 PROCEDURE — 36430 TRANSFUSION BLD/BLD COMPNT: CPT

## 2020-11-18 PROCEDURE — 30233N1 TRANSFUSION OF NONAUTOLOGOUS RED BLOOD CELLS INTO PERIPHERAL VEIN, PERCUTANEOUS APPROACH: ICD-10-PCS | Performed by: NURSE PRACTITIONER

## 2020-11-18 PROCEDURE — 65270000029 HC RM PRIVATE

## 2020-11-18 PROCEDURE — 99232 SBSQ HOSP IP/OBS MODERATE 35: CPT | Performed by: INTERNAL MEDICINE

## 2020-11-18 PROCEDURE — 74011250636 HC RX REV CODE- 250/636: Performed by: FAMILY MEDICINE

## 2020-11-18 PROCEDURE — 85027 COMPLETE CBC AUTOMATED: CPT

## 2020-11-18 PROCEDURE — 74011000258 HC RX REV CODE- 258: Performed by: SURGERY

## 2020-11-18 PROCEDURE — 74011250636 HC RX REV CODE- 250/636: Performed by: NURSE PRACTITIONER

## 2020-11-18 RX ORDER — LORAZEPAM 2 MG/ML
1 INJECTION INTRAMUSCULAR ONCE
Status: COMPLETED | OUTPATIENT
Start: 2020-11-18 | End: 2020-11-18

## 2020-11-18 RX ORDER — LIDOCAINE HYDROCHLORIDE 10 MG/ML
10 INJECTION INFILTRATION; PERINEURAL ONCE
Status: COMPLETED | OUTPATIENT
Start: 2020-11-18 | End: 2020-11-18

## 2020-11-18 RX ORDER — NALOXONE HYDROCHLORIDE 0.4 MG/ML
0.4 INJECTION, SOLUTION INTRAMUSCULAR; INTRAVENOUS; SUBCUTANEOUS
Status: DISCONTINUED | OUTPATIENT
Start: 2020-11-18 | End: 2020-12-03 | Stop reason: HOSPADM

## 2020-11-18 RX ORDER — SODIUM CHLORIDE 9 MG/ML
250 INJECTION, SOLUTION INTRAVENOUS AS NEEDED
Status: DISCONTINUED | OUTPATIENT
Start: 2020-11-18 | End: 2020-12-03 | Stop reason: HOSPADM

## 2020-11-18 RX ORDER — HYDROMORPHONE HYDROCHLORIDE 1 MG/ML
1 INJECTION, SOLUTION INTRAMUSCULAR; INTRAVENOUS; SUBCUTANEOUS
Status: DISCONTINUED | OUTPATIENT
Start: 2020-11-18 | End: 2020-11-21

## 2020-11-18 RX ORDER — LORAZEPAM 2 MG/ML
1 INJECTION INTRAMUSCULAR
Status: DISCONTINUED | OUTPATIENT
Start: 2020-11-18 | End: 2020-11-28

## 2020-11-18 RX ORDER — OXYCODONE HYDROCHLORIDE 5 MG/1
10 TABLET ORAL
Status: DISCONTINUED | OUTPATIENT
Start: 2020-11-18 | End: 2020-11-28

## 2020-11-18 RX ORDER — HYDROMORPHONE HYDROCHLORIDE 1 MG/ML
1 INJECTION, SOLUTION INTRAMUSCULAR; INTRAVENOUS; SUBCUTANEOUS ONCE
Status: COMPLETED | OUTPATIENT
Start: 2020-11-18 | End: 2020-11-18

## 2020-11-18 RX ADMIN — Medication 10 ML: at 05:10

## 2020-11-18 RX ADMIN — CEFAZOLIN SODIUM 2 G: 100 INJECTION, POWDER, LYOPHILIZED, FOR SOLUTION INTRAVENOUS at 18:59

## 2020-11-18 RX ADMIN — HYDROMORPHONE HYDROCHLORIDE 1 MG: 1 INJECTION, SOLUTION INTRAMUSCULAR; INTRAVENOUS; SUBCUTANEOUS at 13:18

## 2020-11-18 RX ADMIN — LORAZEPAM 1 MG: 2 INJECTION INTRAMUSCULAR; INTRAVENOUS at 04:21

## 2020-11-18 RX ADMIN — LORAZEPAM 1 MG: 2 INJECTION INTRAMUSCULAR; INTRAVENOUS at 22:35

## 2020-11-18 RX ADMIN — LIDOCAINE HYDROCHLORIDE 1 ML: 10 INJECTION, SOLUTION INFILTRATION; PERINEURAL at 13:50

## 2020-11-18 RX ADMIN — Medication 10 ML: at 22:39

## 2020-11-18 RX ADMIN — CEFAZOLIN SODIUM 2 G: 100 INJECTION, POWDER, LYOPHILIZED, FOR SOLUTION INTRAVENOUS at 01:58

## 2020-11-18 RX ADMIN — HYDROMORPHONE HYDROCHLORIDE 1 MG: 1 INJECTION, SOLUTION INTRAMUSCULAR; INTRAVENOUS; SUBCUTANEOUS at 19:10

## 2020-11-18 RX ADMIN — Medication 10 ML: at 15:00

## 2020-11-18 RX ADMIN — Medication 10 ML: at 22:38

## 2020-11-18 RX ADMIN — CEFAZOLIN SODIUM 2 G: 100 INJECTION, POWDER, LYOPHILIZED, FOR SOLUTION INTRAVENOUS at 09:54

## 2020-11-18 NOTE — PROGRESS NOTES
Called to room. Pt has pulled out chest tube, ROCKY drain, and epidural; pt pulled off drsg to PICC but was able to stop Pt before that line was pulled. Pt appears anxious and aggravated. Hyperventilating. Encourage Pt to focus on breathing. NC in place. RN assisting primary placing vaseline guaze. Primary RN completing central line dressing. Anesthesilogist at bedside stating he will discontinue epidural orders since Pt pulled line. This RN stopped PCA, disconnected line and walked the box down to Pharmacy. Wasted 390 mL with pharmacist. Documented as such on doc flow. Notified pulmonary, hospitalist, and Dr Braden Heredia. Dr Braden Heredia on his way to assess Pt.

## 2020-11-18 NOTE — BRIEF OP NOTE
Brief Postoperative Note    Patient: Brigido Art  YOB: 1970  MRN: [id number]    Date of Procedure: 11/17/2020     Pre-Op Diagnosis: Empyema ([address] [J86.9]    Post-Op Diagnosis: Same as preoperative diagnosis.       Procedure(s):  LEFT THORACOTOMY    Surgeon(s):  Galen Norris MD    Surgical Assistant: None    Anesthesia: General     Estimated Blood Loss (mL): [id number]    Complications: None    Specimens: * No specimens in log *     Implants: * No implants in log *    Drains:   Dante-Demarco Drain 11/17/20 Left Chest (Active)       Findings: see op note    Electronically Signed by Cee Henderson MD on 11/17/2020 at 7:05 PM

## 2020-11-18 NOTE — PROGRESS NOTES
Chart screened by  for discharge planning. Patient had VATS procedure late 11/17/2020. Patient was previously receiving [facility] outpatient infusion center and was scheduled to receive antibiotics until 12/4/2020. It is unclear at this time if ID will change the antibiotic treatment at this time. CM will notify Chung Hillman at the infusion center [phone] with updates. CM will continue to follow patient during hospitalization for discharge planning and needs. Please consult or notify  if any new issues arise.

## 2020-11-18 NOTE — PROGRESS NOTES
Called by the primary RN to assist Dr Moreno Sensing with bedside chest tube insertion. Left posterior chest tube reinserted by Dr Aldo Underwood and connected to 20cm water seal suction. No air leak noted. Anterior CT insertion site sutured by Dr Aldo Underwood. Vasoline gauze followed by DSD applied to both sites. Portable CXR ordered. Pt instructed on bedrest, call light and urinal within reach. Understanding noted by patient. Plan to transfer to 2nd surgical unit, spoke with Rosamaria Albrecht RN ABDIRAHMAN. Report to primary RN.

## 2020-11-18 NOTE — PROGRESS NOTES
Gemma Kaur  Admission Date: 11/14/2020             Daily Progress Note: 11/18/2020    The patient's chart is reviewed and the patient is discussed with the staff.    48 y.o.  male presents with a PMHx of smoker, ETOH abuse, and recent hospitalizations for traumatic PTX and rib fractures.  Prior to this admission, he has been admitted twice r/t traumatic PTX.  First admission was 10/30-11/2>>during that admission he required a CT for the PTX.  It was deemed that he was not in need for surgery at that time. He returned 11/3-11/12 where surgery again was consulted, but felt he still did not need surgery. A chest CT on 11/3 showed a loculated hydropneumothorax.  MSSA bactermia was diagnosed per blood cultures on 11/3. He also grew MSSA from the left chest wound. A left chest tube was placed by IR on 11-4 and was removed on 11/11 (had to have TPA to assist with drainage), with only a small amount of remaining fluid noted on follow up CT (estimated about 200 mls). The infectious disease team has been involved and he was to be treated with IV Rocephin for 4 to 6 weeks with EOT on 12/4. He is now readmitted with worsening shortness of breath. The CT now looks worse with air in loculated fluid collections. IR and Surgery have been consulted. Subjective:     VATS yesterday. CT x 2 to suction with small airleak. ROCKY draink with serosanguinous fluid in bulb. On 4L NC  Patient tearful complaining of pain due to oconnell. Pedro Santillan, NP at beside and orders to remove oconnell today. Reports cough, no sputum.        Current Facility-Administered Medications   Medication Dose Route Frequency    0.9% sodium chloride infusion 250 mL  250 mL IntraVENous PRN    central line flush (saline) syringe 10 mL  10 mL InterCATHeter PRN    lactated Ringers infusion  75 mL/hr IntraVENous CONTINUOUS    dextrose 5 % - 0.45% NaCl infusion  100 mL/hr IntraVENous CONTINUOUS    sodium chloride (NS) flush 5-40 mL  5-40 mL IntraVENous Q8H    sodium chloride (NS) flush 5-40 mL  5-40 mL IntraVENous PRN    naloxone (NARCAN) injection 0.2 mg  0.2 mg IntraVENous PRN    ropivacaine (PF) (NAROPIN) 0.1 %, fentaNYL citrate (PF) 5 mcg/mL in 0.9% sodium chloride 500 mL epidural infusion   Epidural CONTINUOUS    promethazine (PHENERGAN) with saline injection 6.25 mg  6.25 mg IntraVENous Q15MIN PRN    central line flush (saline) syringe 10 mL  10 mL InterCATHeter PRN    ceFAZolin (ANCEF) 2 g/20 mL in sterile water IV syringe  2 g IntraVENous Q8H    sodium chloride (NS) flush 5-40 mL  5-40 mL IntraVENous Q8H    sodium chloride (NS) flush 5-40 mL  5-40 mL IntraVENous PRN    acetaminophen (TYLENOL) tablet 650 mg  650 mg Oral Q6H PRN    Or    acetaminophen (TYLENOL) suppository 650 mg  650 mg Rectal Q6H PRN    polyethylene glycol (MIRALAX) packet 17 g  17 g Oral DAILY PRN    promethazine (PHENERGAN) tablet 12.5 mg  12.5 mg Oral Q6H PRN    Or    ondansetron (ZOFRAN) injection 4 mg  4 mg IntraVENous Q6H PRN       Review of Systems  Constitutional: negative for fever, chills, sweats  Cardiovascular: negative for chest pain, palpitations, syncope, edema  Gastrointestinal:  negative for dysphagia, reflux, vomiting, diarrhea, abdominal pain, or melena  Neurologic:  negative for focal weakness, numbness, headache    Objective:     Vitals:    11/17/20 2201 11/18/20 0101 11/18/20 0418 11/18/20 0758   BP: 108/80 122/81 119/77 120/73   Pulse: (!) 104 (!) 123 (!) 103 97   Resp: 20 23 21 20   Temp: 97.5 °F (36.4 °C) 97.7 °F (36.5 °C) 97.5 °F (36.4 °C) 98.3 °F (36.8 °C)   SpO2: 100% 94% 98% 99%   Weight:       Height:             Intake/Output Summary (Last 24 hours) at 11/18/2020 2038  Last data filed at 11/18/2020 1630  Gross per 24 hour   Intake 2436 ml   Output 1950 ml   Net 486 ml       Physical Exam:   Constitution:  the patient is well developed and in no acute distress  EENMT:  Sclera clear, pupils equal, oral mucosa moist  Respiratory: dim L base, CT x 2 to suction, ROCKY x 1   Cardiovascular:  RRR without M,G,R  Gastrointestinal: soft and non-tender; with positive bowel sounds. oconnell in place  Musculoskeletal: warm without cyanosis. There is no lower extremity edema. Skin:  no jaundice or rashes, surgical wounds   Neurologic: no gross neuro deficits     Psychiatric:  alert and oriented x 3    CXR: 11/17/2020        LAB  Recent Labs     11/17/20  1756   GLUCPOC 96      Recent Labs     11/18/20  0428 11/17/20  1811 11/17/20  0904 11/17/20  0549   WBC 9.1 8.5 6.2 6.0   HGB 7.6* 9.1* 9.1* 9.0*   HCT 23.6* 27.3* 28.1* 27.5*    378 360 307     Recent Labs     11/18/20  0428 11/17/20  1128 11/17/20  0904 11/17/20  0549    137  --  139   K 4.7 4.4  --  4.1    103  --  102   CO2 30 34*  --  34*   * 90  --  83   BUN 7 5*  --  6   CREA 0.64* 0.61*  --  0.61*   CA 7.9* 7.7*  --  7.6*   ALB  --  1.6* 1.4*  --    TBILI  --  0.6 0.6  --    ALT  --  8* 9*  --      Recent Labs     11/17/20  1756   PHI 7.42   PCO2I 47.8*   PO2I 122*   HCO3I 31.0*     No results for input(s): LCAD, LAC in the last 72 hours. Assessment:  (Medical Decision Making)     Hospital Problems  Date Reviewed: 11/3/2020          Codes Class Noted POA    * (Principal) Acute respiratory failure with hypoxia ([facility] [address] ICD-10-CM: J96.01  ICD-9-CM: 518.81  11/15/2020 Unknown    On 4L NC, sats acceptable, wean as able. Loculated empyema [facility]) ICD-10-CM: J86.9  ICD-9-CM: 510.9  11/15/2020 Unknown    Left thoracotomy lyesterday , per surgery     MSSA bacteremia ICD-10-CM: R78.81, B95.61  ICD-9-CM: 790.7, 041.11  11/4/2020 Yes    Per ID, on Ancef     Alcohol abuse ICD-10-CM: F10.10  ICD-9-CM: 305.00  8/17/2016 Yes              Plan:  (Medical Decision Making)     --on Ancef per ID  --4L NC, 02 sat 99%,  wean as able  --CT x 2 with small airleak.  ROCKY x 1 to bulb suction    More than 50% of the time documented was spent in face-to-face contact with the patient and in the care of the patient on the floor/unit where the patient is located. Adela Humphreys NP        Lungs: rhonchi  Heart:  RRR with no Murmur/Rubs/Gallops    Additional Comments:  Wean 02, ABX per ID, CT per surgery    I have spoken with and examined the patient. I agree with the above assessment and plan as documented. Javi Ruelas MD      Lungs:  decrease  Heart:  RRR with no Murmur/Rubs/Gallops    Additional Comments:  Having lot of pain, has epidural for pain- will let them adjust it, CT per surgery  I have spoken with and examined the patient. I agree with the above assessment and plan as documented.     Javi Ruelas MD

## 2020-11-18 NOTE — PROGRESS NOTES
Attempted to see patient for epidural rounding. When I arrived in his room, Mr Vijay Desir seemed very agitated, stating that he had been in pain  \"for days\" and refused to allow me to re-dose epidural.  He asked that I return in a few minutes. After alerting his nurse, we returned to his room together and discovered he was up out of bed having removed his chest tubes. The epidural catheter was also pulled out. I inspected the insertion site which appeared fine, catheter tip was intact. I have discontinued epidural orders.

## 2020-11-18 NOTE — PERIOP NOTES
TRANSFER - OUT REPORT:    Verbal report given to [address] [place] [address] on Derenda Boas  being transferred to room 609 for routine post - op       Report consisted of patients Situation, Background, Assessment and   Recommendations(SBAR). Information from the following report(s) SBAR, Kardex, OR Summary, Intake/Output and MAR was reviewed with the receiving nurse. Lines:   PICC Single Lumen 79/02/08 Right;Basilic (Active)   Central Line Being Utilized Yes 11/17/20 1928   Criteria for Appropriate Use Long term IV/antibiotic administration 11/17/20 1928   Site Assessment Clean, dry, & intact 11/17/20 1928   Phlebitis Assessment 0 11/17/20 1928   Infiltration Assessment 0 11/17/20 1928   Date of Last Dressing Change 11/12/20 11/17/20 1928   Dressing Status Clean, dry, & intact 11/17/20 1928   Dressing Type Disk with Chlorhexadine gluconate (CHG); Transparent 11/17/20 1928   Hub Color/Line Status Flushed;Patent 11/17/20 0907   Action Taken Blood drawn 11/17/20 0907   Positive Blood Return (Site #1) Yes 11/17/20 1928   Alcohol Cap Used No 11/17/20 0907       Peripheral IV 11/17/20 Left Forearm (Active)   Site Assessment Clean, dry, & intact 11/17/20 1928   Phlebitis Assessment 0 11/17/20 1928   Infiltration Assessment 0 11/17/20 1928   Dressing Status Clean, dry, & intact 11/17/20 1928   Dressing Type Transparent;Tape 11/17/20 1928   Hub Color/Line Status Green;Flushed;Capped 11/17/20 1928        Opportunity for questions and clarification was provided. Patient transported with:   O2 @ 4 liters    VTE prophylaxis orders have been written for Derenda Boas.

## 2020-11-18 NOTE — INTERDISCIPLINARY ROUNDS
Interdisciplinary team rounds were held 11/18/2020 with the following team members:Care Management, Nursing, Physical Therapy and Physician and the patient and mother. Plan of care discussed. See clinical pathway and/or care plan for interventions and desired outcomes. See previous note as those items were discussed during rounds.   
Pt to be transferred to [address] [place] [address].

## 2020-11-18 NOTE — PROGRESS NOTES
Infectious Disease Progress Note    Today's Date: 2020   Admit Date: 2020    Impression:   · MSSA bacteremia (11/3 - );  [address] NG;  TTE negative  · MSSA Left pleural empyema: s/p chest tube, 20 Cx from pleural drainage, positive:  · discharged 20 on IV Rocephin X 4 weeks with EOT 20  · S/p VATS 20  · ETOH withdrawal on librium taper. Denies illicit drugs   · S/p fall through attic with contusion/trauma     Plan:   · Continue Cefazolin  · Follow clinically     Anti-infectives:   · CTX  Restarted -  · Ancef -       Subjective: Interval History: Underwent VATS yesterday. Having expected pain. No Known Allergies     Review of Systems:  A comprehensive review of systems was negative except for that written in the History of Present Illness. Objective:     Visit Vitals  /73 (BP 1 Location: Left arm, BP Patient Position: At rest)   Pulse 97   Temp 98.3 °F (36.8 °C)   Resp 20   Ht 5' 10\" (1.778 m)   Wt 103.2 kg (227 lb 8.2 oz)   SpO2 99%   BMI 32.65 kg/m²     Temp (24hrs), Av.8 °F (36.6 °C), Min:97 °F (36.1 °C), Max:98.7 °F (37.1 °C)     Patient examined 2020, exam remains unchanged except as noted below:    General:  Alert, cooperative, appears stated age   Head:  Normocephalic, atraumatic    Eyes:  Anicteric, PERRL   Throat: Mucus membranes moist OP clear   Neck: Supple, symmetrical   Lungs:   Diminished L base; chest tubes   Heart:  Regular rate and rhythm, without audible murmur, rub, or gallop   Abdomen:   Soft, non-tender, no guarding, no distention, bowel sounds active   Extremities: Extremities normal, atraumatic, no cyanosis or edema   Pulses: 2+ and symmetric   Skin: Skin color, texture, turgor normal, no rashes or lesions.       Lines/Devices: RUE PICC intact         Data Review:     CBC:  Recent Labs     20  0428 20  1811 20  0904   WBC 9.1 8.5 6.2   GRANS  --   --  69   MONOS  --   --  7   EOS  --   --  3 ANEU  --   --  4.3   ABL  --   --  1.2   HGB 7.6* 9.1* 9.1*   HCT 23.6* 27.3* 28.1*    378 360       BMP:  Recent Labs     11/18/20  0428 11/17/20  1128 11/17/20  0549   CREA 0.64* 0.61* 0.61*   BUN 7 5* 6    137 139   K 4.7 4.4 4.1    103 102   CO2 30 34* 34*   AGAP 7 0* 3*   * 90 83       LFTS:  Recent Labs     11/17/20  1128 11/17/20  0904   TBILI 0.6 0.6   ALT 8* 9*   * 222*   TP 6.7 6.4   ALB 1.6* 1.4*       Microbiology:     All Micro Results     Procedure Component Value Units Date/Time    CULTURE, BLOOD [[id number]] Collected:  11/15/20 0033    Order Status:  Completed Specimen:  Blood Updated:  11/18/20 0747     Special Requests: --        RIGHT  ARM       Culture result: NO GROWTH 3 DAYS       CULTURE, BLOOD [[id number]] Collected:  11/15/20 0035    Order Status:  Completed Specimen:  Blood Updated:  11/18/20 0747     Special Requests: --        LEFT  ARM       Culture result: NO GROWTH 3 DAYS             Imaging:   IMPRESSION:   1. No evidence of pulmonary embolism. 2. Mildly enlarged multilobulated left empyema. 3. Unchanged small focus of left lower lobe consolidation with small internal  bubbles of air which may relate to pneumatoceles although an early lung abscess  is not excluded.   4. Multiple left rib fractures, several of which appear segmental suspect for a  flail chest.       Signed By: Tisha Diaz MD     November 18, 2020

## 2020-11-18 NOTE — PROGRESS NOTES
PLAN:  Diet Regular  SCD/IS for prophylactic measures  Pain/nausea control  Monitor labs  Chest tubes to suction  Document CT output each shift   Chest tube site dressing changes daily   Daily CXR  1 unit of PRBC for acute blood loss  DC oconnell   ROCKY drain care  Epidural management per anesthesia     ASSESSMENT:  Admit Date: 11/14/2020   1 Day Post-Op  Procedure(s):  LEFT THORACOTOMY    Principal Problem:    Acute respiratory failure with hypoxia ([address] (11/15/2020)    Active Problems:    Alcohol abuse (8/17/2016)      MSSA bacteremia (11/4/2020)      Loculated empyema ([address] (11/15/2020)         SUBJECTIVE:  11/18/20: 1 Day Post-Op awake in bed, c/o bladder spasms. Pain controlled. AF, tachy. Hgb 7.6; lost 1.2L of blood in OR. CTs to suction x2. No CT output documented by nursing. Small air leak in both tubes. ROCKY with 75mL/24h output. .         Intake/Output Summary (Last 24 hours) at 11/18/2020 1042  Last data filed at 11/18/2020 0639  Gross per 24 hour   Intake 2436 ml   Output 1950 ml   Net 486 ml     OBJECTIVE:  Constitutional: Alert oriented cooperative patient in no acute distress; appears stated age   Visit Vitals  /73 (BP 1 Location: Left arm, BP Patient Position: At rest)   Pulse 97   Temp 98.3 °F (36.8 °C)   Resp 20   Ht 5' 10\" (1.778 m)   Wt 227 lb 8.2 oz (103.2 kg)   SpO2 99%   BMI 32.65 kg/m²     Eyes:Sclera are clear. ENMT: no external lesions gross hearing normal; no obvious neck masses, no ear or lip lesions  CV: RRR. Normal perfusion  Resp: No JVD. Breathing is  non-labored; no audible wheezing. CTx2 to suction with small air leaks noted; ROCKY with bloody output to left chest  GI: soft and non-distended     Musculoskeletal: unremarkable with normal function. No embolic signs or cyanosis.    Neuro:  Oriented; moves all 4; no focal deficits  Psychiatric: normal affect and mood, no memory impairment      Patient Vitals for the past 24 hrs:   BP Temp Pulse Resp SpO2   11/18/20 0758 120/73 98.3 °F (36.8 °C) 97 20 99 %   11/18/20 0418 119/77 97.5 °F (36.4 °C) (!) 103 21 98 %   11/18/20 0101 122/81 97.7 °F (36.5 °C) (!) 123 23 94 %   11/17/20 2201 108/80 97.5 °F (36.4 °C) (!) 104 20 100 %   11/17/20 2141   (!) 107  98 %   11/17/20 2125 127/77  (!) 103  98 %   11/17/20 2104 126/82  (!) 102 14 98 %   11/17/20 2046 134/72 97.7 °F (36.5 °C) (!) 105 20 96 %   11/17/20 2024 105/68  98 18    11/17/20 2019 104/70  (!) 101 16 98 %   11/17/20 2014 103/70  94 18 96 %   11/17/20 2009 108/72  95 20 96 %   11/17/20 2005 116/73  96 16 94 %   11/17/20 2000 123/72  97 14 96 %   11/17/20 1955 105/67  95 18 96 %   11/17/20 1950 115/69  99 16 95 %   11/17/20 1948   97  95 %   11/17/20 1940   100 14 100 %   11/17/20 1935   100 16 100 %   11/17/20 1930 112/68  100 14    11/17/20 1928 103/62 97 °F (36.1 °C) 100 11 97 %   11/17/20 1547 (!) 96/58  90 18 100 %   11/17/20 1542 (!) 101/59  81 18 100 %   11/17/20 1537 105/70  82 18 100 %   11/17/20 1530 91/61  78 18 100 %   11/17/20 1520 112/69  83 20 100 %   11/17/20 1456   92 30 100 %   11/17/20 1404 (!) 149/67 98.7 °F (37.1 °C) 85 30 100 %   11/17/20 1150 114/73 97.9 °F (36.6 °C) 80 20 100 %     Labs:    Recent Labs     11/18/20  0428  11/17/20  1128 11/17/20  0904   WBC 9.1   < >  --  6.2   HGB 7.6*   < >  --  9.1*      < >  --  360     --  137  --    K 4.7  --  4.4  --      --  103  --    CO2 30  --  34*  --    BUN 7  --  5*  --    CREA 0.64*  --  0.61*  --    *  --  90  --    TBILI  --   --  0.6 0.6   CBIL  --   --   --  0.2   ALT  --   --  8* 9*   AP  --   --  228* 222*    < > = values in this interval not displayed.        Signed:  Donya Ackerman NP

## 2020-11-18 NOTE — ANESTHESIA PROCEDURE NOTES
Thoracic Epidural Catheter Placement for Postoperative Pain Infusion    Start time: 11/17/2020 3:20 PM  End time: 11/17/2020 3:37 PM  Performed by: Paula Samuel MD  Authorized by: Paula Samuel MD     Pre-Procedure  Indication: at surgeon's request, post-op pain management and procedure for pain    Preanesthetic Checklist: patient identified, risks and benefits discussed, anesthesia consent, site marked, patient being monitored, timeout performed and anesthesia consent    Timeout Time: 15:20        Epidural:   Patient position:  Right lateral decubitus  Prep region:  Thoracic  Prep: Chlorhexidine    Location:  T10-11    Needle and Epidural Catheter:   Needle Type:  Tuohy  Needle Gauge:  17 G  Injection Technique:  Loss of resistance using air  Attempts:  2  Catheter Size:  19 G  Catheter at Skin Depth (cm):  11  Depth in Epidural Space (cm):  4  Events: no blood with aspiration, no cerebrospinal fluid with aspiration, no paresthesia and negative aspiration test    Test Dose:  Negative    Assessment:   Catheter Secured:  Tegaderm and tape  Insertion:  Uncomplicated  Patient tolerance:  Patient tolerated the procedure well with no immediate complications

## 2020-11-18 NOTE — PROGRESS NOTES
Hourly rounds completed. Awake most part of the night verbalizing he is uncomfortable, uses epidural for pain management but pt doesn't seem to be relieved. Sent message to Dr.V. Bertha Bronson and was given with an order of Ativan 0.5 mg IV, and pt went to sleep. Both chest tubes in place, draining well, ROKCY drain in place with moderate  bloody output. Tyler draining well with yellow colored urine. Will continue to monitor and give report to oncoming RN.

## 2020-11-18 NOTE — PROGRESS NOTES
TRANSFER - IN REPORT:    Verbal report received from [address] [place] [address] on Dena Fall  being received from PACU for routine progression of care     Report consisted of patients Situation, Background, Assessment and   Recommendations(SBAR). Information from the following report(s) SBAR, Procedure Summary and Intake/Output was reviewed with the receiving nurse. Opportunity for questions and clarification was provided. Assessment completed upon patients arrival to unit and care assumed.

## 2020-11-18 NOTE — PROGRESS NOTES
.. TRANSFER - OUT REPORT:    Verbal report given to Nathan Alvarado on Doc Fields  [facility] for routine progression of care       Report consisted of patients Situation, Background, Assessment and   Recommendations(SBAR). Information from the following report(s) SBAR, Kardex and OR Summary was reviewed with the receiving nurse. Lines:   PICC Single Lumen 02/66/31 Right;Basilic (Active)   Central Line Being Utilized Yes 11/18/20 0745   Criteria for Appropriate Use Long term IV/antibiotic administration 11/18/20 0745   Site Assessment Clean, dry, & intact 11/18/20 1303   Phlebitis Assessment 0 11/18/20 0745   Infiltration Assessment 0 11/18/20 0745   Date of Last Dressing Change 11/18/20 11/18/20 1303   Dressing Status New;Clean, dry, & intact 11/18/20 1303   Dressing Type Transparent;Tape;Disk with Chlorhexadine gluconate (CHG) 11/18/20 0745   Hub Color/Line Status Patent; Infusing 11/18/20 0745   Action Taken Blood drawn 11/17/20 0907   Positive Blood Return (Site #1) Yes 11/17/20 2300   Alcohol Cap Used No 11/18/20 0745       Peripheral IV 11/17/20 Left Forearm (Active)   Site Assessment Clean, dry, & intact 11/18/20 1130   Phlebitis Assessment 0 11/18/20 1130   Infiltration Assessment 0 11/18/20 1130   Dressing Status Clean, dry, & intact 11/18/20 1130   Dressing Type Transparent;Tape 11/18/20 1130   Hub Color/Line Status Patent 11/18/20 1130   Alcohol Cap Used No 11/18/20 1130        Opportunity for questions and clarification was provided.       Patient transported with:   O2 @ 4 liters, Transport, and RN

## 2020-11-18 NOTE — ANESTHESIA PROCEDURE NOTES
Arterial Line Placement    Start time: 11/17/2020 4:32 PM  End time: 11/17/2020 4:34 PM  Performed by: Monty Basilio MD  Authorized by: Monty Basilio MD     Pre-Procedure  Indications:  Arterial pressure monitoring and blood sampling  Preanesthetic Checklist: patient identified, risks and benefits discussed, anesthesia consent, site marked, patient being monitored, timeout performed and patient being monitored    Timeout Time: 16:32        Procedure:   Prep:  Chlorhexidine  Seldinger Technique?: Yes    Orientation:  Right  Location:  Radial artery  Catheter size:  20 G  Number of attempts:  2  Cont Cardiac Output Sensor: No      Assessment:   Post-procedure:  Line secured and sterile dressing applied  Patient Tolerance:  Patient tolerated the procedure well with no immediate complications

## 2020-11-18 NOTE — PROGRESS NOTES
Hospitalist Progress Note    Subjective:   Daily Progress Note: 11/18/2020 4:09 PM    Patient  was admitted to surgical service from 10/30-11/2 after mechanical fall through attic floor sustaining rib fractures and left pneumothorax.  Chest tube placed in ER at that time, then reportedly removed chest tube about 4 days PTA. Marla Staples sustained a coracoid fracture, seen by Ortho with sling/conservative management.  Progressive pain and SOB since discharge, then presented to ER again 11/3 wit new effusion. Blood cultures grew [employer] MSSA, ID consulted. Another chest tube placed, extensive hemothorax with tPa administration x 2. Pulmonay on board. Patient is also an alcoholic in Dts. Discharged 11/12 with follow up rocephin in the infusion center daily. Currently presented again 11/14 with worsening SOB and fatigue, oxygen sats in the 80's. Found with enlarged multilobulated left empyema, unchanged LLL consolidation with small internal air bubbles and multiple left rib fxs.   11/15:  Pulmonary in. Pain around old chest tube site with erythema. Recommendations to consult IR and OR for VATS. IR in for consult, recommend OR for VATs/drain placement. ID in for consult, continue 2 GM Rocephin q 24 hours. Surgery in  11/16:  ID changing to high dose ancef. 11/17:  Underwent left thoracotomy per Dr Shayy Mc. Also had thoracic epidural catheter placed for post op pain infusion. 11/18:  CT x 2 with small airleak, ROCKY with serosanguinous fluid in bulb. Still on 4 liters oxygen. Tyler removed. Cough without expectorant. Transfused one unit PRBC. At noon today, patient became anxious and agitated, pulled both chest tubes and epidural out. Dr Shayy Mc in to replace chest tube. CXR below. Transferred to second floor. IV and po pain meds ordered.         Current Facility-Administered Medications   Medication Dose Route Frequency    0.9% sodium chloride infusion 250 mL  250 mL IntraVENous PRN    central line flush (saline) syringe 10 mL  10 mL InterCATHeter PRN    dextrose 5 % - 0.45% NaCl infusion  100 mL/hr IntraVENous CONTINUOUS    sodium chloride (NS) flush 5-40 mL  5-40 mL IntraVENous Q8H    sodium chloride (NS) flush 5-40 mL  5-40 mL IntraVENous PRN    naloxone (NARCAN) injection 0.2 mg  0.2 mg IntraVENous PRN    ropivacaine (PF) (NAROPIN) 0.1 %, fentaNYL citrate (PF) 5 mcg/mL in 0.9% sodium chloride 500 mL epidural infusion   Epidural CONTINUOUS    promethazine (PHENERGAN) with saline injection 6.25 mg  6.25 mg IntraVENous Q15MIN PRN    central line flush (saline) syringe 10 mL  10 mL InterCATHeter PRN    ceFAZolin (ANCEF) 2 g/20 mL in sterile water IV syringe  2 g IntraVENous Q8H    sodium chloride (NS) flush 5-40 mL  5-40 mL IntraVENous Q8H    sodium chloride (NS) flush 5-40 mL  5-40 mL IntraVENous PRN    acetaminophen (TYLENOL) tablet 650 mg  650 mg Oral Q6H PRN    Or    acetaminophen (TYLENOL) suppository 650 mg  650 mg Rectal Q6H PRN    polyethylene glycol (MIRALAX) packet 17 g  17 g Oral DAILY PRN    promethazine (PHENERGAN) tablet 12.5 mg  12.5 mg Oral Q6H PRN    Or    ondansetron (ZOFRAN) injection 4 mg  4 mg IntraVENous Q6H PRN        Review of Systems  A comprehensive review of systems was negative except for that written in the HPI. Objective:     Visit Vitals  /77 (BP 1 Location: Left arm, BP Patient Position: At rest)   Pulse (!) 109   Temp 97.6 °F (36.4 °C)   Resp 21   Ht 5' 10\" (1.778 m)   Wt 103.2 kg (227 lb 8.2 oz)   SpO2 100%   BMI 32.65 kg/m²    O2 Flow Rate (L/min): 4 l/min O2 Device: Nasal cannula    Temp (24hrs), Av.6 °F (36.4 °C), Min:97 °F (36.1 °C), Max:98.3 °F (36.8 °C)     07 - 1900  In: -   Out: 546 [Urine:475; Drains:25]  1901 -  0700  In: 2676 [P.O.:480; I.V.:2196]  Out: 2150 [Urine:875; Drains:75]    General:  Complains of pain after pulling out multiple lines. Calm. Family member at bedside. Transferring to second floor.    Head: Normocephalic, without obvious abnormality, atraumatic  Eyes: conjunctivae/corneas clear. PERRL  Throat: Lips, mucosa, and tongue normal. Teeth and gums normal  Neck: supple, symmetrical, trachea midline, no JVD  Lungs: Right lung clear to auscultation, left with diminished breath sounds lower half. Heart: regular rate and rhythm, S1, S2 normal, no murmur, click, rub or gallop  Abdomen: soft, non-tender. Bowel sounds normal. No masses,  no organomegaly  Extremities: extremities normal, atraumatic, no cyanosis or edema  Skin: Skin color, texture, turgor normal. No rashes or lesions  Neurologic: Grossly normal    Additional comments: Notes,orders, test results, vitals reviewed    Data Review  Recent Results (from the past 24 hour(s))   POC CG8I    Collection Time: 11/17/20  5:56 PM   Result Value Ref Range    pH (POC) 7.42 7.35 - 7.45      pCO2 (POC) 47.8 (H) 35 - 45 MMHG    pO2 (POC) 122 (H) 75 - 100 MMHG    HCO3 (POC) 31.0 (H) 22 - 26 MMOL/L    sO2 (POC) 99 (H) 95 - 98 %    Base excess (POC) 6 mmol/L    Sodium,  136 - 145 MMOL/L    Potassium, POC 4.7 3.5 - 5.1 MMOL/L    Glucose, POC 96 65 - 100 MG/DL    Calcium, ionized (POC) 1.20 1. 12 - 1.32 mmol/L   CBC W/O DIFF    Collection Time: 11/17/20  6:11 PM   Result Value Ref Range    WBC 8.5 4.3 - 11.1 K/uL    RBC 2.37 (L) 4.23 - 5.6 M/uL    HGB 9.1 (L) 13.6 - 17.2 g/dL    HCT 27.3 (L) 41.1 - 50.3 %    .2 (H) 79.6 - 97.8 FL    MCH 38.4 (H) 26.1 - 32.9 PG    MCHC 33.3 31.4 - 35.0 g/dL    RDW 15.5 (H) 11.9 - 14.6 %    PLATELET 968 910 - 936 K/uL    MPV 9.7 9.4 - 12.3 FL    ABSOLUTE NRBC 0.00 0.0 - 0.2 K/uL   METABOLIC PANEL, BASIC    Collection Time: 11/18/20  4:28 AM   Result Value Ref Range    Sodium 139 136 - 145 mmol/L    Potassium 4.7 3.5 - 5.1 mmol/L    Chloride 102 98 - 107 mmol/L    CO2 30 21 - 32 mmol/L    Anion gap 7 7 - 16 mmol/L    Glucose 167 (H) 65 - 100 mg/dL    BUN 7 6 - 23 MG/DL    Creatinine 0.64 (L) 0.8 - 1.5 MG/DL    GFR est AA >60 >60 ml/min/1.73m2 GFR est non-AA >60 >60 ml/min/1.73m2    Calcium 7.9 (L) 8.3 - 10.4 MG/DL   CBC W/O DIFF    Collection Time: 11/18/20  4:28 AM   Result Value Ref Range    WBC 9.1 4.3 - 11.1 K/uL    RBC 2.03 (L) 4.23 - 5.6 M/uL    HGB 7.6 (L) 13.6 - 17.2 g/dL    HCT 23.6 (L) 41.1 - 50.3 %    .3 (H) 79.6 - 97.8 FL    MCH 37.4 (H) 26.1 - 32.9 PG    MCHC 32.2 31.4 - 35.0 g/dL    RDW 15.3 (H) 11.9 - 14.6 %    PLATELET 273 562 - 989 K/uL    MPV 10.6 9.4 - 12.3 FL    ABSOLUTE NRBC 0.00 0.0 - 0.2 K/uL   RBC, ALLOCATE    Collection Time: 11/18/20  9:00 AM   Result Value Ref Range    HISTORY CHECKED? Historical check performed      11/14:  CXR: Unchanged left-sided empyema. 11/14:  CT CHEST:  No evidence of pulmonary embolism. Mildly enlarged multilobulated left empyema. Unchanged small focus of left lower lobe consolidation with small internal bubbles of air which may relate to pneumatoceles although an early lung abscess is not excluded. Multiple left rib fractures, several of which appear segmental suspect for a flail chest.     11/16:  DUPLEX LE:  No evidence of deep venous thrombosis within the lower extremities. 11/17:  CXR:  Left-sided chest tubes without definite pneumothorax. Improved aeration of the left lower hemithorax. Mild pleural thickening or fluid remains on the left in addition to patchy interstitial and airspace opacities. Mild right basilar subsegmental atelectasis. 11/18:  CXR:  Status post removal of one of the left chest tubes and repositioning of the other. No pneumothorax. Stable left hemithorax changes and right base atelectasis.     Assessment/Plan:   ACUTE RESPIRATORY FAILURE WITH HYPOXIA: Chest tubes x 2 previously with empyema, hemothorax requiring tPa x 2    Active Problems:    Alcohol abuse (8/17/2016)      MSSA BACTEREMIA:  ID on board    Was on rocephin long until 12/2, changed to Megan Dillon 1348 11/17   Daily CBC     LOCULATED EMPYEMA  VATS done 11/17   Patient pulled chest tubes x 2 and thoracic epidural out  11/18   Chest tube x 1 replaced per Dr Jamse Holstein 11/18   Continue oxygen prn   Pulmonary, surgery on board    Care Plan discussed with: Patient/Family, Aga Middleton, and Nurse    Signed By: Ulysses Nieto NP     November 18, 2020

## 2020-11-19 ENCOUNTER — APPOINTMENT (OUTPATIENT)
Dept: GENERAL RADIOLOGY | Age: 50
DRG: 163 | End: 2020-11-19
Attending: NURSE PRACTITIONER

## 2020-11-19 ENCOUNTER — HOSPITAL ENCOUNTER (OUTPATIENT)
Dept: INFUSION THERAPY | Age: 50
End: 2020-11-19

## 2020-11-19 LAB
ABO + RH BLD: NORMAL
ANION GAP SERPL CALC-SCNC: 5 MMOL/L (ref 7–16)
BLD PROD TYP BPU: NORMAL
BLOOD GROUP ANTIBODIES SERPL: NORMAL
BPU ID: NORMAL
BUN SERPL-MCNC: 4 MG/DL (ref 6–23)
CALCIUM SERPL-MCNC: 7.8 MG/DL (ref 8.3–10.4)
CHLORIDE SERPL-SCNC: 102 MMOL/L (ref 98–107)
CO2 SERPL-SCNC: 33 MMOL/L (ref 21–32)
CREAT SERPL-MCNC: 0.66 MG/DL (ref 0.8–1.5)
CROSSMATCH RESULT,%XM: NORMAL
ERYTHROCYTE [DISTWIDTH] IN BLOOD BY AUTOMATED COUNT: 19.9 % (ref 11.9–14.6)
GLUCOSE SERPL-MCNC: 105 MG/DL (ref 65–100)
HCT VFR BLD AUTO: 25 % (ref 41.1–50.3)
HGB BLD-MCNC: 8.2 G/DL (ref 13.6–17.2)
MAGNESIUM SERPL-MCNC: 1.7 MG/DL (ref 1.8–2.4)
MCH RBC QN AUTO: 36.1 PG (ref 26.1–32.9)
MCHC RBC AUTO-ENTMCNC: 32.8 G/DL (ref 31.4–35)
MCV RBC AUTO: 110.1 FL (ref 79.6–97.8)
NRBC # BLD: 0 K/UL (ref 0–0.2)
PLATELET # BLD AUTO: 312 K/UL (ref 150–450)
PMV BLD AUTO: 9.7 FL (ref 9.4–12.3)
POTASSIUM SERPL-SCNC: 3.9 MMOL/L (ref 3.5–5.1)
RBC # BLD AUTO: 2.27 M/UL (ref 4.23–5.6)
SODIUM SERPL-SCNC: 140 MMOL/L (ref 136–145)
SPECIMEN EXP DATE BLD: NORMAL
STATUS OF UNIT,%ST: NORMAL
UNIT DIVISION, %UDIV: 0
WBC # BLD AUTO: 7.7 K/UL (ref 4.3–11.1)

## 2020-11-19 PROCEDURE — 71045 X-RAY EXAM CHEST 1 VIEW: CPT

## 2020-11-19 PROCEDURE — 36415 COLL VENOUS BLD VENIPUNCTURE: CPT

## 2020-11-19 PROCEDURE — 99232 SBSQ HOSP IP/OBS MODERATE 35: CPT | Performed by: INTERNAL MEDICINE

## 2020-11-19 PROCEDURE — 83735 ASSAY OF MAGNESIUM: CPT

## 2020-11-19 PROCEDURE — 77030013140 HC MSK NEB VYRM -A

## 2020-11-19 PROCEDURE — 80048 BASIC METABOLIC PNL TOTAL CA: CPT

## 2020-11-19 PROCEDURE — 94640 AIRWAY INHALATION TREATMENT: CPT

## 2020-11-19 PROCEDURE — 74011250637 HC RX REV CODE- 250/637: Performed by: NURSE PRACTITIONER

## 2020-11-19 PROCEDURE — 74011000258 HC RX REV CODE- 258: Performed by: SURGERY

## 2020-11-19 PROCEDURE — 94760 N-INVAS EAR/PLS OXIMETRY 1: CPT

## 2020-11-19 PROCEDURE — 74011250636 HC RX REV CODE- 250/636: Performed by: NURSE PRACTITIONER

## 2020-11-19 PROCEDURE — 74011000250 HC RX REV CODE- 250: Performed by: SURGERY

## 2020-11-19 PROCEDURE — 74011000250 HC RX REV CODE- 250: Performed by: INTERNAL MEDICINE

## 2020-11-19 PROCEDURE — 65270000029 HC RM PRIVATE

## 2020-11-19 PROCEDURE — 74011250636 HC RX REV CODE- 250/636: Performed by: SURGERY

## 2020-11-19 PROCEDURE — 77030027138 HC INCENT SPIROMETER -A

## 2020-11-19 PROCEDURE — 2709999900 HC NON-CHARGEABLE SUPPLY

## 2020-11-19 PROCEDURE — 85027 COMPLETE CBC AUTOMATED: CPT

## 2020-11-19 PROCEDURE — 77010033678 HC OXYGEN DAILY

## 2020-11-19 RX ORDER — CALCIUM CARBONATE 200(500)MG
200 TABLET,CHEWABLE ORAL
Status: DISCONTINUED | OUTPATIENT
Start: 2020-11-20 | End: 2020-12-03 | Stop reason: HOSPADM

## 2020-11-19 RX ORDER — ALBUTEROL SULFATE 0.83 MG/ML
2.5 SOLUTION RESPIRATORY (INHALATION)
Status: DISCONTINUED | OUTPATIENT
Start: 2020-11-19 | End: 2020-11-25

## 2020-11-19 RX ADMIN — HYDROMORPHONE HYDROCHLORIDE 1 MG: 1 INJECTION, SOLUTION INTRAMUSCULAR; INTRAVENOUS; SUBCUTANEOUS at 08:36

## 2020-11-19 RX ADMIN — ALBUTEROL SULFATE 2.5 MG: 2.5 SOLUTION RESPIRATORY (INHALATION) at 19:32

## 2020-11-19 RX ADMIN — CEFAZOLIN SODIUM 2 G: 100 INJECTION, POWDER, LYOPHILIZED, FOR SOLUTION INTRAVENOUS at 10:20

## 2020-11-19 RX ADMIN — HYDROMORPHONE HYDROCHLORIDE 1 MG: 1 INJECTION, SOLUTION INTRAMUSCULAR; INTRAVENOUS; SUBCUTANEOUS at 14:37

## 2020-11-19 RX ADMIN — Medication 10 ML: at 06:00

## 2020-11-19 RX ADMIN — OXYCODONE HYDROCHLORIDE 10 MG: 5 TABLET ORAL at 18:59

## 2020-11-19 RX ADMIN — Medication 10 ML: at 16:33

## 2020-11-19 RX ADMIN — HYDROMORPHONE HYDROCHLORIDE 1 MG: 1 INJECTION, SOLUTION INTRAMUSCULAR; INTRAVENOUS; SUBCUTANEOUS at 00:24

## 2020-11-19 RX ADMIN — LORAZEPAM 1 MG: 2 INJECTION INTRAMUSCULAR; INTRAVENOUS at 21:23

## 2020-11-19 RX ADMIN — DEXTROSE MONOHYDRATE AND SODIUM CHLORIDE 100 ML/HR: 5; .45 INJECTION, SOLUTION INTRAVENOUS at 06:47

## 2020-11-19 RX ADMIN — OXYCODONE HYDROCHLORIDE 10 MG: 5 TABLET ORAL at 12:05

## 2020-11-19 RX ADMIN — DEXTROSE MONOHYDRATE AND SODIUM CHLORIDE 100 ML/HR: 5; .45 INJECTION, SOLUTION INTRAVENOUS at 17:10

## 2020-11-19 RX ADMIN — Medication 10 ML: at 22:00

## 2020-11-19 RX ADMIN — OXYCODONE HYDROCHLORIDE 10 MG: 5 TABLET ORAL at 03:08

## 2020-11-19 RX ADMIN — Medication 10 ML: at 21:24

## 2020-11-19 RX ADMIN — CEFAZOLIN SODIUM 2 G: 100 INJECTION, POWDER, LYOPHILIZED, FOR SOLUTION INTRAVENOUS at 03:10

## 2020-11-19 RX ADMIN — ALBUTEROL SULFATE 2.5 MG: 2.5 SOLUTION RESPIRATORY (INHALATION) at 13:17

## 2020-11-19 RX ADMIN — CEFAZOLIN SODIUM 2 G: 100 INJECTION, POWDER, LYOPHILIZED, FOR SOLUTION INTRAVENOUS at 17:11

## 2020-11-19 NOTE — ROUTINE PROCESS
END OF SHIFT NOTE: 
 
INTAKE/OUTPUT 
11/17 0701 - 11/18 0700 In: 2436 [P.O.:240; I.V.:2196] Out: 2150 [Urine:875; Drains:75] Voiding: YES Catheter: NO 
Drain:   
 
 
 
 
 
Flatus: Patient does not have flatus present. Stool:  0 occurrences. Characteristics: 
  
 
Emesis: 0 occurrences. Characteristics: VITAL SIGNS Patient Vitals for the past 12 hrs: 
 Temp Pulse Resp BP SpO2  
11/18/20 1710 98.3 °F (36.8 °C) (!) 105 20 130/84 100 % 11/18/20 1653 97.3 °F (36.3 °C) (!) 102 20 134/81 100 % 11/18/20 1132 97.6 °F (36.4 °C) (!) 109 21 137/77 100 % Pain Assessment Pain Intensity 1: 10 (11/18/20 1911) Pain Location 1: Chest 
Pain Intervention(s) 1: Medication (see MAR) Patient Stated Pain Goal: 0 Ambulating Yes, only from [facility] to bed after transfer Shift report given to oncoming nurse at the bedside.  
 
Tk Alegria RN

## 2020-11-19 NOTE — PROGRESS NOTES
Problem: Falls - Risk of  Goal: *Absence of Falls  Description: Document Veatrice Marker Fall Risk and appropriate interventions in the flowsheet. Outcome: Progressing Towards Goal  Note: Fall Risk Interventions:  Mobility Interventions: Patient to call before getting OOB         Medication Interventions: Teach patient to arise slowly, Patient to call before getting OOB         History of Falls Interventions:  Investigate reason for fall

## 2020-11-19 NOTE — PROGRESS NOTES
PLAN:  Diet Regular  SCD/IS for prophylactic measures  Pain/nausea control  Monitor labs  Chest tube to water seal  Document CT output each shift   Chest tube site dressing changes daily   Daily CXR  DC oconnell   If no leak noted tomorrow, will pull CT and sign off      ASSESSMENT:  Admit Date: 11/14/2020   2 Day Post-Op  Procedure(s):  LEFT THORACOTOMY    Principal Problem:    Acute respiratory failure with hypoxia ([address] (11/15/2020)    Active Problems:    Alcohol abuse (8/17/2016)      MSSA bacteremia (11/4/2020)      Loculated empyema ([address] (11/15/2020)         SUBJECTIVE:  11/18/20: 1 Day Post-Op awake in bed, c/o bladder spasms. Pain controlled. AF, tachy. Hgb 7.6; lost 1.2L of blood in OR. CTs to suction x2. No CT output documented by nursing. Small air leak in both tubes. ROCKY with 75mL/24h output. 11/19/20: 2 Day Post-Op up in chair. The patient pulled out his chest tubes and drain yesterday and Dr. Kira Shane replaced on chest tube at the bedside. CT with 190mL/24h output. No ptx on CXR. No air leak noted       Intake/Output Summary (Last 24 hours) at 11/19/2020 1127  Last data filed at 11/19/2020 8623  Gross per 24 hour   Intake 4074 ml   Output 1341 ml   Net 2733 ml     OBJECTIVE:  Constitutional: Alert oriented cooperative patient in no acute distress; appears stated age   Visit Vitals  /67   Pulse 95   Temp 97.7 °F (36.5 °C)   Resp 20   Ht 5' 10\" (1.778 m)   Wt 227 lb 8.2 oz (103.2 kg)   SpO2 100%   BMI 32.65 kg/m²     Eyes:Sclera are clear. ENMT: no external lesions gross hearing normal; no obvious neck masses, no ear or lip lesions  CV: RRR. Normal perfusion  Resp: No JVD. Breathing is  non-labored; no audible wheezing. CT to left w/o air leak, incision open to air with staples; no signs of infection  GI: soft and non-distended     Musculoskeletal: unremarkable with normal function. No embolic signs or cyanosis.    Neuro:  Oriented; moves all 4; no focal deficits  Psychiatric: normal affect and mood, no memory impairment      Patient Vitals for the past 24 hrs:   BP Temp Pulse Resp SpO2   11/19/20 0716 112/67 97.7 °F (36.5 °C) 95 20 100 %   11/19/20 0444 111/68 98.2 °F (36.8 °C) 95 20 100 %   11/19/20 0049 111/66 98.6 °F (37 °C) (!) 103 20 100 %   11/18/20 1951 115/71 99.1 °F (37.3 °C) (!) 107 20 99 %   11/18/20 1710 130/84 98.3 °F (36.8 °C) (!) 105 20 100 %   11/18/20 1653 134/81 97.3 °F (36.3 °C) (!) 102 20 100 %   11/18/20 1132 137/77 97.6 °F (36.4 °C) (!) 109 21 100 %     Labs:    Recent Labs     11/19/20  0351  11/17/20  1128 11/17/20  0904   WBC 7.7   < >  --  6.2   HGB 8.2*   < >  --  9.1*      < >  --  360      < > 137  --    K 3.9   < > 4.4  --       < > 103  --    CO2 33*   < > 34*  --    BUN 4*   < > 5*  --    CREA 0.66*   < > 0.61*  --    *   < > 90  --    TBILI  --   --  0.6 0.6   CBIL  --   --   --  0.2   ALT  --   --  8* 9*   AP  --   --  228* 222*    < > = values in this interval not displayed.        Signed:  Virgil Cowan NP

## 2020-11-19 NOTE — PROGRESS NOTES
END OF SHIFT NOTE:    INTAKE/OUTPUT  11/18 0701 - 11/19 0700  In: 310   Out: 6510 [Urine:1275; Drains:25]  Voiding: YES  Catheter: NO  Drain:              Flatus: Patient does not have flatus present. Stool:  0 occurrences. Characteristics:       Emesis: 0 occurrences. Characteristics:        VITAL SIGNS  Patient Vitals for the past 12 hrs:   Temp Pulse Resp BP SpO2   11/19/20 0716 97.7 °F (36.5 °C) 95 20 112/67 100 %   11/19/20 0444 98.2 °F (36.8 °C) 95 20 111/68 100 %   11/19/20 0049 98.6 °F (37 °C) (!) 103 20 111/66 100 %       Pain Assessment  Pain Intensity 1: 10 (11/19/20 0836)  Pain Location 1: Chest  Pain Intervention(s) 1: Medication (see MAR)  Patient Stated Pain Goal: 0    Ambulating  Yes  Hourly rounds performed. Patient continue to pull out chest tubes. Attempt to re-orient. Shift report given to oncoming nurse at the bedside.     Fabiola Pryor

## 2020-11-19 NOTE — PROGRESS NOTES
Hospitalist Progress Note    Subjective:   Daily Progress Note: 11/19/2020 11:19 AM    Patient  was admitted to surgical service from 10/30-11/2 after mechanical fall through attic floor sustaining rib fractures and left pneumothorax.  Chest tube placed in ER at that time, then reportedly removed chest tube about 4 days PTA. Marcella qIbal sustained a coracoid fracture, seen by Ortho with sling/conservative management.  Progressive pain and SOB since discharge, then presented to ER again 11/3 wit new effusion. Blood cultures grew Bio[employer] MSSA, ID consulted. Another chest tube placed, extensive hemothorax with tPa administration x 2. Pulmonay on board. Patient is also an alcoholic in Dts. Discharged 11/12 with follow up rocephin in the infusion center daily.    Currently presented again 11/14 with worsening SOB and fatigue, oxygen sats in the 80's. Found with enlarged multilobulated left empyema, unchanged LLL consolidation with small internal air bubbles and multiple left rib fxs.   11/15:  Pulmonary in. Pain around old chest tube site with erythema. Recommendations to consult IR and OR for VATS. IR in for consult, recommend OR for VATs/drain placement. ID in for consult, continue 2 GM Rocephin q 24 hours. 11/16:  ID changing to high dose ancef. 11/17:  Underwent left thoracotomy per Dr Juani Ferrara. Also had thoracic epidural catheter placed for post op pain infusion.   11/18:  Still on 4 liters oxygen. Tyler removed. Cough without expectorant. Transfused one unit PRBC. At noon today, patient became anxious and agitated, pulled both chest tubes and epidural out. Dr Juani Ferrara in to replace chest tube. Transferred to second floor. 11/19:  Quiet and morose today. Up in chair. Obviously having a good deal of pain. CT draining bloody fluid.   Ativan prn.       Current Facility-Administered Medications   Medication Dose Route Frequency    albuterol (PROVENTIL VENTOLIN) nebulizer solution 2.5 mg  2.5 mg Nebulization Q6H RT    0.9% sodium chloride infusion 250 mL  250 mL IntraVENous PRN    HYDROmorphone (PF) (DILAUDID) injection 1 mg  1 mg IntraVENous Q4H PRN    naloxone (NARCAN) injection 0.4 mg  0.4 mg IntraVENous EVERY 2 MINUTES AS NEEDED    oxyCODONE IR (ROXICODONE) tablet 10 mg  10 mg Oral Q4H PRN    LORazepam (ATIVAN) injection 1 mg  1 mg IntraVENous Q4H PRN    central line flush (saline) syringe 10 mL  10 mL InterCATHeter PRN    dextrose 5 % - 0.45% NaCl infusion  100 mL/hr IntraVENous CONTINUOUS    sodium chloride (NS) flush 5-40 mL  5-40 mL IntraVENous Q8H    sodium chloride (NS) flush 5-40 mL  5-40 mL IntraVENous PRN    promethazine (PHENERGAN) with saline injection 6.25 mg  6.25 mg IntraVENous Q15MIN PRN    central line flush (saline) syringe 10 mL  10 mL InterCATHeter PRN    ceFAZolin (ANCEF) 2 g/20 mL in sterile water IV syringe  2 g IntraVENous Q8H    sodium chloride (NS) flush 5-40 mL  5-40 mL IntraVENous Q8H    sodium chloride (NS) flush 5-40 mL  5-40 mL IntraVENous PRN    acetaminophen (TYLENOL) tablet 650 mg  650 mg Oral Q6H PRN    Or    acetaminophen (TYLENOL) suppository 650 mg  650 mg Rectal Q6H PRN    polyethylene glycol (MIRALAX) packet 17 g  17 g Oral DAILY PRN    promethazine (PHENERGAN) tablet 12.5 mg  12.5 mg Oral Q6H PRN    Or    ondansetron (ZOFRAN) injection 4 mg  4 mg IntraVENous Q6H PRN      Review of Systems  A comprehensive review of systems was negative except for that written in the HPI. Objective:     Visit Vitals  /67   Pulse 95   Temp 97.7 °F (36.5 °C)   Resp 20   Ht 5' 10\" (1.778 m)   Wt 103.2 kg (227 lb 8.2 oz)   SpO2 100%   BMI 32.65 kg/m²    O2 Flow Rate (L/min): 4 l/min O2 Device: Nasal cannula    Temp (24hrs), Av.1 °F (36.7 °C), Min:97.3 °F (36.3 °C), Max:99.1 °F (37.3 °C)    701 - 1900  In: 2339 [I.V.:3764]  Out: 20   1901 - 700  In: 0712 [P.O.:240;  I.V.:996]  Out: 2296 [Urine:1675; Drains:100]    General:  Continued pain after pulling out multiple lines. Calm. Up in chair. Head: Normocephalic, without obvious abnormality, atraumatic  Eyes: conjunctivae/corneas clear. PERRL  Throat: Lips, mucosa, and tongue normal. Teeth and gums normal  Neck: supple, symmetrical, trachea midline, no JVD  Lungs: Right lung clear to auscultation, left with diminished, but improved breath sounds lower half. Left CT draining bloody fluid. Heart: regular rate and rhythm, S1, S2 normal, no murmur, click, rub or gallop  Abdomen: soft, non-tender. Bowel sounds normal. No masses,  no organomegaly  Extremities: All extremities normal, atraumatic, no cyanosis or edema  Skin: Skin color, texture, turgor normal. No rashes or lesions  Neurologic: Grossly normal     Additional comments: Notes,orders, test results, vitals reviewed    Data Review  Recent Results (from the past 24 hour(s))   METABOLIC PANEL, BASIC    Collection Time: 11/19/20  3:51 AM   Result Value Ref Range    Sodium 140 136 - 145 mmol/L    Potassium 3.9 3.5 - 5.1 mmol/L    Chloride 102 98 - 107 mmol/L    CO2 33 (H) 21 - 32 mmol/L    Anion gap 5 (L) 7 - 16 mmol/L    Glucose 105 (H) 65 - 100 mg/dL    BUN 4 (L) 6 - 23 MG/DL    Creatinine 0.66 (L) 0.8 - 1.5 MG/DL    GFR est AA >60 >60 ml/min/1.73m2    GFR est non-AA >60 >60 ml/min/1.73m2    Calcium 7.8 (L) 8.3 - 10.4 MG/DL   CBC W/O DIFF    Collection Time: 11/19/20  3:51 AM   Result Value Ref Range    WBC 7.7 4.3 - 11.1 K/uL    RBC 2.27 (L) 4.23 - 5.6 M/uL    HGB 8.2 (L) 13.6 - 17.2 g/dL    HCT 25.0 (L) 41.1 - 50.3 %    .1 (H) 79.6 - 97.8 FL    MCH 36.1 (H) 26.1 - 32.9 PG    MCHC 32.8 31.4 - 35.0 g/dL    RDW 19.9 (H) 11.9 - 14.6 %    PLATELET 818 668 - 080 K/uL    MPV 9.7 9.4 - 12.3 FL    ABSOLUTE NRBC 0.00 0.0 - 0.2 K/uL   MAGNESIUM    Collection Time: 11/19/20  3:51 AM   Result Value Ref Range    Magnesium 1.7 (L) 1.8 - 2.4 mg/dL      11/14:  CXR: Unchanged left-sided empyema.     11/14:  CT CHEST:  No evidence of pulmonary embolism.   Mildly enlarged multilobulated left empyema. Unchanged small focus of left lower lobe consolidation with small internal bubbles of air which may relate to pneumatoceles although an early lung abscess is not excluded. Multiple left rib fractures, several of which appear segmental suspect for a flail chest.     11/16:  DUPLEX LE:  No evidence of deep venous thrombosis within the lower extremities.     11/17:  CXR:  Left-sided chest tubes without definite pneumothorax. Improved aeration of the left lower hemithorax. Mild pleural thickening or fluid remains on the left in addition to patchy interstitial and airspace opacities. Mild right basilar subsegmental atelectasis.     11/18:  CXR:  Status post removal of one of the left chest tubes and repositioning of the other. No pneumothorax. Stable left hemithorax changes and right base atelectasis. 11/19:  CXR:  Unchanged bilateral lung atelectasis and pleural thickening or  effusion along the outer origin of the left lung.     Assessment/Plan:   ACUTE RESPIRATORY FAILURE WITH HYPOXIA: Chest tubes x 2 previously with empyema, hemothorax requiring tPa x 2 after traumatic fall with left rib fx    VATS and thoracic epidural placed 11/17 (third set of    Chest tubes)   Pulled all tubes out 11/19:  One CT replaced left lung   Per Dr Megan Hawthorne, functioning well    Pulmonary and surgery on board    Continue oxygen 4 liters, weaning per Pulmonary   Analgesics prn      MASSA BACTEREMIA:  11/4:  ID ON BOARD    Has been on rocephin since 11/4 with initial plan for  EOT 12/4   Changed to ancef 11/16   Daily CBC    ALCOHOL ABUSE: PRIOR TO LAST ADMISSION DRANK ONE PINT OF VODKA DAILY:  FINISHED LIBRIUM TAPER   Continued anxiety:  Ativan prn     Care Plan discussed with: Patient, Dr Jackie Mcintyre and Nurse    Signed By: Ava Abdul NP     November 19, 2020

## 2020-11-19 NOTE — PROGRESS NOTES
Problem: Falls - Risk of  Goal: *Absence of Falls  Description: Document Jeremy Abarca Fall Risk and appropriate interventions in the flowsheet. Outcome: Progressing Towards Goal  Note: Fall Risk Interventions:  Mobility Interventions: Patient to call before getting OOB         Medication Interventions: Teach patient to arise slowly, Patient to call before getting OOB         History of Falls Interventions:  Investigate reason for fall         Problem: Patient Education: Go to Patient Education Activity  Goal: Patient/Family Education  Outcome: Progressing Towards Goal     Problem: Airway Clearance - Ineffective  Goal: *Patent airway  Outcome: Progressing Towards Goal  Goal: *Absence of airway secretions  Outcome: Progressing Towards Goal  Goal: *Able to cough effectively  Outcome: Progressing Towards Goal  Goal: *PALLIATIVE CARE:  Alleviation of secretions, cough and/or nasal congestion  Outcome: Progressing Towards Goal     Problem: Patient Education: Go to Patient Education Activity  Goal: Patient/Family Education  Outcome: Progressing Towards Goal

## 2020-11-19 NOTE — PROGRESS NOTES
END OF SHIFT NOTE:    INTAKE/OUTPUT  11/18 0701 - 11/19 0700  In: 310   Out: 3015 [Urine:1275; Drains:25]  Voiding: YES  Catheter: NO  Drain:              Flatus: Patient does have flatus present. Stool:  0 occurrences. Characteristics:       Emesis: 0 occurrences. Characteristics:        VITAL SIGNS  Patient Vitals for the past 12 hrs:   Temp Pulse Resp BP SpO2   11/19/20 1539 98.5 °F (36.9 °C) 93 20 112/70 97 %   11/19/20 1317     100 %   11/19/20 1148 97.8 °F (36.6 °C) 96 20 110/67 99 %   11/19/20 0716 97.7 °F (36.5 °C) 95 20 112/67 100 %       Pain Assessment  Pain Intensity 1: 5 (11/19/20 1539)  Pain Location 1: Chest  Pain Intervention(s) 1: Medication (see MAR)  Patient Stated Pain Goal: 0    Ambulating  Yes with assistance    Shift report given to oncoming nurse at the bedside.     Ngozi Stout RN

## 2020-11-19 NOTE — PROGRESS NOTES
Tj Erazo  Admission Date: 11/14/2020             Daily Progress Note: 11/19/2020    The patient's chart is reviewed and the patient is discussed with the staff.    48 y.o.  male presents with a PMHx of smoker, ETOH abuse, and recent hospitalizations for traumatic PTX and rib fractures.  Prior to this admission, he has been admitted twice r/t traumatic PTX.  First admission was 10/30-11/2>>during that admission he required a CT for the PTX.  It was deemed that he was not in need for surgery at that time. He returned 11/3-11/12 where surgery again was consulted, but felt he still did not need surgery. A chest CT on 11/3 showed a loculated hydropneumothorax.  MSSA bactermia was diagnosed per blood cultures on 11/3. He also grew MSSA from the left chest wound. A left chest tube was placed by IR on 11-4 and was removed on 11/11 (had to have TPA to assist with drainage), with only a small amount of remaining fluid noted on follow up CT (estimated about 200 mls). The infectious disease team has been involved and he was to be treated with IV Rocephin for 4 to 6 weeks with EOT on 12/4. He is now readmitted with worsening shortness of breath. The CT now looks worse with air in loculated fluid collections. IR and Surgery have been consulted. VATS 11/17 with CT x 2 to suction, ROCKY to bulb suction. Received one unit PRBC yesterday. Subjective:      O2 sat 100% on 4L NC. Denies shortness of breath unless he gets anxious. Reports occasional cough. No sputum. CT x 1 in place     Per notes yesterday afternoon, patient was agitated and pulled out epidural catheter, ROCKY drain and chest tubes. Chest tube x 1 was replaced by Dr. Soumya Márquez yesterday.        Current Facility-Administered Medications   Medication Dose Route Frequency    0.9% sodium chloride infusion 250 mL  250 mL IntraVENous PRN    HYDROmorphone (PF) (DILAUDID) injection 1 mg  1 mg IntraVENous Q4H PRN    naloxone (NARCAN) injection 0.4 mg  0.4 mg IntraVENous EVERY 2 MINUTES AS NEEDED    oxyCODONE IR (ROXICODONE) tablet 10 mg  10 mg Oral Q4H PRN    LORazepam (ATIVAN) injection 1 mg  1 mg IntraVENous Q4H PRN    central line flush (saline) syringe 10 mL  10 mL InterCATHeter PRN    dextrose 5 % - 0.45% NaCl infusion  100 mL/hr IntraVENous CONTINUOUS    sodium chloride (NS) flush 5-40 mL  5-40 mL IntraVENous Q8H    sodium chloride (NS) flush 5-40 mL  5-40 mL IntraVENous PRN    promethazine (PHENERGAN) with saline injection 6.25 mg  6.25 mg IntraVENous Q15MIN PRN    central line flush (saline) syringe 10 mL  10 mL InterCATHeter PRN    ceFAZolin (ANCEF) 2 g/20 mL in sterile water IV syringe  2 g IntraVENous Q8H    sodium chloride (NS) flush 5-40 mL  5-40 mL IntraVENous Q8H    sodium chloride (NS) flush 5-40 mL  5-40 mL IntraVENous PRN    acetaminophen (TYLENOL) tablet 650 mg  650 mg Oral Q6H PRN    Or    acetaminophen (TYLENOL) suppository 650 mg  650 mg Rectal Q6H PRN    polyethylene glycol (MIRALAX) packet 17 g  17 g Oral DAILY PRN    promethazine (PHENERGAN) tablet 12.5 mg  12.5 mg Oral Q6H PRN    Or    ondansetron (ZOFRAN) injection 4 mg  4 mg IntraVENous Q6H PRN       Review of Systems  Constitutional: negative for fever, chills, sweats  Cardiovascular: negative for chest pain, palpitations, syncope, edema  Gastrointestinal:  negative for dysphagia, reflux, vomiting, diarrhea, abdominal pain, or melena  Neurologic:  negative for focal weakness, numbness, headache    Objective:     Vitals:    11/18/20 1951 11/19/20 0049 11/19/20 0444 11/19/20 0716   BP: 115/71 111/66 111/68 112/67   Pulse: (!) 107 (!) 103 95 95   Resp: 20 20 20 20   Temp: 99.1 °F (37.3 °C) 98.6 °F (37 °C) 98.2 °F (36.8 °C) 97.7 °F (36.5 °C)   SpO2: 99% 100% 100% 100%   Weight:       Height:             Intake/Output Summary (Last 24 hours) at 11/19/2020 0904  Last data filed at 11/19/2020 0829  Gross per 24 hour   Intake 4074 ml   Output 1841 ml   Net 2233 ml       Physical Exam:   Constitution:  the patient is well developed and in no acute distress  EENMT:  Sclera clear, pupils equal, oral mucosa moist  Respiratory: dim L base, CT x 1 to suction  Cardiovascular:  RRR without M,G,R  Gastrointestinal: soft and non-tender; with positive bowel sounds   Musculoskeletal: warm without cyanosis. There is no lower extremity edema. Skin:  no jaundice or rashes, surgical wounds   Neurologic: no gross neuro deficits     Psychiatric:  alert and oriented x 3    CXR: 11/19/2020        LAB  Recent Labs     11/17/20  1756   GLUCPOC 96      Recent Labs     11/19/20  0351 11/18/20  0428 11/17/20  1811 11/17/20  0904   WBC 7.7 9.1 8.5 6.2   HGB 8.2* 7.6* 9.1* 9.1*   HCT 25.0* 23.6* 27.3* 28.1*    328 378 360     Recent Labs     11/19/20  0351 11/18/20  0428 11/17/20  1128 11/17/20  0904    139 137  --    K 3.9 4.7 4.4  --     102 103  --    CO2 33* 30 34*  --    * 167* 90  --    BUN 4* 7 5*  --    CREA 0.66* 0.64* 0.61*  --    MG 1.7*  --   --   --    CA 7.8* 7.9* 7.7*  --    ALB  --   --  1.6* 1.4*   TBILI  --   --  0.6 0.6   ALT  --   --  8* 9*     Recent Labs     11/17/20  1756   PHI 7.42   PCO2I 47.8*   PO2I 122*   HCO3I 31.0*     No results for input(s): LCAD, LAC in the last 72 hours.       Assessment:  (Medical Decision Making)     Hospital Problems  Date Reviewed: 11/3/2020          Codes Class Noted POA    * (Principal) Acute respiratory failure with hypoxia [facility]) ICD-10-CM: J96.01  ICD-9-CM: 518.81  11/15/2020 Unknown    On 4L NC, sats 100%, wean      Loculated empyema (HCC) ICD-10-CM: J86.9  ICD-9-CM: 510.9  11/15/2020 Unknown    Left thoracotomy , per surgery     MSSA bacteremia ICD-10-CM: R78.81, B95.61  ICD-9-CM: 790.7, 041.11  11/4/2020 Yes    Per ID, on Ancef     Alcohol abuse ICD-10-CM: F10.10  ICD-9-CM: 305.00  8/17/2016 Yes              Plan:  (Medical Decision Making)     --on Ancef per ID  --4L NC, 02 sat 100%- wean   --CT per surgery    More than 50% of the time documented was spent in face-to-face contact with the patient and in the care of the patient on the floor/unit where the patient is located. Gatito Oates, JENNA      Lungs:  clear  Heart:  RRR with no Murmur/Rubs/Gallops    Additional Comments:  CT per surgery, wean 02     I have spoken with and examined the patient. I agree with the above assessment and plan as documented.     Florin Kenny MD

## 2020-11-19 NOTE — PROGRESS NOTES
Infectious Disease Progress Note    Today's Date: 2020   Admit Date: 2020    Impression:   · MSSA bacteremia (11/3 - );  [address] NG;  TTE negative  · MSSA Left pleural empyema: s/p chest tube, 20 Cx from pleural drainage, positive:  · Originally discharged 20 on IV Rocephin X 4 weeks with EOT 20  · S/p VATS 20  · ETOH withdrawal on librium taper. Denies illicit drugs   · S/p fall through attic with contusion/trauma     Plan:   · Continue Cefazolin while here. EOT planned for 20. · Follow clinically     Anti-infectives:   · CTX  Restarted -  · Ancef -       Subjective: Interval History: Underwent VATS ; afebrile, WBC normal. Up to chair, moderate pain but a little better. Breathing ok. No Known Allergies     Review of Systems:  A comprehensive review of systems was negative except for that written in the History of Present Illness. Objective:     Visit Vitals  /67   Pulse 95   Temp 97.7 °F (36.5 °C)   Resp 20   Ht 5' 10\" (1.778 m)   Wt 103.2 kg (227 lb 8.2 oz)   SpO2 100%   BMI 32.65 kg/m²     Temp (24hrs), Av.1 °F (36.7 °C), Min:97.3 °F (36.3 °C), Max:99.1 °F (37.3 °C)     Patient examined 2020, exam remains unchanged except as noted below:    General:  Alert, cooperative, appears stated age   Head:  Normocephalic, atraumatic    Eyes:  Anicteric, PERRL   Throat: Mucus membranes moist OP clear   Neck: Supple, symmetrical   Lungs:   Diminished L base; chest tubes   Heart:  Regular rate and rhythm, without audible murmur, rub, or gallop   Abdomen:   Soft, non-tender, no guarding, no distention, bowel sounds active   Extremities: Extremities normal, atraumatic, no cyanosis or edema   Pulses: 2+ and symmetric   Skin: Skin color, texture, turgor normal, no rashes or lesions.       Lines/Devices: RUE PICC intact         Data Review:     CBC:  Recent Labs     20  0351 20  0428 20  1811 20  0904   WBC 7.7 9.1 8.5 6.2   GRANS  --   --   --  69   MONOS  --   --   --  7   EOS  --   --   --  3   ANEU  --   --   --  4.3   ABL  --   --   --  1.2   HGB 8.2* 7.6* 9.1* 9.1*   HCT 25.0* 23.6* 27.3* 28.1*    328 378 360       BMP:  Recent Labs     11/19/20  0351 11/18/20  0428 11/17/20  1128   CREA 0.66* 0.64* 0.61*   BUN 4* 7 5*    139 137   K 3.9 4.7 4.4    102 103   CO2 33* 30 34*   AGAP 5* 7 0*   * 167* 90       LFTS:  Recent Labs     11/17/20  1128 11/17/20  0904   TBILI 0.6 0.6   ALT 8* 9*   * 222*   TP 6.7 6.4   ALB 1.6* 1.4*       Microbiology:     All Micro Results     Procedure Component Value Units Date/Time    CULTURE, BLOOD [[id number]] Collected:  11/15/20 0033    Order Status:  Completed Specimen:  Blood Updated:  11/19/20 0735     Special Requests: --        RIGHT  ARM       Culture result: NO GROWTH 4 DAYS       CULTURE, BLOOD [[id number]] Collected:  11/15/20 0035    Order Status:  Completed Specimen:  Blood Updated:  11/19/20 0735     Special Requests: --        LEFT  ARM       Culture result: NO GROWTH 4 DAYS             Imaging:   IMPRESSION:   1. No evidence of pulmonary embolism. 2. Mildly enlarged multilobulated left empyema. 3. Unchanged small focus of left lower lobe consolidation with small internal  bubbles of air which may relate to pneumatoceles although an early lung abscess  is not excluded.   4. Multiple left rib fractures, several of which appear segmental suspect for a  flail chest.       Signed By: Chito Marquez MD     November 19, 2020

## 2020-11-20 ENCOUNTER — APPOINTMENT (OUTPATIENT)
Dept: GENERAL RADIOLOGY | Age: 50
DRG: 163 | End: 2020-11-20
Attending: NURSE PRACTITIONER

## 2020-11-20 ENCOUNTER — APPOINTMENT (OUTPATIENT)
Dept: INFUSION THERAPY | Age: 50
End: 2020-11-20

## 2020-11-20 LAB
ANION GAP SERPL CALC-SCNC: 7 MMOL/L (ref 7–16)
BACTERIA SPEC CULT: NORMAL
BACTERIA SPEC CULT: NORMAL
BUN SERPL-MCNC: 2 MG/DL (ref 6–23)
CALCIUM SERPL-MCNC: 7.5 MG/DL (ref 8.3–10.4)
CHLORIDE SERPL-SCNC: 102 MMOL/L (ref 98–107)
CO2 SERPL-SCNC: 29 MMOL/L (ref 21–32)
CREAT SERPL-MCNC: 0.5 MG/DL (ref 0.8–1.5)
ERYTHROCYTE [DISTWIDTH] IN BLOOD BY AUTOMATED COUNT: 18.9 % (ref 11.9–14.6)
GLUCOSE SERPL-MCNC: 89 MG/DL (ref 65–100)
HCT VFR BLD AUTO: 26.2 % (ref 41.1–50.3)
HGB BLD-MCNC: 8.4 G/DL (ref 13.6–17.2)
MCH RBC QN AUTO: 36.2 PG (ref 26.1–32.9)
MCHC RBC AUTO-ENTMCNC: 32.1 G/DL (ref 31.4–35)
MCV RBC AUTO: 112.9 FL (ref 79.6–97.8)
NRBC # BLD: 0 K/UL (ref 0–0.2)
PLATELET # BLD AUTO: 263 K/UL (ref 150–450)
PMV BLD AUTO: 10.1 FL (ref 9.4–12.3)
POTASSIUM SERPL-SCNC: 4.6 MMOL/L (ref 3.5–5.1)
RBC # BLD AUTO: 2.32 M/UL (ref 4.23–5.6)
SERVICE CMNT-IMP: NORMAL
SERVICE CMNT-IMP: NORMAL
SODIUM SERPL-SCNC: 138 MMOL/L (ref 136–145)
WBC # BLD AUTO: 6.1 K/UL (ref 4.3–11.1)

## 2020-11-20 PROCEDURE — 77030021668 HC NEB PREFIL KT VYRM -A

## 2020-11-20 PROCEDURE — 99232 SBSQ HOSP IP/OBS MODERATE 35: CPT | Performed by: INTERNAL MEDICINE

## 2020-11-20 PROCEDURE — 74011250637 HC RX REV CODE- 250/637: Performed by: NURSE PRACTITIONER

## 2020-11-20 PROCEDURE — 80048 BASIC METABOLIC PNL TOTAL CA: CPT

## 2020-11-20 PROCEDURE — 74011250637 HC RX REV CODE- 250/637: Performed by: FAMILY MEDICINE

## 2020-11-20 PROCEDURE — 65270000029 HC RM PRIVATE

## 2020-11-20 PROCEDURE — 36415 COLL VENOUS BLD VENIPUNCTURE: CPT

## 2020-11-20 PROCEDURE — 94640 AIRWAY INHALATION TREATMENT: CPT

## 2020-11-20 PROCEDURE — 74011250636 HC RX REV CODE- 250/636: Performed by: NURSE PRACTITIONER

## 2020-11-20 PROCEDURE — 85027 COMPLETE CBC AUTOMATED: CPT

## 2020-11-20 PROCEDURE — 71045 X-RAY EXAM CHEST 1 VIEW: CPT

## 2020-11-20 PROCEDURE — 2709999900 HC NON-CHARGEABLE SUPPLY

## 2020-11-20 PROCEDURE — 74011250636 HC RX REV CODE- 250/636: Performed by: SURGERY

## 2020-11-20 PROCEDURE — 74011000250 HC RX REV CODE- 250: Performed by: SURGERY

## 2020-11-20 PROCEDURE — 94760 N-INVAS EAR/PLS OXIMETRY 1: CPT

## 2020-11-20 PROCEDURE — 74011000250 HC RX REV CODE- 250: Performed by: INTERNAL MEDICINE

## 2020-11-20 PROCEDURE — 77010033678 HC OXYGEN DAILY

## 2020-11-20 RX ORDER — LANOLIN ALCOHOL/MO/W.PET/CERES
400 CREAM (GRAM) TOPICAL 2 TIMES DAILY
Status: DISCONTINUED | OUTPATIENT
Start: 2020-11-20 | End: 2020-12-03 | Stop reason: HOSPADM

## 2020-11-20 RX ADMIN — Medication 10 ML: at 14:08

## 2020-11-20 RX ADMIN — CEFAZOLIN SODIUM 2 G: 100 INJECTION, POWDER, LYOPHILIZED, FOR SOLUTION INTRAVENOUS at 01:08

## 2020-11-20 RX ADMIN — MAGNESIUM GLUCONATE 500 MG ORAL TABLET 400 MG: 500 TABLET ORAL at 09:07

## 2020-11-20 RX ADMIN — CEFAZOLIN SODIUM 2 G: 100 INJECTION, POWDER, LYOPHILIZED, FOR SOLUTION INTRAVENOUS at 10:47

## 2020-11-20 RX ADMIN — Medication 10 ML: at 22:00

## 2020-11-20 RX ADMIN — LORAZEPAM 1 MG: 2 INJECTION INTRAMUSCULAR; INTRAVENOUS at 18:13

## 2020-11-20 RX ADMIN — HYDROMORPHONE HYDROCHLORIDE 1 MG: 1 INJECTION, SOLUTION INTRAMUSCULAR; INTRAVENOUS; SUBCUTANEOUS at 20:32

## 2020-11-20 RX ADMIN — OXYCODONE HYDROCHLORIDE 10 MG: 5 TABLET ORAL at 16:27

## 2020-11-20 RX ADMIN — ALBUTEROL SULFATE 2.5 MG: 2.5 SOLUTION RESPIRATORY (INHALATION) at 07:22

## 2020-11-20 RX ADMIN — Medication 10 ML: at 06:00

## 2020-11-20 RX ADMIN — ALBUTEROL SULFATE 2.5 MG: 2.5 SOLUTION RESPIRATORY (INHALATION) at 19:45

## 2020-11-20 RX ADMIN — Medication 10 ML: at 14:00

## 2020-11-20 RX ADMIN — OXYCODONE HYDROCHLORIDE 10 MG: 5 TABLET ORAL at 10:47

## 2020-11-20 RX ADMIN — OXYCODONE HYDROCHLORIDE 10 MG: 5 TABLET ORAL at 23:30

## 2020-11-20 RX ADMIN — OXYCODONE HYDROCHLORIDE 10 MG: 5 TABLET ORAL at 01:05

## 2020-11-20 RX ADMIN — HYDROMORPHONE HYDROCHLORIDE 1 MG: 1 INJECTION, SOLUTION INTRAMUSCULAR; INTRAVENOUS; SUBCUTANEOUS at 13:57

## 2020-11-20 RX ADMIN — Medication 10 ML: at 06:13

## 2020-11-20 RX ADMIN — CALCIUM CARBONATE (ANTACID) CHEW TAB 500 MG 200 MG: 500 CHEW TAB at 12:28

## 2020-11-20 RX ADMIN — MAGNESIUM GLUCONATE 500 MG ORAL TABLET 400 MG: 500 TABLET ORAL at 18:13

## 2020-11-20 RX ADMIN — HYDROMORPHONE HYDROCHLORIDE 1 MG: 1 INJECTION, SOLUTION INTRAMUSCULAR; INTRAVENOUS; SUBCUTANEOUS at 06:13

## 2020-11-20 RX ADMIN — CEFAZOLIN SODIUM 2 G: 100 INJECTION, POWDER, LYOPHILIZED, FOR SOLUTION INTRAVENOUS at 18:14

## 2020-11-20 RX ADMIN — CALCIUM CARBONATE (ANTACID) CHEW TAB 500 MG 200 MG: 500 CHEW TAB at 09:07

## 2020-11-20 RX ADMIN — CALCIUM CARBONATE (ANTACID) CHEW TAB 500 MG 200 MG: 500 CHEW TAB at 16:28

## 2020-11-20 NOTE — PROGRESS NOTES
PLAN:  Diet Regular  SCD/IS for prophylactic measures  Pain/nausea control  Monitor labs  Document CT output each shift   Daily CXR  DC oconnell 11/19/20  DC Chest tube  Dressing to Left chest should remain in place x 48 hours. No further general surgery needs. Will sign off at this time. Thank you for the consult. F/u in office with Dr. Shayy Mc in 10-14 days for staple and suture removal. Please call for any questions. ASSESSMENT:  Admit Date: 11/14/2020   3 Day Post-Op  Procedure(s):  LEFT THORACOTOMY    Principal Problem:    Acute respiratory failure with hypoxia ([address] (11/15/2020)    Active Problems:    Alcohol abuse (8/17/2016)      MSSA bacteremia (11/4/2020)      Loculated empyema ([facility] [address] (11/15/2020)         SUBJECTIVE:  11/18/20: 1 Day Post-Op awake in bed, c/o bladder spasms. Pain controlled. AF, tachy. Hgb 7.6; lost 1.2L of blood in OR. CTs to suction x2. No CT output documented by nursing. Small air leak in both tubes. ROCKY with 75mL/24h output. 11/19/20: 2 Day Post-Op up in chair. The patient pulled out his chest tubes and drain yesterday and Dr. Shayy Mc replaced on chest tube at the bedside. CT with 190mL/24h output. No ptx on CXR. No air leak noted     11/20/20: 3 Day Post-Op. Pt sitting in chair. Pain controlled. Left Chest tube to water seal. No air leak. 260mL out last 24 hours. AF, NAD. Intake/Output Summary (Last 24 hours) at 11/20/2020 1334  Last data filed at 11/20/2020 0750  Gross per 24 hour   Intake 1996 ml   Output 2860 ml   Net -864 ml     OBJECTIVE:  Constitutional: Alert oriented cooperative patient in no acute distress; appears stated age   Visit Vitals  /70   Pulse 97   Temp 97.6 °F (36.4 °C)   Resp 20   Ht 5' 10\" (1.778 m)   Wt 227 lb 8.2 oz (103.2 kg)   SpO2 91%   BMI 32.65 kg/m²     Eyes: Sclera are clear. ENMT: no external lesions gross hearing normal; no obvious neck masses, no ear or lip lesions  CV: RRR. Normal perfusion  Resp: No JVD.   Breathing is  non-labored; no audible wheezing. CT to left w/o air leak, incision open to air with staples; no signs of infection  GI: soft and non-distended     Musculoskeletal: unremarkable with normal function. No embolic signs or cyanosis.    Neuro:  Oriented; moves all 4; no focal deficits  Psychiatric: normal affect and mood, no memory impairment      Patient Vitals for the past 24 hrs:   BP Temp Pulse Resp SpO2   11/20/20 1150 121/70 97.6 °F (36.4 °C) 97 20 91 %   11/20/20 0750 108/67 98.3 °F (36.8 °C) 82 19 99 %   11/20/20 0724     93 %   11/20/20 0722     (!) 88 %   11/20/20 0315 100/66 98.4 °F (36.9 °C) 90 20 91 %   11/19/20 2340 117/72 98.1 °F (36.7 °C) (!) 101 21 100 %   11/19/20 1954 127/76 98.5 °F (36.9 °C) (!) 106 21 97 %   11/19/20 1932     95 %   11/19/20 1539 112/70 98.5 °F (36.9 °C) 93 20 97 %     Labs:    Recent Labs     11/20/20  0602 11/20/20  0424   WBC 6.1  --    HGB 8.4*  --      --    NA  --  138   K  --  4.6   CL  --  102   CO2  --  29   BUN  --  2*   CREA  --  0.50*   GLU  --  89       Signed:  Gucci Rivas NP

## 2020-11-20 NOTE — PROGRESS NOTES
Ada Carrasco  Admission Date: 11/14/2020             Daily Progress Note: 11/20/2020    The patient's chart is reviewed and the patient is discussed with the staff.    48 y.o.  male presents with a PMHx of smoker, ETOH abuse, and recent hospitalizations for traumatic PTX and rib fractures.  Prior to this admission, he has been admitted twice r/t traumatic PTX.  First admission was 10/30-11/2>>during that admission he required a CT for the PTX.  It was deemed that he was not in need for surgery at that time. He returned 11/3-11/12 where surgery again was consulted, but felt he still did not need surgery. A chest CT on 11/3 showed a loculated hydropneumothorax.  MSSA bactermia was diagnosed per blood cultures on 11/3. He also grew MSSA from the left chest wound. A left chest tube was placed by IR on 11-4 and was removed on 11/11 (had to have TPA to assist with drainage), with only a small amount of remaining fluid noted on follow up CT (estimated about 200 mls). The infectious disease team has been involved and he was to be treated with IV Rocephin for 4 to 6 weeks with EOT on 12/4. He is now readmitted with worsening shortness of breath. The CT now looks worse with air in loculated fluid collections. IR and Surgery have been consulted. VATS 11/17 with CT x 2 to suction, ROCKY to bulb suction. Received one unit PRBC yesterday.      Subjective:     Still has 1 CT but says surgery is going to take it out today   On 2L NC  Complains of pain       Current Facility-Administered Medications   Medication Dose Route Frequency    magnesium oxide (MAG-OX) tablet 400 mg  400 mg Oral BID    albuterol (PROVENTIL VENTOLIN) nebulizer solution 2.5 mg  2.5 mg Nebulization Q6H RT    calcium carbonate (TUMS) chewable tablet 200 mg [elemental]  200 mg Oral TID WITH MEALS    0.9% sodium chloride infusion 250 mL  250 mL IntraVENous PRN    HYDROmorphone (PF) (DILAUDID) injection 1 mg  1 mg IntraVENous Q4H PRN    naloxone (NARCAN) injection 0.4 mg  0.4 mg IntraVENous EVERY 2 MINUTES AS NEEDED    oxyCODONE IR (ROXICODONE) tablet 10 mg  10 mg Oral Q4H PRN    LORazepam (ATIVAN) injection 1 mg  1 mg IntraVENous Q4H PRN    central line flush (saline) syringe 10 mL  10 mL InterCATHeter PRN    sodium chloride (NS) flush 5-40 mL  5-40 mL IntraVENous Q8H    sodium chloride (NS) flush 5-40 mL  5-40 mL IntraVENous PRN    promethazine (PHENERGAN) with saline injection 6.25 mg  6.25 mg IntraVENous Q15MIN PRN    central line flush (saline) syringe 10 mL  10 mL InterCATHeter PRN    ceFAZolin (ANCEF) 2 g/20 mL in sterile water IV syringe  2 g IntraVENous Q8H    sodium chloride (NS) flush 5-40 mL  5-40 mL IntraVENous Q8H    sodium chloride (NS) flush 5-40 mL  5-40 mL IntraVENous PRN    acetaminophen (TYLENOL) tablet 650 mg  650 mg Oral Q6H PRN    Or    acetaminophen (TYLENOL) suppository 650 mg  650 mg Rectal Q6H PRN    polyethylene glycol (MIRALAX) packet 17 g  17 g Oral DAILY PRN    promethazine (PHENERGAN) tablet 12.5 mg  12.5 mg Oral Q6H PRN    Or    ondansetron (ZOFRAN) injection 4 mg  4 mg IntraVENous Q6H PRN       Review of Systems  Constitutional: negative for fever, chills, sweats  Cardiovascular: negative for chest pain, palpitations, syncope, edema  Gastrointestinal:  negative for dysphagia, reflux, vomiting, diarrhea, abdominal pain, or melena  Neurologic:  negative for focal weakness, numbness, headache    Objective:     Vitals:    11/20/20 0722 11/20/20 0724 11/20/20 0750 11/20/20 1150   BP:   108/67 121/70   Pulse:   82 97   Resp:   19 20   Temp:   98.3 °F (36.8 °C) 97.6 °F (36.4 °C)   SpO2: (!) 88% 93% 99% 91%   Weight:       Height:             Intake/Output Summary (Last 24 hours) at 11/20/2020 1215  Last data filed at 11/20/2020 0750  Gross per 24 hour   Intake 1996 ml   Output 2860 ml   Net -864 ml       Physical Exam:   Constitution:  the patient is well developed and in no acute distress  EENMT:  Sclera clear, pupils equal, oral mucosa moist  Respiratory: dim L base, CT x 1 to suction  Cardiovascular:  RRR without M,G,R  Gastrointestinal: soft and non-tender; with positive bowel sounds   Musculoskeletal: warm without cyanosis. There is no lower extremity edema. Skin:  no jaundice or rashes, surgical wounds   Neurologic: no gross neuro deficits     Psychiatric:  alert and oriented x 3    CXR: 11/19/2020        LAB  Recent Labs     11/17/20  1756   GLUCPOC 96      Recent Labs     11/20/20  0602 11/19/20  0351 11/18/20  0428 11/17/20  1811   WBC 6.1 7.7 9.1 8.5   HGB 8.4* 8.2* 7.6* 9.1*   HCT 26.2* 25.0* 23.6* 27.3*    312 328 378     Recent Labs     11/20/20  0424 11/19/20  0351 11/18/20  0428    140 139   K 4.6 3.9 4.7    102 102   CO2 29 33* 30   GLU 89 105* 167*   BUN 2* 4* 7   CREA 0.50* 0.66* 0.64*   MG  --  1.7*  --    CA 7.5* 7.8* 7.9*     Recent Labs     11/17/20  1756   PHI 7.42   PCO2I 47.8*   PO2I 122*   HCO3I 31.0*     No results for input(s): LCAD, LAC in the last 72 hours.       Assessment:  (Medical Decision Making)     Hospital Problems  Date Reviewed: 11/3/2020          Codes Class Noted POA    * (Principal) Acute respiratory failure with hypoxia [facility]) ICD-10-CM: J96.01  ICD-9-CM: 518.81  11/15/2020 Unknown    On 4L NC, sats 100%, wean      Loculated empyema (HCC) ICD-10-CM: J86.9  ICD-9-CM: 510.9  11/15/2020 Unknown    Left thoracotomy , per surgery     MSSA bacteremia ICD-10-CM: R78.81, B95.61  ICD-9-CM: 790.7, 041.11  11/4/2020 Yes    Per ID, on Ancef     Alcohol abuse ICD-10-CM: F10.10  ICD-9-CM: 305.00  8/17/2016 Yes              Plan:  (Medical Decision Making)     --on Ancef per ID till 11/4   -wean 02   --CT per surgery  -will see back on Monday if still here ,call if needed    More than 50% of the time documented was spent in face-to-face contact with the patient and in the care of the patient on the floor/unit where the patient is located.     Nick Mera MD      Lungs:  paul

## 2020-11-20 NOTE — PROGRESS NOTES
Hospitalist Progress Note    Subjective:   Daily Progress Note: 11/20/2020 5:22 PM    Patient  was admitted to surgical service from 10/30-11/2 after mechanical fall through attic floor sustaining rib fractures and left pneumothorax.  Chest tube placed in ER at that time, then reportedly removed chest tube about 4 days PTA. Lu Waters sustained a coracoid fracture, seen by Ortho with sling/conservative management.  Progressive pain and SOB since discharge, then presented to ER again 11/3 wit new effusion.  Blood cultures grew Biofire MSSA, ID consulted. Another chest tube placed, extensive hemothorax with tPa administration x 2.  Pulmonay on board. Patient is also an alcoholic in Dts. Discharged 11/12 with follow up rocephin in the infusion center daily. Hoa Whitlock presented again 11/14 with worsening SOB and fatigue, oxygen sats in the 80's. Found with enlarged multilobulated left empyema, unchanged LLL consolidation with small internal air bubbles and multiple left rib fxs.   11/15:  Pulmonary in. Pain around old chest tube site with erythema. Recommendations to consult IR and OR for VATS.  IR in for consult, recommend OR for VATs/drain placement. ID in for consult, continue 2 GM Rocephin q 24 hours.    11/16:  ID changing to high dose ancef. 11/17: Johnston Pretzel left thoracotomy per Dr Aldo Underwood. Also had thoracic epidural catheter placed for post op pain infusion.   11/18:  Still on 4 liters oxygen.  Tyler removed. Cough without expectorant. Transfused one unit PRBC.  At noon today, patient became anxious and agitated, pulled both chest tubes and epidural out. Dr Aldo Underwood in to replace chest tube. Transferred to second floor. 11/19:  Quiet and morose today. Up in chair. Obviously having a good deal of pain. CT draining bloody fluid. Ativan prn.      11/20:  Surgery removed left chest tube today. Continued tachypnea on 2 liters with oxygen sats 94-97%. Cathye Fern desats to 88% on room air.   Lung sounds remain coarse and faint on left.  Still having a lot of pain. Surgery signing off. Minimal exertion increases oxygen needs exponentially. Current Facility-Administered Medications   Medication Dose Route Frequency    magnesium oxide (MAG-OX) tablet 400 mg  400 mg Oral BID    albuterol (PROVENTIL VENTOLIN) nebulizer solution 2.5 mg  2.5 mg Nebulization Q6H RT    calcium carbonate (TUMS) chewable tablet 200 mg [elemental]  200 mg Oral TID WITH MEALS    0.9% sodium chloride infusion 250 mL  250 mL IntraVENous PRN    HYDROmorphone (PF) (DILAUDID) injection 1 mg  1 mg IntraVENous Q4H PRN    naloxone (NARCAN) injection 0.4 mg  0.4 mg IntraVENous EVERY 2 MINUTES AS NEEDED    oxyCODONE IR (ROXICODONE) tablet 10 mg  10 mg Oral Q4H PRN    LORazepam (ATIVAN) injection 1 mg  1 mg IntraVENous Q4H PRN    central line flush (saline) syringe 10 mL  10 mL InterCATHeter PRN    sodium chloride (NS) flush 5-40 mL  5-40 mL IntraVENous Q8H    sodium chloride (NS) flush 5-40 mL  5-40 mL IntraVENous PRN    promethazine (PHENERGAN) with saline injection 6.25 mg  6.25 mg IntraVENous Q15MIN PRN    central line flush (saline) syringe 10 mL  10 mL InterCATHeter PRN    ceFAZolin (ANCEF) 2 g/20 mL in sterile water IV syringe  2 g IntraVENous Q8H    sodium chloride (NS) flush 5-40 mL  5-40 mL IntraVENous Q8H    sodium chloride (NS) flush 5-40 mL  5-40 mL IntraVENous PRN    acetaminophen (TYLENOL) tablet 650 mg  650 mg Oral Q6H PRN    Or    acetaminophen (TYLENOL) suppository 650 mg  650 mg Rectal Q6H PRN    polyethylene glycol (MIRALAX) packet 17 g  17 g Oral DAILY PRN    promethazine (PHENERGAN) tablet 12.5 mg  12.5 mg Oral Q6H PRN    Or    ondansetron (ZOFRAN) injection 4 mg  4 mg IntraVENous Q6H PRN      Review of Systems  A comprehensive review of systems was negative except for that written in the HPI.     Objective:     Visit Vitals  /80   Pulse 98   Temp 98.5 °F (36.9 °C)   Resp 22   Ht 5' 10\" (1.778 m)   Wt 103.2 kg (227 lb 8.2 oz) SpO2 90%   BMI 32.65 kg/m²    O2 Flow Rate (L/min): 2 l/min O2 Device: Nasal cannula    Temp (24hrs), Av.2 °F (36.8 °C), Min:97.6 °F (36.4 °C), Max:98.5 °F (36.9 °C)    701 - 1900  In: -   Out: 400 [Urine:400]  1901 - 700  In: 0683 [I.V.:5760]  Out: 5095 [Urine:3100]    General:  Continued pain after solitary left chest tube removed. Minimal exertion causes increased pain. Tachypneic. Calm. Up in chair. Head: Normocephalic, without obvious abnormality, atraumatic  Eyes: conjunctivae/corneas clear. PERRL  Throat: Lips, mucosa, and tongue normal. Teeth and gums normal  Neck: supple, symmetrical, trachea midline, no JVD  Lungs: Right lung clear to auscultation, left with diminished, coarse breath sounds throughout. Chest tube out. O2 at 2 liters, desats with removal to 88%   Heart: regular rate and rhythm, S1, S2 normal, no murmur, click, rub or gallop  Abdomen: soft, non-tender.  Bowel sounds normal. No masses,  no organomegaly  Extremities: All extremities normal, atraumatic, no cyanosis or edema  Skin: Skin color, texture, turgor normal. No rashes or lesions  Neurologic: Grossly normal     Additional comments: Notes,orders, test results, vitals reviewed    Data Review  Recent Results (from the past 24 hour(s))   METABOLIC PANEL, BASIC    Collection Time: 20  4:24 AM   Result Value Ref Range    Sodium 138 136 - 145 mmol/L    Potassium 4.6 3.5 - 5.1 mmol/L    Chloride 102 98 - 107 mmol/L    CO2 29 21 - 32 mmol/L    Anion gap 7 7 - 16 mmol/L    Glucose 89 65 - 100 mg/dL    BUN 2 (L) 6 - 23 MG/DL    Creatinine 0.50 (L) 0.8 - 1.5 MG/DL    GFR est AA >60 >60 ml/min/1.73m2    GFR est non-AA >60 >60 ml/min/1.73m2    Calcium 7.5 (L) 8.3 - 10.4 MG/DL   CBC W/O DIFF    Collection Time: 20  6:02 AM   Result Value Ref Range    WBC 6.1 4.3 - 11.1 K/uL    RBC 2.32 (L) 4.23 - 5.6 M/uL    HGB 8.4 (L) 13.6 - 17.2 g/dL    HCT 26.2 (L) 41.1 - 50.3 %    .9 (H) 79.6 - 97.8 FL MCH 36.2 (H) 26.1 - 32.9 PG    MCHC 32.1 31.4 - 35.0 g/dL    RDW 18.9 (H) 11.9 - 14.6 %    PLATELET 335 655 - 791 K/uL    MPV 10.1 9.4 - 12.3 FL    ABSOLUTE NRBC 0.00 0.0 - 0.2 K/uL      11/14:  CXR: Unchanged left-sided empyema.     11/14:  CT CHEST:  No evidence of pulmonary embolism.  Mildly enlarged multilobulated left empyema.  Unchanged small focus of left lower lobe consolidation with small internal bubbles of air which may relate to pneumatoceles although an early lung abscess is not excluded. Multiple left rib fractures, several of which appear segmental suspect for a flail chest.     11/16:  DUPLEX LE:  No evidence of deep venous thrombosis within the lower extremities.     11/17:  CXR:  Left-sided chest tubes without definite pneumothorax. Improved aeration of the left lower hemithorax. Mild pleural thickening or fluid remains on the left in addition to patchy interstitial and airspace opacities.  Mild right basilar subsegmental atelectasis.     11/18:  CXR:  Status post removal of one of the left chest tubes and repositioning of the other. No pneumothorax. Stable left hemithorax changes and right base atelectasis.     11/19:  CXR:  Unchanged bilateral lung atelectasis and pleural thickening or effusion along the outer origin of the left lung. 11/20:  CXR:  Unchanged bilateral lung atelectasis and pleural thickening or effusion along the outer left lung.     Assessment/Plan:   ACUTE RESPIRATORY FAILURE WITH HYPOXIA: Chest tubes x 2 previously with empyema, hemothorax requiring tPa x 2 after traumatic fall with left rib fx               VATS and thoracic epidural placed 11/17 (third set of               Chest tubes)              Pulled all tubes out 11/19:  One CT replaced left lung:  Removed 11/20              Surgery signed off 11/20               Pulmonary on board               Continue oxygen 2 liters, unable to wean further 11/20, de-sats to 88% on room air              Analgesics prn      MASSA BACTEREMIA:  11/4:  ID ON BOARD               Has been on rocephin since 11/4 with initial plan for  EOT 12/4              Changed to ancef 11/16 with same EOT              Daily CBC     ALCOHOL ABUSE: PRIOR TO LAST ADMISSION DRANK ONE PINT OF VODKA DAILY:  FINISHED LIBRIUM TAPER              Continued anxiety:  Ativan prn      Care Plan discussed with: Patient and Nurse    Signed By: Joanne Bearden NP     November 20, 2020

## 2020-11-20 NOTE — PROGRESS NOTES
On call surgical team notified of increased swelling to the left flank along the incision site. Awaiting response.

## 2020-11-20 NOTE — PROGRESS NOTES
Infectious Disease Progress Note    Today's Date: 2020   Admit Date: 2020    Impression:   · MSSA bacteremia (11/3 - );  [address] NG;  TTE negative  · MSSA Left pleural empyema: s/p chest tube, 20 Cx from pleural drainage, positive:  · Originally discharged 20 on IV Rocephin X 4 weeks with EOT 20  · S/p VATS 20  · ETOH withdrawal on librium taper. Denies illicit drugs   · S/p fall through attic with contusion/trauma     Plan:   · Continue Cefazolin while here. EOT planned for 20. · Follow clinically   · ID will follow up again on Monday unless called    Anti-infectives:   · CTX  Restarted -  · Ancef -       Subjective: Interval History: remains afebrile, c/o discomfort with chest tube. No Known Allergies     Review of Systems:  A comprehensive review of systems was negative except for that written in the History of Present Illness.     Objective:     Visit Vitals  /70   Pulse 97   Temp 97.6 °F (36.4 °C)   Resp 20   Ht 5' 10\" (1.778 m)   Wt 103.2 kg (227 lb 8.2 oz)   SpO2 91%   BMI 32.65 kg/m²     Temp (24hrs), Av.2 °F (36.8 °C), Min:97.6 °F (36.4 °C), Max:98.5 °F (36.9 °C)       General:  NAD   Head:  NCAT   Eyes:  No icterus or drainage   Throat:    Neck: supple   Lungs:   Chest tube in place, decrease breath sounds, normal woB   Heart:  RRR   Abdomen:   S/NT/ND   Extremities: Extremities normal, atraumatic, no cyanosis or edema   Pulses:    Skin: No rash on visible skin     Lines/Devices: RUE PICC intact         Data Review:     CBC:  Recent Labs     20  0602 20  0351 20  0428   WBC 6.1 7.7 9.1   HGB 8.4* 8.2* 7.6*   HCT 26.2* 25.0* 23.6*    312 328       BMP:  Recent Labs     20  0424 20  0351 20  0428   CREA 0.50* 0.66* 0.64*   BUN 2* 4* 7    140 139   K 4.6 3.9 4.7    102 102   CO2 29 33* 30   AGAP 7 5* 7   GLU 89 105* 167*       LFTS:  No results for input(s): TBILI, ALT, AP, TP, ALB in the last 72 hours. No lab exists for component: SGOT    Microbiology:     All Micro Results     Procedure Component Value Units Date/Time    CULTURE, BLOOD [[id number]] Collected:  11/15/20 0033    Order Status:  Completed Specimen:  Blood Updated:  11/20/20 0829     Special Requests: --        RIGHT  ARM       Culture result: NO GROWTH 5 DAYS       CULTURE, BLOOD [[id number]] Collected:  11/15/20 0035    Order Status:  Completed Specimen:  Blood Updated:  11/20/20 0829     Special Requests: --        LEFT  ARM       Culture result: NO GROWTH 5 DAYS             Imaging:   IMPRESSION:   1. No evidence of pulmonary embolism. 2. Mildly enlarged multilobulated left empyema. 3. Unchanged small focus of left lower lobe consolidation with small internal  bubbles of air which may relate to pneumatoceles although an early lung abscess  is not excluded.   4. Multiple left rib fractures, several of which appear segmental suspect for a  flail chest.       Signed By: Julián Nuñez MD     November 20, 2020

## 2020-11-20 NOTE — PROGRESS NOTES
END OF SHIFT NOTE:    INTAKE/OUTPUT  11/19 0701 - 11/20 0700  In: 1562 [I.V.:5760]  Out: 2880 [Urine:2600]  Voiding: YES  Catheter: NO  Drain:              Flatus: Patient does have flatus present. Stool:  0 occurrences. Characteristics:       Emesis: 0 occurrences. Characteristics:        VITAL SIGNS  Patient Vitals for the past 12 hrs:   Temp Pulse Resp BP SpO2   11/20/20 0315 98.4 °F (36.9 °C) 90 20 100/66 91 %   11/19/20 2340 98.1 °F (36.7 °C) (!) 101 21 117/72 100 %   11/19/20 1954 98.5 °F (36.9 °C) (!) 106 21 127/76 97 %   11/19/20 1932     95 %       Pain Assessment  Pain Intensity 1: 0 (11/20/20 0200)  Pain Location 1: Chest  Pain Intervention(s) 1: Medication (see MAR)  Patient Stated Pain Goal: 0    Ambulating  Yes    Shift report given to oncoming nurse at the bedside.     Lily May RN

## 2020-11-20 NOTE — PROGRESS NOTES
Comprehensive Nutrition Assessment    Type and Reason for Visit: Initial, RD nutrition re-screen/LOS    Nutrition Recommendations/Plan:    Ensure Enlive with evening meal daily. Malnutrition Assessment:  Malnutrition Status: No malnutrition    Nutrition Assessment:   Nutrition History: Compromised intake with last admission      Nutrition Background: PMH remarkable for mechanical fall through attic floor sustaining rib fractures and left pneumothorax., ETOH abuse. Admitted w acute respiratory failure w hypoxia s/p VATS and thoracic epidural placed 11/17 (3rd set of chest tubes), pulled all tubes 11/19, currently w 1 chest tube, MSSA bacteremia. Daily Update:  Up to chair at RD visit, slow to recall po intake. Needed assistance with recall of noon meal then states off all items that will be offered on evening meal.  His intake is variable associates with his appetite and meals being closer together than his home intake. Nutrition Related Findings: no physical findings of malnutrition      Current Nutrition Therapies:  DIET REGULAR    Current Intake: Average Meal Intake: %(recorded times 2 meals) Average Supplement Intake: None ordered      Anthropometric Measures:  Height: 5' 10\" (177.8 cm)  Current Body Wt: 103.2 kg (227 lb 8.2 oz)(11/15), Weight source: Bed scale  BMI: 32.6, Obese class 1 (BMI 30.0-34. 9)  Admission Body Weight: 199 lb 15.3 oz(no source)  Ideal Body Wt: 166 lbs (75 kg), 137.1 %  Usual Body Wt: (highly variable 185# Oct to current 227#)        Estimated Daily Nutrient Needs:  Energy (kcal): 4735-4400 (Kcal/kg(25-30 kcal/kg), Weight Used: Ideal(secondary visually overwt and CBD increase from prior wt))  Protein (g): 75-90(1-1.2 g/kg) Weight Used: (Ideal)  Fluid (ml/day):   (1 ml/kcal)    Nutrition Diagnosis:   · Inadequate oral intake related to (compromised appetite) as evidenced by (po meeting ~75% needs)    Nutrition Interventions:   Food and/or Nutrient Delivery: Continue current diet, Start oral nutrition supplement     Coordination of Nutrition Care: Continue to monitor while inpatient  Plan of Care discussed with Awa Webb RN. Goals:    Meet >90% estimated needs    Nutrition Monitoring and Evaluation:      Food/Nutrient Intake Outcomes: Food and nutrient intake, Supplement intake       Discharge Planning:     Too soon to determine    Benjamin Thrasher RD, LDN on 11/20/2020 at 2:44 PM  Contact: [phone]

## 2020-11-21 ENCOUNTER — HOSPITAL ENCOUNTER (OUTPATIENT)
Dept: INFUSION THERAPY | Age: 50
End: 2020-11-21

## 2020-11-21 ENCOUNTER — APPOINTMENT (OUTPATIENT)
Dept: GENERAL RADIOLOGY | Age: 50
DRG: 163 | End: 2020-11-21
Attending: NURSE PRACTITIONER

## 2020-11-21 LAB
ANION GAP SERPL CALC-SCNC: 3 MMOL/L (ref 7–16)
BUN SERPL-MCNC: 3 MG/DL (ref 6–23)
CALCIUM SERPL-MCNC: 7.9 MG/DL (ref 8.3–10.4)
CHLORIDE SERPL-SCNC: 98 MMOL/L (ref 98–107)
CO2 SERPL-SCNC: 35 MMOL/L (ref 21–32)
CREAT SERPL-MCNC: 0.51 MG/DL (ref 0.8–1.5)
ERYTHROCYTE [DISTWIDTH] IN BLOOD BY AUTOMATED COUNT: 17.8 % (ref 11.9–14.6)
GLUCOSE SERPL-MCNC: 122 MG/DL (ref 65–100)
HCT VFR BLD AUTO: 24.7 % (ref 41.1–50.3)
HGB BLD-MCNC: 7.9 G/DL (ref 13.6–17.2)
MCH RBC QN AUTO: 35.6 PG (ref 26.1–32.9)
MCHC RBC AUTO-ENTMCNC: 32 G/DL (ref 31.4–35)
MCV RBC AUTO: 111.3 FL (ref 79.6–97.8)
NRBC # BLD: 0 K/UL (ref 0–0.2)
PLATELET # BLD AUTO: 261 K/UL (ref 150–450)
PMV BLD AUTO: 9.5 FL (ref 9.4–12.3)
POTASSIUM SERPL-SCNC: 3.8 MMOL/L (ref 3.5–5.1)
RBC # BLD AUTO: 2.22 M/UL (ref 4.23–5.6)
SODIUM SERPL-SCNC: 136 MMOL/L (ref 136–145)
WBC # BLD AUTO: 5.8 K/UL (ref 4.3–11.1)

## 2020-11-21 PROCEDURE — 74011000250 HC RX REV CODE- 250: Performed by: INTERNAL MEDICINE

## 2020-11-21 PROCEDURE — 77010033678 HC OXYGEN DAILY

## 2020-11-21 PROCEDURE — 80048 BASIC METABOLIC PNL TOTAL CA: CPT

## 2020-11-21 PROCEDURE — 74011250636 HC RX REV CODE- 250/636: Performed by: NURSE PRACTITIONER

## 2020-11-21 PROCEDURE — 74011250637 HC RX REV CODE- 250/637: Performed by: NURSE PRACTITIONER

## 2020-11-21 PROCEDURE — 74011250636 HC RX REV CODE- 250/636: Performed by: SURGERY

## 2020-11-21 PROCEDURE — 74011250637 HC RX REV CODE- 250/637: Performed by: FAMILY MEDICINE

## 2020-11-21 PROCEDURE — 74011000250 HC RX REV CODE- 250: Performed by: SURGERY

## 2020-11-21 PROCEDURE — 94640 AIRWAY INHALATION TREATMENT: CPT

## 2020-11-21 PROCEDURE — 36415 COLL VENOUS BLD VENIPUNCTURE: CPT

## 2020-11-21 PROCEDURE — 85027 COMPLETE CBC AUTOMATED: CPT

## 2020-11-21 PROCEDURE — 71045 X-RAY EXAM CHEST 1 VIEW: CPT

## 2020-11-21 PROCEDURE — 94760 N-INVAS EAR/PLS OXIMETRY 1: CPT

## 2020-11-21 PROCEDURE — 65270000029 HC RM PRIVATE

## 2020-11-21 RX ORDER — HYDROMORPHONE HYDROCHLORIDE 1 MG/ML
1 INJECTION, SOLUTION INTRAMUSCULAR; INTRAVENOUS; SUBCUTANEOUS
Status: DISCONTINUED | OUTPATIENT
Start: 2020-11-21 | End: 2020-11-27

## 2020-11-21 RX ORDER — CALCIUM CARBONATE 200(500)MG
200 TABLET,CHEWABLE ORAL ONCE
Status: COMPLETED | OUTPATIENT
Start: 2020-11-21 | End: 2020-11-21

## 2020-11-21 RX ORDER — TRAZODONE HYDROCHLORIDE 50 MG/1
50 TABLET ORAL
Status: DISCONTINUED | OUTPATIENT
Start: 2020-11-21 | End: 2020-12-03 | Stop reason: HOSPADM

## 2020-11-21 RX ADMIN — Medication 10 ML: at 06:00

## 2020-11-21 RX ADMIN — ALBUTEROL SULFATE 2.5 MG: 2.5 SOLUTION RESPIRATORY (INHALATION) at 08:11

## 2020-11-21 RX ADMIN — HYDROMORPHONE HYDROCHLORIDE 1 MG: 1 INJECTION, SOLUTION INTRAMUSCULAR; INTRAVENOUS; SUBCUTANEOUS at 02:33

## 2020-11-21 RX ADMIN — OXYCODONE HYDROCHLORIDE 10 MG: 5 TABLET ORAL at 20:07

## 2020-11-21 RX ADMIN — Medication 10 ML: at 14:00

## 2020-11-21 RX ADMIN — ALBUTEROL SULFATE 2.5 MG: 2.5 SOLUTION RESPIRATORY (INHALATION) at 20:42

## 2020-11-21 RX ADMIN — OXYCODONE HYDROCHLORIDE 10 MG: 5 TABLET ORAL at 05:31

## 2020-11-21 RX ADMIN — MAGNESIUM GLUCONATE 500 MG ORAL TABLET 400 MG: 500 TABLET ORAL at 17:38

## 2020-11-21 RX ADMIN — CALCIUM CARBONATE (ANTACID) CHEW TAB 500 MG 200 MG: 500 CHEW TAB at 03:48

## 2020-11-21 RX ADMIN — MAGNESIUM GLUCONATE 500 MG ORAL TABLET 400 MG: 500 TABLET ORAL at 09:42

## 2020-11-21 RX ADMIN — CEFAZOLIN SODIUM 2 G: 100 INJECTION, POWDER, LYOPHILIZED, FOR SOLUTION INTRAVENOUS at 01:21

## 2020-11-21 RX ADMIN — ALBUTEROL SULFATE 2.5 MG: 2.5 SOLUTION RESPIRATORY (INHALATION) at 14:36

## 2020-11-21 RX ADMIN — CEFAZOLIN SODIUM 2 G: 100 INJECTION, POWDER, LYOPHILIZED, FOR SOLUTION INTRAVENOUS at 17:45

## 2020-11-21 RX ADMIN — HYDROMORPHONE HYDROCHLORIDE 1 MG: 1 INJECTION, SOLUTION INTRAMUSCULAR; INTRAVENOUS; SUBCUTANEOUS at 17:43

## 2020-11-21 RX ADMIN — LORAZEPAM 1 MG: 2 INJECTION INTRAMUSCULAR; INTRAVENOUS at 11:46

## 2020-11-21 RX ADMIN — CALCIUM CARBONATE (ANTACID) CHEW TAB 500 MG 200 MG: 500 CHEW TAB at 17:38

## 2020-11-21 RX ADMIN — OXYCODONE HYDROCHLORIDE 10 MG: 5 TABLET ORAL at 09:42

## 2020-11-21 RX ADMIN — CALCIUM CARBONATE (ANTACID) CHEW TAB 500 MG 200 MG: 500 CHEW TAB at 11:46

## 2020-11-21 RX ADMIN — TRAZODONE HYDROCHLORIDE 50 MG: 50 TABLET ORAL at 21:25

## 2020-11-21 RX ADMIN — CEFAZOLIN SODIUM 2 G: 100 INJECTION, POWDER, LYOPHILIZED, FOR SOLUTION INTRAVENOUS at 11:46

## 2020-11-21 RX ADMIN — Medication 10 ML: at 21:25

## 2020-11-21 RX ADMIN — HYDROMORPHONE HYDROCHLORIDE 1 MG: 1 INJECTION, SOLUTION INTRAMUSCULAR; INTRAVENOUS; SUBCUTANEOUS at 23:30

## 2020-11-21 NOTE — PROGRESS NOTES
Found on RA. Patient had removed his NC at some point and O2 sat was 74%. Got it back up to 93% with 8L/nebulizer. Currently on 3L and sat is in the mid 90s now.

## 2020-11-21 NOTE — PROGRESS NOTES
END OF SHIFT NOTE:    INTAKE/OUTPUT  11/20 0701 - 11/21 0700  In: -   Out: 1050 [Urine:1050]  Voiding: YES  Catheter: NO  Drain:              Flatus: Patient does have flatus present. Stool:  0 occurrences. Characteristics:       Emesis: 0 occurrences. Characteristics:        VITAL SIGNS  Patient Vitals for the past 12 hrs:   Temp Pulse Resp BP SpO2   11/21/20 0348 99.5 °F (37.5 °C) 99 18 110/74 97 %   11/20/20 2350 98.2 °F (36.8 °C) 97 20 119/78    11/20/20 1946     94 %   11/20/20 1934 98.5 °F (36.9 °C) 99 21 123/83 94 %       Pain Assessment  Pain Intensity 1: 0 (11/21/20 0110)  Pain Location 1: Chest  Pain Intervention(s) 1: Medication (see MAR)  Patient Stated Pain Goal: 0    Ambulating  Yes    Hourly rounding on patient    Shift report given to oncoming nurse at the bedside.     Leeroy Teixeira

## 2020-11-21 NOTE — PROGRESS NOTES
Problem: Falls - Risk of  Goal: *Absence of Falls  Description: Document Carla Brown Fall Risk and appropriate interventions in the flowsheet. Outcome: Progressing Towards Goal  Note: Fall Risk Interventions:  Mobility Interventions: Patient to call before getting OOB, Communicate number of staff needed for ambulation/transfer         Medication Interventions: Patient to call before getting OOB, Teach patient to arise slowly    Elimination Interventions: Call light in reach    History of Falls Interventions: Door open when patient unattended         Problem: Patient Education: Go to Patient Education Activity  Goal: Patient/Family Education  Outcome: Progressing Towards Goal     Problem: Airway Clearance - Ineffective  Goal: *Patent airway  Outcome: Progressing Towards Goal  Goal: *Absence of airway secretions  Outcome: Progressing Towards Goal  Goal: *Able to cough effectively  Outcome: Progressing Towards Goal  Goal: *PALLIATIVE CARE:  Alleviation of secretions, cough and/or nasal congestion  Outcome: Progressing Towards Goal     Problem: Patient Education: Go to Patient Education Activity  Goal: Patient/Family Education  Outcome: Progressing Towards Goal     Problem: Pressure Injury - Risk of  Goal: *Prevention of pressure injury  Description: Document Carroll Scale and appropriate interventions in the flowsheet.   Outcome: Progressing Towards Goal  Note: Pressure Injury Interventions:  Sensory Interventions: Assess changes in LOC    Moisture Interventions: Absorbent underpads    Activity Interventions: Pressure redistribution bed/mattress(bed type)    Mobility Interventions: Pressure redistribution bed/mattress (bed type)    Nutrition Interventions: Offer support with meals,snacks and hydration, Document food/fluid/supplement intake                     Problem: Patient Education: Go to Patient Education Activity  Goal: Patient/Family Education  Outcome: Progressing Towards Goal

## 2020-11-21 NOTE — PROGRESS NOTES
Hospitalist Progress Note    Subjective:   Daily Progress Note: 11/21/2020 5:25 PM    See prior notes for entire hospital stay. 11/20:  Surgery removed left chest tube today. Continued tachypnea on 2 liters with oxygen sats 94-97%. Precious Diop desats to 88% on room air. Lung sounds remain coarse and faint on left. Still having a lot of pain. Surgery signing off. Minimal exertion increases oxygen needs exponentially. 11/21: Worsening pain and tachypnea throughout the day. Oxygen sat 94-97% on 3 liters, desats to 88% 1 minute off oxygen, down to 74% after 3 minutes. Area of spongy edema left mid back around to mid axillary line with worsening size and pain. Now approx 20 x17 cm including large incision line with dry, intact staples. No crepitus. No erythema, heat, drainage or signs of infection. Dr Yemi Andradery in to exam also. Will get CT chest in am and ask Pulmonary and surgery to see again. RT in frequently. Hgb down to 7.9 today.      Current Facility-Administered Medications   Medication Dose Route Frequency    magnesium oxide (MAG-OX) tablet 400 mg  400 mg Oral BID    albuterol (PROVENTIL VENTOLIN) nebulizer solution 2.5 mg  2.5 mg Nebulization Q6H RT    calcium carbonate (TUMS) chewable tablet 200 mg [elemental]  200 mg Oral TID WITH MEALS    0.9% sodium chloride infusion 250 mL  250 mL IntraVENous PRN    HYDROmorphone (PF) (DILAUDID) injection 1 mg  1 mg IntraVENous Q4H PRN    naloxone (NARCAN) injection 0.4 mg  0.4 mg IntraVENous EVERY 2 MINUTES AS NEEDED    oxyCODONE IR (ROXICODONE) tablet 10 mg  10 mg Oral Q4H PRN    LORazepam (ATIVAN) injection 1 mg  1 mg IntraVENous Q4H PRN    central line flush (saline) syringe 10 mL  10 mL InterCATHeter PRN    sodium chloride (NS) flush 5-40 mL  5-40 mL IntraVENous Q8H    sodium chloride (NS) flush 5-40 mL  5-40 mL IntraVENous PRN    promethazine (PHENERGAN) with saline injection 6.25 mg  6.25 mg IntraVENous Q15MIN PRN    central line flush (saline) syringe 10 mL  10 mL InterCATHeter PRN    ceFAZolin (ANCEF) 2 g/20 mL in sterile water IV syringe  2 g IntraVENous Q8H    sodium chloride (NS) flush 5-40 mL  5-40 mL IntraVENous Q8H    sodium chloride (NS) flush 5-40 mL  5-40 mL IntraVENous PRN    acetaminophen (TYLENOL) tablet 650 mg  650 mg Oral Q6H PRN    Or    acetaminophen (TYLENOL) suppository 650 mg  650 mg Rectal Q6H PRN    polyethylene glycol (MIRALAX) packet 17 g  17 g Oral DAILY PRN    promethazine (PHENERGAN) tablet 12.5 mg  12.5 mg Oral Q6H PRN    Or    ondansetron (ZOFRAN) injection 4 mg  4 mg IntraVENous Q6H PRN      Review of Systems  A comprehensive review of systems was negative except for that written in the HPI. Objective:     Visit Vitals  /72   Pulse 92   Temp 98.8 °F (37.1 °C)   Resp 18   Ht 5' 10\" (1.778 m)   Wt 103.2 kg (227 lb 8.2 oz)   SpO2 98%   BMI 32.65 kg/m²    O2 Flow Rate (L/min): 3 l/min O2 Device: Nasal cannula, Humidifier    Temp (24hrs), Av.6 °F (37 °C), Min:98 °F (36.7 °C), Max:99.5 °F (37.5 °C)    701 - 1900  In: -   Out: 200 [Urine:200]  1901 -  07  In: 4960 [I.V.:1020]  Out: 2740 [Urine:2600]    General:  Progressively worsening pain, tachypnea and hypoxia today. No drainage from dressings,. Concern for air/fluid leak. Restless.       Head: Normocephalic, without obvious abnormality, atraumatic  Eyes: conjunctivae/corneas clear. PERRL  Throat: Lips, mucosa, and tongue normal. Teeth and gums normal  Neck: supple, symmetrical, trachea midline, no JVD  Lungs: Right lung clear to auscultation, left with diminished, coarse breath sounds throughout, no moving much air. Chest tube out despite CXR indicating the contrary. Continued O2 at 3 liters, desats with removal to 88%, then low 70's.    Heart: regular rate and rhythm, S1, S2 normal, no murmur, click, rub or gallop. See above for chest wall. Abdomen: Soft, non-tender.  Bowel sounds normal. No masses,  no organomegaly  Extremities: All extremities normal, atraumatic, no cyanosis or edema  Skin: Skin color, texture, turgor normal. No rashes or lesions  Neurologic: Grossly normal     Additional comments: Notes,orders, test results, vitals reviewed    Data Review  Recent Results (from the past 24 hour(s))   METABOLIC PANEL, BASIC    Collection Time: 11/21/20  4:30 AM   Result Value Ref Range    Sodium 136 136 - 145 mmol/L    Potassium 3.8 3.5 - 5.1 mmol/L    Chloride 98 98 - 107 mmol/L    CO2 35 (H) 21 - 32 mmol/L    Anion gap 3 (L) 7 - 16 mmol/L    Glucose 122 (H) 65 - 100 mg/dL    BUN 3 (L) 6 - 23 MG/DL    Creatinine 0.51 (L) 0.8 - 1.5 MG/DL    GFR est AA >60 >60 ml/min/1.73m2    GFR est non-AA >60 >60 ml/min/1.73m2    Calcium 7.9 (L) 8.3 - 10.4 MG/DL   CBC W/O DIFF    Collection Time: 11/21/20  4:30 AM   Result Value Ref Range    WBC 5.8 4.3 - 11.1 K/uL    RBC 2.22 (L) 4.23 - 5.6 M/uL    HGB 7.9 (L) 13.6 - 17.2 g/dL    HCT 24.7 (L) 41.1 - 50.3 %    .3 (H) 79.6 - 97.8 FL    MCH 35.6 (H) 26.1 - 32.9 PG    MCHC 32.0 31.4 - 35.0 g/dL    RDW 17.8 (H) 11.9 - 14.6 %    PLATELET 650 004 - 689 K/uL    MPV 9.5 9.4 - 12.3 FL    ABSOLUTE NRBC 0.00 0.0 - 0.2 K/uL     11/14:  CXR: Unchanged left-sided empyema.     11/14:  CT CHEST:  No evidence of pulmonary embolism.  Mildly enlarged multilobulated left empyema.  Unchanged small focus of left lower lobe consolidation with small internal bubbles of air which may relate to pneumatoceles although an early lung abscess is not excluded. Multiple left rib fractures, several of which appear segmental suspect for a flail chest.     11/16:  DUPLEX LE:  No evidence of deep venous thrombosis within the lower extremities.     11/17:  CXR:  Left-sided chest tubes without definite pneumothorax. Improved aeration of the left lower hemithorax.  Mild pleural thickening or fluid remains on the left in addition to patchy interstitial and airspace opacities.  Mild right basilar subsegmental atelectasis.     11/18:  CXR:  Status post removal of one of the left chest tubes and repositioning of the other. No pneumothorax. Stable left hemithorax changes and right base atelectasis.     11/19:  CXR:  Unchanged bilateral lung atelectasis and pleural thickening or effusion along the outer origin of the left lung.     11/20:  CXR:  Unchanged bilateral lung atelectasis and pleural thickening or effusion along the outer left lung.     Assessment/Plan:   ACUTE RESPIRATORY FAILURE WITH HYPOXIA: Chest tubes x 2 previously with empyema, hemothorax requiring tPa x 2 after traumatic fall with left rib fx               VATS and thoracic epidural placed 11/17 (third set of               Chest tubes)              Pulled all tubes out 11/19:  One CT replaced left lung:  Removed 11/20              Surgery signed off 11/20               Pulmonary on board               Continue oxygen 2 liters, unable to wean further 11/20, de-sats to 88% on room air              Analgesics prn      MASSA BACTEREMIA:  11/4:  ID ON BOARD               Has been on rocephin since 11/4 with initial plan for  EOT  12/4              Changed to ancef 11/16 with same EOT              Daily CBC     ALCOHOL ABUSE: PRIOR TO LAST ADMISSION DRANK ONE PINT OF VODKA DAILY:  FINISHED LIBRIUM TAPER              Continued anxiety:  Ativan prn     11/21:  Marked worsening of spongy edematous area left back without crepitus. CT chest again tomorrow   Requesting Surgery and Pulmonary see tomorrow.     Monitor closely     Care Plan discussed with: Patient, Surgery, Pulmonary, Dr Canseco , and Nurse    Signed By: Mildred Bess NP     November 21, 2020

## 2020-11-21 NOTE — PROGRESS NOTES
END OF SHIFT NOTE:    INTAKE/OUTPUT  11/19 0701 - 11/20 0700  In: 4953 [I.V.:5760]  Out: 2880 [Urine:2600]  Voiding: YES  Catheter: NO  Drain:              Flatus: Patient does have flatus present. Stool:  0 occurrences. Characteristics:       Emesis: 0 occurrences. Characteristics:        VITAL SIGNS  Patient Vitals for the past 12 hrs:   Temp Pulse Resp BP SpO2   11/20/20 1600 98.5 °F (36.9 °C) 98 22 121/80 90 %   11/20/20 1150 97.6 °F (36.4 °C) 97 20 121/70 91 %   11/20/20 0750 98.3 °F (36.8 °C) 82 19 108/67 99 %       Pain Assessment  Pain Intensity 1: 0 (11/20/20 0200)  Pain Location 1: Chest  Pain Intervention(s) 1: Medication (see MAR)  Patient Stated Pain Goal: 0    Ambulating  Yes, in room and to bathroom    Shift report given to oncoming nurse at the bedside.     Anil Price RN

## 2020-11-22 ENCOUNTER — APPOINTMENT (OUTPATIENT)
Dept: CT IMAGING | Age: 50
DRG: 163 | End: 2020-11-22
Attending: NURSE PRACTITIONER

## 2020-11-22 LAB
ANION GAP SERPL CALC-SCNC: 2 MMOL/L (ref 7–16)
BUN SERPL-MCNC: 5 MG/DL (ref 6–23)
CALCIUM SERPL-MCNC: 8.4 MG/DL (ref 8.3–10.4)
CHLORIDE SERPL-SCNC: 100 MMOL/L (ref 98–107)
CO2 SERPL-SCNC: 34 MMOL/L (ref 21–32)
CREAT SERPL-MCNC: 0.59 MG/DL (ref 0.8–1.5)
ERYTHROCYTE [DISTWIDTH] IN BLOOD BY AUTOMATED COUNT: 17.4 % (ref 11.9–14.6)
GLUCOSE SERPL-MCNC: 101 MG/DL (ref 65–100)
HCT VFR BLD AUTO: 25.7 % (ref 41.1–50.3)
HGB BLD-MCNC: 8.4 G/DL (ref 13.6–17.2)
MCH RBC QN AUTO: 36.1 PG (ref 26.1–32.9)
MCHC RBC AUTO-ENTMCNC: 32.7 G/DL (ref 31.4–35)
MCV RBC AUTO: 110.3 FL (ref 79.6–97.8)
NRBC # BLD: 0 K/UL (ref 0–0.2)
PLATELET # BLD AUTO: 285 K/UL (ref 150–450)
PMV BLD AUTO: 9.5 FL (ref 9.4–12.3)
POTASSIUM SERPL-SCNC: 4 MMOL/L (ref 3.5–5.1)
RBC # BLD AUTO: 2.33 M/UL (ref 4.23–5.6)
SODIUM SERPL-SCNC: 136 MMOL/L (ref 138–145)
WBC # BLD AUTO: 5.9 K/UL (ref 4.3–11.1)

## 2020-11-22 PROCEDURE — 74011250636 HC RX REV CODE- 250/636: Performed by: INTERNAL MEDICINE

## 2020-11-22 PROCEDURE — 74011250636 HC RX REV CODE- 250/636: Performed by: NURSE PRACTITIONER

## 2020-11-22 PROCEDURE — 94640 AIRWAY INHALATION TREATMENT: CPT

## 2020-11-22 PROCEDURE — 2709999900 HC NON-CHARGEABLE SUPPLY

## 2020-11-22 PROCEDURE — 74011250637 HC RX REV CODE- 250/637: Performed by: NURSE PRACTITIONER

## 2020-11-22 PROCEDURE — 74011000250 HC RX REV CODE- 250: Performed by: SURGERY

## 2020-11-22 PROCEDURE — 36415 COLL VENOUS BLD VENIPUNCTURE: CPT

## 2020-11-22 PROCEDURE — 65270000029 HC RM PRIVATE

## 2020-11-22 PROCEDURE — 80048 BASIC METABOLIC PNL TOTAL CA: CPT

## 2020-11-22 PROCEDURE — 36593 DECLOT VASCULAR DEVICE: CPT

## 2020-11-22 PROCEDURE — 74011000258 HC RX REV CODE- 258: Performed by: INTERNAL MEDICINE

## 2020-11-22 PROCEDURE — 71250 CT THORAX DX C-: CPT

## 2020-11-22 PROCEDURE — 74011000250 HC RX REV CODE- 250: Performed by: INTERNAL MEDICINE

## 2020-11-22 PROCEDURE — 74011250636 HC RX REV CODE- 250/636: Performed by: SURGERY

## 2020-11-22 PROCEDURE — 74011250637 HC RX REV CODE- 250/637: Performed by: STUDENT IN AN ORGANIZED HEALTH CARE EDUCATION/TRAINING PROGRAM

## 2020-11-22 PROCEDURE — 74011000250 HC RX REV CODE- 250: Performed by: NURSE PRACTITIONER

## 2020-11-22 PROCEDURE — 74011250637 HC RX REV CODE- 250/637: Performed by: FAMILY MEDICINE

## 2020-11-22 PROCEDURE — 85027 COMPLETE CBC AUTOMATED: CPT

## 2020-11-22 PROCEDURE — 94760 N-INVAS EAR/PLS OXIMETRY 1: CPT

## 2020-11-22 PROCEDURE — 77010033678 HC OXYGEN DAILY

## 2020-11-22 PROCEDURE — 99232 SBSQ HOSP IP/OBS MODERATE 35: CPT | Performed by: INTERNAL MEDICINE

## 2020-11-22 RX ORDER — CYCLOBENZAPRINE HCL 10 MG
10 TABLET ORAL 3 TIMES DAILY
Status: DISCONTINUED | OUTPATIENT
Start: 2020-11-22 | End: 2020-12-03 | Stop reason: HOSPADM

## 2020-11-22 RX ADMIN — MAGNESIUM GLUCONATE 500 MG ORAL TABLET 400 MG: 500 TABLET ORAL at 18:30

## 2020-11-22 RX ADMIN — TRAZODONE HYDROCHLORIDE 50 MG: 50 TABLET ORAL at 21:55

## 2020-11-22 RX ADMIN — OXYCODONE HYDROCHLORIDE 10 MG: 5 TABLET ORAL at 05:07

## 2020-11-22 RX ADMIN — HYDROMORPHONE HYDROCHLORIDE 1 MG: 1 INJECTION, SOLUTION INTRAMUSCULAR; INTRAVENOUS; SUBCUTANEOUS at 02:58

## 2020-11-22 RX ADMIN — CALCIUM CARBONATE (ANTACID) CHEW TAB 500 MG 200 MG: 500 CHEW TAB at 18:33

## 2020-11-22 RX ADMIN — CALCIUM CARBONATE (ANTACID) CHEW TAB 500 MG 200 MG: 500 CHEW TAB at 12:33

## 2020-11-22 RX ADMIN — CEFAZOLIN SODIUM 2 G: 100 INJECTION, POWDER, LYOPHILIZED, FOR SOLUTION INTRAVENOUS at 18:34

## 2020-11-22 RX ADMIN — CALCIUM CARBONATE (ANTACID) CHEW TAB 500 MG 200 MG: 500 CHEW TAB at 09:35

## 2020-11-22 RX ADMIN — Medication 10 ML: at 21:57

## 2020-11-22 RX ADMIN — CEFTRIAXONE 2 G: 2 INJECTION, POWDER, FOR SOLUTION INTRAMUSCULAR; INTRAVENOUS at 02:16

## 2020-11-22 RX ADMIN — HYDROMORPHONE HYDROCHLORIDE 1 MG: 1 INJECTION, SOLUTION INTRAMUSCULAR; INTRAVENOUS; SUBCUTANEOUS at 06:13

## 2020-11-22 RX ADMIN — CEFAZOLIN SODIUM 2 G: 100 INJECTION, POWDER, LYOPHILIZED, FOR SOLUTION INTRAVENOUS at 02:03

## 2020-11-22 RX ADMIN — Medication 10 ML: at 18:34

## 2020-11-22 RX ADMIN — Medication 10 ML: at 07:42

## 2020-11-22 RX ADMIN — CYCLOBENZAPRINE 10 MG: 10 TABLET, FILM COATED ORAL at 18:31

## 2020-11-22 RX ADMIN — ALBUTEROL SULFATE 2.5 MG: 2.5 SOLUTION RESPIRATORY (INHALATION) at 07:30

## 2020-11-22 RX ADMIN — HYDROMORPHONE HYDROCHLORIDE 1 MG: 1 INJECTION, SOLUTION INTRAMUSCULAR; INTRAVENOUS; SUBCUTANEOUS at 14:39

## 2020-11-22 RX ADMIN — Medication 10 ML: at 21:58

## 2020-11-22 RX ADMIN — MAGNESIUM GLUCONATE 500 MG ORAL TABLET 400 MG: 500 TABLET ORAL at 09:35

## 2020-11-22 RX ADMIN — OXYCODONE HYDROCHLORIDE 10 MG: 5 TABLET ORAL at 11:14

## 2020-11-22 RX ADMIN — LORAZEPAM 1 MG: 2 INJECTION INTRAMUSCULAR; INTRAVENOUS at 12:11

## 2020-11-22 RX ADMIN — ALTEPLASE 1 MG: 2.2 INJECTION, POWDER, LYOPHILIZED, FOR SOLUTION INTRAVENOUS at 06:19

## 2020-11-22 RX ADMIN — ALBUTEROL SULFATE 2.5 MG: 2.5 SOLUTION RESPIRATORY (INHALATION) at 20:02

## 2020-11-22 RX ADMIN — CYCLOBENZAPRINE 10 MG: 10 TABLET, FILM COATED ORAL at 21:55

## 2020-11-22 RX ADMIN — CEFAZOLIN SODIUM 2 G: 100 INJECTION, POWDER, LYOPHILIZED, FOR SOLUTION INTRAVENOUS at 09:52

## 2020-11-22 RX ADMIN — HYDROMORPHONE HYDROCHLORIDE 1 MG: 1 INJECTION, SOLUTION INTRAMUSCULAR; INTRAVENOUS; SUBCUTANEOUS at 20:17

## 2020-11-22 RX ADMIN — ALBUTEROL SULFATE 2.5 MG: 2.5 SOLUTION RESPIRATORY (INHALATION) at 15:22

## 2020-11-22 NOTE — PROGRESS NOTES
Hospitalist Progress Note    Subjective:   Daily Progress Note: 11/22/2020 3:57 PM    See prior notes for entire hospital stay. Most recently, VATs 11/17 with chest tube x 2 to suction, thoracic epidural placed.  ROCKY placed. Patient pulled all drains out 11/18.  1 Chest tube replaced by Dr Shimon Maya at bedside. Draining bloody fluid. 11/20:  Surgery removed left chest tube today.  Continued tachypnea on 2 liters with oxygen sats 94-97%. To Shannon desats to 88% on room air.  Lung sounds remain coarse and faint on left.  Still having a lot of pain.  Surgery signing off. Eulah Angelita exertion increases oxygen needs exponentially.  transfused one unit PRBC.   11/21: Worsening pain and tachypnea throughout the day. Oxygen sat 94-97% on 3 liters, desats to 88% 1 minute off oxygen, down to 74% after 3 minutes. Area of spongy edema left mid back around to mid axillary line with worsening size and pain. Now approx 20 x17 cm including large incision line with dry, intact staples. No crepitus. No erythema, heat, drainage or signs of infection. Dr Sanderson Corners in to exam also. Will get CT chest in am and ask Pulmonary and surgery to see again. RT in frequently. Hgb down to 7.9 today.      11/22: Mildly improved this am.  Continues to desaturate this am.  CT below. Flexeril added prn. Surgery in, waiting for thoracic surgeon to return tomorrow. Pulmonary in. CT Chest as below with increased amount of loculated fluid now extending into chest wall. Remains hypotensive.       Current Facility-Administered Medications   Medication Dose Route Frequency    cyclobenzaprine (FLEXERIL) tablet 10 mg  10 mg Oral TID    traZODone (DESYREL) tablet 50 mg  50 mg Oral QHS    HYDROmorphone (PF) (DILAUDID) injection 1 mg  1 mg IntraVENous Q3H PRN    magnesium oxide (MAG-OX) tablet 400 mg  400 mg Oral BID    albuterol (PROVENTIL VENTOLIN) nebulizer solution 2.5 mg  2.5 mg Nebulization Q6H RT    calcium carbonate (TUMS) chewable tablet 200 mg [elemental]  200 mg Oral TID WITH MEALS    0.9% sodium chloride infusion 250 mL  250 mL IntraVENous PRN    naloxone (NARCAN) injection 0.4 mg  0.4 mg IntraVENous EVERY 2 MINUTES AS NEEDED    oxyCODONE IR (ROXICODONE) tablet 10 mg  10 mg Oral Q4H PRN    LORazepam (ATIVAN) injection 1 mg  1 mg IntraVENous Q4H PRN    central line flush (saline) syringe 10 mL  10 mL InterCATHeter PRN    sodium chloride (NS) flush 5-40 mL  5-40 mL IntraVENous Q8H    sodium chloride (NS) flush 5-40 mL  5-40 mL IntraVENous PRN    promethazine (PHENERGAN) with saline injection 6.25 mg  6.25 mg IntraVENous Q15MIN PRN    central line flush (saline) syringe 10 mL  10 mL InterCATHeter PRN    ceFAZolin (ANCEF) 2 g/20 mL in sterile water IV syringe  2 g IntraVENous Q8H    sodium chloride (NS) flush 5-40 mL  5-40 mL IntraVENous Q8H    sodium chloride (NS) flush 5-40 mL  5-40 mL IntraVENous PRN    acetaminophen (TYLENOL) tablet 650 mg  650 mg Oral Q6H PRN    Or    acetaminophen (TYLENOL) suppository 650 mg  650 mg Rectal Q6H PRN    polyethylene glycol (MIRALAX) packet 17 g  17 g Oral DAILY PRN    promethazine (PHENERGAN) tablet 12.5 mg  12.5 mg Oral Q6H PRN    Or    ondansetron (ZOFRAN) injection 4 mg  4 mg IntraVENous Q6H PRN      Review of Systems  A comprehensive review of systems was negative except for that written in the HPI. Objective:     Visit Vitals  BP 97/64   Pulse 88   Temp 97.7 °F (36.5 °C)   Resp 18   Ht 5' 10\" (1.778 m)   Wt 103.2 kg (227 lb 8.2 oz)   SpO2 94%   BMI 32.65 kg/m²    O2 Flow Rate (L/min): 2 l/min O2 Device: Nasal cannula, Humidifier    Temp (24hrs), Av.9 °F (36.6 °C), Min:97.7 °F (36.5 °C), Max:98.2 °F (36.8 °C)     1901 -  0700  In: -   Out: 2342 [Urine:1550]    General:  Progressively worsening pain, tachypnea and hypoxia continues. No drainage from dressings,. Concern for air/fluid leak.   Restless.       Head: Normocephalic, without obvious abnormality, atraumatic  Eyes: conjunctivae/corneas clear. PERRL  Throat: Lips, mucosa, and tongue normal. Teeth and gums normal  Neck: supple, symmetrical, trachea midline, no JVD  Lungs: Right lung clear to auscultation, left with diminished, coarse breath sounds throughout, no moving much air. Chest tube out despite CXR indicating the contrary. Continued O2 at 3 liters, desats with removal to 88%, then low 70's.    Heart: regular rate and rhythm, S1, S2 normal, no murmur, click, rub or gallop. See above for chest wall. Abdomen: Soft, non-tender.  Bowel sounds normal. No masses,  no organomegaly  Extremities: All extremities normal, atraumatic, no cyanosis or edema  Skin: Skin color, texture, turgor normal. No rashes or lesions  Neurologic: Grossly normal    Data Review  Recent Results (from the past 24 hour(s))   METABOLIC PANEL, BASIC    Collection Time: 11/22/20  4:14 AM   Result Value Ref Range    Sodium 136 (L) 138 - 145 mmol/L    Potassium 4.0 3.5 - 5.1 mmol/L    Chloride 100 98 - 107 mmol/L    CO2 34 (H) 21 - 32 mmol/L    Anion gap 2 (L) 7 - 16 mmol/L    Glucose 101 (H) 65 - 100 mg/dL    BUN 5 (L) 6 - 23 MG/DL    Creatinine 0.59 (L) 0.8 - 1.5 MG/DL    GFR est AA >60 >60 ml/min/1.73m2    GFR est non-AA >60 >60 ml/min/1.73m2    Calcium 8.4 8.3 - 10.4 MG/DL   CBC W/O DIFF    Collection Time: 11/22/20  4:14 AM   Result Value Ref Range    WBC 5.9 4.3 - 11.1 K/uL    RBC 2.33 (L) 4.23 - 5.6 M/uL    HGB 8.4 (L) 13.6 - 17.2 g/dL    HCT 25.7 (L) 41.1 - 50.3 %    .3 (H) 79.6 - 97.8 FL    MCH 36.1 (H) 26.1 - 32.9 PG    MCHC 32.7 31.4 - 35.0 g/dL    RDW 17.4 (H) 11.9 - 14.6 %    PLATELET 025 162 - 467 K/uL    MPV 9.5 9.4 - 12.3 FL    ABSOLUTE NRBC 0.00 0.0 - 0.2 K/uL     11/14:  CXR: Unchanged left-sided empyema.     11/14:  CT CHEST:  No evidence of pulmonary embolism.  Mildly enlarged multilobulated left empyema.  Unchanged small focus of left lower lobe consolidation with small internal bubbles of air which may relate to pneumatoceles although an early lung abscess is not excluded. Multiple left rib fractures, several of which appear segmental suspect for a flail chest.     11/16:  DUPLEX LE:  No evidence of deep venous thrombosis within the lower extremities.     11/17:  CXR:  Left-sided chest tubes without definite pneumothorax. Improved aeration of the left lower hemithorax. Mild pleural thickening or fluid remains on the left in addition to patchy interstitial and airspace opacities.  Mild right basilar subsegmental atelectasis.     11/18:  CXR:  Status post removal of one of the left chest tubes and repositioning of the other. No pneumothorax. Stable left hemithorax changes and right base atelectasis.     11/19:  CXR:  Unchanged bilateral lung atelectasis and pleural thickening or effusion along the outer origin of the left lung.     11/20:  CXR:  Unchanged bilateral lung atelectasis and pleural thickening or effusion along the outer left lung.     11/22:  CT CHEST: Increasing loculated fluid along left lateral lung margin extending into the chest wall, following VATS. Associated numerous left lateral rib fractures.  Increasing small basilar pleural effusions, left greater than right     Assessment/Plan:   ACUTE RESPIRATORY FAILURE WITH HYPOXIA: Chest tubes x 2 previously with empyema, hemothorax requiring tPa x 2 after traumatic fall with left rib fx               VATS and thoracic epidural placed 11/17 (third set of               Chest tubes)              Pulled all tubes out 11/19:  One CT replaced left lung:   Removed 11/20              Surgery signed off 11/20               Pulmonary signed off              Continue oxygen 3 liters, unable to wean further 11/20,  de-sats to 88%, then 72% on room air              Analgesics prn      MASSA BACTEREMIA:  11/4:  ID ON BOARD               Has been on rocephin since 11/4 with initial plan for   EOT 12/4              Changed to ancef 11/16 with same EOT              Daily CBC     ALCOHOL ABUSE: PRIOR TO LAST ADMISSION DRANK ONE PINT OF VODKA DAILY:  FINISHED LIBRIUM TAPER              Continued anxiety:  Ativan prn      11/21:  Marked worsening of spongy edematous area left back without crepitus. CT chest 11/22 w worsening loculated fluid              Surgery and Pulmonary in 11/22: Thoracic surgeon  will be in tomorrow. Adding flexeril for muscle spasm.                Monitor closely      Care Plan discussed with: Patient, Surgery, Pulmonary, Dr Kacey Estrada, and Nurse    Signed By: Ginny Palmer NP     November 22, 2020

## 2020-11-22 NOTE — ROUTINE PROCESS
Bedside and Verbal shift change report given to myself (oncoming nurse) by Daljit Xiong RN (offgoing nurse). Report included the following information SBAR, Kardex, MAR and Recent Results.

## 2020-11-22 NOTE — ROUTINE PROCESS
Bedside and Verbal shift change report given to OSMANY Ling (oncoming nurse) by myself (offgoing nurse). Report included the following information SBAR, Kardex, MAR and Recent Results.

## 2020-11-22 NOTE — PROGRESS NOTES
PLAN:  Diet Regular  SCD/IS for prophylactic measures  Pain/nausea control  Monitor labs  L Chest tube out 11/20/20  Dressing to Left chest should remain in place x 48 hours. CT chest 11/22/20  IV Abx -  Ancef  Further orders per surgeon    ASSESSMENT:  Admit Date: 11/14/2020   5 Day Post-Op  Procedure(s):  LEFT THORACOTOMY    Principal Problem:    Acute respiratory failure with hypoxia ([address] (11/15/2020)    Active Problems:    Alcohol abuse (8/17/2016)      MSSA bacteremia (11/4/2020)      Loculated empyema ([address] (11/15/2020)         SUBJECTIVE:  11/18/20: 1 Day Post-Op awake in bed, c/o bladder spasms. Pain controlled. AF, tachy. Hgb 7.6; lost 1.2L of blood in OR. CTs to suction x2. No CT output documented by nursing. Small air leak in both tubes. ROCKY with 75mL/24h output. 11/19/20: 2 Day Post-Op up in chair. The patient pulled out his chest tubes and drain yesterday and Dr. Gretta Perales replaced on chest tube at the bedside. CT with 190mL/24h output. No ptx on CXR. No air leak noted     11/20/20: 3 Day Post-Op. Pt sitting in chair. Pain controlled. Left Chest tube to water seal. No air leak. 260mL out last 24 hours. AF, NAD.       11/22/20: 5 Day Post-Op. Pt awake in bed. C/o pain to left side/ ribs. CXR yesterday showing IMPRESSION: No evidence of pneumothorax & Stable left pleural effusion. AF, NAD.     11/22/20 CT Chest   Impression:     1. Increasing loculated fluid along left lateral lung margin extending into the  chest wall, following VATS. 2. Associated numerous left lateral rib fractures.   3. Increasing small basilar pleural effusions, left greater than right      Intake/Output Summary (Last 24 hours) at 11/22/2020 1451  Last data filed at 11/22/2020 0224  Gross per 24 hour   Intake    Output 400 ml   Net -400 ml     OBJECTIVE:  Constitutional: Alert oriented cooperative patient in no acute distress; appears stated age   Visit Vitals  /70   Pulse 97   Temp 97.7 °F (36.5 °C)   Resp 18   Ht 5' 10\" (1.778 m)   Wt 227 lb 8.2 oz (103.2 kg)   SpO2 93%   BMI 32.65 kg/m²     Eyes: Sclera are clear. ENMT: no external lesions gross hearing normal; no obvious neck masses, no ear or lip lesions  CV: RRR. Normal perfusion  Resp: No JVD. Breathing is  non-labored; no audible wheezing. L chest incision open to air with staples- small amount of erythema present   GI: soft and non-distended     Musculoskeletal: unremarkable with normal function. No embolic signs or cyanosis.    Neuro:  Oriented; moves all 4; no focal deficits  Psychiatric: normal affect and mood, no memory impairment      Patient Vitals for the past 24 hrs:   BP Temp Pulse Resp SpO2   11/22/20 1120 117/70 97.7 °F (36.5 °C) 97 18 93 %   11/22/20 0744 111/72 98 °F (36.7 °C) 87 18 95 %   11/22/20 0730     96 %   11/22/20 0415 119/77 97.7 °F (36.5 °C) 90 18 98 %   11/21/20 2345 104/60 98.2 °F (36.8 °C) 94 18 97 %   11/21/20 2042     95 %   11/21/20 1959 114/69 98.2 °F (36.8 °C) 100 18 98 %   11/21/20 1545 105/72 98.8 °F (37.1 °C) 92 18 98 %     Labs:    Recent Labs     11/22/20  0414   WBC 5.9   HGB 8.4*      *   K 4.0      CO2 34*   BUN 5*   CREA 0.59*   *       Signed:  Gagandeep Stein NP

## 2020-11-22 NOTE — PROGRESS NOTES
Tj Erazo  Admission Date: 11/14/2020             Daily Progress Note: 11/22/2020    The patient's chart is reviewed and the patient is discussed with the staff.    48 y.o.  male presents with a PMHx of smoker, ETOH abuse, and recent hospitalizations for traumatic PTX and rib fractures.  Prior to this admission, he has been admitted twice r/t traumatic PTX.  First admission was 10/30-11/2>>during that admission he required a CT for the PTX.  It was deemed that he was not in need for surgery at that time. He returned 11/3-11/12 where surgery again was consulted, but felt he still did not need surgery. A chest CT on 11/3 showed a loculated hydropneumothorax.  MSSA bactermia was diagnosed per blood cultures on 11/3. He also grew MSSA from the left chest wound. A left chest tube was placed by IR on 11-4 and was removed on 11/11 (had to have TPA to assist with drainage), with only a small amount of remaining fluid noted on follow up CT (estimated about 200 mls). The infectious disease team has been involved and he was to be treated with IV Rocephin for 4 to 6 weeks with EOT on 12/4. He is now readmitted with worsening shortness of breath. The CT now looks worse with air in loculated fluid collections. IR and Surgery have been consulted. VATS 11/17 with CT x 2 to suction, ROCKY to bulb suction. Received one unit PRBC yesterday. Subjective:     CT removed on Friday per surgery. More tachypneic over night. Swelling area near staple site, but no crepitus. On 3 LPM NC with O2 sat 98%. RA sat was 74% overnight. When entered room pt had O2 on forehead. No distress noted. Transport at bedside to take to CT. No distress noted at this time.      Current Facility-Administered Medications   Medication Dose Route Frequency    traZODone (DESYREL) tablet 50 mg  50 mg Oral QHS    HYDROmorphone (PF) (DILAUDID) injection 1 mg  1 mg IntraVENous Q3H PRN    magnesium oxide (MAG-OX) tablet 400 mg  400 mg Oral BID    albuterol (PROVENTIL VENTOLIN) nebulizer solution 2.5 mg  2.5 mg Nebulization Q6H RT    calcium carbonate (TUMS) chewable tablet 200 mg [elemental]  200 mg Oral TID WITH MEALS    0.9% sodium chloride infusion 250 mL  250 mL IntraVENous PRN    naloxone (NARCAN) injection 0.4 mg  0.4 mg IntraVENous EVERY 2 MINUTES AS NEEDED    oxyCODONE IR (ROXICODONE) tablet 10 mg  10 mg Oral Q4H PRN    LORazepam (ATIVAN) injection 1 mg  1 mg IntraVENous Q4H PRN    central line flush (saline) syringe 10 mL  10 mL InterCATHeter PRN    sodium chloride (NS) flush 5-40 mL  5-40 mL IntraVENous Q8H    sodium chloride (NS) flush 5-40 mL  5-40 mL IntraVENous PRN    promethazine (PHENERGAN) with saline injection 6.25 mg  6.25 mg IntraVENous Q15MIN PRN    central line flush (saline) syringe 10 mL  10 mL InterCATHeter PRN    ceFAZolin (ANCEF) 2 g/20 mL in sterile water IV syringe  2 g IntraVENous Q8H    sodium chloride (NS) flush 5-40 mL  5-40 mL IntraVENous Q8H    sodium chloride (NS) flush 5-40 mL  5-40 mL IntraVENous PRN    acetaminophen (TYLENOL) tablet 650 mg  650 mg Oral Q6H PRN    Or    acetaminophen (TYLENOL) suppository 650 mg  650 mg Rectal Q6H PRN    polyethylene glycol (MIRALAX) packet 17 g  17 g Oral DAILY PRN    promethazine (PHENERGAN) tablet 12.5 mg  12.5 mg Oral Q6H PRN    Or    ondansetron (ZOFRAN) injection 4 mg  4 mg IntraVENous Q6H PRN       Review of Systems  Constitutional: negative for fever, chills, sweats  Cardiovascular: negative for chest pain, palpitations, syncope, edema  Gastrointestinal:  negative for dysphagia, reflux, vomiting, diarrhea, abdominal pain, or melena  Neurologic:  negative for focal weakness, numbness, headache    Objective:     Vitals:    11/21/20 1959 11/21/20 2042 11/21/20 2345 11/22/20 0415   BP: 114/69  104/60 119/77   Pulse: 100  94 90   Resp: 18  18 18   Temp: 98.2 °F (36.8 °C)  98.2 °F (36.8 °C) 97.7 °F (36.5 °C)   SpO2: 98% 95% 97% 98% Weight:       Height:             Intake/Output Summary (Last 24 hours) at 11/22/2020 0732  Last data filed at 11/22/2020 0363  Gross per 24 hour   Intake    Output 1200 ml   Net -1200 ml       Physical Exam:   Constitution:  the patient is well developed and in no acute distress  EENMT:  Sclera clear, pupils equal, oral mucosa moist  Respiratory: dim L base  Cardiovascular:  RRR without M,G,R  Gastrointestinal: soft and non-tender; with positive bowel sounds   Musculoskeletal: warm without cyanosis. There is no lower extremity edema. Skin:  no jaundice or rashes, surgical wounds   Neurologic: no gross neuro deficits     Psychiatric:  alert and oriented x 3    CXR:   11/21/2020 11/19/2020              LAB  No results for input(s): GLUCPOC in the last 72 hours. No lab exists for component: [place]   Recent Labs     11/22/20 0414 11/21/20  0430 11/20/20  0602   WBC 5.9 5.8 6.1   HGB 8.4* 7.9* 8.4*   HCT 25.7* 24.7* 26.2*    261 263     Recent Labs     11/22/20 0414 11/21/20  0430 11/20/20  0424   * 136 138   K 4.0 3.8 4.6    98 102   CO2 34* 35* 29   * 122* 89   BUN 5* 3* 2*   CREA 0.59* 0.51* 0.50*   CA 8.4 7.9* 7.5*     No results for input(s): PH, PCO2, PO2, HCO3, PHI, PCO2I, PO2I, HCO3I in the last 72 hours. No results for input(s): LCAD, LAC in the last 72 hours.       Assessment:  (Medical Decision Making)     Hospital Problems  Date Reviewed: 11/3/2020          Codes Class Noted POA    * (Principal) Acute respiratory failure with hypoxia [facility]) ICD-10-CM: J96.01  ICD-9-CM: 518.81  11/15/2020 Unknown    On 4L NC, sats 100%, wean      Loculated empyema (HCC) ICD-10-CM: J86.9  ICD-9-CM: 510.9  11/15/2020 Unknown    Left thoracotomy , per surgery     MSSA bacteremia ICD-10-CM: R78.81, B95.61  ICD-9-CM: 790.7, 041.11  11/4/2020 Yes    Per ID, on Ancef     Alcohol abuse ICD-10-CM: F10.10  ICD-9-CM: 305.00  8/17/2016 Yes              Plan:  (Medical Decision Making)   --cont ancef per ID til 11/4  --cont supplement O2 for now, wean when able  --stat Chest CT w/o contrast to evaluate swelling area near staples>>will review when completed  --may need to ask surgery to see again, pending results      More than 50% of the time documented was spent in face-to-face contact with the patient and in the care of the patient on the floor/unit where the patient is located. Tommie Fischer NP      Reviewed Chest CT results: noted loculated fluid, which has increased in size since CT removal, has now extended into chest wall which explains swelling noted above surgical site. Numerous rib fractures noted, these are not new. Small pleural effusions noted, left>right. Discussed CT with Sylvain Pelletier NP with hospitalist service. She has been in contact with surgery. They will likely need to re-evaluate pt today or tomorrow. He is currently stable on 3 LPM.     Tommie Fischer NP   Lungs:  Decreased breath sounds on left  Heart:  RRR with no Murmur/Rubs/Gallops    Additional Comments:  Surgery is evaluating-    I have spoken with and examined the patient. I agree with the above assessment and plan as documented.     Lenore Cormier MD

## 2020-11-22 NOTE — PROGRESS NOTES
END OF SHIFT NOTE:    INTAKE/OUTPUT  11/20 0701 - 11/21 0700  In: -   Out: 1050 [Urine:1050]  Voiding: YES  Catheter: NO  Drain:              Flatus: Patient does have flatus present. Stool:  0 occurrences. Characteristics:       Emesis: 0 occurrences. Characteristics:        VITAL SIGNS  Patient Vitals for the past 12 hrs:   Temp Pulse Resp BP SpO2   11/21/20 1545 98.8 °F (37.1 °C) 92 18 105/72 98 %   11/21/20 1445     94 %   11/21/20 1443     (!) 74 %   11/21/20 1140 98.4 °F (36.9 °C) 91 18 98/64 95 %       Pain Assessment  Pain Intensity 1: 10 (11/21/20 0940)  Pain Location 1: Back, Chest  Pain Intervention(s) 1: Medication (see MAR)  Patient Stated Pain Goal: 0    Ambulating  Yes    Shift report given to oncoming nurse at the bedside.     Adrianne Smith RN

## 2020-11-23 ENCOUNTER — HOSPITAL ENCOUNTER (OUTPATIENT)
Dept: INFUSION THERAPY | Age: 50
End: 2020-11-23

## 2020-11-23 LAB
ALBUMIN SERPL-MCNC: 1.5 G/DL (ref 3.5–5)
ALBUMIN/GLOB SERPL: 0.3 {RATIO} (ref 1.2–3.5)
ALP SERPL-CCNC: 146 U/L (ref 50–136)
ALT SERPL-CCNC: 8 U/L (ref 12–65)
ANION GAP SERPL CALC-SCNC: 5 MMOL/L (ref 7–16)
AST SERPL-CCNC: 48 U/L (ref 15–37)
BILIRUB DIRECT SERPL-MCNC: 0.1 MG/DL
BILIRUB SERPL-MCNC: 0.5 MG/DL (ref 0.2–1.1)
BUN SERPL-MCNC: 4 MG/DL (ref 6–23)
CALCIUM SERPL-MCNC: 8.4 MG/DL (ref 8.3–10.4)
CHLORIDE SERPL-SCNC: 101 MMOL/L (ref 98–107)
CO2 SERPL-SCNC: 33 MMOL/L (ref 21–32)
CREAT SERPL-MCNC: 0.54 MG/DL (ref 0.8–1.5)
ERYTHROCYTE [DISTWIDTH] IN BLOOD BY AUTOMATED COUNT: 17.2 % (ref 11.9–14.6)
GLOBULIN SER CALC-MCNC: 4.6 G/DL (ref 2.3–3.5)
GLUCOSE SERPL-MCNC: 103 MG/DL (ref 65–100)
HCT VFR BLD AUTO: 22.8 % (ref 41.1–50.3)
HGB BLD-MCNC: 7.3 G/DL (ref 13.6–17.2)
HISTORY CHECKED?,CKHIST: NORMAL
MAGNESIUM SERPL-MCNC: 1.7 MG/DL (ref 1.8–2.4)
MCH RBC QN AUTO: 35.6 PG (ref 26.1–32.9)
MCHC RBC AUTO-ENTMCNC: 32 G/DL (ref 31.4–35)
MCV RBC AUTO: 111.2 FL (ref 79.6–97.8)
NRBC # BLD: 0 K/UL (ref 0–0.2)
PLATELET # BLD AUTO: 285 K/UL (ref 150–450)
PMV BLD AUTO: 9.9 FL (ref 9.4–12.3)
POTASSIUM SERPL-SCNC: 3.9 MMOL/L (ref 3.5–5.1)
PROT SERPL-MCNC: 6.1 G/DL (ref 6.3–8.2)
RBC # BLD AUTO: 2.05 M/UL (ref 4.23–5.6)
SODIUM SERPL-SCNC: 139 MMOL/L (ref 138–145)
WBC # BLD AUTO: 5.8 K/UL (ref 4.3–11.1)

## 2020-11-23 PROCEDURE — 80048 BASIC METABOLIC PNL TOTAL CA: CPT

## 2020-11-23 PROCEDURE — 74011250636 HC RX REV CODE- 250/636: Performed by: NURSE PRACTITIONER

## 2020-11-23 PROCEDURE — 36415 COLL VENOUS BLD VENIPUNCTURE: CPT

## 2020-11-23 PROCEDURE — 94640 AIRWAY INHALATION TREATMENT: CPT

## 2020-11-23 PROCEDURE — 65270000029 HC RM PRIVATE

## 2020-11-23 PROCEDURE — 32220 RELEASE OF LUNG: CPT | Performed by: SURGERY

## 2020-11-23 PROCEDURE — 2709999900 HC NON-CHARGEABLE SUPPLY

## 2020-11-23 PROCEDURE — 74011250637 HC RX REV CODE- 250/637: Performed by: FAMILY MEDICINE

## 2020-11-23 PROCEDURE — 80076 HEPATIC FUNCTION PANEL: CPT

## 2020-11-23 PROCEDURE — 94760 N-INVAS EAR/PLS OXIMETRY 1: CPT

## 2020-11-23 PROCEDURE — 74011000250 HC RX REV CODE- 250: Performed by: SURGERY

## 2020-11-23 PROCEDURE — 74011250636 HC RX REV CODE- 250/636: Performed by: SURGERY

## 2020-11-23 PROCEDURE — 83735 ASSAY OF MAGNESIUM: CPT

## 2020-11-23 PROCEDURE — 85027 COMPLETE CBC AUTOMATED: CPT

## 2020-11-23 PROCEDURE — 74011250637 HC RX REV CODE- 250/637: Performed by: STUDENT IN AN ORGANIZED HEALTH CARE EDUCATION/TRAINING PROGRAM

## 2020-11-23 PROCEDURE — 36430 TRANSFUSION BLD/BLD COMPNT: CPT

## 2020-11-23 PROCEDURE — 99232 SBSQ HOSP IP/OBS MODERATE 35: CPT | Performed by: INTERNAL MEDICINE

## 2020-11-23 PROCEDURE — 74011000250 HC RX REV CODE- 250: Performed by: INTERNAL MEDICINE

## 2020-11-23 PROCEDURE — 77010033678 HC OXYGEN DAILY

## 2020-11-23 PROCEDURE — P9016 RBC LEUKOCYTES REDUCED: HCPCS

## 2020-11-23 PROCEDURE — 86923 COMPATIBILITY TEST ELECTRIC: CPT

## 2020-11-23 PROCEDURE — 86900 BLOOD TYPING SEROLOGIC ABO: CPT

## 2020-11-23 PROCEDURE — 74011250637 HC RX REV CODE- 250/637: Performed by: NURSE PRACTITIONER

## 2020-11-23 RX ORDER — SODIUM CHLORIDE 9 MG/ML
250 INJECTION, SOLUTION INTRAVENOUS AS NEEDED
Status: DISCONTINUED | OUTPATIENT
Start: 2020-11-23 | End: 2020-12-03 | Stop reason: HOSPADM

## 2020-11-23 RX ORDER — CALCIUM CARBONATE 200(500)MG
200 TABLET,CHEWABLE ORAL ONCE
Status: COMPLETED | OUTPATIENT
Start: 2020-11-23 | End: 2020-11-23

## 2020-11-23 RX ADMIN — HYDROMORPHONE HYDROCHLORIDE 1 MG: 1 INJECTION, SOLUTION INTRAMUSCULAR; INTRAVENOUS; SUBCUTANEOUS at 14:31

## 2020-11-23 RX ADMIN — CALCIUM CARBONATE (ANTACID) CHEW TAB 500 MG 200 MG: 500 CHEW TAB at 11:17

## 2020-11-23 RX ADMIN — CALCIUM CARBONATE (ANTACID) CHEW TAB 500 MG 200 MG: 500 CHEW TAB at 01:37

## 2020-11-23 RX ADMIN — ALBUTEROL SULFATE 2.5 MG: 2.5 SOLUTION RESPIRATORY (INHALATION) at 19:23

## 2020-11-23 RX ADMIN — Medication 10 ML: at 14:00

## 2020-11-23 RX ADMIN — CYCLOBENZAPRINE 10 MG: 10 TABLET, FILM COATED ORAL at 17:46

## 2020-11-23 RX ADMIN — Medication 10 ML: at 21:25

## 2020-11-23 RX ADMIN — CEFAZOLIN SODIUM 2 G: 100 INJECTION, POWDER, LYOPHILIZED, FOR SOLUTION INTRAVENOUS at 17:46

## 2020-11-23 RX ADMIN — CYCLOBENZAPRINE 10 MG: 10 TABLET, FILM COATED ORAL at 09:10

## 2020-11-23 RX ADMIN — OXYCODONE HYDROCHLORIDE 10 MG: 5 TABLET ORAL at 01:00

## 2020-11-23 RX ADMIN — HYDROMORPHONE HYDROCHLORIDE 1 MG: 1 INJECTION, SOLUTION INTRAMUSCULAR; INTRAVENOUS; SUBCUTANEOUS at 06:25

## 2020-11-23 RX ADMIN — CEFAZOLIN SODIUM 2 G: 100 INJECTION, POWDER, LYOPHILIZED, FOR SOLUTION INTRAVENOUS at 09:10

## 2020-11-23 RX ADMIN — CYCLOBENZAPRINE 10 MG: 10 TABLET, FILM COATED ORAL at 21:24

## 2020-11-23 RX ADMIN — CALCIUM CARBONATE (ANTACID) CHEW TAB 500 MG 200 MG: 500 CHEW TAB at 17:46

## 2020-11-23 RX ADMIN — ALBUTEROL SULFATE 2.5 MG: 2.5 SOLUTION RESPIRATORY (INHALATION) at 13:22

## 2020-11-23 RX ADMIN — ALBUTEROL SULFATE 2.5 MG: 2.5 SOLUTION RESPIRATORY (INHALATION) at 08:52

## 2020-11-23 RX ADMIN — HYDROMORPHONE HYDROCHLORIDE 1 MG: 1 INJECTION, SOLUTION INTRAMUSCULAR; INTRAVENOUS; SUBCUTANEOUS at 23:32

## 2020-11-23 RX ADMIN — HYDROMORPHONE HYDROCHLORIDE 1 MG: 1 INJECTION, SOLUTION INTRAMUSCULAR; INTRAVENOUS; SUBCUTANEOUS at 11:16

## 2020-11-23 RX ADMIN — CEFAZOLIN SODIUM 2 G: 100 INJECTION, POWDER, LYOPHILIZED, FOR SOLUTION INTRAVENOUS at 01:38

## 2020-11-23 RX ADMIN — TRAZODONE HYDROCHLORIDE 50 MG: 50 TABLET ORAL at 21:24

## 2020-11-23 RX ADMIN — Medication 10 ML: at 06:00

## 2020-11-23 RX ADMIN — ALBUTEROL SULFATE 2.5 MG: 2.5 SOLUTION RESPIRATORY (INHALATION) at 01:43

## 2020-11-23 RX ADMIN — MAGNESIUM GLUCONATE 500 MG ORAL TABLET 400 MG: 500 TABLET ORAL at 09:10

## 2020-11-23 RX ADMIN — OXYCODONE HYDROCHLORIDE 10 MG: 5 TABLET ORAL at 17:46

## 2020-11-23 RX ADMIN — Medication 10 ML: at 21:26

## 2020-11-23 RX ADMIN — MAGNESIUM GLUCONATE 500 MG ORAL TABLET 400 MG: 500 TABLET ORAL at 17:46

## 2020-11-23 RX ADMIN — CALCIUM CARBONATE (ANTACID) CHEW TAB 500 MG 200 MG: 500 CHEW TAB at 09:10

## 2020-11-23 NOTE — PROGRESS NOTES
Infectious Disease Progress Note    Today's Date: 2020   Admit Date: 2020    Impression:   · MSSA bacteremia (11/3 - );  [address] NG;  TTE negative  · MSSA Left pleural empyema: s/p chest tube, 20 Cx from pleural drainage, positive:  · Originally discharged 20 on IV Rocephin X 4 weeks with EOT 20  · S/p VATS 20  · ETOH withdrawal on librium taper. Denies illicit drugs   · S/p fall through attic with contusion/trauma     Plan:   · Continue Cefazolin while here. EOT planned for 20, although given recurrence of fluid may decide to extend for full 6 weeks (20) or switch to po cefadroxil for 2 weeks  (-)  · Will continue to follow       Anti-infectives:   · CTX  Restarted -  · Ancef -       Subjective: Interval History: Lying at 45 degrees; denied SOB; still with left incisional discomfort, no better, no worse    No Known Allergies     Review of Systems:  A comprehensive review of systems was negative except for that written in the History of Present Illness.     Objective:     Visit Vitals  /77   Pulse 93   Temp 97.7 °F (36.5 °C)   Resp 18   Ht 5' 10\" (1.778 m)   Wt 103.2 kg (227 lb 8.2 oz)   SpO2 91%   BMI 32.65 kg/m²     Temp (24hrs), Av.6 °F (36.4 °C), Min:97.1 °F (36.2 °C), Max:98 °F (36.7 °C)       General:  NAD   Head:  NCAT   Eyes:  No icterus or drainage   Throat:    Neck: supple   Lungs:   Mild erythema along left chest wall incision; no drainage; slight warmth; tender; decreased BS on left   Heart:  RRR   Abdomen:   S/NT/ND   Extremities: Extremities normal, atraumatic, no cyanosis or edema   Pulses:    Skin: No rash on visible skin     Lines/Devices: RUE PICC intact         Data Review:     CBC:  Recent Labs     20  0428 20  0414 20  0430   WBC 5.8 5.9 5.8   HGB 7.3* 8.4* 7.9*   HCT 22.8* 25.7* 24.7*    285 261       BMP:  Recent Labs     20  0428 20  0414 20  0430   CREA 0.54* 0.59* 0.51*   BUN 4* 5* 3*    136* 136   K 3.9 4.0 3.8    100 98   CO2 33* 34* 35*   AGAP 5* 2* 3*   * 101* 122*       LFTS:  Recent Labs     11/23/20  0428   TBILI 0.5   ALT 8*   *   TP 6.1*   ALB 1.5*       Microbiology:     All Micro Results     Procedure Component Value Units Date/Time    CULTURE, BLOOD [[id number]] Collected:  11/15/20 0033    Order Status:  Completed Specimen:  Blood Updated:  11/20/20 0829     Special Requests: --        RIGHT  ARM       Culture result: NO GROWTH 5 DAYS       CULTURE, BLOOD [[id number]] Collected:  11/15/20 0035    Order Status:  Completed Specimen:  Blood Updated:  11/20/20 0829     Special Requests: --        LEFT  ARM       Culture result: NO GROWTH 5 DAYS             Imaging:   IMPRESSION:   1. No evidence of pulmonary embolism. 2. Mildly enlarged multilobulated left empyema. 3. Unchanged small focus of left lower lobe consolidation with small internal  bubbles of air which may relate to pneumatoceles although an early lung abscess  is not excluded.   4. Multiple left rib fractures, several of which appear segmental suspect for a  flail chest.       Signed By: Jay Louie MD     November 23, 2020

## 2020-11-23 NOTE — PROGRESS NOTES
PLAN:  Diet Regular  SCD/IS for prophylactic measures  Pain/nausea control  Monitor labs  L Chest tube out 11/20/20  Dressing to Left chest should remain in place x 48 hours. CT chest 11/22/20 with increasing loculations  IV Abx per primary  No further surgical needs  Follow up with Dr. Lorenzo Soto 1 week after discharge  Will sign off    ASSESSMENT:  Admit Date: 11/14/2020   6 Day Post-Op  Procedure(s):  LEFT THORACOTOMY    Principal Problem:    Acute respiratory failure with hypoxia ([facility] [address] (11/15/2020)    Active Problems:    Alcohol abuse (8/17/2016)      MSSA bacteremia (11/4/2020)      Loculated empyema ([facility] [address] (11/15/2020)         SUBJECTIVE:  11/18/20: 1 Day Post-Op awake in bed, c/o bladder spasms. Pain controlled. AF, tachy. Hgb 7.6; lost 1.2L of blood in OR. CTs to suction x2. No CT output documented by nursing. Small air leak in both tubes. ROCKY with 75mL/24h output. 11/19/20: 2 Day Post-Op up in chair. The patient pulled out his chest tubes and drain yesterday and Dr. Lorenzo Soto replaced on chest tube at the bedside. CT with 190mL/24h output. No ptx on CXR. No air leak noted     11/20/20: 3 Day Post-Op. Pt sitting in chair. Pain controlled. Left Chest tube to water seal. No air leak. 260mL out last 24 hours. AF, NAD.       11/22/20: 5 Day Post-Op. Pt awake in bed. C/o pain to left side/ ribs. CXR yesterday showing IMPRESSION: No evidence of pneumothorax & Stable left pleural effusion. AF, NAD.     11/22/20 CT Chest   Impression:     1. Increasing loculated fluid along left lateral lung margin extending into the  chest wall, following VATS. 2. Associated numerous left lateral rib fractures. 3. Increasing small basilar pleural effusions, left greater than right    11/23/2020 6 Days Post-Op awake in bed, no complaints. Pain controlled. AF, VSS. Oxygenating well on 2L NC. WBC 5.8.        Intake/Output Summary (Last 24 hours) at 11/23/2020 1105  Last data filed at 11/23/2020 0918  Gross per 24 hour   Intake    Output 1125 ml   Net -1125 ml     OBJECTIVE:  Constitutional: Alert oriented cooperative patient in no acute distress; appears stated age   Visit Vitals  /67   Pulse 89   Temp 97.9 °F (36.6 °C)   Resp 17   Ht 5' 10\" (1.778 m)   Wt 227 lb 8.2 oz (103.2 kg)   SpO2 91%   BMI 32.65 kg/m²     Eyes: Sclera are clear. ENMT: no external lesions gross hearing normal; no obvious neck masses, no ear or lip lesions  CV: RRR. Normal perfusion  Resp: No JVD. Breathing is  non-labored; no audible wheezing. L chest incision open to air with staples. Dressing c/d/i.  GI: soft and non-distended     Musculoskeletal: unremarkable with normal function. No embolic signs or cyanosis.    Neuro:  Oriented; moves all 4; no focal deficits  Psychiatric: normal affect and mood, no memory impairment      Patient Vitals for the past 24 hrs:   BP Temp Pulse Resp SpO2   11/23/20 0854     91 %   11/23/20 0727 105/67 97.9 °F (36.6 °C) 89 17 99 %   11/23/20 0350 114/72 97.3 °F (36.3 °C) (!) 101 18 94 %   11/23/20 0143     93 %   11/22/20 2305 121/72 98 °F (36.7 °C) 89 18 97 %   11/22/20 2003     95 %   11/22/20 1949 120/70 97.1 °F (36.2 °C) 100 20 94 %   11/22/20 1522     94 %   11/22/20 1515 97/64 97.7 °F (36.5 °C) 88 18 98 %   11/22/20 1120 117/70 97.7 °F (36.5 °C) 97 18 93 %     Labs:    Recent Labs     11/23/20  0428   WBC 5.8   HGB 7.3*         K 3.9      CO2 33*   BUN 4*   CREA 0.54*   *   TBILI 0.5   CBIL 0.1   ALT 8*   *       Signed:  Wayne Rutherford NP

## 2020-11-23 NOTE — PROGRESS NOTES
Progress Note    2020  Admit Date: 2020  9:21 PM   NAME: Clarence Escobar   :  1970   MRN:  [id number]   Attending: Sophia Wharton MD  PCP:  None  Treatment Team: Attending Provider: Sophia Wharton MD; Consulting Provider: Aileen Hurley MD; Consulting Provider: Trupti Saravia MD; Nurse Practitioner: Nick Aaron NP; Care Manager: Zack Pan RN; Primary Nurse: Jaiden Mann RN    Full Code   SUBJECTIVE:   Mr. Kareen Jenkins is a 49 yo male with PMH of smoker, ETOH abuse, recent hospitalizations for traumatic pneumothorax and rib fx. Saskia Jeronimo has had 2 recent admissions for same requiring chest tubes and TPA to assist with drainage, deemed no need for surgery at that time.  Pt found to have MSSA bacteremia and left chest wound.  ID consulted and pt tx with IV rocephin for 4-6 weeks with EOT 20. Saskia Jeronimo presented to the ER by EMS after being found hypoxic with SOB at the infusion center.  CXR showed unchanged left sided empyema.  CT chest showed mildly enlarged multilobulated left empyema, multiple rib fx.  IR consulted. Pulm consulted. General Surgery consulted.  underwent VATs, chest tube X2, thoracic epidural placed, ROCKY placed.  pt pulled out drains. 1 chest tube replaced by Dr. Bruno Bobby.  left chest tube removed per Surgery.  hypoxic, spongy edema left mid back around mid axillary. CT chest obtained showed increased loculated fluid along left lateral lung margin extending into the chest wall, following VATS, increasing small basilar pleural effusion, left greater than right. Surgery re-consulted. Pulmonary consulted. ID following may need to extend abx for total of 6 weeks. Required PRBC  for drop in hgb. Pt having periods of anxiety.          Past Medical History:   Diagnosis Date    GERD (gastroesophageal reflux disease)     once a week OTC med     Recent Results (from the past 24 hour(s))   METABOLIC PANEL, BASIC    Collection Time: 20  4:28 AM Result Value Ref Range    Sodium 139 138 - 145 mmol/L    Potassium 3.9 3.5 - 5.1 mmol/L    Chloride 101 98 - 107 mmol/L    CO2 33 (H) 21 - 32 mmol/L    Anion gap 5 (L) 7 - 16 mmol/L    Glucose 103 (H) 65 - 100 mg/dL    BUN 4 (L) 6 - 23 MG/DL    Creatinine 0.54 (L) 0.8 - 1.5 MG/DL    GFR est AA >60 >60 ml/min/1.73m2    GFR est non-AA >60 >60 ml/min/1.73m2    Calcium 8.4 8.3 - 10.4 MG/DL   CBC W/O DIFF    Collection Time: 11/23/20  4:28 AM   Result Value Ref Range    WBC 5.8 4.3 - 11.1 K/uL    RBC 2.05 (L) 4.23 - 5.6 M/uL    HGB 7.3 (L) 13.6 - 17.2 g/dL    HCT 22.8 (L) 41.1 - 50.3 %    .2 (H) 79.6 - 97.8 FL    MCH 35.6 (H) 26.1 - 32.9 PG    MCHC 32.0 31.4 - 35.0 g/dL    RDW 17.2 (H) 11.9 - 14.6 %    PLATELET 458 205 - 777 K/uL    MPV 9.9 9.4 - 12.3 FL    ABSOLUTE NRBC 0.00 0.0 - 0.2 K/uL   HEPATIC FUNCTION PANEL    Collection Time: 11/23/20  4:28 AM   Result Value Ref Range    Protein, total 6.1 (L) 6.3 - 8.2 g/dL    Albumin 1.5 (L) 3.5 - 5.0 g/dL    Globulin 4.6 (H) 2.3 - 3.5 g/dL    A-G Ratio 0.3 (L) 1.2 - 3.5      Bilirubin, total 0.5 0.2 - 1.1 MG/DL    Bilirubin, direct 0.1 <0.4 MG/DL    Alk.  phosphatase 146 (H) 50 - 136 U/L    AST (SGOT) 48 (H) 15 - 37 U/L    ALT (SGPT) 8 (L) 12 - 65 U/L   MAGNESIUM    Collection Time: 11/23/20  4:28 AM   Result Value Ref Range    Magnesium 1.7 (L) 1.8 - 2.4 mg/dL     No Known Allergies  Current Facility-Administered Medications   Medication Dose Route Frequency Provider Last Rate Last Dose    cyclobenzaprine (FLEXERIL) tablet 10 mg  10 mg Oral TID Valentin Parra MD   10 mg at 11/23/20 0910    traZODone (DESYREL) tablet 50 mg  50 mg Oral QHS Triny Turk NP   50 mg at 11/22/20 2155    HYDROmorphone (PF) (DILAUDID) injection 1 mg  1 mg IntraVENous Q3H PRN Meaghan Turk NP   1 mg at 11/23/20 1116    magnesium oxide (MAG-OX) tablet 400 mg  400 mg Oral BID Meaghan Turk NP   400 mg at 11/23/20 0910    albuterol (PROVENTIL VENTOLIN) nebulizer solution 2.5 mg  2.5 mg Nebulization Q6H RT Teetee Ugarte MD   2.5 mg at 11/23/20 0126    calcium carbonate (TUMS) chewable tablet 200 mg [elemental]  200 mg Oral TID WITH MEALS Alvaro Odom MD   200 mg at 11/23/20 1117    0.9% sodium chloride infusion 250 mL  250 mL IntraVENous PRN Suzanne Cooney NP        naloxone (NARCAN) injection 0.4 mg  0.4 mg IntraVENous EVERY 2 MINUTES AS NEEDED Concepcion Turk NP        oxyCODONE IR (ROXICODONE) tablet 10 mg  10 mg Oral Q4H PRN Concepcion Turk NP   10 mg at 11/23/20 0100    LORazepam (ATIVAN) injection 1 mg  1 mg IntraVENous Q4H PRN Concepcion Turk NP   1 mg at 11/22/20 1211    central line flush (saline) syringe 10 mL  10 mL InterCATHeter PRN Ирина Renteria MD        sodium chloride (NS) flush 5-40 mL  5-40 mL IntraVENous Q8H Leatha Diego MD   10 mL at 11/23/20 0600    sodium chloride (NS) flush 5-40 mL  5-40 mL IntraVENous PRN Leatha Diego MD        [facility] with saline injection 6.25 mg  6.25 mg IntraVENous Q15MIN PRN Leatha Diego MD   6.25 mg at 11/17/20 2034    central line flush (saline) syringe 10 mL  10 mL InterCATHeter PRN Mello Erwin MD        ceFAZolin (ANCEF) 2 g/20 mL in sterile water IV syringe  2 g IntraVENous Q8H Ирина Renteria MD   2 g at 11/23/20 0910    sodium chloride (NS) flush 5-40 mL  5-40 mL IntraVENous Q8H Ирина Renteria MD   10 mL at 11/23/20 0600    sodium chloride (NS) flush 5-40 mL  5-40 mL IntraVENous PRN Иирна Renteria MD        acetaminophen (TYLENOL) tablet 650 mg  650 mg Oral Q6H PRN Ирина Renteria MD        Or    acetaminophen (TYLENOL) suppository 650 mg  650 mg Rectal Q6H PRN Ирина Renteria MD        polyethylene glycol (MIRALAX) packet 17 g  17 g Oral DAILY PRN Ирина Renteria MD        promethazine (PHENERGAN) tablet 12.5 mg  12.5 mg Oral Q6H PRN Ирина Renteria MD        Or    ondansetron [facility]) injection 4 mg  4 mg IntraVENous Q6H PRN Taras Medeiros MD   4 mg at 20 1938       Review of Systems negative with exception of pertinent positives noted above  PHYSICAL EXAM     Visit Vitals  /77   Pulse 93   Temp 97.7 °F (36.5 °C)   Resp 18   Ht 5' 10\" (1.778 m)   Wt 103.2 kg (227 lb 8.2 oz)   SpO2 91%   BMI 32.65 kg/m²      Temp (24hrs), Av.6 °F (36.4 °C), Min:97.1 °F (36.2 °C), Max:98 °F (36.7 °C)    Oxygen Therapy  O2 Sat (%): 91 % (20 1117)  Pulse via Oximetry: 91 beats per minute (20 0854)  O2 Device: Nasal cannula (20 0854)  O2 Flow Rate (L/min): 2.5 l/min (20 0854)  FIO2 (%): 28 % (20 1317)  ETCO2 (mmHg): 99 mmHg (20 1849)    Intake/Output Summary (Last 24 hours) at 2020 1253  Last data filed at 2020 0918  Gross per 24 hour   Intake    Output 1125 ml   Net -1125 ml          General:       No acute distress, appears ill   Lungs:          Diminished left side. Dressing left chest c/d/i. Heart:            S1S2 present without murmurs rubs gallops. RRR. No LE edema  Abdomen:    Soft, non tender, non distended. BS present  Extremities: Moves ext spontaneously. No cyanosis  Neurologic:  A/O X3.  No focal deficits.       Results summary of Diagnostic Studies/Procedures copied from within [facility] EMR:        [address] Problems    Diagnosis Date Noted    Acute respiratory failure with hypoxia ([facility] [address] 11/15/2020    Loculated empyema ([facility] [address] 11/15/2020    MSSA bacteremia 2020    Alcohol abuse 2016     Plan:  Acute resp failure with hypoxia/loculated empyema/rib fx with pneumothorax  Pulm following  IR consulted  General Surgery consulted,s/p VATS .    pulled all tubes out, chest tube replaced  removed  On rocephin EOT 20  ID following, may need to extend abx to full 6 weeks   Wean O2  General Surgery re-consulted today    MSSA Bacteremia  ID following  Rocephin EOT 20       Alcohol abuse  Ativan prn      Marked worsening of spongy edematous area left back without crepitus 11/21  CT chest 11/22 w worsening loculated fluid   Surgery and Pulmonary in 11/22   Added  flexeril for muscle spasm.      Acute blood loss anemia  11/22 transfused PRBC  Will transfuse another unit PRBC today        Notes, labs, VS, diagnostic testing reviewed        DVT Prophylaxis: SCD    Plan of Care Discussed with: Supervising MD Dr. Edgar Castro, pt, care team        Evans Dior NP

## 2020-11-23 NOTE — OP NOTES
[address] [facility]  OPERATIVE REPORT    Name:  Christian Eubanks  MR#:  [id number]  :  1970  ACCOUNT #:  [de-identified]  DATE OF SERVICE:  2020    PREOPERATIVE DIAGNOSIS:  Loculated pleural effusion. POSTOPERATIVE DIAGNOSIS:  Loculated pleural effusion. PROCEDURE PERFORMED:  Right thoracotomy with drainage of effusion and decortication, a very difficult case taking at least one hour longer than normal.    SURGEON:  Magda Malik MD    ASSISTANT:  None. ANESTHESIA:  General.    COMPLICATIONS:  None. SPECIMENS REMOVED:  None. IMPLANTS:  None. ESTIMATED BLOOD LOSS:  1200 mL    COUNT:  Correct. PROCEDURE:  After the patient was asleep, rolled onto his left chest, the right chest was prepped and draped in a sterile fashion. Attempt was made to get in with a 5-mm trocar but this was unsuccessful. Therefore, I immediately went to a right posterolateral thoracotomy. Incision was made in the skin. Muscle was divided. I then encountered fairly large fluid collections with anteriorly purulence. This was opened up. The patient had multiple rib fractures and this was what made this case so very, very difficult to even get into his chest as these were somewhat healed and calcified, and approximately the 7th interspace was entered as this was the most accessible. This probably took 45 minutes to actually get into the chest and clean the lung up to where I could get a retractor in. In addition, to make this case much, much harder, Anesthesia was unable to collapse the right lung. Therefore, it was fully inflated the entire time. Several injuries to the lung were made trying to get this down off the chest wall but this was successfully done circumferentially, opening up fluid collections, draining these, and decorticating the lung for full expansion. There was a good bit of bleeding as 1200 mL were lost.  Extensive irrigation was then done.   Two applications of Progel were placed on the lung secondary to air leaks. At this point, two 32 chest tubes were placed straight anteriorly into the apex and one as a right angle into the base. This was secured to the skin with 0 silks. A 2 Vicryl was used to reapproximate the ribs. A 1 PDS was used to close the muscle in layers. A stapler was used to close the skin. The patient tolerated the procedure well.       MD KRISTOPHER Allison/[id number]/  D:  11/23/2020 8:09  T:  11/23/2020 12:04  JOB #:  [id number]

## 2020-11-23 NOTE — PROGRESS NOTES
Problem: Falls - Risk of  Goal: *Absence of Falls  Description: Document Veatrice Marker Fall Risk and appropriate interventions in the flowsheet. Outcome: Progressing Towards Goal  Note: Fall Risk Interventions:  Mobility Interventions: Communicate number of staff needed for ambulation/transfer, Patient to call before getting OOB         Medication Interventions: Patient to call before getting OOB, Teach patient to arise slowly    Elimination Interventions: Patient to call for help with toileting needs, Call light in reach    History of Falls Interventions: Door open when patient unattended, Investigate reason for fall         Problem: Patient Education: Go to Patient Education Activity  Goal: Patient/Family Education  Outcome: Progressing Towards Goal     Problem: Airway Clearance - Ineffective  Goal: *Patent airway  Outcome: Progressing Towards Goal  Goal: *Absence of airway secretions  Outcome: Progressing Towards Goal  Goal: *Able to cough effectively  Outcome: Progressing Towards Goal  Goal: *PALLIATIVE CARE:  Alleviation of secretions, cough and/or nasal congestion  Outcome: Progressing Towards Goal     Problem: Patient Education: Go to Patient Education Activity  Goal: Patient/Family Education  Outcome: Progressing Towards Goal     Problem: Pressure Injury - Risk of  Goal: *Prevention of pressure injury  Description: Document Carroll Scale and appropriate interventions in the flowsheet.   Outcome: Progressing Towards Goal  Note: Pressure Injury Interventions:  Sensory Interventions: Assess changes in LOC    Moisture Interventions: Absorbent underpads, Apply protective barrier, creams and emollients    Activity Interventions: Increase time out of bed, Pressure redistribution bed/mattress(bed type)    Mobility Interventions: Pressure redistribution bed/mattress (bed type)    Nutrition Interventions: Offer support with meals,snacks and hydration, Document food/fluid/supplement intake    Friction and Shear Interventions: Apply protective barrier, creams and emollients, Foam dressings/transparent film/skin sealants, Lift sheet                Problem: Patient Education: Go to Patient Education Activity  Goal: Patient/Family Education  Outcome: Progressing Towards Goal

## 2020-11-23 NOTE — PROGRESS NOTES
Chart review complete, pt continues with IV antibx and surgery follow up, CM will remain available to assist as needed.

## 2020-11-23 NOTE — PROGRESS NOTES
END OF SHIFT NOTE:    INTAKE/OUTPUT  11/22 0701 - 11/23 0700  In: -   Out: 825 [Urine:825]  Voiding: YES  Catheter: NO  Drain:              Flatus: Patient does have flatus present. Stool:  0 occurrences. Characteristics:       Emesis: 0 occurrences. Characteristics:        VITAL SIGNS  Patient Vitals for the past 12 hrs:   Temp Pulse Resp BP SpO2   11/23/20 1512 98 °F (36.7 °C) 93 17 105/61 98 %   11/23/20 1322     90 %   11/23/20 1117 97.7 °F (36.5 °C) 93 18 125/77 91 %   11/23/20 0854     91 %   11/23/20 0727 97.9 °F (36.6 °C) 89 17 105/67 99 %       Pain Assessment  Pain Intensity 1: 9 (11/23/20 1839)  Pain Location 1: Chest, Back  Pain Intervention(s) 1: Medication (see MAR)  Patient Stated Pain Goal: 0    Ambulating  Yes    Shift report given to oncoming nurse at the bedside.     Kirstin Chapa RN

## 2020-11-23 NOTE — PROGRESS NOTES
Sudhir [place]  Admission Date: 11/14/2020             Daily Progress Note: 11/23/2020    The patient's chart is reviewed and the patient is discussed with the staff.    48 y.o.  male presents with a PMHx of smoker, ETOH abuse, and recent hospitalizations for traumatic PTX and rib fractures.  Prior to this admission, he has been admitted twice r/t traumatic PTX.  First admission was 10/30-11/2>>during that admission he required a CT for the PTX.  It was deemed that he was not in need for surgery at that time. He returned 11/3-11/12 where surgery again was consulted, but felt he still did not need surgery. A chest CT on 11/3 showed a loculated hydropneumothorax.  MSSA bactermia was diagnosed per blood cultures on 11/3. He also grew MSSA from the left chest wound. A left chest tube was placed by IR on 11-4 and was removed on 11/11 (had to have TPA to assist with drainage), with only a small amount of remaining fluid noted on follow up CT (estimated about 200 mls). The infectious disease team has been involved and he was to be treated with IV Rocephin for 4 to 6 weeks with EOT on 12/4. He is now readmitted with worsening shortness of breath. The CT now looks worse with air in loculated fluid collections. IR and Surgery have been consulted. VATS 11/17 with CT x 2 to suction, ROCKY to bulb suction. Received one unit PRBC yesterday. Subjective:     CT removed on Friday per surgery. Chest CT pm 11/22 with noted loculated fluid, which has increased in size since CT removal, has now extended into chest wall which explains swelling noted above surgical site. Numerous rib fractures noted, these are not new. Small pleural effusions noted, left>right. Surgery asked to evaluate    On 2  Lpm, lots of pain at incision site where staples are.         Current Facility-Administered Medications   Medication Dose Route Frequency    cyclobenzaprine (FLEXERIL) tablet 10 mg  10 mg Oral TID    traZODone (DESYREL) tablet 50 mg  50 mg Oral QHS    HYDROmorphone (PF) (DILAUDID) injection 1 mg  1 mg IntraVENous Q3H PRN    magnesium oxide (MAG-OX) tablet 400 mg  400 mg Oral BID    albuterol (PROVENTIL VENTOLIN) nebulizer solution 2.5 mg  2.5 mg Nebulization Q6H RT    calcium carbonate (TUMS) chewable tablet 200 mg [elemental]  200 mg Oral TID WITH MEALS    0.9% sodium chloride infusion 250 mL  250 mL IntraVENous PRN    naloxone (NARCAN) injection 0.4 mg  0.4 mg IntraVENous EVERY 2 MINUTES AS NEEDED    oxyCODONE IR (ROXICODONE) tablet 10 mg  10 mg Oral Q4H PRN    LORazepam (ATIVAN) injection 1 mg  1 mg IntraVENous Q4H PRN    central line flush (saline) syringe 10 mL  10 mL InterCATHeter PRN    sodium chloride (NS) flush 5-40 mL  5-40 mL IntraVENous Q8H    sodium chloride (NS) flush 5-40 mL  5-40 mL IntraVENous PRN    promethazine (PHENERGAN) with saline injection 6.25 mg  6.25 mg IntraVENous Q15MIN PRN    central line flush (saline) syringe 10 mL  10 mL InterCATHeter PRN    ceFAZolin (ANCEF) 2 g/20 mL in sterile water IV syringe  2 g IntraVENous Q8H    sodium chloride (NS) flush 5-40 mL  5-40 mL IntraVENous Q8H    sodium chloride (NS) flush 5-40 mL  5-40 mL IntraVENous PRN    acetaminophen (TYLENOL) tablet 650 mg  650 mg Oral Q6H PRN    Or    acetaminophen (TYLENOL) suppository 650 mg  650 mg Rectal Q6H PRN    polyethylene glycol (MIRALAX) packet 17 g  17 g Oral DAILY PRN    promethazine (PHENERGAN) tablet 12.5 mg  12.5 mg Oral Q6H PRN    Or    ondansetron (ZOFRAN) injection 4 mg  4 mg IntraVENous Q6H PRN       Review of Systems  Constitutional: negative for fever, chills, sweats  Cardiovascular: negative for chest pain, palpitations, syncope, edema  Gastrointestinal:  negative for dysphagia, reflux, vomiting, diarrhea, abdominal pain, or melena  Neurologic:  negative for focal weakness, numbness, headache    Objective:     Vitals:    11/23/20 0143 11/23/20 0350 11/23/20 9967 11/23/20 0854   BP:  114/72 105/67    Pulse:  (!) 101 89    Resp:  18 17    Temp:  97.3 °F (36.3 °C) 97.9 °F (36.6 °C)    SpO2: 93% 94% 99% 91%   Weight:       Height:             Intake/Output Summary (Last 24 hours) at 11/23/2020 0900  Last data filed at 11/23/2020 7384  Gross per 24 hour   Intake    Output 825 ml   Net -825 ml       Physical Exam:   Constitution:  the patient is well developed and in no acute distress  EENMT:  Sclera clear, pupils equal, oral mucosa moist  Respiratory: diminished bilateral bases on 2 lpm  Cardiovascular:  RRR without M,G,R  Gastrointestinal: soft and non-tender; with positive bowel sounds   Musculoskeletal: warm without cyanosis. There is no lower extremity edema. Skin:  no jaundice or rashes, surgical wounds   Neurologic: no gross neuro deficits, alert and oriented x 3                    IMPRESSION  Impression:     1. Increasing loculated fluid along left lateral lung margin extending into the  chest wall, following VATS. 2. Associated numerous left lateral rib fractures. 3. Increasing small basilar pleural effusions, left greater than right        LAB  No results for input(s): GLUCPOC in the last 72 hours. No lab exists for component: [place]   Recent Labs     11/23/20 0428 11/22/20 0414 11/21/20  0430   WBC 5.8 5.9 5.8   HGB 7.3* 8.4* 7.9*   HCT 22.8* 25.7* 24.7*    285 261     Recent Labs     11/23/20 0428 11/22/20 0414 11/21/20  0430    136* 136   K 3.9 4.0 3.8    100 98   CO2 33* 34* 35*   * 101* 122*   BUN 4* 5* 3*   CREA 0.54* 0.59* 0.51*   MG 1.7*  --   --    CA 8.4 8.4 7.9*   ALB 1.5*  --   --    TBILI 0.5  --   --    ALT 8*  --   --        Echo  SUMMARY:     -  Left ventricle: Systolic function was normal. Ejection fraction was  estimated in the range of 55 % to 60 %. There were no regional wall motion  abnormalities.     -  Aortic valve:  Although there was no diagnostic evidence for vegetation,   this  study is not adequate to completely exclude the possibility.     -  Mitral valve: Although there was no diagnostic evidence for vegetation,   this  study is not adequate to completely exclude the possibility.     -  Tricuspid valve: Although there was no diagnostic evidence for vegetation,  this study is not adequate to completely exclude the possibility.       Assessment:  (Medical Decision Making)     Hospital Problems  Date Reviewed: 11/3/2020          Codes Class Noted POA    * (Principal) Acute respiratory failure with hypoxia ([facility] [address] ICD-10-CM: J96.01  ICD-9-CM: 518.81  11/15/2020 Unknown        Loculated empyema ([facility] [address] ICD-10-CM: J86.9  ICD-9-CM: 510.9  11/15/2020 Unknown        MSSA bacteremia ICD-10-CM: R78.81, B95.61  ICD-9-CM: 790.7, 041.11  11/4/2020 Yes        Alcohol abuse ICD-10-CM: F10.10  ICD-9-CM: 305.00  8/17/2016 Yes            S/P VATS with decortication on 11/17, seen by ID. MSSA bacteremia, on ancef    Plan:  (Medical Decision Making)   -- ancef EOT 12/4. Repeat blood cultures negative  -- surgery managing loculated fluid  -- on minimal O2- 2 lpm, wean as able  -- would avoid IV narcotics when able, Hx ETOH abuse  -- POC per hospitalist, ID and surgery. More than 50% of the time documented was spent in face-to-face contact with the patient and in the care of the patient on the floor/unit where the patient is located. Ihsan Hearing, NP  The patient has been seen and examined by me personally, the chart,labs, and radiographic studies have been reviewed. Chest: CTA  Extremities: no edema    I agree with the above assessment and plan. Nothing to add will be available upon further request and sign off for now.     Adonay Hensley MD.

## 2020-11-23 NOTE — PROGRESS NOTES
END OF SHIFT NOTE:    INTAKE/OUTPUT  11/22 0701 - 11/23 0700  In: -   Out: 825 [Urine:825]  Voiding: YES  Catheter: NO  Drain:              Flatus: Patient does have flatus present. Stool:  0 occurrences. Characteristics:       Emesis: 0 occurrences. Characteristics:        VITAL SIGNS  Patient Vitals for the past 12 hrs:   Temp Pulse Resp BP SpO2   11/23/20 0350 97.3 °F (36.3 °C) (!) 101 18 114/72 94 %   11/23/20 0143     93 %   11/22/20 2305 98 °F (36.7 °C) 89 18 121/72 97 %   11/22/20 2003     95 %   11/22/20 1949 97.1 °F (36.2 °C) 100 20 120/70 94 %       Pain Assessment  Pain Intensity 1: 6 (11/23/20 0150)  Pain Location 1: Chest, Back  Pain Intervention(s) 1: Medication (see MAR)  Patient Stated Pain Goal: 2    Ambulating  Yes    Hourly rounding on patient. Shift report given to oncoming nurse at the bedside.     Kofi Reyna

## 2020-11-24 ENCOUNTER — APPOINTMENT (OUTPATIENT)
Dept: INFUSION THERAPY | Age: 50
End: 2020-11-24

## 2020-11-24 LAB
ABO + RH BLD: NORMAL
ANION GAP SERPL CALC-SCNC: 5 MMOL/L (ref 7–16)
BLD PROD TYP BPU: NORMAL
BLOOD GROUP ANTIBODIES SERPL: NORMAL
BPU ID: NORMAL
BUN SERPL-MCNC: 1 MG/DL (ref 6–23)
CALCIUM SERPL-MCNC: 8.7 MG/DL (ref 8.3–10.4)
CHLORIDE SERPL-SCNC: 102 MMOL/L (ref 98–107)
CO2 SERPL-SCNC: 32 MMOL/L (ref 21–32)
CREAT SERPL-MCNC: 0.59 MG/DL (ref 0.8–1.5)
CROSSMATCH RESULT,%XM: NORMAL
ERYTHROCYTE [DISTWIDTH] IN BLOOD BY AUTOMATED COUNT: 18.4 % (ref 11.9–14.6)
GLUCOSE SERPL-MCNC: 87 MG/DL (ref 65–100)
HCT VFR BLD AUTO: 26.9 % (ref 41.1–50.3)
HGB BLD-MCNC: 8.6 G/DL (ref 13.6–17.2)
MCH RBC QN AUTO: 35.1 PG (ref 26.1–32.9)
MCHC RBC AUTO-ENTMCNC: 32 G/DL (ref 31.4–35)
MCV RBC AUTO: 109.8 FL (ref 79.6–97.8)
NRBC # BLD: 0 K/UL (ref 0–0.2)
PLATELET # BLD AUTO: 317 K/UL (ref 150–450)
PMV BLD AUTO: 9.8 FL (ref 9.4–12.3)
POTASSIUM SERPL-SCNC: 3.9 MMOL/L (ref 3.5–5.1)
RBC # BLD AUTO: 2.45 M/UL (ref 4.23–5.6)
SODIUM SERPL-SCNC: 139 MMOL/L (ref 138–145)
SPECIMEN EXP DATE BLD: NORMAL
STATUS OF UNIT,%ST: NORMAL
UNIT DIVISION, %UDIV: 0
WBC # BLD AUTO: 5.9 K/UL (ref 4.3–11.1)

## 2020-11-24 PROCEDURE — 65270000029 HC RM PRIVATE

## 2020-11-24 PROCEDURE — 85027 COMPLETE CBC AUTOMATED: CPT

## 2020-11-24 PROCEDURE — 74011250637 HC RX REV CODE- 250/637: Performed by: FAMILY MEDICINE

## 2020-11-24 PROCEDURE — 77010033678 HC OXYGEN DAILY

## 2020-11-24 PROCEDURE — 80048 BASIC METABOLIC PNL TOTAL CA: CPT

## 2020-11-24 PROCEDURE — 94640 AIRWAY INHALATION TREATMENT: CPT

## 2020-11-24 PROCEDURE — 2709999900 HC NON-CHARGEABLE SUPPLY

## 2020-11-24 PROCEDURE — 74011250637 HC RX REV CODE- 250/637: Performed by: STUDENT IN AN ORGANIZED HEALTH CARE EDUCATION/TRAINING PROGRAM

## 2020-11-24 PROCEDURE — 94760 N-INVAS EAR/PLS OXIMETRY 1: CPT

## 2020-11-24 PROCEDURE — 74011000250 HC RX REV CODE- 250: Performed by: SURGERY

## 2020-11-24 PROCEDURE — 74011250637 HC RX REV CODE- 250/637: Performed by: NURSE PRACTITIONER

## 2020-11-24 PROCEDURE — 74011250636 HC RX REV CODE- 250/636: Performed by: NURSE PRACTITIONER

## 2020-11-24 PROCEDURE — 74011000250 HC RX REV CODE- 250: Performed by: INTERNAL MEDICINE

## 2020-11-24 PROCEDURE — 36430 TRANSFUSION BLD/BLD COMPNT: CPT

## 2020-11-24 PROCEDURE — 74011250636 HC RX REV CODE- 250/636: Performed by: SURGERY

## 2020-11-24 PROCEDURE — P9016 RBC LEUKOCYTES REDUCED: HCPCS

## 2020-11-24 RX ADMIN — CEFAZOLIN SODIUM 2 G: 100 INJECTION, POWDER, LYOPHILIZED, FOR SOLUTION INTRAVENOUS at 10:23

## 2020-11-24 RX ADMIN — ALBUTEROL SULFATE 2.5 MG: 2.5 SOLUTION RESPIRATORY (INHALATION) at 01:56

## 2020-11-24 RX ADMIN — ALBUTEROL SULFATE 2.5 MG: 2.5 SOLUTION RESPIRATORY (INHALATION) at 13:21

## 2020-11-24 RX ADMIN — CALCIUM CARBONATE (ANTACID) CHEW TAB 500 MG 200 MG: 500 CHEW TAB at 17:49

## 2020-11-24 RX ADMIN — Medication 10 ML: at 14:48

## 2020-11-24 RX ADMIN — Medication 10 ML: at 06:00

## 2020-11-24 RX ADMIN — ALBUTEROL SULFATE 2.5 MG: 2.5 SOLUTION RESPIRATORY (INHALATION) at 07:00

## 2020-11-24 RX ADMIN — CEFAZOLIN SODIUM 2 G: 100 INJECTION, POWDER, LYOPHILIZED, FOR SOLUTION INTRAVENOUS at 17:49

## 2020-11-24 RX ADMIN — TRAZODONE HYDROCHLORIDE 50 MG: 50 TABLET ORAL at 21:17

## 2020-11-24 RX ADMIN — OXYCODONE HYDROCHLORIDE 10 MG: 5 TABLET ORAL at 04:39

## 2020-11-24 RX ADMIN — MAGNESIUM GLUCONATE 500 MG ORAL TABLET 400 MG: 500 TABLET ORAL at 08:41

## 2020-11-24 RX ADMIN — MAGNESIUM GLUCONATE 500 MG ORAL TABLET 400 MG: 500 TABLET ORAL at 17:49

## 2020-11-24 RX ADMIN — CYCLOBENZAPRINE 10 MG: 10 TABLET, FILM COATED ORAL at 21:17

## 2020-11-24 RX ADMIN — CYCLOBENZAPRINE 10 MG: 10 TABLET, FILM COATED ORAL at 17:49

## 2020-11-24 RX ADMIN — ALBUTEROL SULFATE 2.5 MG: 2.5 SOLUTION RESPIRATORY (INHALATION) at 19:45

## 2020-11-24 RX ADMIN — OXYCODONE HYDROCHLORIDE 10 MG: 5 TABLET ORAL at 08:40

## 2020-11-24 RX ADMIN — HYDROMORPHONE HYDROCHLORIDE 1 MG: 1 INJECTION, SOLUTION INTRAMUSCULAR; INTRAVENOUS; SUBCUTANEOUS at 11:31

## 2020-11-24 RX ADMIN — CALCIUM CARBONATE (ANTACID) CHEW TAB 500 MG 200 MG: 500 CHEW TAB at 08:41

## 2020-11-24 RX ADMIN — Medication 10 ML: at 22:00

## 2020-11-24 RX ADMIN — CALCIUM CARBONATE (ANTACID) CHEW TAB 500 MG 200 MG: 500 CHEW TAB at 11:34

## 2020-11-24 RX ADMIN — CYCLOBENZAPRINE 10 MG: 10 TABLET, FILM COATED ORAL at 08:41

## 2020-11-24 RX ADMIN — OXYCODONE HYDROCHLORIDE 10 MG: 5 TABLET ORAL at 14:47

## 2020-11-24 RX ADMIN — CEFAZOLIN SODIUM 2 G: 100 INJECTION, POWDER, LYOPHILIZED, FOR SOLUTION INTRAVENOUS at 02:11

## 2020-11-24 NOTE — PROGRESS NOTES
Progress Note    2020  Admit Date: 2020  9:21 PM   NAME: Ailyn Mace   :  1970   MRN:  [id number]   Attending: Igor Valle MD  PCP:  None  Treatment Team: Attending Provider: Igor Valle MD; Consulting Provider: Paulo Garcia MD; Nurse Practitioner: Dulce Milan NP; Care Manager: Prudence Rowell, RN; Student Nurse: Dorcas President    Full Code   SUBJECTIVE:     Mr. Meryl Cross is a 49 yo male with PMH of smoker, ETOH abuse, recent hospitalizations for traumatic pneumothorax and rib fx. [facility] has had 2 recent admissions for same requiring chest tubes and TPA to assist with drainage, deemed no need for surgery at that time.  Pt found to have MSSA bacteremia and left chest wound.  ID consulted and pt tx with IV rocephin for 4-6 weeks with EOT 20. [facility] presented to the ER by EMS after being found hypoxic with SOB at the infusion center.  CXR showed unchanged left sided empyema.  CT chest showed mildly enlarged multilobulated left empyema, multiple rib fx.  IR consulted. Pulm consulted. General Surgery consulted.  ID changed rocephin to ancef.  underwent VATs, chest tube X2, thoracic epidural placed, ROCKY placed.  pt pulled out drains. 1 chest tube replaced by Dr. Gretta Perales.  left chest tube removed per Surgery.  hypoxic, spongy edema left mid back around mid axillary. CT chest obtained showed increased loculated fluid along left lateral lung margin extending into the chest wall, following VATS, increasing small basilar pleural effusion, left greater than right. Surgery re-consulted. Pulmonary consulted. ID extending Ancef until at least 20. Required PRBC  for drop in hgb. on  3 L O2. Feeling better today. Anxious to get staples removed. Denies CP, worsening SOB.         Past Medical History:   Diagnosis Date    GERD (gastroesophageal reflux disease)     once a week OTC med     Recent Results (from the past 24 hour(s))   RBC, ALLOCATE    Collection Time: 11/23/20  2:45 PM   Result Value Ref Range    HISTORY CHECKED?  Historical check performed    TYPE & SCREEN    Collection Time: 11/23/20  2:57 PM   Result Value Ref Range    Crossmatch Expiration 11/26/2020,2359     ABO/Rh(D) A NEGATIVE     Antibody screen NEG     Unit number [id number]     Blood component type  LR     Unit division 00     Status of unit TRANSFUSED     Crossmatch result Compatible    METABOLIC PANEL, BASIC    Collection Time: 11/24/20  4:47 AM   Result Value Ref Range    Sodium 139 138 - 145 mmol/L    Potassium 3.9 3.5 - 5.1 mmol/L    Chloride 102 98 - 107 mmol/L    CO2 32 21 - 32 mmol/L    Anion gap 5 (L) 7 - 16 mmol/L    Glucose 87 65 - 100 mg/dL    BUN 1 (L) 6 - 23 MG/DL    Creatinine 0.59 (L) 0.8 - 1.5 MG/DL    GFR est AA >60 >60 ml/min/1.73m2    GFR est non-AA >60 >60 ml/min/1.73m2    Calcium 8.7 8.3 - 10.4 MG/DL   CBC W/O DIFF    Collection Time: 11/24/20  4:47 AM   Result Value Ref Range    WBC 5.9 4.3 - 11.1 K/uL    RBC 2.45 (L) 4.23 - 5.6 M/uL    HGB 8.6 (L) 13.6 - 17.2 g/dL    HCT 26.9 (L) 41.1 - 50.3 %    .8 (H) 79.6 - 97.8 FL    MCH 35.1 (H) 26.1 - 32.9 PG    MCHC 32.0 31.4 - 35.0 g/dL    RDW 18.4 (H) 11.9 - 14.6 %    PLATELET 397 365 - 026 K/uL    MPV 9.8 9.4 - 12.3 FL    ABSOLUTE NRBC 0.00 0.0 - 0.2 K/uL     No Known Allergies  Current Facility-Administered Medications   Medication Dose Route Frequency Provider Last Rate Last Dose    0.9% sodium chloride infusion 250 mL  250 mL IntraVENous PRN Cali Adair NP        cyclobenzaprine (FLEXERIL) tablet 10 mg  10 mg Oral TID Vish Neal MD   10 mg at 11/24/20 0841    traZODone (DESYREL) tablet 50 mg  50 mg Oral QHS Triny Turk NP   50 mg at 11/23/20 2124    HYDROmorphone (PF) (DILAUDID) injection 1 mg  1 mg IntraVENous Q3H PRN Mirtha Turk NP   1 mg at 11/23/20 2332    magnesium oxide (MAG-OX) tablet 400 mg  400 mg Oral BID Mirtha Turk NP   400 mg at 11/24/20 0841    albuterol (PROVENTIL VENTOLIN) nebulizer solution 2.5 mg  2.5 mg Nebulization Q6H RT Jalaine Heimlich, MD   2.5 mg at 11/24/20 0700    calcium carbonate (TUMS) chewable tablet 200 mg [elemental]  200 mg Oral TID WITH MEALS Sunitha Springer MD   200 mg at 11/24/20 0841    0.9% sodium chloride infusion 250 mL  250 mL IntraVENous PRN Rebecca Cooney NP        naloxone (NARCAN) injection 0.4 mg  0.4 mg IntraVENous EVERY 2 MINUTES AS NEEDED [facility], NP        oxyCODONE IR (ROXICODONE) tablet 10 mg  10 mg Oral Q4H PRN [facility],    10 mg at 11/24/20 0840    LORazepam (ATIVAN) injection 1 mg  1 mg IntraVENous Q4H PRN [facility],    1 mg at 11/22/20 1211    central line flush (saline) syringe 10 mL  10 mL InterCATHeter PRN Erica Galdamez MD        sodium chloride (NS) flush 5-40 mL  5-40 mL IntraVENous Q8H Sasha Argueta MD   10 mL at 11/24/20 0600    sodium chloride (NS) flush 5-40 mL  5-40 mL IntraVENous PRN Sasha Argueta MD        [facility]) with saline injection 6.25 mg  6.25 mg IntraVENous Q15MIN PRN Sasha Argueta MD   6.25 mg at 11/17/20 2034    central line flush (saline) syringe 10 mL  10 mL InterCATHeter PRN Janneth Reed MD        ceFAZolin (ANCEF) 2 g/20 mL in sterile water IV syringe  2 g IntraVENous Q8H Erica Galdamez MD   2 g at 11/24/20 0211    sodium chloride (NS) flush 5-40 mL  5-40 mL IntraVENous Q8H Erica Galdamez MD   10 mL at 11/24/20 0600    sodium chloride (NS) flush 5-40 mL  5-40 mL IntraVENous PRN Erica Galdamez MD        acetaminophen (TYLENOL) tablet 650 mg  650 mg Oral Q6H PRN Erica Galdamez MD        Or    acetaminophen (TYLENOL) suppository 650 mg  650 mg Rectal Q6H PRN Erica Galdamez MD        polyethylene glycol (MIRALAX) packet 17 g  17 g Oral DAILY PRN Erica Galdamez MD        promethazine (PHENERGAN) tablet 12.5 mg  12.5 mg Oral Q6H PRN Erica Galdamez MD        Or    ondansetron [facility] injection 4 mg  4 mg IntraVENous Q6H PRN Gatito Ma MD   4 mg at 20 1938       Review of Systems negative with exception of pertinent positives noted above  PHYSICAL EXAM     Visit Vitals  /71   Pulse 88   Temp 97.9 °F (36.6 °C)   Resp 17   Ht 5' 10\" (1.778 m)   Wt 103.2 kg (227 lb 8.2 oz)   SpO2 94%   BMI 32.65 kg/m²      Temp (24hrs), Av.6 °F (37 °C), Min:97.7 °F (36.5 °C), Max:99.6 °F (37.6 °C)    Oxygen Therapy  O2 Sat (%): 94 % (20 0738)  Pulse via Oximetry: 88 beats per minute (20 0700)  O2 Device: Nasal cannula (20 0840)  O2 Flow Rate (L/min): 3 l/min (20 0840)  FIO2 (%): 28 % (20 1317)  ETCO2 (mmHg): 99 mmHg (20 1849)    Intake/Output Summary (Last 24 hours) at 2020 1021  Last data filed at 2020 0050  Gross per 24 hour   Intake 324.2 ml   Output 1000 ml   Net -675.8 ml      General:       No acute distress, appears ill   Lungs:          Diminished left side. Dressing left chest c/d/i.  staples from surgical incision left chest, mid axillary, back c/d/i no surrounding erythema or drainage. Heart:            S1S2 present without murmurs rubs gallops. RRR. No LE edema  Abdomen:    Soft, non tender, non distended. BS present  Extremities: Moves ext spontaneously. No cyanosis  Neurologic:  A/O X3.  No focal deficits.     Results summary of Diagnostic Studies/Procedures copied from within [facility] EMR:        [address] Problems    Diagnosis Date Noted    Acute respiratory failure with hypoxia ([facility] [address] 11/15/2020    Loculated empyema ([facility] [address] 11/15/2020    MSSA bacteremia 2020    Alcohol abuse 2016     Plan:    Acute resp failure with hypoxia/loculated empyema/rib fx with pneumothorax  Pulm following  IR consulted  General Surgery consulted,s/p VATS .    pulled all tubes out, chest tube replaced  removed  On rocephin from - now on Ancef with  EOT 20  ID following  Wean O2  General Surgery signed off, F/U 1 week Dr. Ventura Minus at [facility]     MSSA Bacteremia  ID following  Ancef EOT 12/16/20      Alcohol abuse  Ativan prn      Marked worsening of spongy edematous area left back without crepitus 11/21  CT chest 11/22 w worsening loculated fluid   Surgery and Pulmonary in 11/22   Added  flexeril for muscle spasm.      Acute blood loss anemia  11/22 and 11/23 transfused PRBC  hgb stable          Notes, labs, VS, diagnostic testing reviewed        DVT Prophylaxis: SCD  Plan of Care Discussed with: Supervising MD Dr. Jay Mckeon, care team, pt      Ra Bell, JENNA

## 2020-11-24 NOTE — PROGRESS NOTES
END OF SHIFT NOTE:    INTAKE/OUTPUT  11/23 0701 - 11/24 0700  In: 324.2   Out: 1300 [Urine:1300]  Voiding: YES  Catheter: NO  Drain:              Flatus: Patient does have flatus present. Stool:  0 occurrences. Characteristics:       Emesis: 0 occurrences. Characteristics:        VITAL SIGNS  Patient Vitals for the past 12 hrs:   Temp Pulse Resp BP SpO2   11/24/20 0700     97 %   11/24/20 0438 98.7 °F (37.1 °C) 97 18 (!) 118/96 96 %   11/24/20 0156     (!) 82 %   11/23/20 2338 99.6 °F (37.6 °C) 94 18 106/69 99 %   11/23/20 2226 98.8 °F (37.1 °C) 99 18 114/76 99 %   11/23/20 2209 99.3 °F (37.4 °C) 96 18 120/68 99 %       Pain Assessment  Pain Intensity 1: 0 (11/24/20 0120)  Pain Location 1: Chest, Back  Pain Intervention(s) 1: Repositioned, Distraction  Patient Stated Pain Goal: 0    Ambulating  Yes    Hourly rounding on patient    Shift report given to oncoming nurse at the bedside.     Kofi Reyna

## 2020-11-24 NOTE — PROGRESS NOTES
Infectious Disease Progress Note    Today's Date: 2020   Admit Date: 2020    Impression:   · MSSA bacteremia (11/3 - );  [address] NG;  TTE negative  · MSSA Left pleural empyema: s/p chest tube, 20 Cx from pleural drainage, positive:  · Originally discharged 20 on IV Rocephin X 4 weeks with EOT 20  · S/p VATS 20  · S/p repeat thoracotomy; decortication yesterday (2020) with purulence evacuated. · ETOH dependence  · S/p fall through attic with contusion/trauma     Plan:   · Continue Cefazolin while here. Extend duration to 2020, although ultimately this will depend on response to therapy  · Will continue to follow       Anti-infectives:   · CTX  Restarted -  · Ancef -       Subjective:   He is complaining of incisional pain. No dyspnea or cough. Afebrile. No Known Allergies     Review of Systems:  A comprehensive review of systems was negative except for that written in the History of Present Illness.     Objective:     Visit Vitals  /71   Pulse 88   Temp 97.9 °F (36.6 °C)   Resp 17   Ht 5' 10\" (1.778 m)   Wt 103.2 kg (227 lb 8.2 oz)   SpO2 94%   BMI 32.65 kg/m²     Temp (24hrs), Av.6 °F (37 °C), Min:97.7 °F (36.5 °C), Max:99.6 °F (37.6 °C)       General:  NAD   Head:  NCAT   Eyes:  No icterus or drainage   Throat:    Neck: supple   Lungs:   Axillary surgical site with staples; irritated erythroderma along staple line; left flank surgical sites are closed; no drainage   Heart:  RRR   Abdomen:   S/NT/ND   Extremities: Extremities normal, atraumatic, no cyanosis or edema   Pulses:    Skin: No rash on visible skin     Lines/Devices: RUE PICC intact         Data Review:     CBC:  Recent Labs     20  0447 20  0428 20  0414   WBC 5.9 5.8 5.9   HGB 8.6* 7.3* 8.4*   HCT 26.9* 22.8* 25.7*    285 285       BMP:  Recent Labs     20  0447 20  0428 20  0414   CREA 0.59* 0.54* 0.59*   BUN 1* 4* 5*    139 136*   K 3.9 3.9 4.0    101 100   CO2 32 33* 34*   AGAP 5* 5* 2*   GLU 87 103* 101*       LFTS:  Recent Labs     11/23/20  0428   TBILI 0.5   ALT 8*   *   TP 6.1*   ALB 1.5*       Microbiology:     All Micro Results     Procedure Component Value Units Date/Time    CULTURE, BLOOD [[id number]] Collected:  11/15/20 0033    Order Status:  Completed Specimen:  Blood Updated:  11/20/20 0829     Special Requests: --        RIGHT  ARM       Culture result: NO GROWTH 5 DAYS       CULTURE, BLOOD [[id number]] Collected:  11/15/20 0035    Order Status:  Completed Specimen:  Blood Updated:  11/20/20 0829     Special Requests: --        LEFT  ARM       Culture result: NO GROWTH 5 DAYS             Imaging:   CT chest (11/22/2020)  Impression:     1. Increasing loculated fluid along left lateral lung margin extending into the  chest wall, following VATS. 2. Associated numerous left lateral rib fractures.   3. Increasing small basilar pleural effusions, left greater than right       Signed By: Lakisha Narvaez MD     November 24, 2020

## 2020-11-24 NOTE — PROGRESS NOTES
Problem: Falls - Risk of  Goal: *Absence of Falls  Description: Document Tylor Bond Fall Risk and appropriate interventions in the flowsheet. Outcome: Progressing Towards Goal  Note: Fall Risk Interventions:  Mobility Interventions: Communicate number of staff needed for ambulation/transfer         Medication Interventions: Evaluate medications/consider consulting pharmacy, Teach patient to arise slowly    Elimination Interventions: Call light in reach    History of Falls Interventions: Door open when patient unattended, Evaluate medications/consider consulting pharmacy         Problem: Patient Education: Go to Patient Education Activity  Goal: Patient/Family Education  Outcome: Progressing Towards Goal     Problem: Airway Clearance - Ineffective  Goal: *Patent airway  Outcome: Progressing Towards Goal  Goal: *Absence of airway secretions  Outcome: Progressing Towards Goal  Goal: *Able to cough effectively  Outcome: Progressing Towards Goal  Goal: *PALLIATIVE CARE:  Alleviation of secretions, cough and/or nasal congestion  Outcome: Progressing Towards Goal     Problem: Patient Education: Go to Patient Education Activity  Goal: Patient/Family Education  Outcome: Progressing Towards Goal     Problem: Pressure Injury - Risk of  Goal: *Prevention of pressure injury  Description: Document Carroll Scale and appropriate interventions in the flowsheet.   Outcome: Progressing Towards Goal  Note: Pressure Injury Interventions:  Sensory Interventions: Assess changes in LOC    Moisture Interventions: Absorbent underpads, Apply protective barrier, creams and emollients    Activity Interventions: Increase time out of bed, Pressure redistribution bed/mattress(bed type)    Mobility Interventions: Pressure redistribution bed/mattress (bed type)    Nutrition Interventions: Offer support with meals,snacks and hydration, Document food/fluid/supplement intake    Friction and Shear Interventions: Apply protective barrier, creams and emollients, Foam dressings/transparent film/skin sealants, Lift sheet                Problem: Patient Education: Go to Patient Education Activity  Goal: Patient/Family Education  Outcome: Progressing Towards Goal

## 2020-11-25 ENCOUNTER — APPOINTMENT (OUTPATIENT)
Dept: INFUSION THERAPY | Age: 50
End: 2020-11-25

## 2020-11-25 LAB
ANION GAP SERPL CALC-SCNC: 4 MMOL/L (ref 7–16)
BUN SERPL-MCNC: 3 MG/DL (ref 6–23)
CALCIUM SERPL-MCNC: 8.6 MG/DL (ref 8.3–10.4)
CHLORIDE SERPL-SCNC: 102 MMOL/L (ref 98–107)
CO2 SERPL-SCNC: 31 MMOL/L (ref 21–32)
CREAT SERPL-MCNC: 0.55 MG/DL (ref 0.8–1.5)
ERYTHROCYTE [DISTWIDTH] IN BLOOD BY AUTOMATED COUNT: 17.9 % (ref 11.9–14.6)
GLUCOSE SERPL-MCNC: 90 MG/DL (ref 65–100)
HCT VFR BLD AUTO: 26.6 % (ref 41.1–50.3)
HGB BLD-MCNC: 8.6 G/DL (ref 13.6–17.2)
MAGNESIUM SERPL-MCNC: 1.7 MG/DL (ref 1.8–2.4)
MCH RBC QN AUTO: 35 PG (ref 26.1–32.9)
MCHC RBC AUTO-ENTMCNC: 32.3 G/DL (ref 31.4–35)
MCV RBC AUTO: 108.1 FL (ref 79.6–97.8)
NRBC # BLD: 0 K/UL (ref 0–0.2)
PLATELET # BLD AUTO: 306 K/UL (ref 150–450)
PMV BLD AUTO: 9.5 FL (ref 9.4–12.3)
POTASSIUM SERPL-SCNC: 3.8 MMOL/L (ref 3.5–5.1)
RBC # BLD AUTO: 2.46 M/UL (ref 4.23–5.6)
SODIUM SERPL-SCNC: 137 MMOL/L (ref 138–145)
WBC # BLD AUTO: 5.6 K/UL (ref 4.3–11.1)

## 2020-11-25 PROCEDURE — 74011250637 HC RX REV CODE- 250/637: Performed by: FAMILY MEDICINE

## 2020-11-25 PROCEDURE — C1751 CATH, INF, PER/CENT/MIDLINE: HCPCS

## 2020-11-25 PROCEDURE — 94640 AIRWAY INHALATION TREATMENT: CPT

## 2020-11-25 PROCEDURE — 83735 ASSAY OF MAGNESIUM: CPT

## 2020-11-25 PROCEDURE — 74011000250 HC RX REV CODE- 250: Performed by: INTERNAL MEDICINE

## 2020-11-25 PROCEDURE — 77010033678 HC OXYGEN DAILY

## 2020-11-25 PROCEDURE — 74011250636 HC RX REV CODE- 250/636: Performed by: NURSE PRACTITIONER

## 2020-11-25 PROCEDURE — 94760 N-INVAS EAR/PLS OXIMETRY 1: CPT

## 2020-11-25 PROCEDURE — 74011250636 HC RX REV CODE- 250/636: Performed by: SURGERY

## 2020-11-25 PROCEDURE — 85027 COMPLETE CBC AUTOMATED: CPT

## 2020-11-25 PROCEDURE — 74011250637 HC RX REV CODE- 250/637: Performed by: STUDENT IN AN ORGANIZED HEALTH CARE EDUCATION/TRAINING PROGRAM

## 2020-11-25 PROCEDURE — 2709999900 HC NON-CHARGEABLE SUPPLY

## 2020-11-25 PROCEDURE — 80048 BASIC METABOLIC PNL TOTAL CA: CPT

## 2020-11-25 PROCEDURE — 74011000250 HC RX REV CODE- 250: Performed by: SURGERY

## 2020-11-25 PROCEDURE — 65270000029 HC RM PRIVATE

## 2020-11-25 PROCEDURE — 74011250637 HC RX REV CODE- 250/637: Performed by: NURSE PRACTITIONER

## 2020-11-25 RX ORDER — ALBUTEROL SULFATE 0.83 MG/ML
2.5 SOLUTION RESPIRATORY (INHALATION)
Status: DISCONTINUED | OUTPATIENT
Start: 2020-11-25 | End: 2020-12-03 | Stop reason: HOSPADM

## 2020-11-25 RX ADMIN — Medication 10 ML: at 06:00

## 2020-11-25 RX ADMIN — Medication 10 ML: at 14:21

## 2020-11-25 RX ADMIN — HYDROMORPHONE HYDROCHLORIDE 1 MG: 1 INJECTION, SOLUTION INTRAMUSCULAR; INTRAVENOUS; SUBCUTANEOUS at 00:28

## 2020-11-25 RX ADMIN — CYCLOBENZAPRINE 10 MG: 10 TABLET, FILM COATED ORAL at 08:31

## 2020-11-25 RX ADMIN — Medication 10 ML: at 22:45

## 2020-11-25 RX ADMIN — Medication 10 ML: at 22:44

## 2020-11-25 RX ADMIN — MAGNESIUM GLUCONATE 500 MG ORAL TABLET 400 MG: 500 TABLET ORAL at 08:31

## 2020-11-25 RX ADMIN — CEFAZOLIN SODIUM 2 G: 100 INJECTION, POWDER, LYOPHILIZED, FOR SOLUTION INTRAVENOUS at 01:33

## 2020-11-25 RX ADMIN — CEFAZOLIN SODIUM 2 G: 100 INJECTION, POWDER, LYOPHILIZED, FOR SOLUTION INTRAVENOUS at 18:04

## 2020-11-25 RX ADMIN — MAGNESIUM GLUCONATE 500 MG ORAL TABLET 400 MG: 500 TABLET ORAL at 17:19

## 2020-11-25 RX ADMIN — OXYCODONE HYDROCHLORIDE 10 MG: 5 TABLET ORAL at 08:31

## 2020-11-25 RX ADMIN — CALCIUM CARBONATE (ANTACID) CHEW TAB 500 MG 200 MG: 500 CHEW TAB at 11:46

## 2020-11-25 RX ADMIN — ALBUTEROL SULFATE 2.5 MG: 2.5 SOLUTION RESPIRATORY (INHALATION) at 08:03

## 2020-11-25 RX ADMIN — CALCIUM CARBONATE (ANTACID) CHEW TAB 500 MG 200 MG: 500 CHEW TAB at 08:31

## 2020-11-25 RX ADMIN — CALCIUM CARBONATE (ANTACID) CHEW TAB 500 MG 200 MG: 500 CHEW TAB at 16:47

## 2020-11-25 RX ADMIN — HYDROMORPHONE HYDROCHLORIDE 1 MG: 1 INJECTION, SOLUTION INTRAMUSCULAR; INTRAVENOUS; SUBCUTANEOUS at 18:03

## 2020-11-25 RX ADMIN — CEFAZOLIN SODIUM 2 G: 100 INJECTION, POWDER, LYOPHILIZED, FOR SOLUTION INTRAVENOUS at 10:00

## 2020-11-25 RX ADMIN — HYDROMORPHONE HYDROCHLORIDE 1 MG: 1 INJECTION, SOLUTION INTRAMUSCULAR; INTRAVENOUS; SUBCUTANEOUS at 05:28

## 2020-11-25 RX ADMIN — OXYCODONE HYDROCHLORIDE 10 MG: 5 TABLET ORAL at 14:21

## 2020-11-25 RX ADMIN — HYDROMORPHONE HYDROCHLORIDE 1 MG: 1 INJECTION, SOLUTION INTRAMUSCULAR; INTRAVENOUS; SUBCUTANEOUS at 11:52

## 2020-11-25 RX ADMIN — CYCLOBENZAPRINE 10 MG: 10 TABLET, FILM COATED ORAL at 16:47

## 2020-11-25 NOTE — PROGRESS NOTES
END OF SHIFT NOTE:    INTAKE/OUTPUT  11/24 0701 - 11/25 0700  In: 300 [P.O.:300]  Out: 750 [Urine:750]  Voiding: YES  Catheter: NO  Drain:              Flatus: Patient does have flatus present. Stool:  0 occurrences. Characteristics:       Emesis: 0 occurrences. Characteristics:        VITAL SIGNS  Patient Vitals for the past 12 hrs:   Temp Pulse Resp BP SpO2   11/25/20 0258 98.5 °F (36.9 °C) 88 18 98/67 92 %   11/24/20 2334 99.1 °F (37.3 °C) 97 18 107/68 91 %   11/24/20 1953 98.8 °F (37.1 °C) 99 18 115/76 90 %   11/24/20 1947     98 %       Pain Assessment  Pain Intensity 1: 10 (11/25/20 0528)  Pain Location 1: Chest  Pain Intervention(s) 1: Medication (see MAR)  Patient Stated Pain Goal: 0    Ambulating  Yes    Hourly rounding on patient. Shift report given to oncoming nurse at the bedside.     [place]

## 2020-11-25 NOTE — PROGRESS NOTES
Hospitalist Progress Note     Admit Date:  2020  9:21 PM   Name:  Karla Falcon   Age:  48 y.o.  :  1970   MRN:  [id number]   PCP:  None  Treatment Team: Attending Provider: Mohini Bhakta MD; Consulting Provider: Valerie Moon MD; Care Manager: Tacho Saucedo, RN; Student Nurse: Estiven Amezquita; Utilization Review: Shamir Liu RN; Primary Nurse: Alida Garcia; Nurse Practitioner: Rd Newton NP; Primary Nurse: Leonard Dennison RN    Subjective:   HPI and or CC:  48year old CM PMH ETOH abuse, GERD, tobacco use, recently hospitalized 11/3- for MSSA bacteremia and loculated empyema right side s/p fall from attic with right rib fx admitted after midnight for acute onset SOB. Per review of discharge on , he was to have daily Rocephin until  for MSSA bacteremia which he has been compliant with.    he reports doing well until he arrived home from infusion center and SOB began. Ems noted saturations in the 80s.  CXR showed unchanged left sided empyema.  CT chest showed mildly enlarged multilobulated left empyema, multiple rib fx.  IR consulted. Pulm consulted. General Surgery consulted.  ID changed rocephin to ancef.  underwent VATs, chest tube X2, thoracic epidural placed, ROCKY placed.   pt pulled out drains. 1 chest tube replaced by Dr. Shimon Maya. [facility] Resides left chest tube removed per Surgery.   hypoxic, spongy edema left mid back around mid axillary. CT chest obtained showed increased loculated fluid along left lateral lung margin extending into the chest wall, following VATS, increasing small basilar pleural effusion, left greater than right.  Surgery re-consulted. On  underwent right thoracotomy with drainage of effusion and decortication. Pulmonary consulted.  ID extending Ancef until at least 20.  Required PRBC  for drop in hgb. on  3 L O2    :  Patient seen today, alert and oriented x3.  Afebrile, no leukocytosis. Saturations low to mid 90s on 2 liters. Objective:     Patient Vitals for the past 24 hrs:   Temp Pulse Resp BP SpO2   11/25/20 0803     96 %   11/25/20 0731 98.2 °F (36.8 °C) 86 18 114/76 92 %   11/25/20 0258 98.5 °F (36.9 °C) 88 18 98/67 92 %   11/24/20 2334 99.1 °F (37.3 °C) 97 18 107/68 91 %   11/24/20 1953 98.8 °F (37.1 °C) 99 18 115/76 90 %   11/24/20 1947     98 %   11/24/20 1527 97.9 °F (36.6 °C) 90 18 109/72 97 %   11/24/20 1322     98 %   11/24/20 1143 98.3 °F (36.8 °C) 87 18 110/74 97 %     Oxygen Therapy  O2 Sat (%): 96 % (11/25/20 0803)  Pulse via Oximetry: 88 beats per minute (11/25/20 0803)  O2 Device: Nasal cannula (11/25/20 0803)  O2 Flow Rate (L/min): 2 l/min (11/25/20 0803)  FIO2 (%): 28 % (11/19/20 1317)  ETCO2 (mmHg): 99 mmHg (11/16/20 1849)    Intake/Output Summary (Last 24 hours) at 11/25/2020 1048  Last data filed at 11/25/2020 0030  Gross per 24 hour   Intake    Output 750 ml   Net -750 ml         REVIEW OF SYSTEMS: Comprehensive ROS performed and negative except as stated in HPI. Physical Examination:  General:    Well nourished. No gross distress. Head:  Normocephalic, atraumatic, nares patent  Eyes:  Extraocular movements intact, normal sclera  CV:   RRR. No  Murmurs, clicks, or gallops, distal pulses intact  Lungs:   Unlabored, no cyanosis, no wheeze  Abdomen:   Soft, nondistended, nontender. Extremities: Warm and dry. No cyanosis or edema. Skin:     No rashes or jaundice. Neuro:  No gross focal deficits, no tremor  Psych:  Mood and affect appropriate    Data Review:  I have reviewed all labs, meds, telemetry events, and studies from the last 24 hours.     Recent Results (from the past 24 hour(s))   CBC W/O DIFF    Collection Time: 11/25/20  4:23 AM   Result Value Ref Range    WBC 5.6 4.3 - 11.1 K/uL    RBC 2.46 (L) 4.23 - 5.6 M/uL    HGB 8.6 (L) 13.6 - 17.2 g/dL    HCT 26.6 (L) 41.1 - 50.3 %    .1 (H) 79.6 - 97.8 FL    MCH 35.0 (H) 26.1 - 32.9 PG    MCHC 32.3 31.4 - 35.0 g/dL    RDW 17.9 (H) 11.9 - 14.6 %    PLATELET 398 227 - 746 K/uL    MPV 9.5 9.4 - 12.3 FL    ABSOLUTE NRBC 0.00 0.0 - 0.2 K/uL   METABOLIC PANEL, BASIC    Collection Time: 11/25/20  4:23 AM   Result Value Ref Range    Sodium 137 (L) 138 - 145 mmol/L    Potassium 3.8 3.5 - 5.1 mmol/L    Chloride 102 98 - 107 mmol/L    CO2 31 21 - 32 mmol/L    Anion gap 4 (L) 7 - 16 mmol/L    Glucose 90 65 - 100 mg/dL    BUN 3 (L) 6 - 23 MG/DL    Creatinine 0.55 (L) 0.8 - 1.5 MG/DL    GFR est AA >60 >60 ml/min/1.73m2    GFR est non-AA >60 >60 ml/min/1.73m2    Calcium 8.6 8.3 - 10.4 MG/DL   MAGNESIUM    Collection Time: 11/25/20  4:23 AM   Result Value Ref Range    Magnesium 1.7 (L) 1.8 - 2.4 mg/dL        All Micro Results     Procedure Component Value Units Date/Time    CULTURE, BLOOD [[id number]] Collected:  11/15/20 0033    Order Status:  Completed Specimen:  Blood Updated:  11/20/20 0829     Special Requests: --        RIGHT  ARM       Culture result: NO GROWTH 5 DAYS       CULTURE, BLOOD [[id number]] Collected:  11/15/20 0035    Order Status:  Completed Specimen:  Blood Updated:  11/20/20 0829     Special Requests: --        LEFT  ARM       Culture result: NO GROWTH 5 DAYS             Current Meds:  Current Facility-Administered Medications   Medication Dose Route Frequency    albuterol (PROVENTIL VENTOLIN) nebulizer solution 2.5 mg  2.5 mg Nebulization Q6H PRN    0.9% sodium chloride infusion 250 mL  250 mL IntraVENous PRN    cyclobenzaprine (FLEXERIL) tablet 10 mg  10 mg Oral TID    traZODone (DESYREL) tablet 50 mg  50 mg Oral QHS    HYDROmorphone (PF) (DILAUDID) injection 1 mg  1 mg IntraVENous Q3H PRN    magnesium oxide (MAG-OX) tablet 400 mg  400 mg Oral BID    calcium carbonate (TUMS) chewable tablet 200 mg [elemental]  200 mg Oral TID WITH MEALS    0.9% sodium chloride infusion 250 mL  250 mL IntraVENous PRN    naloxone (NARCAN) injection 0.4 mg  0.4 mg IntraVENous EVERY 2 MINUTES AS NEEDED    oxyCODONE IR (ROXICODONE) tablet 10 mg  10 mg Oral Q4H PRN    LORazepam (ATIVAN) injection 1 mg  1 mg IntraVENous Q4H PRN    central line flush (saline) syringe 10 mL  10 mL InterCATHeter PRN    sodium chloride (NS) flush 5-40 mL  5-40 mL IntraVENous Q8H    sodium chloride (NS) flush 5-40 mL  5-40 mL IntraVENous PRN    promethazine (PHENERGAN) with saline injection 6.25 mg  6.25 mg IntraVENous Q15MIN PRN    central line flush (saline) syringe 10 mL  10 mL InterCATHeter PRN    ceFAZolin (ANCEF) 2 g/20 mL in sterile water IV syringe  2 g IntraVENous Q8H    sodium chloride (NS) flush 5-40 mL  5-40 mL IntraVENous Q8H    sodium chloride (NS) flush 5-40 mL  5-40 mL IntraVENous PRN    acetaminophen (TYLENOL) tablet 650 mg  650 mg Oral Q6H PRN    Or    acetaminophen (TYLENOL) suppository 650 mg  650 mg Rectal Q6H PRN    polyethylene glycol (MIRALAX) packet 17 g  17 g Oral DAILY PRN    promethazine (PHENERGAN) tablet 12.5 mg  12.5 mg Oral Q6H PRN    Or    ondansetron (ZOFRAN) injection 4 mg  4 mg IntraVENous Q6H PRN       Diet:  DIET REGULAR  DIET NUTRITIONAL SUPPLEMENTS    Other Studies (last 24 hours):  No results found. Assessment and Plan:     Hospital Problems as of 11/25/2020 Date Reviewed: 11/3/2020          Codes Class Noted - Resolved POA    * (Principal) Acute respiratory failure with hypoxia ([facility] ICD-10-CM: J96.01  ICD-9-CM: 518.81  11/15/2020 - Present Unknown        Loculated empyema ([facility] ICD-10-CM: J86.9  ICD-9-CM: 510.9  11/15/2020 - Present Unknown        MSSA bacteremia ICD-10-CM: R78.81, B95.61  ICD-9-CM: 790.7, 041.11  11/4/2020 - Present Yes        Alcohol abuse ICD-10-CM: F10.10  ICD-9-CM: 305.00  8/17/2016 - Present Yes              A/P:    1.  Acute resp failure with hypoxia/loculated empyema/rib fx with pneumothorax  Pulm following  IR consulted  General Surgery consulted,s/p VATS 11/17.   11/19 pulled all tubes out, chest tube replaced 11/20 removed  On rocephin from 11/13-11/16 now on Ancef with  EOT 12/16/20  ID following  Wean O2  General Surgery signed off, F/U 1 week Dr. Dietz Both at \[facility]\""     2. MSSA Bacteremia  ID following  Ancef EOT 12/16/20     3.  Alcohol abuse  Resolved; patient hospitalized since 11/15     4.  Marked worsening of spongy edematous area left back without crepitus 11/21  CT chest 11/22 w worsening loculated fluid   Surgery and Pulmonary in 11/22 11/23 underwent right thoracotomy with drainage of effusion and decortication.   Added  flexeril for muscle spasm.      5.  Acute blood loss anemia  11/22 and 11/23 transfused PRBC  hgb stable-8.3 today             Signed:  Stephanie Mackenzie NP

## 2020-11-25 NOTE — PROGRESS NOTES
END OF SHIFT NOTE:    INTAKE/OUTPUT  11/23 0701 - 11/24 0700  In: 324.2   Out: 1300 [Urine:1300]  Voiding: YES  Catheter: NO  Drain:              Flatus: Patient does have flatus present. Stool:  0 occurrences. Characteristics:       Emesis: 0 occurrences. Characteristics:        VITAL SIGNS  Patient Vitals for the past 12 hrs:   Temp Pulse Resp BP SpO2   11/24/20 1527 97.9 °F (36.6 °C) 90 18 109/72 97 %   11/24/20 1322     98 %   11/24/20 1143 98.3 °F (36.8 °C) 87 18 110/74 97 %   11/24/20 0738 97.9 °F (36.6 °C) 88 17 112/71 94 %       Pain Assessment  Pain Intensity 1: 7 (11/24/20 1447)  Pain Location 1: Chest  Pain Intervention(s) 1: Medication (see MAR)  Patient Stated Pain Goal: 0    Ambulating  Yes    Shift report given to oncoming nurse at the bedside.     Sole Clark

## 2020-11-25 NOTE — PROGRESS NOTES
Problem: Falls - Risk of  Goal: *Absence of Falls  Description: Document Eli Ballard Fall Risk and appropriate interventions in the flowsheet. Outcome: Progressing Towards Goal  Note: Fall Risk Interventions:  Mobility Interventions: Communicate number of staff needed for ambulation/transfer, Patient to call before getting OOB, PT Consult for mobility concerns         Medication Interventions: Evaluate medications/consider consulting pharmacy, Patient to call before getting OOB, Teach patient to arise slowly    Elimination Interventions: Call light in reach    History of Falls Interventions: Consult care management for discharge planning, Door open when patient unattended, Investigate reason for fall         Problem: Patient Education: Go to Patient Education Activity  Goal: Patient/Family Education  Outcome: Progressing Towards Goal     Problem: Airway Clearance - Ineffective  Goal: *Patent airway  Outcome: Progressing Towards Goal  Goal: *Absence of airway secretions  Outcome: Progressing Towards Goal  Goal: *Able to cough effectively  Outcome: Progressing Towards Goal  Goal: *PALLIATIVE CARE:  Alleviation of secretions, cough and/or nasal congestion  Outcome: Progressing Towards Goal     Problem: Patient Education: Go to Patient Education Activity  Goal: Patient/Family Education  Outcome: Progressing Towards Goal     Problem: Pressure Injury - Risk of  Goal: *Prevention of pressure injury  Description: Document Carroll Scale and appropriate interventions in the flowsheet.   Outcome: Progressing Towards Goal  Note: Pressure Injury Interventions:  Sensory Interventions: Assess changes in LOC    Moisture Interventions: Absorbent underpads, Apply protective barrier, creams and emollients    Activity Interventions: Increase time out of bed, Pressure redistribution bed/mattress(bed type), PT/OT evaluation    Mobility Interventions: HOB 30 degrees or less, Pressure redistribution bed/mattress (bed type), PT/OT evaluation    Nutrition Interventions: Document food/fluid/supplement intake    Friction and Shear Interventions: Apply protective barrier, creams and emollients, Foam dressings/transparent film/skin sealants, Lift sheet                Problem: Patient Education: Go to Patient Education Activity  Goal: Patient/Family Education  Outcome: Progressing Towards Goal Acitretin Counseling:  I discussed with the patient the risks of acitretin including but not limited to hair loss, dry lips/skin/eyes, liver damage, hyperlipidemia, depression/suicidal ideation, photosensitivity.  Serious rare side effects can include but are not limited to pancreatitis, pseudotumor cerebri, bony changes, clot formation/stroke/heart attack.  Patient understands that alcohol is contraindicated since it can result in liver toxicity and significantly prolong the elimination of the drug by many years.

## 2020-11-25 NOTE — PROGRESS NOTES
Infectious Disease Progress Note    Today's Date: 2020   Admit Date: 2020    Impression:   · MSSA bacteremia (11/3 - );  [address] NG;  TTE negative  · MSSA Left pleural empyema: s/p chest tube, 20 Cx from pleural drainage, positive:  · Originally discharged 20 on IV Rocephin X 4 weeks with EOT 20  · S/p VATS 20  · S/p repeat thoracotomy; decortication yesterday (2020) with purulence evacuated. · ETOH dependence  · S/p fall through attic with contusion/trauma     Plan:   · Continue Cefazolin while here. Extend duration to 2020; may then switch to po (cefadroxil or cephalexin) for additional 2-4 weeks. · Given continued pain would consider repeating Chest CT in next 48 hours given removal of chest tubes  · Will continue to follow       Anti-infectives:   · CTX  Restarted -  · Ancef -       Subjective:   Continues to complain of right chest wall pain both at incision and at chest tube sites. No Known Allergies     Review of Systems:  A comprehensive review of systems was negative except for that written in the History of Present Illness.     Objective:     Visit Vitals  /76   Pulse 86   Temp 98.2 °F (36.8 °C)   Resp 18   Ht 5' 10\" (1.778 m)   Wt 103.2 kg (227 lb 8.2 oz)   SpO2 96%   BMI 32.65 kg/m²     Temp (24hrs), Av.5 °F (36.9 °C), Min:97.9 °F (36.6 °C), Max:99.1 °F (37.3 °C)       General:  NAD   Head:  NCAT   Eyes:  No icterus or drainage   Throat:    Neck: supple   Lungs:   Axillary surgical site with staples; irritated erythroderma along staple line; left flank surgical sites are closed; no drainage; dressings left intact over chest tube sites inferiorly on left lateral chest wall   Heart:  RRR   Abdomen:   S/NT/ND   Extremities: RUE PICC nontender   Pulses:    Skin: No rash on visible skin     Lines/Devices: RUE PICC intact         Data Review:     CBC:  Recent Labs     20  0423 20  0447 20  0428   WBC 5.6 5.9 5. 8   HGB 8.6* 8.6* 7.3*   HCT 26.6* 26.9* 22.8*    317 285       BMP:  Recent Labs     11/25/20 0423 11/24/20 0447 11/23/20 0428   CREA 0.55* 0.59* 0.54*   BUN 3* 1* 4*   * 139 139   K 3.8 3.9 3.9    102 101   CO2 31 32 33*   AGAP 4* 5* 5*   GLU 90 87 103*       LFTS:  Recent Labs     11/23/20 0428   TBILI 0.5   ALT 8*   *   TP 6.1*   ALB 1.5*       Microbiology:     All Micro Results     Procedure Component Value Units Date/Time    CULTURE, BLOOD [[id number]] Collected:  11/15/20 0033    Order Status:  Completed Specimen:  Blood Updated:  11/20/20 0829     Special Requests: --        RIGHT  ARM       Culture result: NO GROWTH 5 DAYS       CULTURE, BLOOD [[id number]] Collected:  11/15/20 0035    Order Status:  Completed Specimen:  Blood Updated:  11/20/20 0829     Special Requests: --        LEFT  ARM       Culture result: NO GROWTH 5 DAYS             Imaging:   CT chest (11/22/2020)  Impression:     1. Increasing loculated fluid along left lateral lung margin extending into the  chest wall, following VATS. 2. Associated numerous left lateral rib fractures.   3. Increasing small basilar pleural effusions, left greater than right       Signed By: Saida Corrales MD     November 25, 2020

## 2020-11-25 NOTE — PROGRESS NOTES
END OF SHIFT NOTE:    INTAKE/OUTPUT  11/24 0701 - 11/25 0700  In: 300 [P.O.:300]  Out: 750 [Urine:750]  Voiding: YES  Catheter: NO  Drain:              Flatus: Patient does have flatus present. Stool:  0 occurrences. Characteristics:       Emesis: 0 occurrences. Characteristics:        VITAL SIGNS  Patient Vitals for the past 12 hrs:   Temp Pulse Resp BP SpO2   11/25/20 1528 97.7 °F (36.5 °C) 91 17 119/74 93 %   11/25/20 1129 97.9 °F (36.6 °C) 94 18 110/74 95 %   11/25/20 0803     96 %   11/25/20 0731 98.2 °F (36.8 °C) 86 18 114/76 92 %       Pain Assessment  Pain Intensity 1: 0 (11/25/20 1521)  Pain Location 1: Chest  Pain Intervention(s) 1: Medication (see MAR)  Patient Stated Pain Goal: 0    Ambulating  Yes    Shift report given to oncoming nurse at the bedside.     Jenna Kumar RN

## 2020-11-25 NOTE — ROUTINE PROCESS
Respiratory Care Services Policy Number: -[id number] Title: Patient Care Assessment Protocol Effective Date: 01/1999 Revised Date: 05/2014, 04/2018, 12/2018, 07/2019 Reviewed Date: 06/2013/ 03/2015, 03/2016, 06/2017 Overview In an effort to improve quality and reduce costs of respiratory care at [facility], the Respiratory Department has developed a number of Patient Care Protocols. These protocols have been developed according to Kamar 3 and are utilized for those patients who are ordered respiratory therapy using therapeutic indications and standardized approaches for accomplishing objectives. Patient Care Protocols are intended to improve care by: ? Defining the indications and standards of care agreed upon by the Pulmonary Medicine and [address] [place] [address] of [facility]. ? Training respiratory care practitioners to apply those criteria to individual patients and modify therapy as indicated by the protocols. ? Documenting the indication and care plan as part of the initial ordering process. ? Tapering or discontinuing treatments once the indication for therapy changes. The Patient Care Protocols shall be universally applied throughout the hospital as determined by  
the Pulmonary Medicine and [address] [place] [address]. Rationale for Patient Care Assessment Protocols: 
? Continuous Quality Improvement ? Cost containment ? Standardization of care 
? Enhanced continuity of care ? Utilization review ? Timely intervention The following patient care assessment protocols have been developed: ? Aerosolized Medication Protocol http://spStaphOff Biotechb/[facility]ights/zack/arianna/policies/Respiratory%20Care%20Services%20Policies/-[id number]. doc ? Bronchial Hygiene Protocol http://spweb/localAileron Therapeuticsstems/[employer]/stfrancis/policies/Respiratory%20Care%20Services%20Policies/-[id number]. doc 
 ? Oxygen Protocolhttp://[facility] CenterJumpLincSan[facility]/stfrancis/policies/Respiratory Care Services Policies/-[id number]. doc http://spC[facility] CenterJumpLincSanford Medical Center [facility]/stfrancis/policies/Respiratory%20Care%20Services%20Policies/-[id number]. doc 
? CVRU Fast Track Weaning Protocol http://spCay[facility] Medical Center/[facility] CenterJumpLincSanford [facility] Center [facility]/stfrancis/policies/Respiratory%20Care%20Services%20Policies/-[id number]. doc ? Asthma Treatment Protocol ERhttp://[facility] CenterJumpLincSan[facility]/stfrancis/policies/Respiratory Care Services Policies/-[id number]. doc http://[facility] CenterJumpLincW[facility]/stfrancis/policies/Respiratory%20Care%20Services%20Policies/-[id number]. doc ? Pediatric Asthma Treatment Protocol ERhttp://[facility] CenterJumpLincSan[facility]/stfrancis/policies/Respiratory Care Services Policies/-[id number]. doc http://spC[facility] CenterJumpLincSan[facility]/stfrancis/policies/Respiratory%20Care%20Services%20Policies/-[id number]. doc ? Alpha-1 Antitrypsin Deficiency Protocolhttp://[facility] CenterJumpLincW[facility]/stfrancis/policies/Respiratory Care Services Policies/-[id number]. doc http://[facility] CenterJumpLincW[facility]/stfrancis/policies/Respiratory%20Care%20Services%20Policies/-[id number]. doc ? Prone Positioning Protocol http://spC[facility] CenterJumpLincWils[facility]/stfrancis/policies/Respiratory%20Care%20Services%20Policies/-[id number]. doc 
? COPD Protocol http://spC[facility] CenterJumpLincWilsons/[facility]/stfrancis/policies/Respiratory%20Care%20Services%20Policies/-[id number]. doc 
? Home Oxygen Assessment Protocolhttp://sujathaCayuga Medical Center/[facility]/arianna/policies/Respiratory Care Services Policies/-[id number]. doc http://[facility]/stancis/policies/Respiratory%20Care%20Services%20Policies/-[id number]. doc 
? Ventilator Weaning Protocol http://sujathaCayuga Medical Center/[facility]/stancis/policies/Respiratory%20Care%20Services%20Policies/-[id number]. doc ? Lung Volume Expansion Protocolhttp://sujathaCayuga Medical Center/[facility]/stancis/policies/Respiratory Care Services Policies/-[id number]. doc http://[facility]/gvl/stfrancis/policies/Respiratory%20Care%20Services%20Policies/-[id number]. docm The Director of [address] [place] [address] oversees the Patient Care Assessment Program. The Respiratory Educator is responsible for protocol development and training. The Supervisor is responsible for implementation and  activities. Each patient with an order for respiratory treatments will receive an evaluation. Respiratory Care Practitioners (RCP's) will perform the evaluations. The same evaluation tool will be utilized for initial and follow-up assessments. If the patient does not meet criteria for ordered therapy, the therapy will be discontinued. If the patient demonstrates an adverse response to initially ordered therapy, the therapy will be discontinued and the physician will be contacted. Specific physician's orders that deviate from protocols and are deemed \"inappropriate\" or \"unsafe\" will be addressed with ordering physician and/or medical director as required. Respiratory Patient Care Assessment Protocols I. Policy: In an effort to provide quality patient care and effective utilization of services, physicians who order respiratory therapy will have their patients treated via the protocols established (see attached) Respiratory Care Practitioners (RCP's) will complete the initial assessment which will indicate patient needs,  the care plan developed and will performed within 24 hours of admission. Frequency of the therapy will be set according to the results of the respiratory therapy evaluation and frequency guidelines policy. Reassessment will be continued every 48 hours and more frequently as needed for the individual patient. II. Purpose: A.  To provide a process that will allow for ongoing assessment and care plan modification for patients receiving respiratory services based on both objective and or subjective patient responses to interventions. This process of protocol utilization will assist in patient care progression while eliminating the need for the physician to continually update respiratory therapy orders. B. To assure continuity of respiratory care that meets [facility] Clinical Practice Guidelines. III. Initiation:  Implement Respiratory Care Protocols for patients who are ordered by physician  
       to receive respiratory therapy procedures or for ventilator management. IV. Protocol: A. Upon receiving an order for therapy the RCP will review the patient's EMR (electronic medical record) for all pertinent information includin. Physician's order for therapy 2. Patient history and physical examination 3. Physician progress notes 4. Diagnostic. X-rays, PFT's, arterial blood gases etc. 
B. The RCP will perform a respiratory assessment in the following manner: 1. General observations: color, pattern and effort of breathing, chest expansion, (symmetrical and bilateral), level of consciousness and the ability to ambulate. 2. The RCP will assess patient's cough ability and determine if bronchial hygiene is needed. If patient is unable to produce sputum, at that time, the RCP should question the patient with regard to their sputum: production, color consistency, frequency and amount. 3. Auscultation: Using a stethoscope, the RCP will listen and note quality of breath sounds and presence or absence of adventitious breath sounds in all lung fields, both anteriorly and posteriorly. 4. Upon completing the EMR review and physical assessment, the RCP will document findings in the RT Assessment section of the EMR. The score level will be provided and will be used to determine the frequency of therapy. V. Indications: A. Bronchial Hygiene Protocol indications: 1. Potential for or presence of atelectasis. 1. Need for hydration and removal of retained secretions. 1. Need for improvement of cough effectiveness. 1. Presence of conditions associated with disorder of pulmonary clearance: 
a. Cystic fibrosis b. Bronchiectasis 
c. Neuromuscular disease 
d. Obstructive lung diseases 
e. Restrictive lung diseases Aerosolized Medication(s) Protocol indications:Treatment of bronchospasm/wheezing 2. Improvement of mucociliary clearance 3. Treatment of stridor 4. History of Bronchiectasis, Asthma or COPD 
C. Oxygen Therapy Protocol indications: 1. Documented hypoxemia 2. Severe trauma 3. Acute myocardial infarction 4. Short-term therapy (e.g. post anesthesia recovery) D. [facility] Ventilator Weaning Protocol indications: 1. All mechanically ventilated surgical patients unless they have a no wean order. E. Asthma Treatment Protocol ER indications: 1. Patients 15years of age and older that have been triaged or diagnosed with   asthma exacerbation shall be indicated for the ER Asthma Treatment Protocol. A physician order will be required to initiate the protocol. F. Pediatric Asthma protocol in the ER indications: 1. Patients less than 15years old that have been triaged or diagnosed with asthma exacerbation shall be indicated for the Pediatric Asthma protocol. A physician order will be required to initiate the protocol. G. Alpha-1 Antitrypsin Deficiency Testing protocol indications: 1. Patients admitted and diagnosed with COPD. H. Prone Positioning Protocol indications: 
       1. Acute lung injury 2. Acute respiratory distress syndrome (ARDS) I. Respiratory Care COPD Protocol indications: 1. History of COPD in patient's records 2. Smoking history J. Home Oxygen Assessment Protocol indications: 1. Chronic lung disease 2. Cor pulmonale 3. Unable to wean to room air 48 hours prior to anticipated discharge. K.  Ventilator Weaning Protocol indications: 1. Patient's mechanically ventilated 2. Managed by intensivist 
LSobeida Lung Volume Expansion Protocol indications: 
      1. Any patient at risk for pulmonary complications. VI. Maintenance: A. Timely patient assessment is an integral part of this protocol therefore the following will be applied: 1. All non- critical care patients will be evaluated upon receiving initial respiratory care orders within 24 hours and re-evaluated within 48 hours (or more as needed). 2. Orders requesting a Respiratory Consult will be responded to in the following manner: 
a. In patient emergency situations, the RCP assigned to the floor will respond immediately to the patient, provide an initial respiratory assessment, and contact the patient's physician as necessary for appropriate orders. b. In non-emergent situations, the RCP assigned to the floor will respond to the patient within 90 minutes and provide an initial respiratory assessment and contact patient's physician as necessary for appropriate orders. c. An RCP will provide a comprehensive assessment as soon as possible. 3. Upon completion of an evaluation, the RCP will complete documentation in the patient's EMR in the RT Assessment section. 4. The RCP who completes the assessment will document orders for therapy in the orders section of the patient's EMR selecting new order. Next, per protocol should be selected indicating it is a protocol order and sign orders should be selected to complete the process. The applicable protocol must be added to the progress note per Joint Commission guidelines. 5. The Pharmacy and Therapeutics (P&T) Committee has mandated that the medication Xopenex may be changed to unit dose albuterol without an order, except for those patients receiving Xopenex due to cardiac arrhythmias. 1. The dosage for these patients should be 0.63 mg. and may be changed from 1.25 mg. to 0.63 mg per P & T Committee by the RCP completing the assessment. 6. Patients who are not experiencing cardiac arrhythmias, and are ordered Xopenex and Atrovent may be changed to Duoneb. VII. Safety: A. The following safety issues shall be monitored: 1. The RCP will perform hand hygiene per hospital policy utilizing Standard Precautions for all patients and following transmission-based isolation as indicated per hospital policy. 2. The RCP must exercise professional judgment in classifying the patient for frequency of therapy. 3. Appropriate classification of the patient will require an evaluation utilizing the Therapy Assessment Protocol Guidelines. 4. The RCP will confer with the physician concerning the care of the patient at any time questions or problems arise. 5. If during therapy, the patient exhibits no improvement, or deterioration in clinical status the RCP will notify the physician and the patient's nurse. VIII. Interventions: A. The patient's nurse is responsible concerning all items related to his/her care. Ongoing communication with nursing is essential to successful protocol management. B. The RCP recognizes the value of the team approach in meeting the patient's needs. Nursing input regarding the patient's pulmonary condition will be sought as needed. IX. Reportable conditions: The RCP will inform the physician if: 1. There are acute changes in patient's respiratory status. 2. The therapist is unable to determine appropriate care plan upon assessment. 3. The patient fails to reach therapeutic objective. 4. A change or additional medication is needed. X.  Patient Education: A. Patient will receive instruction on the followin. The treatment modality, including objectives and proper technique of therapy 2. Respiratory medications B. Documentation shall occur in the patient education section of the patient's EMR. XI. Documentation: Record all findings as described above in the patient's EMR. Related Protocols: A. Aerosolized Medication Protocol B. Bronchial Hygiene C. Oxygen Protocol D. RU Fast Track Weaning Protocol E. Asthma Treatment protocol ER 
F. Alpha-1 antitrypsin Deficiency Protocol G. Prone Positioning Protocol H. Respiratory Care COPD Protocol I. Home Oxygen assessment Protocol J. Ventilator Weaning Protocols K. Volume Expansion protocol Indications      Frequency          Level A. Aerosol therapy 1.  q4h     Severe SOB, wheezing, unable to sleep 1 2.  qid, q4 wa or q6h   Moderate SOB, wheezing   2 3.  tid      Hx of asthma, or COPD mild wheezing,  
      or facilitate secretion removal              3 
 4.  bid      Asthma, or COPD, Intermittent wheezing 4 5. PRN, i.e. tid PRN, qid PRN Asthma, or COPD, occasional wheezing 5 B. Bronchopulmonary Hygiene 1. q4h             Copious secretions, SOB, unable to sleep 1 2. qid & PRN            Moderate amounts of secretions   2 3. tid           Small amounts of secretions and poor cough,   
           history of secretions    3 4. PRN, i.e. tid PRN, qid PRN     Breathing exercises, encourage cough only 4  
  
C. Oxygen Therapy     Follow hospital approved Oxygen Protocol Note: 
qid treatments are due 0800, 1200, 1600, and 2000. tid treatments are due 0800, 1400, and 2000 Q6h treatments are due 0800, 1400, 2000, 0200 Q4 wa teatments are due 0800, 1200, 1600, and 2000. Q4h treatments are due  0800, 1200, 1600, 2000, 0000, and 0400. The Level 1-5 rating system is only to be used as criteria for determining appropriate frequency of therapy. References:  
[address] [place] [address] Standard L    Respiratory Care Department Policy, Procedure and Protocol Guideline Manual, 1995, KAREN Fonseca. L  Therapist Driven Respiratory Care Protocols  A Practitioner's Guide for Criteria-Based Respiratory Care by Zahra Potter M.D., and KAREN Estes, ASHLEY. L  The rationale for therapist-driven protocols: an update. Respiratory Care 1998; 18:013-397 L Therapist Driven Respiratory Care Protocols  A Practitioner's Guide for Criteria-Based Respiratory Care by Dang Eden M.D., and BENIGNO Blake The rationale for therapist-driven protocols: an update. Respiratory Care 1998; [id number]. N   [facility] Clinical Practice Guidelines. A. The RCP will perform a respiratory assessment in the following manner: 1. Perform hand hygiene per hospital policy utilizing Standard Precautions for all patients and following transmission-based isolation as indicated per policy. 2. Identify patient via ID bracelet verifying patient name and birth date. 3. General observations: color, pattern and effort of breathing, chest expansion, (symmetrical and bilateral), level of consciousness and the ability to ambulate. 4. The RCP will assess patients cough ability and determine if Nasotracheal suctioning is needed. If patient is unable to produce sputum, at that time, the RCP should question the patient with regard to their sputum: production, color consistency, frequency and amount. 5. Auscultation: Using a stethoscope, the RCP will listen and note quality of breath sounds and presence or absence of adventitious breath sounds in all lung fields, both anteriorly and posteriorly. 6. Upon completing the EMR review and physical assessment, the RCP will document findings in the RT Assessment section of the EMR. The score level will be provided and will be used to determine the frequency of therapy. V. Indications: A. Indications for Bronchial Hygiene Protocol will include: 1. Potential for or presence of atelectasis. 2. Need for hydration and removal of retained secretions. 3. Need for improvement of cough effectiveness. 4. Presence of conditions associated with disorder of pulmonary clearance: 
a. Cystic fibrosis b. Bronchiectasis B.  Indications for Aerosolized Medication(s) Protocol should include: 1. Treatment of bronchospasm/wheezing 2. Improvement of mucociliary clearance 3. Treatment of stridor 4. History of Asthma or COPD 
           C.  Indications for Oxygen Therapy Protocol should include: 1. Documented hypoxemia 2. Severe trauma 3. Acute myocardial infarction 4. Short-term therapy (e.g. post anesthesia recovery) VI. Maintenance: A. Timely patient assessment is an integral part of this protocol therefore the following will be applied: 1. All non- critical care patients will be evaluated upon receiving initial respiratory care orders within 24 hours and re-evaluated within 48 hours (or more as needed). 2. Orders requesting a Respiratory Consult will be responded to in the following manner: 
a. In patient emergency situations, the RCP assigned to the floor will respond immediately to the patient, provide an initial respiratory assessment, and contact the patients physician as necessary for appropriate orders. b. In non-emergent situations, the RCP assigned to the floor will respond to the patient within 90 minutes and provide an initial respiratory assessment and contact patients physician as necessary for appropriate orders. c. An RCP will provide a comprehensive assessment as soon as possible. 3. Upon completion of an evaluation, the RCP will complete documentation in the patients EMR in the RT Assessment section. 4. The RCP who completes the assessment will document orders for therapy in the orders section of the patients EMR selecting new order. Next, per protocol should be selected indicating it is a protocol order and sign orders should be selected to complete the process. 5. The Pharmacy and Therapeutics (P&T) Committee has mandated that the medication Xopenex may be changed to unit dose albuterol without an order, except for those patients receiving Xopenex due to cardiac arrhythmias. The dosage for these patients should be 0.63 mg. and may be changed from 1.25 mg. to 0.63 mg per P & T Committee by the RCP completing the assessment. 6. Patients who are not experiencing cardiac arrhythmias, and are ordered Xopenex and Atrovent may be changed to Duoneb. VII. Safety: A. The following safety issues shall be monitored: 1. The RCP will perform hand hygiene per hospital policy utilizing Standard Precautions for all patients and following transmission-based isolation as indicated per hospital policy. 2. The RCP must exercise professional judgment in classifying the patient for frequency of therapy. 3. Appropriate classification of the patient will require an evaluation utilizing the Therapy Assessment Protocol Guidelines. 4. The RCP will confer with the physician concerning the care of the patient at any time questions or problems arise. 5. If during therapy, the patient exhibits no improvement or deterioration in clinical status the RCP will notify the physician and the patients nurse. VIII. Interventions: A. The patients nurse is responsible concerning all items related to his/her care. Ongoing communication with nursing is essential to successful protocol management. B. The RCP recognizes the value of the team approach in meeting the patients needs. Nursing input regarding the patients pulmonary condition will be sought as needed. IX. Reportable conditions: The RCP will inform the physician if: 1. There are acute changes in patients respiratory status. 2. The therapist is unable to determine appropriate care plan upon assessment. 3. The patient fails to reach therapeutic objective. 4. A change or additional medication is needed. X.  Patient Education: A. Patient will receive instruction on the followin. The treatment modality, including objectives and proper technique of therapy 2. Respiratory medications B. Documentation shall occur in the patient education section of the patients EMR. XI. Documentation: Record all findings as described above in the patients EMR. Related Protocols: A. Aerosolized Medication Protocol B. Bronchial Hygiene C. Oxygen Protocol D. Volume Expansion/Secretion Clearance E. Ventilator Weaning Protocols References: 
[address] Standard L    Respiratory Care Department Policy, Procedure and Protocol Guideline Manual, 1995, KAREN SOLIMAN  Therapist Driven Respiratory Care Protocols  A Practitioners Guide for Criteria-Based Respiratory Care by Servando Kidd M.D., and BENIGNO Anna  The rationale for therapist-driven protocols: an update. Respiratory Care 1998; 67:377-457 N   [facility] Clinical Practice Guidelines. Respiratory Care Services Policy Number: -[id number] Title: Patient Care Assessment Protocol Effective Date: 01/1999 Revised Date: 05/2014, 04/2018, 12/2018, 07/2019 Reviewed Date: 06/2013/ 03/2015, 03/2016, 06/2017 Overview In an effort to improve quality and reduce costs of respiratory care at [facility], the Respiratory Department has developed a number of Patient Care Protocols. These protocols have been developed according to Kamar 3 and are utilized for those patients who are ordered respiratory therapy using therapeutic indications and standardized approaches for accomplishing objectives. Patient Care Protocols are intended to improve care by: ? Defining the indications and standards of care agreed upon by the Pulmonary Medicine and [facility] DOMINICK Betancourt of [facility]. ? Training respiratory care practitioners to apply those criteria to individual patients and modify therapy as indicated by the protocols. ? Documenting the indication and care plan as part of the initial ordering process. ? Tapering or discontinuing treatments once the indication for therapy changes. The Patient Care Protocols shall be universally applied throughout the hospital as determined by  
the Pulmonary Medicine and [address] Medical GroupJhon Betancourt. Rationale for Patient Care Assessment Protocols: 
? Continuous Quality Improvement ? Cost containment ? Standardization of care 
? Enhanced continuity of care ? Utilization review ? Timely intervention The following patient care assessment protocols have been developed: ? Aerosolized Medication Protocol [email or web address]/policies/Respiratory%20Care%20Services%20Policies/-[id number]. doc ? Bronchial Hygiene Protocol [email or web address]/policies/Respiratory%20Care%20Services%20Policies/-[id number]. doc 
? Oxygen Protocolhttp://[email or web address]/gvl/stfrancis/policies/Respiratory Care Services Policies/-[id number]. doc [email or web address]/[facility]/stfrancis/policies/Respiratory%20Care%20Services%20Policies/-[id number]. doc 
? CVRU Fast Track Weaning Protocol http://[email or web address]/[email or web address]/policies/Respiratory%20Care%20Services%20Policies/-[id number]. doc ? Asthma Treatment Protocol ERhttp://[email or web address]/stfrancis/policies/Respiratory Care Services Policies/-[id number]. doc http://spb/localQuidsi/[facility]/stfrancis/policies/Respiratory%20Care%20Services%20Policies/-[id number]. doc ? Pediatric Asthma Treatment Protocol ERhttp://[email or web address]/stfrancis/policies/Respiratory Care Services Policies/-[id number]. doc [email or web address]/stfrancis/policies/Respiratory%20Care%20Services%20Policies/-[id number]. doc ? Alpha-1 Antitrypsin Deficiency Protocolhttp://[facility]/l/arianna/policies/Respiratory Care Services Policies/-[id number]. doc http://[facility]/stteresais/policies/Respiratory%20Care%20Services%20Policies/-[id number]. doc 
 ? Prone Positioning Protocol http://[facility]/stfrancis/policies/Respiratory%20Care%20Services%20Policies/-[id number]. doc 
? COPD Protocol http://spb/[facility]/stfrancis/policies/Respiratory%20Care%20Services%20Policies/-[id number]. doc 
? Home Oxygen Assessment Protocolhttp://[facility]/stfrancis/policies/Respiratory Care Services Policies/-[id number]. doc http://[facility]/stfrancis/policies/Respiratory%20Care%20Services%20Policies/-[id number]. doc 
? Ventilator Weaning Protocol http://spb/[facility]/stfrancis/policies/Respiratory%20Care%20Services%20Policies/-[id number]. doc ? Lung Volume Expansion Protocolhttp://[facility]/stfrancis/policies/Respiratory Care Services Policies/-[id number]. doc http://[facility]/stfrancis/policies/Respiratory%20Care%20Services%20Policies/-[id number]. docm The Director of [address] [place] [address] oversees the Patient Care Assessment Program. The Respiratory Educator is responsible for protocol development and training. The Supervisor is responsible for implementation and  activities. Each patient with an order for respiratory treatments will receive an evaluation. Respiratory Care Practitioners (RCP's) will perform the evaluations. The same evaluation tool will be utilized for initial and follow-up assessments. If the patient does not meet criteria for ordered therapy, the therapy will be discontinued. If the patient demonstrates an adverse response to initially ordered therapy, the therapy will be discontinued and the physician will be contacted. Specific physician's orders that deviate from protocols and are deemed \"inappropriate\" or \"unsafe\" will be addressed with ordering physician and/or medical director as required. Respiratory Patient Care Assessment Protocols I.  Policy: In an effort to provide quality patient care and effective utilization of services, physicians who order respiratory therapy will have their patients treated via the protocols established (see attached) Respiratory Care Practitioners (RCP's) will complete the initial assessment which will indicate patient needs,  the care plan developed and will performed within 24 hours of admission. Frequency of the therapy will be set according to the results of the respiratory therapy evaluation and frequency guidelines policy. Reassessment will be continued every 48 hours and more frequently as needed for the individual patient. II. Purpose: C. To provide a process that will allow for ongoing assessment and care plan modification for patients receiving respiratory services based on both objective and or subjective patient responses to interventions. This process of protocol utilization will assist in patient care progression while eliminating the need for the physician to continually update respiratory therapy orders. D. To assure continuity of respiratory care that meets [facility] Clinical Practice Guidelines. III. Initiation:  Implement Respiratory Care Protocols for patients who are ordered by physician  
       to receive respiratory therapy procedures or for ventilator management. IV. Protocol: 
C. Upon receiving an order for therapy the RCP will review the patient's EMR (electronic medical record) for all pertinent information includin. Physician's order for therapy 6. Patient history and physical examination 7. Physician progress notes 8. Diagnostic. X-rays, PFT's, arterial blood gases etc. 
D. The RCP will perform a respiratory assessment in the following manner: 
5. General observations: color, pattern and effort of breathing, chest expansion, (symmetrical and bilateral), level of consciousness and the ability to ambulate. 6. The RCP will assess patient's cough ability and determine if bronchial hygiene is needed. If patient is unable to produce sputum, at that time, the RCP should question the patient with regard to their sputum: production, color consistency, frequency and amount. 7. Auscultation: Using a stethoscope, the RCP will listen and note quality of breath sounds and presence or absence of adventitious breath sounds in all lung fields, both anteriorly and posteriorly. 8. Upon completing the EMR review and physical assessment, the RCP will document findings in the RT Assessment section of the EMR. The score level will be provided and will be used to determine the frequency of therapy. V. Indications: A. Bronchial Hygiene Protocol indications: 2. Potential for or presence of atelectasis. 2. Need for hydration and removal of retained secretions. 2. Need for improvement of cough effectiveness. 2. Presence of conditions associated with disorder of pulmonary clearance: 
f. Cystic fibrosis 
g. Bronchiectasis 
h. Neuromuscular disease 
i. Obstructive lung diseases 
j. Restrictive lung diseases Aerosolized Medication(s) Protocol indications:Treatment of bronchospasm/wheezing 6. Improvement of mucociliary clearance 7. Treatment of stridor 8. History of Bronchiectasis, Asthma or COPD 
F. Oxygen Therapy Protocol indications:  
5. Documented hypoxemia 6. Severe trauma 7. Acute myocardial infarction 8. Short-term therapy (e.g. post anesthesia recovery) G. CVRU Ventilator Weaning Protocol indications: 1. All mechanically ventilated surgical patients unless they have a no wean order. E. Asthma Treatment Protocol ER indications: 1. Patients 15years of age and older that have been triaged or diagnosed with   asthma exacerbation shall be indicated for the ER Asthma Treatment Protocol. A physician order will be required to initiate the protocol. F. Pediatric Asthma protocol in the ER indications: 1. Patients less than 15years old that have been triaged or diagnosed with asthma exacerbation shall be indicated for the Pediatric Asthma protocol. A physician order will be required to initiate the protocol. G. Alpha-1 Antitrypsin Deficiency Testing protocol indications: 1. Patients admitted and diagnosed with COPD. H. Prone Positioning Protocol indications: 
       1. Acute lung injury 2. Acute respiratory distress syndrome (ARDS) I. Respiratory Care COPD Protocol indications: 1. History of COPD in patient's records 2. Smoking history J. Home Oxygen Assessment Protocol indications: 1. Chronic lung disease 2. Cor pulmonale 3. Unable to wean to room air 48 hours prior to anticipated discharge. K.  Ventilator Weaning Protocol indications: 1. Patient's mechanically ventilated 2. Managed by intensivist 
L. Lung Volume Expansion Protocol indications: 
      1. Any patient at risk for pulmonary complications. VI. Maintenance:   
B. Timely patient assessment is an integral part of this protocol therefore the following will be applied: 7. All non- critical care patients will be evaluated upon receiving initial respiratory care orders within 24 hours and re-evaluated within 48 hours (or more as needed). 8. Orders requesting a Respiratory Consult will be responded to in the following manner: 
d. In patient emergency situations, the RCP assigned to the floor will respond immediately to the patient, provide an initial respiratory assessment, and contact the patient's physician as necessary for appropriate orders. e. In non-emergent situations, the RCP assigned to the floor will respond to the patient within 90 minutes and provide an initial respiratory assessment and contact patient's physician as necessary for appropriate orders. f. An RCP will provide a comprehensive assessment as soon as possible. 9. Upon completion of an evaluation, the RCP will complete documentation in the patient's EMR in the RT Assessment section. 10. The RCP who completes the assessment will document orders for therapy in the orders section of the patient's EMR selecting new order. Next, per protocol should be selected indicating it is a protocol order and sign orders should be selected to complete the process. The applicable protocol must be added to the progress note per Joint Commission guidelines. 11. The Pharmacy and Therapeutics (P&T) Committee has mandated that the medication Xopenex may be changed to unit dose albuterol without an order, except for those patients receiving Xopenex due to cardiac arrhythmias. 2. The dosage for these patients should be 0.63 mg. and may be changed from 1.25 mg. to 0.63 mg per P & T Committee by the RCP completing the assessment. 12. Patients who are not experiencing cardiac arrhythmias, and are ordered Xopenex and Atrovent may be changed to Duoneb. VII. Safety:       
B. The following safety issues shall be monitored: 6. The RCP will perform hand hygiene per hospital policy utilizing Standard Precautions for all patients and following transmission-based isolation as indicated per hospital policy. 7. The RCP must exercise professional judgment in classifying the patient for frequency of therapy. 8. Appropriate classification of the patient will require an evaluation utilizing the Therapy Assessment Protocol Guidelines. 9. The RCP will confer with the physician concerning the care of the patient at any time questions or problems arise. 10. If during therapy, the patient exhibits no improvement, or deterioration in clinical status the RCP will notify the physician and the patient's nurse. VIII. Interventions:  
C. The patient's nurse is responsible concerning all items related to his/her care. Ongoing communication with nursing is essential to successful protocol management. D. The RCP recognizes the value of the team approach in meeting the patient's needs. Nursing input regarding the patient's pulmonary condition will be sought as needed. IX. Reportable conditions: The RCP will inform the physician if: 
5. There are acute changes in patient's respiratory status. 6. The therapist is unable to determine appropriate care plan upon assessment. 7. The patient fails to reach therapeutic objective. 8. A change or additional medication is needed. X.  Patient Education: C. Patient will receive instruction on the following: 3. The treatment modality, including objectives and proper technique of therapy 4. Respiratory medications D. Documentation shall occur in the patient education section of the patient's EMR. XI. Documentation: Record all findings as described above in the patient's EMR. Related Protocols: A. Aerosolized Medication Protocol L. Bronchial Hygiene M. Oxygen Protocol [address] Fast Track Weaning Protocol O. Asthma Treatment protocol ER 
P. Alpha-1 antitrypsin Deficiency Protocol Q. Prone Positioning Protocol R. Respiratory Care COPD Protocol S. Home Oxygen assessment Protocol T. Ventilator Weaning Protocols U. Volume Expansion protocol Indications      Frequency          Level A. Aerosol therapy 1.  q4h     Severe SOB, wheezing, unable to sleep 1 2.  qid, q4 wa or q6h   Moderate SOB, wheezing   2 3.  tid      Hx of asthma, or COPD mild wheezing,  
      or facilitate secretion removal              3 
 4.  bid      Asthma, or COPD, Intermittent wheezing 4 5. PRN, i.e. tid PRN, qid PRN Asthma, or COPD, occasional wheezing 5 B. Bronchopulmonary Hygiene 1. q4h             Copious secretions, SOB, unable to sleep 1 2. qid & PRN            Moderate amounts of secretions   2  3. tid           Small amounts of secretions and poor cough,   
           history of secretions    3  
 4. PRN, i.e. tid PRN, qid PRN     Breathing exercises, encourage cough only 4  
  
C. Oxygen Therapy     Follow hospital approved Oxygen Protocol Note: 
qid treatments are due 0800, 1200, 1600, and 2000. tid treatments are due 0800, 1400, and 2000 Q6h treatments are due 0800, 1400, 2000, 0200 Q4 wa teatments are due 0800, 1200, 1600, and 2000. Q4h treatments are due  0800, 1200, 1600, 2000, 0000, and 0400. The Level 1-5 rating system is only to be used as criteria for determining appropriate frequency of therapy. References:  
[address] Standard L    Respiratory Care Department Policy, Procedure and Protocol Guideline Manual, 1995, KAREN Fonseca. L  Therapist Driven Respiratory Care Protocols  A Practitioner's Guide for Criteria-Based Respiratory Care by Aaron Lopez M.D., and J. Benedetto Goltz, ASHLEY. L  The rationale for therapist-driven protocols: an update. Respiratory Care 1998; 25:296-197 L Therapist Driven Respiratory Care Protocols  A Practitioner's Guide for Criteria-Based Respiratory Care by Aaron Lopez M.D., and J. Benedetto Goltz, ASHLEY. L The rationale for therapist-driven protocols: an update. Respiratory Care 1998; [id number]. N   [facility] Clinical Practice Guidelines. B. The RCP will perform a respiratory assessment in the following manner: 7. Perform hand hygiene per hospital policy utilizing Standard Precautions for all patients and following transmission-based isolation as indicated per policy. 8. Identify patient via ID bracelet verifying patient name and birth date. 9. General observations: color, pattern and effort of breathing, chest expansion, (symmetrical and bilateral), level of consciousness and the ability to ambulate. 10. The RCP will assess patients cough ability and determine if Nasotracheal suctioning is needed.  If patient is unable to produce sputum, at that time, the RCP should question the patient with regard to their sputum: production, color consistency, frequency and amount. 11. Auscultation: Using a stethoscope, the RCP will listen and note quality of breath sounds and presence or absence of adventitious breath sounds in all lung fields, both anteriorly and posteriorly. 12. Upon completing the EMR review and physical assessment, the RCP will document findings in the RT Assessment section of the EMR. The score level will be provided and will be used to determine the frequency of therapy. V. Indications: A. Indications for Bronchial Hygiene Protocol will include: 5. Potential for or presence of atelectasis. 6. Need for hydration and removal of retained secretions. 7. Need for improvement of cough effectiveness. 8. Presence of conditions associated with disorder of pulmonary clearance: 
c. Cystic fibrosis 
d. Bronchiectasis C. Indications for Aerosolized Medication(s) Protocol should include: 5. Treatment of bronchospasm/wheezing 6. Improvement of mucociliary clearance 7. Treatment of stridor 8. History of Asthma or COPD 
           C.  Indications for Oxygen Therapy Protocol should include: 
5. Documented hypoxemia 6. Severe trauma 7. Acute myocardial infarction 8. Short-term therapy (e.g. post anesthesia recovery) VI. Maintenance:   
B. Timely patient assessment is an integral part of this protocol therefore the following will be applied: 7. All non- critical care patients will be evaluated upon receiving initial respiratory care orders within 24 hours and re-evaluated within 48 hours (or more as needed). 8. Orders requesting a Respiratory Consult will be responded to in the following manner: 
d. In patient emergency situations, the RCP assigned to the floor will respond immediately to the patient, provide an initial respiratory assessment, and contact the patients physician as necessary for appropriate orders. e.  In non-emergent situations, the RCP assigned to the floor will respond to the patient within 90 minutes and provide an initial respiratory assessment and contact patients physician as necessary for appropriate orders. f. An RCP will provide a comprehensive assessment as soon as possible. 9. Upon completion of an evaluation, the RCP will complete documentation in the patients EMR in the RT Assessment section. 10. The RCP who completes the assessment will document orders for therapy in the orders section of the patients EMR selecting new order. Next, per protocol should be selected indicating it is a protocol order and sign orders should be selected to complete the process. 11. The Pharmacy and Therapeutics (P&T) Committee has mandated that the medication Xopenex may be changed to unit dose albuterol without an order, except for those patients receiving Xopenex due to cardiac arrhythmias. The dosage for these patients should be 0.63 mg. and may be changed from 1.25 mg. to 0.63 mg per P & T Committee by the RCP completing the assessment. 12. Patients who are not experiencing cardiac arrhythmias, and are ordered Xopenex and Atrovent may be changed to Duoneb. VII. Safety:       
B. The following safety issues shall be monitored: 6. The RCP will perform hand hygiene per hospital policy utilizing Standard Precautions for all patients and following transmission-based isolation as indicated per hospital policy. 7. The RCP must exercise professional judgment in classifying the patient for frequency of therapy. 8. Appropriate classification of the patient will require an evaluation utilizing the Therapy Assessment Protocol Guidelines. 9. The RCP will confer with the physician concerning the care of the patient at any time questions or problems arise. 10. If during therapy, the patient exhibits no improvement or deterioration in clinical status the RCP will notify the physician and the patients nurse. VIII. Interventions: C. The patients nurse is responsible concerning all items related to his/her care. Ongoing communication with nursing is essential to successful protocol management. D. The RCP recognizes the value of the team approach in meeting the patients needs. Nursing input regarding the patients pulmonary condition will be sought as needed. IX. Reportable conditions: The RCP will inform the physician if: 
5. There are acute changes in patients respiratory status. 6. The therapist is unable to determine appropriate care plan upon assessment. 7. The patient fails to reach therapeutic objective. 8. A change or additional medication is needed. X.  Patient Education: C. Patient will receive instruction on the following: 3. The treatment modality, including objectives and proper technique of therapy 4. Respiratory medications D. Documentation shall occur in the patient education section of the patients EMR. XI. Documentation: Record all findings as described above in the patients EMR. Related Protocols: A. Aerosolized Medication Protocol F. Bronchial Hygiene G. Oxygen Protocol H. Volume Expansion/Secretion Clearance I. Ventilator Weaning Protocols References: 
[address] Standard L    Respiratory Care Department Policy, Procedure and Protocol Guideline Manual, 1995, KAREN Fonseca. L  Therapist Driven Respiratory Care Protocols  A Practitioners Guide for Criteria-Based Respiratory Care by Mynor Richards M.D., and KAREN Santillan RRT. L  The rationale for therapist-driven protocols: an update. Respiratory Care 1998; 16:682-960 N   [facility] Clinical Practice Guidelines. Respiratory Care Services Policy Number: -[id number] Title: Aerosolized Medication Protocol Effective Date: 10/1998 Revised Date: 06/13, 03/16, 11/17, 07/19 Reviewed Date: 05/14/ 03/15 , 06/17, 5/18 I. Policy:  The Aerosolized Medication Protocol shall by implemented by Respiratory Care Practitioners (RCP) for patients with orders to receive aerosol therapy with medication. II. Purpose: To open and maintain obstructed airways, the RCP, will utilize the following  
protocol to select the indicated aerosolized medication(s) and determine the most effective method of delivery to the patient. III. Patient Type: All patients who are determined to meet aerosolized medication criteria as  
       outlined in this protocol. IV. Responsibility: Director, [address], registered Respiratory Care Practitioners (RCP's) with documented competency in the performance of respiratory therapeutic techniques. V. Equipment needed: 
Sergio Spell F. Pulse oximeter G. AeroEclipse nebulizer H. Dry Powder Inhaler (DPI) VI. Protocol:  
E. The following conditions are accepted indications for aerosolized medication therapy. 9. Bronchospasm/wheezing 10. Impaired mucociliary clearance 11. Tracheobronchial mucosal congestion/and laryngeal stridor 12. Diseases which commonly require aerosolized medication therapy include, but are not limited to: 
k. Asthma/reactive airway disease 
l. Bronchitis/emphysema (COPD) m. Cystic fibrosis 
n. Severe laryngitis/tracheitis 
o. Bronchiectasis 
p. Smoke inhalation or chemical trauma to the lung or upper airway 
q. Physical trauma to the upper airway 
r. Laryngotracheobronchitis 
s. Bronchiolitis 
t. Non-specific wheezing B. Indications for bronchodilator medications will include: 
g. Bronchospasm/ wheezing 
h. Asthma/reactive airway disease 
i. Chronic obstructive pulmonary disease 
j. Obstructive defect on pulmonary function testing C. Administration of medications 9. If a bronchodilator or any other type of respiratory medication is needed, a physician order must be indicated in the medication section in the patient's EMR.   
10. When the physician specifies the medication and dosage at the time of request, the ordered medication will be used as part of the care plan. D. The following guidelines will be utilized in the evaluation and selection of the appropriate delivery device for indicated medication(s): 
1. Unassisted aerosol (UA) is the preferred method of aerosol delivery and indicated if 
e. Ventilation is inadequate 
f. Patient demonstrates wheezing  
g. Routine treatments shall be given via the AeroEclipse nebulizer. h. The Aerogen nebulizer shall be used in the following circumstances: 
i. ER patients and they will continue with this nebulizer if admitted to 8th floor or ICU. 
ii. Patients in ICU  
iii. Patients on 8th floor with severe wheezing (at the RCP's discretion) 2. Dry PowderInhaler (DPI)  
g. Patient should be alert/cooperative 
h. Able to perform 3 second breath hold. i. Patient has used DPI therapy previously, either at home or in the hospital. 
j. Note: The only approved inhaler on formulary is [facility]. VII. Guidelines:  
Monitor patient's vital signs and evaluate patient's clinical status. The need to change medication and/or modality may be indicated by: 9. A pulse greater than 120 bpm, or if a pulse increase of 20 bpm occurs with bronchodilator medications. 10. Significant worsening of dyspnea or wheezing occurring during or within 30 minutes of discontinuing therapy. 11. Worsening of patient's sensorium (e.g. patient becomes confused or obtunded, and unable to follow directions). 12. Worsening of patient's chest x-ray. 13. Change in sputum (e.g. increased pulmonary infiltrate, which might indicate need for volume expansion therapy). 14. Patient has difficulty coughing up secretions, which might indicate need for acetylcysteine and/or bronchial hygiene therapy. 15. Call physician immediately if dyspnea worsens and is not responsive to modifications allowed by protocol. VIII. Clinical Responsibility: 3. The therapy assessment guidelines will be used to evaluate all patients receiving aerosolized medications with the exception of critical care areas. 9. RCP's will perform changes in therapy per protocol. 10. It will be the responsibility of RCP to provide instruction regarding respiratory medications, possible side effects, aerosol therapy and proper DPI technique, as well as, spacer usage to patients ordered DPI therapy. 11. Current therapy that is part of a patient's home regimen will not be discontinued. a. Provide spacer and educate patient on proper inhaler technique if needed. IX. Documentation A. Document assessment findings in the respiratory assessment section of the patient's EMR. B. Document changes in therapy per protocol in the respiratory orders section and in the care plan section of the patient's EMR. C. Document patient education in the patient education section of the patient's EMR. X. Outcome Criteria: 
C. Relief of wheezes and obstruction D. Improved cough and sputum color and consistency E. Improved chest x-ray F. Improved arterial oxygen tension and or SaO2 
G. Improved Peak Flow on asthmatic patients XI. Related Protocols: C. Respiratory Patient Care Protocols D. Bronchial Hygiene Therapy E. Oxygen Protocol Reference: L - Respiratory Care Department Policy, Procedure and Protocol Guideline Manual, 1995, KAREN Fonseca. L -  Therapist Driven Respiratory Care Protocols  A Practitioner's Guide for Criteria-Based Respiratory Care by Cat Ellis M.D., and KAREN Serrato RRT. L - The rationale for therapist-driven protocols: an update. Respiratory Care 1998; [id number].  -[facility] Clinical Practice Guidelines. Respiratory Care Services Policy Number: -[id number] Title: Patient Care Assessment Protocol Effective Date: 01/1999 Revised Date: 05/2014, 04/2018, 12/2018, 07/2019 Reviewed Date: 06/2013/ 03/2015, 03/2016, 06/2017 Overview In an effort to improve quality and reduce costs of respiratory care at [facility], the Respiratory Department has developed a number of Patient Care Protocols. These protocols have been developed according to Kamar 3 and are utilized for those patients who are ordered respiratory therapy using therapeutic indications and standardized approaches for accomplishing objectives. Patient Care Protocols are intended to improve care by: ? Defining the indications and standards of care agreed upon by the Pulmonary Medicine and [address] [place] [address] of [facility]. ? Training respiratory care practitioners to apply those criteria to individual patients and modify therapy as indicated by the protocols. ? Documenting the indication and care plan as part of the initial ordering process. ? Tapering or discontinuing treatments once the indication for therapy changes. The Patient Care Protocols shall be universally applied throughout the hospital as determined by  
the Pulmonary Medicine and [address] [place] [address]. Rationale for Patient Care Assessment Protocols: 
? Continuous Quality Improvement ? Cost containment ? Standardization of care 
? Enhanced continuity of care ? Utilization review ? Timely intervention The following patient care assessment protocols have been developed: ? Aerosolized Medication Protocol [email or web address] [email or web address]/policies/Respiratory%20Care%20Services%20Policies/-[id number]. doc ? Bronchial Hygiene Protocol [email or web address]/policies/Respiratory%20Care%20Services%20Policies/-[id number]. doc 
? Oxygen [email or web address]/[email or web address]/policies/Respiratory Care Services Policies/-[id number]. doc [email or web address]/policies/Respiratory%20Care%20Servi fidelia%20Policies/-[id number]. doc 
? CVRU Fast Track Weaning Protocol http://[facility]/stfrancis/policies/Respiratory%20Care%20Services%20Policies/-[id number]. doc ? Asthma Treatment Protocol ERhttp://[email or web address]/stfrancis/policies/Respiratory Care Services Policies/-[id number]. doc http://spb/[facility]/stfrancis/policies/Respiratory%20Care%20Services%20Policies/-[id number]. doc ? Pediatric Asthma Treatment Protocol ERhttp://sujathamaria m/[facility]/stfrancis/policies/Respiratory Care Services Policies/-[id number]. doc http://sujathamaria m/[facility]/stfrancis/policies/Respiratory%20Care%20Services%20Policies/-[id number]. doc ? Alpha-1 Antitrypsin Deficiency Protocolhttp://sujathamaria m/[facility]/stfrancis/policies/Respiratory Care Services Policies/-[id number]. doc http://[email or web address]/[facility]/stfrancis/policies/Respiratory%20Care%20Services%20Policies/-[id number]. doc ? Prone Positioning Protocol http://spb/[facility]/stfrancis/policies/Respiratory%20Care%20Services%20Policies/-[id number]. doc 
? COPD Protocol http://[facility]/stfrancis/policies/Respiratory%20Care%20Services%20Policies/-[id number]. doc 
? Home Oxygen Assessment Protocolhttp://sujathamaria m/[facility]/stfrancis/policies/Respiratory Care Services Policies/-[id number]. doc http://[facility]/stfrancis/policies/Respiratory%20Care%20Services%20Policies/-[id number]. doc 
? Ventilator Weaning Protocol http://[facility]/stancis/policies/Respiratory%20Care%20Services%20Policies/-[id number]. doc ? Lung Volume Expansion Protocolhttp://[facility]/stancis/policies/Respiratory Care Services Policies/-[id number]. doc http://[facility]/stancis/policies/Respiratory%20Care%20Services%20Policies/-[id number]. docm The Director of [facility] Eusebio Betancourt oversees the Patient Care Assessment Program. The Respiratory Educator is responsible for protocol development and training. The Supervisor is responsible for implementation and  activities. Each patient with an order for respiratory treatments will receive an evaluation. Respiratory Care Practitioners (RCP's) will perform the evaluations. The same evaluation tool will be utilized for initial and follow-up assessments. If the patient does not meet criteria for ordered therapy, the therapy will be discontinued. If the patient demonstrates an adverse response to initially ordered therapy, the therapy will be discontinued and the physician will be contacted. Specific physician's orders that deviate from protocols and are deemed \"inappropriate\" or \"unsafe\" will be addressed with ordering physician and/or medical director as required. Respiratory Patient Care Assessment Protocols I. Policy: In an effort to provide quality patient care and effective utilization of services, physicians who order respiratory therapy will have their patients treated via the protocols established (see attached) Respiratory Care Practitioners (RCP's) will complete the initial assessment which will indicate patient needs,  the care plan developed and will performed within 24 hours of admission. Frequency of the therapy will be set according to the results of the respiratory therapy evaluation and frequency guidelines policy. Reassessment will be continued every 48 hours and more frequently as needed for the individual patient. II. Purpose:    
I. To provide a process that will allow for ongoing assessment and care plan modification for patients receiving respiratory services based on both objective and or subjective patient responses to interventions. This process of protocol utilization will assist in patient care progression while eliminating the need for the physician to continually update respiratory therapy orders. J. To assure continuity of respiratory care that meets [facility] Clinical Practice Guidelines. III. Initiation:  Implement Respiratory Care Protocols for patients who are ordered by physician  
       to receive respiratory therapy procedures or for ventilator management. IV. Protocol: F. Upon receiving an order for therapy the RCP will review the patient's EMR (electronic medical record) for all pertinent information includin. Physician's order for therapy 14. Patient history and physical examination 15. Physician progress notes 16. Diagnostic. X-rays, PFT's, arterial blood gases etc. 
G. The RCP will perform a respiratory assessment in the following manner: 11. General observations: color, pattern and effort of breathing, chest expansion, (symmetrical and bilateral), level of consciousness and the ability to ambulate. 12. The RCP will assess patient's cough ability and determine if bronchial hygiene is needed. If patient is unable to produce sputum, at that time, the RCP should question the patient with regard to their sputum: production, color consistency, frequency and amount. 13. Auscultation: Using a stethoscope, the RCP will listen and note quality of breath sounds and presence or absence of adventitious breath sounds in all lung fields, both anteriorly and posteriorly. 14. Upon completing the EMR review and physical assessment, the RCP will document findings in the RT Assessment section of the EMR. The score level will be provided and will be used to determine the frequency of therapy. V. Indications: A. Bronchial Hygiene Protocol indications: 4. Potential for or presence of atelectasis. 3. Need for hydration and removal of retained secretions. 3. Need for improvement of cough effectiveness. 3. Presence of conditions associated with disorder of pulmonary clearance: 
u. Cystic fibrosis 
v. Bronchiectasis 
w. Neuromuscular disease 
x. Obstructive lung diseases y. Restrictive lung diseases Aerosolized Medication(s) Protocol indications:Treatment of bronchospasm/wheezing 17. Improvement of mucociliary clearance 18. Treatment of stridor 19. History of Bronchiectasis, Asthma or COPD I. Oxygen Therapy Protocol indications: 12. Documented hypoxemia 13. Severe trauma 14. Acute myocardial infarction 15. Short-term therapy (e.g. post anesthesia recovery) J. [facility] Protocol indications: 1. All mechanically ventilated surgical patients unless they have a no wean order. E. Asthma Treatment Protocol ER indications: 1. Patients 15years of age and older that have been triaged or diagnosed with   asthma exacerbation shall be indicated for the ER Asthma Treatment Protocol. A physician order will be required to initiate the protocol. F. Pediatric Asthma protocol in the ER indications: 1. Patients less than 15years old that have been triaged or diagnosed with asthma exacerbation shall be indicated for the Pediatric Asthma protocol. A physician order will be required to initiate the protocol. G. Alpha-1 Antitrypsin Deficiency Testing protocol indications: 1. Patients admitted and diagnosed with COPD. H. Prone Positioning Protocol indications: 
       1. Acute lung injury 2. Acute respiratory distress syndrome (ARDS) I. Respiratory Care COPD Protocol indications: 1. History of COPD in patient's records 2. Smoking history J. Home Oxygen Assessment Protocol indications: 1. Chronic lung disease 2. Cor pulmonale 3. Unable to wean to room air 48 hours prior to anticipated discharge. K.  Ventilator Weaning Protocol indications: 1. Patient's mechanically ventilated 2. Managed by intensivist 
L. Lung Volume Expansion Protocol indications: 
      1. Any patient at risk for pulmonary complications. VI. Maintenance: H. Timely patient assessment is an integral part of this protocol therefore the following will be applied: 13. All non- critical care patients will be evaluated upon receiving initial respiratory care orders within 24 hours and re-evaluated within 48 hours (or more as needed). 14. Orders requesting a Respiratory Consult will be responded to in the following manner: 
k. In patient emergency situations, the RCP assigned to the floor will respond immediately to the patient, provide an initial respiratory assessment, and contact the patient's physician as necessary for appropriate orders. l. In non-emergent situations, the RCP assigned to the floor will respond to the patient within 90 minutes and provide an initial respiratory assessment and contact patient's physician as necessary for appropriate orders. m. An RCP will provide a comprehensive assessment as soon as possible. 15. Upon completion of an evaluation, the RCP will complete documentation in the patient's EMR in the RT Assessment section. 16. The RCP who completes the assessment will document orders for therapy in the orders section of the patient's EMR selecting new order. Next, per protocol should be selected indicating it is a protocol order and sign orders should be selected to complete the process. The applicable protocol must be added to the progress note per Joint Commission guidelines. 17. The Pharmacy and Therapeutics (P&T) Committee has mandated that the medication Xopenex may be changed to unit dose albuterol without an order, except for those patients receiving Xopenex due to cardiac arrhythmias. 3. The dosage for these patients should be 0.63 mg. and may be changed from 1.25 mg. to 0.63 mg per P & T Committee by the RCP completing the assessment. 18. Patients who are not experiencing cardiac arrhythmias, and are ordered Xopenex and Atrovent may be changed to Duoneb. VII. Safety: F. The following safety issues shall be monitored: 11. The RCP will perform hand hygiene per hospital policy utilizing Standard Precautions for all patients and following transmission-based isolation as indicated per hospital policy. 12. The RCP must exercise professional judgment in classifying the patient for frequency of therapy. 13. Appropriate classification of the patient will require an evaluation utilizing the Therapy Assessment Protocol Guidelines. 14. The RCP will confer with the physician concerning the care of the patient at any time questions or problems arise. 15. If during therapy, the patient exhibits no improvement, or deterioration in clinical status the RCP will notify the physician and the patient's nurse. VIII. Interventions:  
E. The patient's nurse is responsible concerning all items related to his/her care. Ongoing communication with nursing is essential to successful protocol management. F. The RCP recognizes the value of the team approach in meeting the patient's needs. Nursing input regarding the patient's pulmonary condition will be sought as needed. IX. Reportable conditions: The RCP will inform the physician if: 
9. There are acute changes in patient's respiratory status. 10. The therapist is unable to determine appropriate care plan upon assessment. 11. The patient fails to reach therapeutic objective. 12. A change or additional medication is needed. X.  Patient Education:   
E. Patient will receive instruction on the followin. The treatment modality, including objectives and proper technique of therapy 6. Respiratory medications F. Documentation shall occur in the patient education section of the patient's EMR. XI. Documentation: Record all findings as described above in the patient's EMR. Related Protocols: A. Aerosolized Medication Protocol V. Bronchial Hygiene W. Oxygen Protocol X. CVRU Fast Track Weaning Protocol Y. Asthma Treatment protocol ER 
Z. Alpha-1 antitrypsin Deficiency Protocol AA. Prone Positioning Protocol BB. Respiratory Care COPD Protocol CC. Home Oxygen assessment Protocol DD. Ventilator Weaning Protocols EE. Volume Expansion protocol Indications      Frequency          Level A. Aerosol therapy 1.  q4h     Severe SOB, wheezing, unable to sleep 1 2.  qid, q4 wa or q6h   Moderate SOB, wheezing   2 3.  tid      Hx of asthma, or COPD mild wheezing,  
      or facilitate secretion removal              3 
 4.  bid      Asthma, or COPD, Intermittent wheezing 4 5. PRN, i.e. tid PRN, qid PRN Asthma, or COPD, occasional wheezing 5 B. Bronchopulmonary Hygiene 1. q4h             Copious secretions, SOB, unable to sleep 1 2. qid & PRN            Moderate amounts of secretions   2 3. tid           Small amounts of secretions and poor cough,   
           history of secretions    3 4. PRN, i.e. tid PRN, qid PRN     Breathing exercises, encourage cough only 4  
  
C. Oxygen Therapy     Follow hospital approved Oxygen Protocol Note: 
qid treatments are due 0800, 1200, 1600, and 2000. tid treatments are due 0800, 1400, and 2000 Q6h treatments are due 0800, 1400, 2000, 0200 Q4 wa teatments are due 0800, 1200, 1600, and 2000. Q4h treatments are due  0800, 1200, 1600, 2000, 0000, and 0400. The Level 1-5 rating system is only to be used as criteria for determining appropriate frequency of therapy. References:  
[address] Standard L    Respiratory Care Department Policy, Procedure and Protocol Guideline Manual, 1995, KAREN Fonseca. L  Therapist Driven Respiratory Care Protocols  A Practitioner's Guide for Criteria-Based Respiratory Care by Pao Snider M.D., and KAREN Burks, ASHLEY. L  The rationale for therapist-driven protocols: an update. Respiratory Care 1998; 31:968-785 L Therapist Driven Respiratory Care Protocols  A Practitioner's Guide for Criteria-Based Respiratory Care by Tyler Oseguera M.D., and BENIGNO Hogue The rationale for therapist-driven protocols: an update. Respiratory Care 1998; [id number]. N   [facility] Clinical Practice Guidelines. C. The RCP will perform a respiratory assessment in the following manner: 13. Perform hand hygiene per hospital policy utilizing Standard Precautions for all patients and following transmission-based isolation as indicated per policy. 14. Identify patient via ID bracelet verifying patient name and birth date. 15. General observations: color, pattern and effort of breathing, chest expansion, (symmetrical and bilateral), level of consciousness and the ability to ambulate. 16. The RCP will assess patients cough ability and determine if Nasotracheal suctioning is needed. If patient is unable to produce sputum, at that time, the RCP should question the patient with regard to their sputum: production, color consistency, frequency and amount. 17. Auscultation: Using a stethoscope, the RCP will listen and note quality of breath sounds and presence or absence of adventitious breath sounds in all lung fields, both anteriorly and posteriorly. 18. Upon completing the EMR review and physical assessment, the RCP will document findings in the RT Assessment section of the EMR. The score level will be provided and will be used to determine the frequency of therapy. V. Indications: A. Indications for Bronchial Hygiene Protocol will include: 9. Potential for or presence of atelectasis. 10. Need for hydration and removal of retained secretions. 11. Need for improvement of cough effectiveness. 12. Presence of conditions associated with disorder of pulmonary clearance: 
i. Cystic fibrosis 
j. Bronchiectasis D. Indications for Aerosolized Medication(s) Protocol should include: 9. Treatment of bronchospasm/wheezing 10. Improvement of mucociliary clearance 11. Treatment of stridor 12. History of Asthma or COPD 
           C.  Indications for Oxygen Therapy Protocol should include: 
9. Documented hypoxemia 10. Severe trauma 11. Acute myocardial infarction 12. Short-term therapy (e.g. post anesthesia recovery) VI. Maintenance: C. Timely patient assessment is an integral part of this protocol therefore the following will be applied: 13. All non- critical care patients will be evaluated upon receiving initial respiratory care orders within 24 hours and re-evaluated within 48 hours (or more as needed). 14. Orders requesting a Respiratory Consult will be responded to in the following manner: 
k. In patient emergency situations, the RCP assigned to the floor will respond immediately to the patient, provide an initial respiratory assessment, and contact the patients physician as necessary for appropriate orders. l. In non-emergent situations, the RCP assigned to the floor will respond to the patient within 90 minutes and provide an initial respiratory assessment and contact patients physician as necessary for appropriate orders. m. An RCP will provide a comprehensive assessment as soon as possible. 15. Upon completion of an evaluation, the RCP will complete documentation in the patients EMR in the RT Assessment section. 16. The RCP who completes the assessment will document orders for therapy in the orders section of the patients EMR selecting new order. Next, per protocol should be selected indicating it is a protocol order and sign orders should be selected to complete the process. 17. The Pharmacy and Therapeutics (P&T) Committee has mandated that the medication Xopenex may be changed to unit dose albuterol without an order, except for those patients receiving Xopenex due to cardiac arrhythmias. The dosage for these patients should be 0.63 mg. and may be changed from 1.25 mg. to 0.63 mg per P & T Committee by the RCP completing the assessment. 18. Patients who are not experiencing cardiac arrhythmias, and are ordered Xopenex and Atrovent may be changed to Duoneb. VII. Safety:       
C. The following safety issues shall be monitored: 11. The RCP will perform hand hygiene per hospital policy utilizing Standard Precautions for all patients and following transmission-based isolation as indicated per hospital policy. 12. The RCP must exercise professional judgment in classifying the patient for frequency of therapy. 13. Appropriate classification of the patient will require an evaluation utilizing the Therapy Assessment Protocol Guidelines. 14. The RCP will confer with the physician concerning the care of the patient at any time questions or problems arise. 15. If during therapy, the patient exhibits no improvement or deterioration in clinical status the RCP will notify the physician and the patients nurse. VIII. Interventions:  
E. The patients nurse is responsible concerning all items related to his/her care. Ongoing communication with nursing is essential to successful protocol management. F. The RCP recognizes the value of the team approach in meeting the patients needs. Nursing input regarding the patients pulmonary condition will be sought as needed. IX. Reportable conditions: The RCP will inform the physician if: 
9. There are acute changes in patients respiratory status. 10. The therapist is unable to determine appropriate care plan upon assessment. 11. The patient fails to reach therapeutic objective. 12. A change or additional medication is needed. X.  Patient Education:   
E. Patient will receive instruction on the followin. The treatment modality, including objectives and proper technique of therapy 6. Respiratory medications F. Documentation shall occur in the patient education section of the patients EMR. XI. Documentation: Record all findings as described above in the patients EMR. Related Protocols: A. Aerosolized Medication Protocol J. Bronchial Hygiene K. Oxygen Protocol L. Volume Expansion/Secretion Clearance M. Ventilator Weaning Protocols References: 
[address] Standard L    Respiratory Care Department Policy, Procedure and Protocol Guideline Manual, 1995, KAREN SOLIMAN  Therapist Driven Respiratory Care Protocols  A Practitioners Guide for Criteria-Based Respiratory Care by Marie Rodrigez M.D., and KAREN Christina RRT. L  The rationale for therapist-driven protocols: an update. Respiratory Care 1998; 88:934-103 N   [facility] Clinical Practice Guidelines.

## 2020-11-26 ENCOUNTER — APPOINTMENT (OUTPATIENT)
Dept: INFUSION THERAPY | Age: 50
End: 2020-11-26

## 2020-11-26 LAB
ANION GAP SERPL CALC-SCNC: 5 MMOL/L (ref 7–16)
BUN SERPL-MCNC: 3 MG/DL (ref 6–23)
CALCIUM SERPL-MCNC: 8.2 MG/DL (ref 8.3–10.4)
CHLORIDE SERPL-SCNC: 103 MMOL/L (ref 98–107)
CO2 SERPL-SCNC: 31 MMOL/L (ref 21–32)
CREAT SERPL-MCNC: 0.52 MG/DL (ref 0.8–1.5)
ERYTHROCYTE [DISTWIDTH] IN BLOOD BY AUTOMATED COUNT: 17.2 % (ref 11.9–14.6)
GLUCOSE SERPL-MCNC: 96 MG/DL (ref 65–100)
HCT VFR BLD AUTO: 26 % (ref 41.1–50.3)
HGB BLD-MCNC: 8.3 G/DL (ref 13.6–17.2)
MCH RBC QN AUTO: 34.9 PG (ref 26.1–32.9)
MCHC RBC AUTO-ENTMCNC: 31.9 G/DL (ref 31.4–35)
MCV RBC AUTO: 109.2 FL (ref 79.6–97.8)
NRBC # BLD: 0 K/UL (ref 0–0.2)
PLATELET # BLD AUTO: 286 K/UL (ref 150–450)
PMV BLD AUTO: 9.5 FL (ref 9.4–12.3)
POTASSIUM SERPL-SCNC: 3.8 MMOL/L (ref 3.5–5.1)
RBC # BLD AUTO: 2.38 M/UL (ref 4.23–5.6)
SODIUM SERPL-SCNC: 139 MMOL/L (ref 136–145)
WBC # BLD AUTO: 5.5 K/UL (ref 4.3–11.1)

## 2020-11-26 PROCEDURE — 74011250637 HC RX REV CODE- 250/637: Performed by: FAMILY MEDICINE

## 2020-11-26 PROCEDURE — 74011250637 HC RX REV CODE- 250/637: Performed by: STUDENT IN AN ORGANIZED HEALTH CARE EDUCATION/TRAINING PROGRAM

## 2020-11-26 PROCEDURE — 74011000250 HC RX REV CODE- 250: Performed by: SURGERY

## 2020-11-26 PROCEDURE — 74011250636 HC RX REV CODE- 250/636: Performed by: NURSE PRACTITIONER

## 2020-11-26 PROCEDURE — 65270000029 HC RM PRIVATE

## 2020-11-26 PROCEDURE — 85027 COMPLETE CBC AUTOMATED: CPT

## 2020-11-26 PROCEDURE — 80048 BASIC METABOLIC PNL TOTAL CA: CPT

## 2020-11-26 PROCEDURE — 74011250636 HC RX REV CODE- 250/636: Performed by: SURGERY

## 2020-11-26 PROCEDURE — 74011250637 HC RX REV CODE- 250/637: Performed by: NURSE PRACTITIONER

## 2020-11-26 RX ADMIN — Medication 10 ML: at 14:50

## 2020-11-26 RX ADMIN — TRAZODONE HYDROCHLORIDE 50 MG: 50 TABLET ORAL at 00:22

## 2020-11-26 RX ADMIN — MAGNESIUM GLUCONATE 500 MG ORAL TABLET 400 MG: 500 TABLET ORAL at 17:03

## 2020-11-26 RX ADMIN — CEFAZOLIN SODIUM 2 G: 100 INJECTION, POWDER, LYOPHILIZED, FOR SOLUTION INTRAVENOUS at 17:56

## 2020-11-26 RX ADMIN — CALCIUM CARBONATE (ANTACID) CHEW TAB 500 MG 200 MG: 500 CHEW TAB at 16:17

## 2020-11-26 RX ADMIN — Medication 10 ML: at 05:58

## 2020-11-26 RX ADMIN — CALCIUM CARBONATE (ANTACID) CHEW TAB 500 MG 200 MG: 500 CHEW TAB at 12:25

## 2020-11-26 RX ADMIN — Medication 10 ML: at 21:43

## 2020-11-26 RX ADMIN — CYCLOBENZAPRINE 10 MG: 10 TABLET, FILM COATED ORAL at 00:22

## 2020-11-26 RX ADMIN — OXYCODONE HYDROCHLORIDE 10 MG: 5 TABLET ORAL at 12:24

## 2020-11-26 RX ADMIN — HYDROMORPHONE HYDROCHLORIDE 1 MG: 1 INJECTION, SOLUTION INTRAMUSCULAR; INTRAVENOUS; SUBCUTANEOUS at 20:12

## 2020-11-26 RX ADMIN — CEFAZOLIN SODIUM 2 G: 100 INJECTION, POWDER, LYOPHILIZED, FOR SOLUTION INTRAVENOUS at 03:17

## 2020-11-26 RX ADMIN — CALCIUM CARBONATE (ANTACID) CHEW TAB 500 MG 200 MG: 500 CHEW TAB at 08:17

## 2020-11-26 RX ADMIN — CYCLOBENZAPRINE 10 MG: 10 TABLET, FILM COATED ORAL at 08:17

## 2020-11-26 RX ADMIN — CYCLOBENZAPRINE 10 MG: 10 TABLET, FILM COATED ORAL at 16:17

## 2020-11-26 RX ADMIN — MAGNESIUM GLUCONATE 500 MG ORAL TABLET 400 MG: 500 TABLET ORAL at 08:17

## 2020-11-26 RX ADMIN — OXYCODONE HYDROCHLORIDE 10 MG: 5 TABLET ORAL at 03:17

## 2020-11-26 RX ADMIN — HYDROMORPHONE HYDROCHLORIDE 1 MG: 1 INJECTION, SOLUTION INTRAMUSCULAR; INTRAVENOUS; SUBCUTANEOUS at 16:15

## 2020-11-26 RX ADMIN — CEFAZOLIN SODIUM 2 G: 100 INJECTION, POWDER, LYOPHILIZED, FOR SOLUTION INTRAVENOUS at 10:29

## 2020-11-26 RX ADMIN — TRAZODONE HYDROCHLORIDE 50 MG: 50 TABLET ORAL at 21:43

## 2020-11-26 RX ADMIN — CYCLOBENZAPRINE 10 MG: 10 TABLET, FILM COATED ORAL at 21:43

## 2020-11-26 RX ADMIN — OXYCODONE HYDROCHLORIDE 10 MG: 5 TABLET ORAL at 08:18

## 2020-11-26 NOTE — PROGRESS NOTES
Hospitalist Progress Note     Admit Date:  2020  9:21 PM   Name:  Louisa Carney   Age:  48 y.o.  :  1970   MRN:  [id number]   PCP:  None  Treatment Team: Attending Provider: Mehul Brush MD; Consulting Provider: Luis A Mendoza MD; Care Manager: Xavi Ocasio RN; Student Nurse: Ji Joe; Utilization Review: Josep Ferro RN; Nurse Practitioner: Valentina Rodriguez NP; Primary Nurse: Rinaldo Shone, RN    Subjective:   HPI and or CC:  48year old CM PMH ETOH abuse, GERD, tobacco use, recently hospitalized 11/3- for MSSA bacteremia and loculated empyema right side s/p fall from attic with right rib fx admitted after midnight for acute onset SOB. Per review of discharge on , he was to have daily Rocephin until  for MSSA bacteremia which he has been compliant with.    he reports doing well until he arrived home from infusion center and SOB began. Ems noted saturations in the 80s.  CXR showed unchanged left sided empyema.  CT chest showed mildly enlarged multilobulated left empyema, multiple rib fx.  IR consulted. Pulm consulted. General Surgery consulted.  ID changed rocephin to ancef.  underwent VATs, chest tube X2, thoracic epidural placed, ROCKY placed.   pt pulled out drains. 1 chest tube replaced by Dr. Shea Hernandez. Wicho Paulino left chest tube removed per Surgery.   hypoxic, spongy edema left mid back around mid axillary. CT chest obtained showed increased loculated fluid along left lateral lung margin extending into the chest wall, following VATS, increasing small basilar pleural effusion, left greater than right.  Surgery re-consulted. On  underwent right thoracotomy with drainage of effusion and decortication. Pulmonary consulted.  ID extending Ancef until at least 20.  Required PRBC  for drop in hgb. on  3 L O2    :  Patient seen today, alert and oriented x3. Afebrile, no leukocytosis. Saturations low to mid 90s on 2 liters. Dressing left chest dry/intact. Objective:     Patient Vitals for the past 24 hrs:   Temp Pulse Resp BP SpO2   11/26/20 0732 98 °F (36.7 °C) 83 17 107/68 99 %   11/26/20 0331 97.4 °F (36.3 °C) 99 18 124/73 98 %   11/25/20 2338 98.7 °F (37.1 °C) 91 17 111/73 91 %   11/25/20 1939 98.5 °F (36.9 °C) 98 17 110/71 98 %   11/25/20 1528 97.7 °F (36.5 °C) 91 17 119/74 93 %   11/25/20 1129 97.9 °F (36.6 °C) 94 18 110/74 95 %     Oxygen Therapy  O2 Sat (%): 99 % (11/26/20 0732)  Pulse via Oximetry: 88 beats per minute (11/25/20 0803)  O2 Device: Nasal cannula (11/25/20 1939)  O2 Flow Rate (L/min): 2 l/min (11/25/20 1939)  FIO2 (%): 28 % (11/19/20 1317)  ETCO2 (mmHg): 99 mmHg (11/16/20 1849)    Intake/Output Summary (Last 24 hours) at 11/26/2020 0924  Last data filed at 11/25/2020 2338  Gross per 24 hour   Intake    Output 300 ml   Net -300 ml         REVIEW OF SYSTEMS: Comprehensive ROS performed and negative except as stated in HPI. Physical Examination:  General:    Well nourished. No gross distress. Head:  Normocephalic, atraumatic, nares patent  Eyes:  Extraocular movements intact, normal sclera  CV:   RRR. No  Murmurs, clicks, or gallops, distal pulses intact  Chest:              Dressing left chest dry/intact  Lungs:   Unlabored, no cyanosis, no wheeze  Abdomen:   Soft, nondistended, nontender. Extremities: Warm and dry. No cyanosis or edema. Skin:     No rashes or jaundice. Neuro:  No gross focal deficits, no tremor  Psych:  Mood and affect appropriate    Data Review:  I have reviewed all labs, meds, telemetry events, and studies from the last 24 hours.     Recent Results (from the past 24 hour(s))   CBC W/O DIFF    Collection Time: 11/26/20  5:49 AM   Result Value Ref Range    WBC 5.5 4.3 - 11.1 K/uL    RBC 2.38 (L) 4.23 - 5.6 M/uL    HGB 8.3 (L) 13.6 - 17.2 g/dL    HCT 26.0 (L) 41.1 - 50.3 %    .2 (H) 79.6 - 97.8 FL    MCH 34.9 (H) 26.1 - 32.9 PG    MCHC 31.9 31.4 - 35.0 g/dL    RDW 17.2 (H) 11.9 - 14.6 %    PLATELET 210 200 - 011 K/uL    MPV 9.5 9.4 - 12.3 FL    ABSOLUTE NRBC 0.00 0.0 - 0.2 K/uL   METABOLIC PANEL, BASIC    Collection Time: 11/26/20  5:49 AM   Result Value Ref Range    Sodium 139 136 - 145 mmol/L    Potassium 3.8 3.5 - 5.1 mmol/L    Chloride 103 98 - 107 mmol/L    CO2 31 21 - 32 mmol/L    Anion gap 5 (L) 7 - 16 mmol/L    Glucose 96 65 - 100 mg/dL    BUN 3 (L) 6 - 23 MG/DL    Creatinine 0.52 (L) 0.8 - 1.5 MG/DL    GFR est AA >60 >60 ml/min/1.73m2    GFR est non-AA >60 >60 ml/min/1.73m2    Calcium 8.2 (L) 8.3 - 10.4 MG/DL        All Micro Results     Procedure Component Value Units Date/Time    CULTURE, BLOOD [[id number]] Collected:  11/15/20 0033    Order Status:  Completed Specimen:  Blood Updated:  11/20/20 0829     Special Requests: --        RIGHT  ARM       Culture result: NO GROWTH 5 DAYS       CULTURE, BLOOD [[id number]] Collected:  11/15/20 0035    Order Status:  Completed Specimen:  Blood Updated:  11/20/20 0829     Special Requests: --        LEFT  ARM       Culture result: NO GROWTH 5 DAYS             Current Meds:  Current Facility-Administered Medications   Medication Dose Route Frequency    albuterol (PROVENTIL VENTOLIN) nebulizer solution 2.5 mg  2.5 mg Nebulization Q6H PRN    0.9% sodium chloride infusion 250 mL  250 mL IntraVENous PRN    cyclobenzaprine (FLEXERIL) tablet 10 mg  10 mg Oral TID    traZODone (DESYREL) tablet 50 mg  50 mg Oral QHS    HYDROmorphone (PF) (DILAUDID) injection 1 mg  1 mg IntraVENous Q3H PRN    magnesium oxide (MAG-OX) tablet 400 mg  400 mg Oral BID    calcium carbonate (TUMS) chewable tablet 200 mg [elemental]  200 mg Oral TID WITH MEALS    0.9% sodium chloride infusion 250 mL  250 mL IntraVENous PRN    naloxone (NARCAN) injection 0.4 mg  0.4 mg IntraVENous EVERY 2 MINUTES AS NEEDED    oxyCODONE IR (ROXICODONE) tablet 10 mg  10 mg Oral Q4H PRN    LORazepam (ATIVAN) injection 1 mg  1 mg IntraVENous Q4H PRN    central line flush (saline) syringe 10 mL  10 mL InterCATHeter PRN    sodium chloride (NS) flush 5-40 mL  5-40 mL IntraVENous Q8H    sodium chloride (NS) flush 5-40 mL  5-40 mL IntraVENous PRN    promethazine (PHENERGAN) with saline injection 6.25 mg  6.25 mg IntraVENous Q15MIN PRN    central line flush (saline) syringe 10 mL  10 mL InterCATHeter PRN    ceFAZolin (ANCEF) 2 g/20 mL in sterile water IV syringe  2 g IntraVENous Q8H    sodium chloride (NS) flush 5-40 mL  5-40 mL IntraVENous Q8H    sodium chloride (NS) flush 5-40 mL  5-40 mL IntraVENous PRN    acetaminophen (TYLENOL) tablet 650 mg  650 mg Oral Q6H PRN    Or    acetaminophen (TYLENOL) suppository 650 mg  650 mg Rectal Q6H PRN    polyethylene glycol (MIRALAX) packet 17 g  17 g Oral DAILY PRN    promethazine (PHENERGAN) tablet 12.5 mg  12.5 mg Oral Q6H PRN    Or    ondansetron (ZOFRAN) injection 4 mg  4 mg IntraVENous Q6H PRN       Diet:  DIET REGULAR  DIET NUTRITIONAL SUPPLEMENTS    Other Studies (last 24 hours):  No results found. Assessment and Plan:     Hospital Problems as of 11/26/2020 Date Reviewed: 11/3/2020          Codes Class Noted - Resolved POA    * (Principal) Acute respiratory failure with hypoxia ([facility] ICD-10-CM: J96.01  ICD-9-CM: 518.81  11/15/2020 - Present Unknown        Loculated empyema ([facility] ICD-10-CM: J86.9  ICD-9-CM: 510.9  11/15/2020 - Present Unknown        MSSA bacteremia ICD-10-CM: R78.81, B95.61  ICD-9-CM: 790.7, 041.11  11/4/2020 - Present Yes        Alcohol abuse ICD-10-CM: F10.10  ICD-9-CM: 305.00  8/17/2016 - Present Yes              A/P:    1. Acute resp failure with hypoxia/loculated empyema/rib fx with pneumothorax  Pulm following  IR consulted  General Surgery consulted,s/p VATS 11/17.   11/19 pulled all tubes out, chest tube replaced 11/20 removed  On rocephin from 11/13-11/16 now on Ancef with  EOT 12/16/20  ID following  Wean O2  General Surgery signed off, F/U 1 week Dr. Bryanna Last at [facility]     2.  MSSA Bacteremia  ID following  Ancef EOT 12/16/20     3.  Alcohol abuse  Resolved; patient hospitalized since 11/15     4.  Marked worsening of spongy edematous area left back without crepitus 11/21  CT chest 11/22 w worsening loculated fluid   Surgery and Pulmonary in 11/22 11/23 underwent right thoracotomy with drainage of effusion and decortication.   Added  flexeril for muscle spasm.      5.  Acute blood loss anemia  11/22 and 11/23 transfused PRBC  hgb stable-8.3 today             Signed:  Linda Smith NP

## 2020-11-26 NOTE — PROGRESS NOTES
END OF SHIFT NOTE:    INTAKE/OUTPUT  11/25 0701 - 11/26 0700  In: -   Out: 300 [Urine:300]  Voiding: YES  Catheter: NO  Drain:              Flatus: Patient does have flatus present. Stool:  2 occurrences. Characteristics:  Stool Assessment  Stool Color: Brown  Stool Appearance: Hard  Stool Amount: Small  Stool Source/Status: Rectum    Emesis: 0 occurrences. Characteristics:        VITAL SIGNS  Patient Vitals for the past 12 hrs:   Temp Pulse Resp BP SpO2   11/26/20 0331 97.4 °F (36.3 °C) 99 18 124/73 98 %   11/25/20 2338 98.7 °F (37.1 °C) 91 17 111/73 91 %   11/25/20 1939 98.5 °F (36.9 °C) 98 17 110/71 98 %       Pain Assessment  Pain Intensity 1: 8 (11/26/20 0316)  Pain Location 1: Chest  Pain Intervention(s) 1: Medication (see MAR)  Patient Stated Pain Goal: 0    Ambulating  Yes    Shift report given to oncoming nurse at the bedside.     Demian Hernandez RN

## 2020-11-27 ENCOUNTER — APPOINTMENT (OUTPATIENT)
Dept: INFUSION THERAPY | Age: 50
End: 2020-11-27

## 2020-11-27 LAB
ANION GAP SERPL CALC-SCNC: 5 MMOL/L (ref 7–16)
BUN SERPL-MCNC: 4 MG/DL (ref 6–23)
CALCIUM SERPL-MCNC: 8.6 MG/DL (ref 8.3–10.4)
CHLORIDE SERPL-SCNC: 101 MMOL/L (ref 98–107)
CO2 SERPL-SCNC: 31 MMOL/L (ref 21–32)
CREAT SERPL-MCNC: 0.59 MG/DL (ref 0.8–1.5)
ERYTHROCYTE [DISTWIDTH] IN BLOOD BY AUTOMATED COUNT: 16.6 % (ref 11.9–14.6)
GLUCOSE SERPL-MCNC: 88 MG/DL (ref 65–100)
HCT VFR BLD AUTO: 29 % (ref 41.1–50.3)
HGB BLD-MCNC: 9.2 G/DL (ref 13.6–17.2)
MCH RBC QN AUTO: 34.6 PG (ref 26.1–32.9)
MCHC RBC AUTO-ENTMCNC: 31.7 G/DL (ref 31.4–35)
MCV RBC AUTO: 109 FL (ref 79.6–97.8)
NRBC # BLD: 0 K/UL (ref 0–0.2)
PLATELET # BLD AUTO: 307 K/UL (ref 150–450)
PMV BLD AUTO: 9.8 FL (ref 9.4–12.3)
POTASSIUM SERPL-SCNC: 3.9 MMOL/L (ref 3.5–5.1)
RBC # BLD AUTO: 2.66 M/UL (ref 4.23–5.6)
SODIUM SERPL-SCNC: 137 MMOL/L (ref 138–145)
WBC # BLD AUTO: 5.3 K/UL (ref 4.3–11.1)

## 2020-11-27 PROCEDURE — 74011250636 HC RX REV CODE- 250/636: Performed by: NURSE PRACTITIONER

## 2020-11-27 PROCEDURE — 2709999900 HC NON-CHARGEABLE SUPPLY

## 2020-11-27 PROCEDURE — 74011250637 HC RX REV CODE- 250/637: Performed by: INTERNAL MEDICINE

## 2020-11-27 PROCEDURE — 80048 BASIC METABOLIC PNL TOTAL CA: CPT

## 2020-11-27 PROCEDURE — 74011250637 HC RX REV CODE- 250/637: Performed by: FAMILY MEDICINE

## 2020-11-27 PROCEDURE — 74011250637 HC RX REV CODE- 250/637: Performed by: STUDENT IN AN ORGANIZED HEALTH CARE EDUCATION/TRAINING PROGRAM

## 2020-11-27 PROCEDURE — 74011000250 HC RX REV CODE- 250: Performed by: SURGERY

## 2020-11-27 PROCEDURE — 65270000029 HC RM PRIVATE

## 2020-11-27 PROCEDURE — 74011250636 HC RX REV CODE- 250/636: Performed by: SURGERY

## 2020-11-27 PROCEDURE — 74011250637 HC RX REV CODE- 250/637: Performed by: NURSE PRACTITIONER

## 2020-11-27 PROCEDURE — 85027 COMPLETE CBC AUTOMATED: CPT

## 2020-11-27 RX ORDER — PANTOPRAZOLE SODIUM 40 MG/1
40 TABLET, DELAYED RELEASE ORAL
Status: DISCONTINUED | OUTPATIENT
Start: 2020-11-27 | End: 2020-12-03 | Stop reason: HOSPADM

## 2020-11-27 RX ADMIN — Medication 10 ML: at 22:59

## 2020-11-27 RX ADMIN — Medication 10 ML: at 16:47

## 2020-11-27 RX ADMIN — OXYCODONE HYDROCHLORIDE 10 MG: 5 TABLET ORAL at 08:20

## 2020-11-27 RX ADMIN — MAGNESIUM GLUCONATE 500 MG ORAL TABLET 400 MG: 500 TABLET ORAL at 18:00

## 2020-11-27 RX ADMIN — OXYCODONE HYDROCHLORIDE 10 MG: 5 TABLET ORAL at 00:04

## 2020-11-27 RX ADMIN — OXYCODONE HYDROCHLORIDE 10 MG: 5 TABLET ORAL at 16:44

## 2020-11-27 RX ADMIN — CALCIUM CARBONATE (ANTACID) CHEW TAB 500 MG 200 MG: 500 CHEW TAB at 16:45

## 2020-11-27 RX ADMIN — CYCLOBENZAPRINE 10 MG: 10 TABLET, FILM COATED ORAL at 08:20

## 2020-11-27 RX ADMIN — CEFAZOLIN SODIUM 2 G: 100 INJECTION, POWDER, LYOPHILIZED, FOR SOLUTION INTRAVENOUS at 02:35

## 2020-11-27 RX ADMIN — OXYCODONE HYDROCHLORIDE 10 MG: 5 TABLET ORAL at 12:56

## 2020-11-27 RX ADMIN — CYCLOBENZAPRINE 10 MG: 10 TABLET, FILM COATED ORAL at 16:45

## 2020-11-27 RX ADMIN — Medication 10 ML: at 21:08

## 2020-11-27 RX ADMIN — PANTOPRAZOLE SODIUM 40 MG: 40 TABLET, DELAYED RELEASE ORAL at 23:12

## 2020-11-27 RX ADMIN — HYDROMORPHONE HYDROCHLORIDE 1 MG: 1 INJECTION, SOLUTION INTRAMUSCULAR; INTRAVENOUS; SUBCUTANEOUS at 05:44

## 2020-11-27 RX ADMIN — Medication 10 ML: at 05:45

## 2020-11-27 RX ADMIN — CALCIUM CARBONATE (ANTACID) CHEW TAB 500 MG 200 MG: 500 CHEW TAB at 08:20

## 2020-11-27 RX ADMIN — TRAZODONE HYDROCHLORIDE 50 MG: 50 TABLET ORAL at 22:58

## 2020-11-27 RX ADMIN — MAGNESIUM GLUCONATE 500 MG ORAL TABLET 400 MG: 500 TABLET ORAL at 08:20

## 2020-11-27 RX ADMIN — Medication 10 ML: at 16:46

## 2020-11-27 RX ADMIN — CALCIUM CARBONATE (ANTACID) CHEW TAB 500 MG 200 MG: 500 CHEW TAB at 12:56

## 2020-11-27 RX ADMIN — HYDROMORPHONE HYDROCHLORIDE 1 MG: 1 INJECTION, SOLUTION INTRAMUSCULAR; INTRAVENOUS; SUBCUTANEOUS at 18:35

## 2020-11-27 RX ADMIN — Medication 20 ML: at 05:45

## 2020-11-27 RX ADMIN — CYCLOBENZAPRINE 10 MG: 10 TABLET, FILM COATED ORAL at 21:06

## 2020-11-27 RX ADMIN — CEFAZOLIN SODIUM 2 G: 100 INJECTION, POWDER, LYOPHILIZED, FOR SOLUTION INTRAVENOUS at 18:00

## 2020-11-27 RX ADMIN — CEFAZOLIN SODIUM 2 G: 100 INJECTION, POWDER, LYOPHILIZED, FOR SOLUTION INTRAVENOUS at 10:00

## 2020-11-27 NOTE — PROGRESS NOTES
Hospitalist Progress Note    Subjective:   Daily Progress Note: 11/27/2020 5:51 PM    See prior notes for entire hospital stay.      Most recently, VATs 11/17 with chest tube x 2 to suction, thoracic epidural placed.  ROCKY placed. Patient pulled all drains out 11/18.  1 Chest tube replaced by Dr López Seat at bedside. Draining bloody fluid.    11/20:  Surgery removed left chest tube. Continued tachypnea on 2 liters with oxygen sats 94-97%. Ravi Bonilla desats to 88% on room air.  Lung sounds remain coarse and faint on left.  Still having a lot of pain.  Surgery signing off.  Minimal exertion increases oxygen needs exponentially. X fused  one unit PRBC.   11/21:  Worsening pain and tachypnea throughout the day. Oxygen sat 94-97% on 3 liters, desats to 88% 1 minute off oxygen, down to 74% after 3 minutes. Area of spongy edema left mid back around to mid axillary line with worsening size and pain.  Now approx 20 x17 cm including large incision line with dry, intact staples. No crepitus.  No erythema, heat, drainage or signs of infection.  Dr Guzman Pick in to exam also.  Will get CT chest in am and ask Pulmonary and surgery to see again.  RT in frequently.  Hgb down to 7.9 today.    11/22: CT with increasing small basilar pleural effusions, left greater than right and increasing loculated fluid along left lateral lung margin extending into the chest wall. Pulmonary in, signing back off. Transfused. Surgery back in, signing off. ID may extend abx.     11/25:  ID recommending repeat CT Chest in next 48 hours. Continued need for oxygen. 11/27: Desaturats to 89% consistently when off oxygen, has been keeping sats 96-98% on 2 liters. Will order chest CT as recommended by ID. Patient wants to go home ASAP. Informed we can only send up to 5 days of narcotic rx. Patient  is still taking narcs frequently, complains of unrelieved pain frequently also. Discontinuing IV dilaudid.    Checking oxygen walk test, Overnight oxygen study    Current Facility-Administered Medications   Medication Dose Route Frequency    albuterol (PROVENTIL VENTOLIN) nebulizer solution 2.5 mg  2.5 mg Nebulization Q6H PRN    0.9% sodium chloride infusion 250 mL  250 mL IntraVENous PRN    cyclobenzaprine (FLEXERIL) tablet 10 mg  10 mg Oral TID    traZODone (DESYREL) tablet 50 mg  50 mg Oral QHS    HYDROmorphone (PF) (DILAUDID) injection 1 mg  1 mg IntraVENous Q3H PRN    magnesium oxide (MAG-OX) tablet 400 mg  400 mg Oral BID    calcium carbonate (TUMS) chewable tablet 200 mg [elemental]  200 mg Oral TID WITH MEALS    0.9% sodium chloride infusion 250 mL  250 mL IntraVENous PRN    naloxone (NARCAN) injection 0.4 mg  0.4 mg IntraVENous EVERY 2 MINUTES AS NEEDED    oxyCODONE IR (ROXICODONE) tablet 10 mg  10 mg Oral Q4H PRN    LORazepam (ATIVAN) injection 1 mg  1 mg IntraVENous Q4H PRN    central line flush (saline) syringe 10 mL  10 mL InterCATHeter PRN    sodium chloride (NS) flush 5-40 mL  5-40 mL IntraVENous Q8H    sodium chloride (NS) flush 5-40 mL  5-40 mL IntraVENous PRN    promethazine (PHENERGAN) with saline injection 6.25 mg  6.25 mg IntraVENous Q15MIN PRN    central line flush (saline) syringe 10 mL  10 mL InterCATHeter PRN    ceFAZolin (ANCEF) 2 g/20 mL in sterile water IV syringe  2 g IntraVENous Q8H    sodium chloride (NS) flush 5-40 mL  5-40 mL IntraVENous Q8H    sodium chloride (NS) flush 5-40 mL  5-40 mL IntraVENous PRN    acetaminophen (TYLENOL) tablet 650 mg  650 mg Oral Q6H PRN    Or    acetaminophen (TYLENOL) suppository 650 mg  650 mg Rectal Q6H PRN    polyethylene glycol (MIRALAX) packet 17 g  17 g Oral DAILY PRN    promethazine (PHENERGAN) tablet 12.5 mg  12.5 mg Oral Q6H PRN    Or    ondansetron (ZOFRAN) injection 4 mg  4 mg IntraVENous Q6H PRN      Review of Systems  A comprehensive review of systems was negative except for that written in the HPI.     Objective:     Visit Vitals  /82   Pulse (!) 101   Temp 98.5 °F (36.9 °C) Resp 18   Ht 5' 10\" (1.778 m)   Wt 93 kg (205 lb)   SpO2 (!) 89%   BMI 29.41 kg/m²    O2 Flow Rate (L/min): 2 l/min O2 Device: Nasal cannula    Temp (24hrs), Av.3 °F (36.8 °C), Min:97.6 °F (36.4 °C), Max:98.8 °F (37.1 °C)    701 - 1900  In: -   Out: 900 [Urine:900]  1901 - 700  In: -   Out: 1200 [Urine:1200]    General:  Continues to complain of pain. No drainage from dressings,. Feeling better in general despite SOB with minimal exertion and unrelieved pain.        Head: Normocephalic, without obvious abnormality, atraumatic  Eyes: conjunctivae/corneas clear. PERRL  Throat: Lips, mucosa, and tongue normal. Teeth and gums normal  Neck: supple, symmetrical, trachea midline, no JVD  Lungs: Right lung clear to auscultation, left with diminished breath sounds in lower left lung. Suture line with removed staples, dry and intact with edges well approximated. Dressing below dry and intact. Continued O2 at 2 liters, desats with removal to 88%.      Heart: regular rate and rhythm, S1, S2 normal, no murmur, click, rub or gallop.  See above for chest wall. Abdomen: Soft, non-tender.  Bowel sounds normal. No masses,  no organomegaly  Extremities: All extremities normal, atraumatic, no cyanosis or edema  Skin: Skin color, texture, turgor normal. No rashes or lesions  Neurologic: Grossly normal    Additional comments: Notes,orders, test results, vitals reviewed    Data Review  Recent Results (from the past 24 hour(s))   CBC W/O DIFF    Collection Time: 20  5:42 AM   Result Value Ref Range    WBC 5.3 4.3 - 11.1 K/uL    RBC 2.66 (L) 4.23 - 5.6 M/uL    HGB 9.2 (L) 13.6 - 17.2 g/dL    HCT 29.0 (L) 41.1 - 50.3 %    .0 (H) 79.6 - 97.8 FL    MCH 34.6 (H) 26.1 - 32.9 PG    MCHC 31.7 31.4 - 35.0 g/dL    RDW 16.6 (H) 11.9 - 14.6 %    PLATELET 514 860 - 554 K/uL    MPV 9.8 9.4 - 12.3 FL    ABSOLUTE NRBC 0.00 0.0 - 0.2 K/uL   METABOLIC PANEL, BASIC    Collection Time: 20  5:42 AM Result Value Ref Range    Sodium 137 (L) 138 - 145 mmol/L    Potassium 3.9 3.5 - 5.1 mmol/L    Chloride 101 98 - 107 mmol/L    CO2 31 21 - 32 mmol/L    Anion gap 5 (L) 7 - 16 mmol/L    Glucose 88 65 - 100 mg/dL    BUN 4 (L) 6 - 23 MG/DL    Creatinine 0.59 (L) 0.8 - 1.5 MG/DL    GFR est AA >60 >60 ml/min/1.73m2    GFR est non-AA >60 >60 ml/min/1.73m2    Calcium 8.6 8.3 - 10.4 MG/DL      11/14:  CXR: Unchanged left-sided empyema.     11/14:  CT CHEST:  No evidence of pulmonary embolism.  Mildly enlarged multilobulated left empyema.  Unchanged small focus of left lower lobe consolidation with small internal bubbles of air which may relate to pneumatoceles although an early lung abscess is not excluded. Multiple left rib fractures, several of which appear segmental suspect for a flail chest.     11/16:  DUPLEX LE:  No evidence of deep venous thrombosis within the lower extremities.     11/17:  CXR:  Left-sided chest tubes without definite pneumothorax. Improved aeration of the left lower hemithorax. Mild pleural thickening or fluid remains on the left in addition to patchy interstitial and airspace opacities.  Mild right basilar subsegmental atelectasis.     11/18:  CXR:  Status post removal of one of the left chest tubes and repositioning of the other. No pneumothorax. Stable left hemithorax changes and right base atelectasis.     11/19:  CXR:  Unchanged bilateral lung atelectasis and pleural thickening or effusion along the outer origin of the left lung.     11/20:  CXR:  Unchanged bilateral lung atelectasis and pleural thickening or effusion along the outer left lung. 11/22:  CT CHEST: . Associated numerous left lateral rib fractures.  Increasing small basilar pleural effusions, left greater than  Increasing loculated fluid along left lateral lung margin extending into the chest wall, following VATS right    Assessment/Plan:   ACUTE RESPIRATORY FAILURE WITH HYPOXIA: Chest tubes x 2 previously with empyema, hemothorax requiring tPa x 2 after traumatic fall with left rib fx               VATS and thoracic epidural placed 11/17 (third set of               Chest tubes)              Pulled all tubes out 11/19:  One CT replaced left lung:             Removed 11/20              Surgery signed off 11/20               Pulmonary signed off              Continue oxygen 3 liters, unable to wean further 11/20,            de-sats to 88%, then 72% on room air   Continues to desaturate without oxygen 11/27.    Overnight oxygen study and oxygen walk test tomorrow   Repeat CT chest per ID recommendation               Analgesics prn      MASSA BACTEREMIA:  11/4:  ID ON BOARD               Has been on rocephin since 11/4 with initial plan for            EOT 12/4              Changed to ancef 11/16 with same EOT              Daily CBC     ALCOHOL ABUSE: PRIOR TO LAST ADMISSION DRANK ONE PINT OF VODKA DAILY:  FINISHED LIBRIUM TAPER              Continued anxiety: [place] prn      11/21:  Marked worsening of spongy edematous area left back without crepitus.                CT chest 11/22 w worsening loculated fluid              Surgery and Pulmonary in 11/22:  Pulm signed off    Dr Yobani Roth in 11/23               Adding flexeril for muscle spasm.               Monitor closely     Care Plan discussed with: Patient and Nurse    Signed By: Gilbert Martel NP     November 27, 2020

## 2020-11-27 NOTE — PROGRESS NOTES
Nutrition Assessment     Type and Reason for Visit: Reassess    Nutrition Recommendations/Plan:   Continue current diet and oral nutrition supplement. Nutrition Assessment:  PMH remarkable for mechanical fall through attic floor sustaining rib fractures and left pneumothorax., ETOH abuse. Admitted w acute respiratory failure w hypoxia s/p VATS and thoracic epidural placed 11/17 (3rd set of chest tubes), pulled all tubes 11/19, currently w 1 chest tube, MSSA bacteremia. 11/27/2020: Pt underwent thoracotomy on 11/23. Continues on abx treatment,   Pt up and in the commode at time of visit. Family member at beside sharing not nutrition concerns at this time.      Malnutrition Assessment:  Malnutrition Status: No malnutrition     Estimated Daily Nutrient Needs:  Energy (kcal):  5881-4979  Protein (g):  75-90(1-1.2 g/kg)       Fluid (ml/day):   1kcal/mL    Nutrition Related Findings:  no physical findings of malnutrition      Current Nutrition Therapies:  DIET REGULAR  DIET NUTRITIONAL SUPPLEMENTS Dinner; Ensure [facility] ( )    Anthropometric Measures:  · Height:  5' 10\" (177.8 cm)  · Current Body Wt:  103.2 kg (227 lb 8.2 oz)(11/15)  · BMI: 32.6    Nutrition Diagnosis:   · Inadequate oral intake related to (compromised appetite) as evidenced by (po meeting ~75% needs)      Nutrition Intervention:  Food and/or Nutrient Delivery: Continue current diet, Continue oral nutrition supplement  Coordination of Nutrition Care: Continue to monitor while inpatient    Goals:  Meet >90% estimated needs       Nutrition Monitoring and Evaluation:   Food/Nutrient Intake Outcomes: Food and nutrient intake, Supplement intake    Discharge Planning:    Home  Electronically Singed By Zainab Eng MS RD LD on 11/27/2020 at 5:24 PM  Contact: [phone]

## 2020-11-27 NOTE — PROGRESS NOTES
END OF SHIFT NOTE:    INTAKE/OUTPUT  11/25 0701 - 11/26 0700  In: -   Out: 300 [Urine:300]  Voiding: YES  Catheter: NO  Drain:              Flatus: Patient does have flatus present. Stool:  0 occurrences. Characteristics:  Stool Assessment  Stool Color: Brown  Stool Appearance: Hard  Stool Amount: Small  Stool Source/Status: Rectum    Emesis: 0 occurrences. Characteristics:        VITAL SIGNS  Patient Vitals for the past 12 hrs:   Temp Pulse Resp BP SpO2   11/26/20 1541 97.9 °F (36.6 °C)  18 107/70 99 %   11/26/20 1142 98 °F (36.7 °C)  18 118/75 97 %   11/26/20 0732 98 °F (36.7 °C) 83 17 107/68 99 %       Pain Assessment  Pain Intensity 1: 9 (11/26/20 1615)  Pain Location 1: Chest  Pain Intervention(s) 1: Medication (see MAR)  Patient Stated Pain Goal: 0    Ambulating  Yes    Shift report given to oncoming nurse at the bedside.     Danny Hernandez

## 2020-11-28 ENCOUNTER — APPOINTMENT (OUTPATIENT)
Dept: INFUSION THERAPY | Age: 50
End: 2020-11-28

## 2020-11-28 ENCOUNTER — APPOINTMENT (OUTPATIENT)
Dept: CT IMAGING | Age: 50
DRG: 163 | End: 2020-11-28
Attending: NURSE PRACTITIONER

## 2020-11-28 LAB
ANION GAP SERPL CALC-SCNC: 5 MMOL/L (ref 7–16)
BUN SERPL-MCNC: 3 MG/DL (ref 6–23)
CALCIUM SERPL-MCNC: 8.6 MG/DL (ref 8.3–10.4)
CHLORIDE SERPL-SCNC: 101 MMOL/L (ref 98–107)
CO2 SERPL-SCNC: 31 MMOL/L (ref 21–32)
CREAT SERPL-MCNC: 0.58 MG/DL (ref 0.8–1.5)
ERYTHROCYTE [DISTWIDTH] IN BLOOD BY AUTOMATED COUNT: 16.1 % (ref 11.9–14.6)
GLUCOSE SERPL-MCNC: 96 MG/DL (ref 65–100)
HCT VFR BLD AUTO: 27.6 % (ref 41.1–50.3)
HGB BLD-MCNC: 8.9 G/DL (ref 13.6–17.2)
MCH RBC QN AUTO: 34.4 PG (ref 26.1–32.9)
MCHC RBC AUTO-ENTMCNC: 32.2 G/DL (ref 31.4–35)
MCV RBC AUTO: 106.6 FL (ref 79.6–97.8)
NRBC # BLD: 0 K/UL (ref 0–0.2)
PLATELET # BLD AUTO: 322 K/UL (ref 150–450)
PMV BLD AUTO: 9.6 FL (ref 9.4–12.3)
POTASSIUM SERPL-SCNC: 3.9 MMOL/L (ref 3.5–5.1)
RBC # BLD AUTO: 2.59 M/UL (ref 4.23–5.6)
SODIUM SERPL-SCNC: 137 MMOL/L (ref 138–145)
WBC # BLD AUTO: 5.6 K/UL (ref 4.3–11.1)

## 2020-11-28 PROCEDURE — 85027 COMPLETE CBC AUTOMATED: CPT

## 2020-11-28 PROCEDURE — 74011250637 HC RX REV CODE- 250/637: Performed by: STUDENT IN AN ORGANIZED HEALTH CARE EDUCATION/TRAINING PROGRAM

## 2020-11-28 PROCEDURE — 74011250637 HC RX REV CODE- 250/637: Performed by: FAMILY MEDICINE

## 2020-11-28 PROCEDURE — 74011000250 HC RX REV CODE- 250: Performed by: NURSE PRACTITIONER

## 2020-11-28 PROCEDURE — 94762 N-INVAS EAR/PLS OXIMTRY CONT: CPT

## 2020-11-28 PROCEDURE — 65270000029 HC RM PRIVATE

## 2020-11-28 PROCEDURE — 74011250636 HC RX REV CODE- 250/636: Performed by: SURGERY

## 2020-11-28 PROCEDURE — 71250 CT THORAX DX C-: CPT

## 2020-11-28 PROCEDURE — 74011250637 HC RX REV CODE- 250/637: Performed by: NURSE PRACTITIONER

## 2020-11-28 PROCEDURE — 80048 BASIC METABOLIC PNL TOTAL CA: CPT

## 2020-11-28 PROCEDURE — 74011000250 HC RX REV CODE- 250: Performed by: SURGERY

## 2020-11-28 PROCEDURE — 2709999900 HC NON-CHARGEABLE SUPPLY

## 2020-11-28 PROCEDURE — 74011250636 HC RX REV CODE- 250/636: Performed by: NURSE PRACTITIONER

## 2020-11-28 PROCEDURE — 74011250637 HC RX REV CODE- 250/637: Performed by: INTERNAL MEDICINE

## 2020-11-28 RX ORDER — LIDOCAINE 4 G/100G
2 PATCH TOPICAL EVERY 24 HOURS
Status: DISCONTINUED | OUTPATIENT
Start: 2020-11-28 | End: 2020-12-03 | Stop reason: HOSPADM

## 2020-11-28 RX ORDER — TRAMADOL HYDROCHLORIDE 50 MG/1
50 TABLET ORAL
Status: DISCONTINUED | OUTPATIENT
Start: 2020-11-28 | End: 2020-12-03 | Stop reason: HOSPADM

## 2020-11-28 RX ORDER — LORAZEPAM 1 MG/1
1 TABLET ORAL
Status: DISCONTINUED | OUTPATIENT
Start: 2020-11-28 | End: 2020-12-03 | Stop reason: HOSPADM

## 2020-11-28 RX ORDER — OXYCODONE HYDROCHLORIDE 5 MG/1
7.5 TABLET ORAL
Status: DISCONTINUED | OUTPATIENT
Start: 2020-11-28 | End: 2020-12-01

## 2020-11-28 RX ADMIN — Medication 10 ML: at 22:02

## 2020-11-28 RX ADMIN — MAGNESIUM GLUCONATE 500 MG ORAL TABLET 400 MG: 500 TABLET ORAL at 17:50

## 2020-11-28 RX ADMIN — CEFAZOLIN SODIUM 2 G: 100 INJECTION, POWDER, LYOPHILIZED, FOR SOLUTION INTRAVENOUS at 01:39

## 2020-11-28 RX ADMIN — CALCIUM CARBONATE (ANTACID) CHEW TAB 500 MG 200 MG: 500 CHEW TAB at 16:22

## 2020-11-28 RX ADMIN — LORAZEPAM 1 MG: 1 TABLET ORAL at 18:00

## 2020-11-28 RX ADMIN — CALCIUM CARBONATE (ANTACID) CHEW TAB 500 MG 200 MG: 500 CHEW TAB at 12:00

## 2020-11-28 RX ADMIN — OXYCODONE HYDROCHLORIDE 7.5 MG: 5 TABLET ORAL at 16:21

## 2020-11-28 RX ADMIN — OXYCODONE HYDROCHLORIDE 10 MG: 5 TABLET ORAL at 04:26

## 2020-11-28 RX ADMIN — Medication 20 ML: at 01:39

## 2020-11-28 RX ADMIN — CYCLOBENZAPRINE 10 MG: 10 TABLET, FILM COATED ORAL at 08:42

## 2020-11-28 RX ADMIN — CEFAZOLIN SODIUM 2 G: 100 INJECTION, POWDER, LYOPHILIZED, FOR SOLUTION INTRAVENOUS at 10:58

## 2020-11-28 RX ADMIN — CYCLOBENZAPRINE 10 MG: 10 TABLET, FILM COATED ORAL at 22:01

## 2020-11-28 RX ADMIN — Medication 10 ML: at 14:00

## 2020-11-28 RX ADMIN — MAGNESIUM GLUCONATE 500 MG ORAL TABLET 400 MG: 500 TABLET ORAL at 08:43

## 2020-11-28 RX ADMIN — TRAZODONE HYDROCHLORIDE 50 MG: 50 TABLET ORAL at 22:01

## 2020-11-28 RX ADMIN — CALCIUM CARBONATE (ANTACID) CHEW TAB 500 MG 200 MG: 500 CHEW TAB at 08:42

## 2020-11-28 RX ADMIN — CEFAZOLIN SODIUM 2 G: 100 INJECTION, POWDER, LYOPHILIZED, FOR SOLUTION INTRAVENOUS at 17:50

## 2020-11-28 RX ADMIN — Medication 20 ML: at 14:00

## 2020-11-28 RX ADMIN — CYCLOBENZAPRINE 10 MG: 10 TABLET, FILM COATED ORAL at 16:21

## 2020-11-28 RX ADMIN — LORAZEPAM 1 MG: 2 INJECTION INTRAMUSCULAR; INTRAVENOUS at 11:04

## 2020-11-28 RX ADMIN — Medication 30 ML: at 05:16

## 2020-11-28 RX ADMIN — PANTOPRAZOLE SODIUM 40 MG: 40 TABLET, DELAYED RELEASE ORAL at 08:42

## 2020-11-28 RX ADMIN — OXYCODONE HYDROCHLORIDE 10 MG: 5 TABLET ORAL at 08:42

## 2020-11-28 RX ADMIN — Medication 20 ML: at 05:15

## 2020-11-28 NOTE — PROGRESS NOTES
Hospitalist Progress Note    Subjective:   Daily Progress Note: 11/28/2020 11:14 AM    Most recently, VATs 11/17 with chest tube x 2 to suction, thoracic epidural placed.  ROCKY placed.  Patient pulled all drains out 11/18.  1 Chest tube replaced by Dr Migue Raymond at bedside. Draining bloody fluid.    11/20:  Surgery removed left chest tube. Continued tachypnea on 2 liters with oxygen sats 94-97%. Leonid Penny desats to 88% on room air.  Lung sounds remain coarse and faint on left.  Still having a lot of pain.  Surgery signing off.  Minimal exertion increases oxygen needs exponentially. X fused  one unit PRBC.   11/21:  Worsening pain and tachypnea throughout the day. Oxygen sat 94-97% on 3 liters, desats to 88% 1 minute off oxygen, down to 74% after 3 minutes. Area of spongy edema left mid back around to mid axillary line with worsening size and pain.  Now approx 20 x17 cm including large incision line with dry, intact staples. No crepitus.  No erythema, heat, drainage or signs of infection.  Dr Napoleon Tee in to exam also.  Will get CT chest in am and ask Pulmonary and surgery to see again.  RT in frequently.  Hgb down to 7.9 today.    11/22: CT with increasing small basilar pleural effusions, left greater than right and increasing loculated fluid along left lateral lung margin extending into the chest wall. Pulmonary in, signing back off. Transfused. Surgery back in, signing off. ID may extend abx.     11/25:  ID recommending repeat CT Chest in next 48 hours. Continued need for oxygen. 11/27: Desaturats to 89% consistently when off oxygen, has been keeping sats 96-98% on 2 liters. Will order chest CT as recommended by ID. Patient wants to go home ASAP. Informed we can only send up to 5 days of narcotic rx. Patient  is still taking narcs frequently, complains of unrelieved pain frequently also. Discontinuing IV dilaudid.    Checking oxygen walk test, Overnight oxygen study    11/28:  Oxygen qualifier:     Room air: SpO2 with O2 and liter flow   Resting SpO2  96%  98% on 2L   Ambulating SpO2  83%  89% on 3L    CM notified patient will need home oxygen on discharge. He reports he was unaware oxygen saturation dropped, states he did not feel SOB. Does become tachypneic with minimal exertion. Very drowsy this am, keeps falling back to sleep while talking with me. IV benzos and narcs discontinued yesterday. Decreasing oxycodone dose from 10 mg to 7.5 in attempt to wean off narcs prior to discharge. Will have RNs alternate oxycodone with ultram.  See below for CT done this am.  Will speak with surgery about this. Overnight oxygen sat pending.   Hoping to discharge Monday or Tuesday with close follow up     Current Facility-Administered Medications   Medication Dose Route Frequency    pantoprazole (PROTONIX) tablet 40 mg  40 mg Oral ACB    albuterol (PROVENTIL VENTOLIN) nebulizer solution 2.5 mg  2.5 mg Nebulization Q6H PRN    0.9% sodium chloride infusion 250 mL  250 mL IntraVENous PRN    cyclobenzaprine (FLEXERIL) tablet 10 mg  10 mg Oral TID    traZODone (DESYREL) tablet 50 mg  50 mg Oral QHS    magnesium oxide (MAG-OX) tablet 400 mg  400 mg Oral BID    calcium carbonate (TUMS) chewable tablet 200 mg [elemental]  200 mg Oral TID WITH MEALS    0.9% sodium chloride infusion 250 mL  250 mL IntraVENous PRN    naloxone (NARCAN) injection 0.4 mg  0.4 mg IntraVENous EVERY 2 MINUTES AS NEEDED    oxyCODONE IR (ROXICODONE) tablet 10 mg  10 mg Oral Q4H PRN    LORazepam (ATIVAN) injection 1 mg  1 mg IntraVENous Q4H PRN    central line flush (saline) syringe 10 mL  10 mL InterCATHeter PRN    sodium chloride (NS) flush 5-40 mL  5-40 mL IntraVENous Q8H    sodium chloride (NS) flush 5-40 mL  5-40 mL IntraVENous PRN    promethazine (PHENERGAN) with saline injection 6.25 mg  6.25 mg IntraVENous Q15MIN PRN    central line flush (saline) syringe 10 mL  10 mL InterCATHeter PRN    ceFAZolin (ANCEF) 2 g/20 mL in sterile water IV syringe  2 g IntraVENous Q8H    sodium chloride (NS) flush 5-40 mL  5-40 mL IntraVENous Q8H    sodium chloride (NS) flush 5-40 mL  5-40 mL IntraVENous PRN    acetaminophen (TYLENOL) tablet 650 mg  650 mg Oral Q6H PRN    Or    acetaminophen (TYLENOL) suppository 650 mg  650 mg Rectal Q6H PRN    polyethylene glycol (MIRALAX) packet 17 g  17 g Oral DAILY PRN    promethazine (PHENERGAN) tablet 12.5 mg  12.5 mg Oral Q6H PRN    Or    ondansetron (ZOFRAN) injection 4 mg  4 mg IntraVENous Q6H PRN        Review of Systems  A comprehensive review of systems was negative except for that written in the HPI. Objective:     Visit Vitals  /75   Pulse 87   Temp 98.1 °F (36.7 °C)   Resp 18   Ht 5' 10\" (1.778 m)   Wt 93 kg (205 lb)   SpO2 98%   BMI 29.41 kg/m²    O2 Flow Rate (L/min): 2 l/min O2 Device: Nasal cannula    Temp (24hrs), Av.3 °F (36.8 °C), Min:97.5 °F (36.4 °C), Max:99.2 °F (37.3 °C)     1901 -  0700  In: 720 [P.O.:720]  Out: 2200 [Urine:2200]    General:  Drowsy this am.  No drainage from dressings,. Feeling better in general despite SOB with minimal exertion and pain. Up in room, tires easily  Head: Normocephalic, without obvious abnormality, atraumatic  Eyes: conjunctivae/corneas clear. PERRL  Throat: Lips, mucosa, and tongue normal. Teeth and gums normal  Neck: supple, symmetrical, trachea midline, no JVD  Lungs: Right lung clear to auscultation, left with diminished breath sounds in lower left lung. Suture line with removed staples, dry and intact with edges well approximated. Dressing below dry and intact. Continued O2 at 2 liters, desats with removal to 88%. Posterior chest wall remains edematous. Heart: regular rate and rhythm, S1, S2 normal, no murmur, click, rub or gallop.  See above for chest wall. Abdomen: Soft, non-tender.  Bowel sounds normal. No masses,  no organomegaly  Extremities: All extremities normal, atraumatic, no cyanosis or edema  Skin: Skin color, texture, turgor normal. No rashes or lesions  Neurologic: Grossly normal     Additional comments: Notes,orders, test results, vitals reviewed    Data Review  Recent Results (from the past 24 hour(s))   CBC W/O DIFF    Collection Time: 11/28/20  4:39 AM   Result Value Ref Range    WBC 5.6 4.3 - 11.1 K/uL    RBC 2.59 (L) 4.23 - 5.6 M/uL    HGB 8.9 (L) 13.6 - 17.2 g/dL    HCT 27.6 (L) 41.1 - 50.3 %    .6 (H) 79.6 - 97.8 FL    MCH 34.4 (H) 26.1 - 32.9 PG    MCHC 32.2 31.4 - 35.0 g/dL    RDW 16.1 (H) 11.9 - 14.6 %    PLATELET 786 304 - 117 K/uL    MPV 9.6 9.4 - 12.3 FL    ABSOLUTE NRBC 0.00 0.0 - 0.2 K/uL   METABOLIC PANEL, BASIC    Collection Time: 11/28/20  4:39 AM   Result Value Ref Range    Sodium 137 (L) 138 - 145 mmol/L    Potassium 3.9 3.5 - 5.1 mmol/L    Chloride 101 98 - 107 mmol/L    CO2 31 21 - 32 mmol/L    Anion gap 5 (L) 7 - 16 mmol/L    Glucose 96 65 - 100 mg/dL    BUN 3 (L) 6 - 23 MG/DL    Creatinine 0.58 (L) 0.8 - 1.5 MG/DL    GFR est AA >60 >60 ml/min/1.73m2    GFR est non-AA >60 >60 ml/min/1.73m2    Calcium 8.6 8.3 - 10.4 MG/DL      11/14:  CXR: Unchanged left-sided empyema.     11/14:  CT CHEST:  No evidence of pulmonary embolism.  Mildly enlarged multilobulated left empyema.  Unchanged small focus of left lower lobe consolidation with small internal bubbles of air which may relate to pneumatoceles although an early lung abscess is not excluded. Multiple left rib fractures, several of which appear segmental suspect for a flail chest.     11/16:  DUPLEX LE:  No evidence of deep venous thrombosis within the lower extremities.     11/17:  CXR:  Left-sided chest tubes without definite pneumothorax. Improved aeration of the left lower hemithorax. Mild pleural thickening or fluid remains on the left in addition to patchy interstitial and airspace opacities.  Mild right basilar subsegmental atelectasis.     11/18:  CXR:  Status post removal of one of the left chest tubes and repositioning of the other.  No pneumothorax. Stable left hemithorax changes and right base atelectasis.     11/19:  CXR:  Unchanged bilateral lung atelectasis and pleural thickening or effusion along the outer origin of the left lung.     11/20:  CXR:  Unchanged bilateral lung atelectasis and pleural thickening or effusion along the outer left lung. 11/22:  CT CHEST: . Associated numerous left lateral rib fractures. Increasing small basilar pleural effusions, left greater than  Increasing loculated fluid along left lateral lung margin extending into the chest wall, following VATS right     11/28:   CT CHEST:   Loculated fluid collections in the left chest are smaller. Left chest wall fluid collection is slightly larger. Assessment/Plan:   ACUTE RESPIRATORY FAILURE WITH HYPOXIA: Chest tubes x 2 previously with empyema, hemothorax requiring tPa x 2 after traumatic fall with left rib fx               VATS and thoracic epidural placed 11/17 (third set of Chest tubes)              Pulled all tubes out 11/19:  One CT replaced left lung:  Removed 11/20              Surgery signed off 11/20               Pulmonary signed off 11/20              Continue oxygen 3 liters, unable to wean further 11/20,  de-sats to 88%, then 72% on RAr              Continues to desaturate without oxygen when talking or moving 11/27. Overnight oxygen study pending    Oxygen walk test 11/28:  Desaturates to 86% on room air. Patient surprised, does not    Seem to notice tachypnea and SOB. Repeat CT chest per ID recs 11/28 as above: Will speak with surgery regarding increased   Chest wall edema    Weaning narcotics and benzos in preparation for discharge, hopefully 11/30 or 12/1   Will need close follow up on [place]:  11/4:  ID ON BOARD               Has been on rocephin since 11/4 with initial plan for EOT 12/4              Changed to ancef 11/16 with same EOT.  Hoping ID can switch to po 11/30              Daily CBC     ALCOHOL ABUSE: PRIOR TO LAST ADMISSION DRANK ONE PINT OF VODKA DAILY:  FINISHED LIBRIUM TAPER              Continued anxiety:  Ativan prn:  Changed to po only 11/28 11/21:  Marked worsening of spongy edematous area left back without crepitus.  Slightly worse  11/28 on CT              CT chest 11/22 w worsening loculated fluid:  Improved on CT 11/28              Surgery and Pulmonary in 11/22:  Both signed back off              Adding flexeril for muscle spasm.               Monitor closely     Care Plan discussed with: Patient and Nurse    Signed By: Joanne Bearden NP     November 28, 2020

## 2020-11-28 NOTE — PROGRESS NOTES
END OF SHIFT NOTE:    INTAKE/OUTPUT  11/27 0701 - 11/28 0700  In: 720 [P.O.:720]  Out: 2200 [Urine:2200]  Voiding: YES  Catheter: NO  Drain:              Flatus: Patient does have flatus present. Stool:  0 occurrences. Characteristics:  Stool Assessment  Stool Color: Brown  Stool Appearance: Hard  Stool Amount: Small  Stool Source/Status: Rectum    Emesis: 0 occurrences. Characteristics:        VITAL SIGNS  Patient Vitals for the past 12 hrs:   Temp Pulse Resp BP SpO2   11/28/20 0353 99.2 °F (37.3 °C) (!) 101 18 112/68 91 %   11/27/20 2314 97.5 °F (36.4 °C) 96 18 121/80 91 %   11/27/20 2000 97.7 °F (36.5 °C) 92 18 119/67 90 %       Pain Assessment  Pain Intensity 1: (P) 8 (11/28/20 0426)  Pain Location 1: (P) Chest  Pain Intervention(s) 1: (P) Medication (see MAR)  Patient Stated Pain Goal: (P) 0    Ambulating  Yes    Shift report given to oncoming nurse at the bedside.     Rudean Kanner, RN

## 2020-11-28 NOTE — PROGRESS NOTES
Oxygen Qualifier       Room air: SpO2 with O2 and liter flow   Resting SpO2  96%  98% on 2L   Ambulating SpO2  83%  89% on 3L     Patient did not have any discomfort while walking on room air when saturation dropped to 83%. He stated, \"oxygen did not make him feel any better, worse, or different during ambulation\".     Completed by:    Scott Oconnor

## 2020-11-28 NOTE — PROGRESS NOTES
END OF SHIFT NOTE:    INTAKE/OUTPUT  11/26 0701 - 11/27 0700  In: -   Out: 900 [Urine:900]  Voiding: YES  Catheter: NO  Drain:              Flatus: Patient does have flatus present. Stool:  0 occurrences. Characteristics:  Stool Assessment  Stool Color: Brown  Stool Appearance: Hard  Stool Amount: Small  Stool Source/Status: Rectum    Emesis: 0 occurrences. Characteristics:        VITAL SIGNS  Patient Vitals for the past 12 hrs:   Temp Pulse Resp BP SpO2   11/27/20 1539 98.5 °F (36.9 °C) (!) 101 18 120/82 (!) 89 %   11/27/20 1123 98.7 °F (37.1 °C) 97 18 113/75 92 %       Pain Assessment  Pain Intensity 1: 9 (11/27/20 1835)  Pain Location 1: Chest  Pain Intervention(s) 1: Medication (see MAR)  Patient Stated Pain Goal: 0    Ambulating  Yes    Shift report given to oncoming nurse at the bedside.     Levon Taveras RN

## 2020-11-28 NOTE — PROGRESS NOTES
Complaints of heartburn.  Notified Dr. Janie Walters via [employer], orders received for Protonix

## 2020-11-29 ENCOUNTER — APPOINTMENT (OUTPATIENT)
Dept: INFUSION THERAPY | Age: 50
End: 2020-11-29

## 2020-11-29 LAB
ANION GAP SERPL CALC-SCNC: 6 MMOL/L (ref 7–16)
BUN SERPL-MCNC: 3 MG/DL (ref 6–23)
CALCIUM SERPL-MCNC: 8.2 MG/DL (ref 8.3–10.4)
CHLORIDE SERPL-SCNC: 103 MMOL/L (ref 98–107)
CO2 SERPL-SCNC: 29 MMOL/L (ref 21–32)
CREAT SERPL-MCNC: 0.53 MG/DL (ref 0.8–1.5)
ERYTHROCYTE [DISTWIDTH] IN BLOOD BY AUTOMATED COUNT: 15.9 % (ref 11.9–14.6)
GLUCOSE SERPL-MCNC: 99 MG/DL (ref 65–100)
HCT VFR BLD AUTO: 26 % (ref 41.1–50.3)
HGB BLD-MCNC: 8.5 G/DL (ref 13.6–17.2)
MCH RBC QN AUTO: 34.1 PG (ref 26.1–32.9)
MCHC RBC AUTO-ENTMCNC: 32.7 G/DL (ref 31.4–35)
MCV RBC AUTO: 104.4 FL (ref 79.6–97.8)
NRBC # BLD: 0 K/UL (ref 0–0.2)
PLATELET # BLD AUTO: 284 K/UL (ref 150–450)
PMV BLD AUTO: 9.5 FL (ref 9.4–12.3)
POTASSIUM SERPL-SCNC: 3.7 MMOL/L (ref 3.5–5.1)
RBC # BLD AUTO: 2.49 M/UL (ref 4.23–5.6)
SODIUM SERPL-SCNC: 138 MMOL/L (ref 138–145)
WBC # BLD AUTO: 4.7 K/UL (ref 4.3–11.1)

## 2020-11-29 PROCEDURE — 74011000250 HC RX REV CODE- 250: Performed by: SURGERY

## 2020-11-29 PROCEDURE — 74011000250 HC RX REV CODE- 250: Performed by: NURSE PRACTITIONER

## 2020-11-29 PROCEDURE — 74011250636 HC RX REV CODE- 250/636: Performed by: SURGERY

## 2020-11-29 PROCEDURE — 80048 BASIC METABOLIC PNL TOTAL CA: CPT

## 2020-11-29 PROCEDURE — 2709999900 HC NON-CHARGEABLE SUPPLY

## 2020-11-29 PROCEDURE — 74011250637 HC RX REV CODE- 250/637: Performed by: STUDENT IN AN ORGANIZED HEALTH CARE EDUCATION/TRAINING PROGRAM

## 2020-11-29 PROCEDURE — 74011250637 HC RX REV CODE- 250/637: Performed by: NURSE PRACTITIONER

## 2020-11-29 PROCEDURE — 74011250637 HC RX REV CODE- 250/637: Performed by: INTERNAL MEDICINE

## 2020-11-29 PROCEDURE — 74011250637 HC RX REV CODE- 250/637: Performed by: FAMILY MEDICINE

## 2020-11-29 PROCEDURE — 65270000029 HC RM PRIVATE

## 2020-11-29 PROCEDURE — 85027 COMPLETE CBC AUTOMATED: CPT

## 2020-11-29 RX ADMIN — CYCLOBENZAPRINE 10 MG: 10 TABLET, FILM COATED ORAL at 22:13

## 2020-11-29 RX ADMIN — Medication 10 ML: at 22:14

## 2020-11-29 RX ADMIN — Medication 20 ML: at 14:00

## 2020-11-29 RX ADMIN — OXYCODONE HYDROCHLORIDE 7.5 MG: 5 TABLET ORAL at 03:08

## 2020-11-29 RX ADMIN — CEFAZOLIN SODIUM 2 G: 100 INJECTION, POWDER, LYOPHILIZED, FOR SOLUTION INTRAVENOUS at 02:07

## 2020-11-29 RX ADMIN — MAGNESIUM GLUCONATE 500 MG ORAL TABLET 400 MG: 500 TABLET ORAL at 08:32

## 2020-11-29 RX ADMIN — TRAZODONE HYDROCHLORIDE 50 MG: 50 TABLET ORAL at 22:13

## 2020-11-29 RX ADMIN — CALCIUM CARBONATE (ANTACID) CHEW TAB 500 MG 200 MG: 500 CHEW TAB at 17:56

## 2020-11-29 RX ADMIN — PANTOPRAZOLE SODIUM 40 MG: 40 TABLET, DELAYED RELEASE ORAL at 06:01

## 2020-11-29 RX ADMIN — CALCIUM CARBONATE (ANTACID) CHEW TAB 500 MG 200 MG: 500 CHEW TAB at 08:32

## 2020-11-29 RX ADMIN — CYCLOBENZAPRINE 10 MG: 10 TABLET, FILM COATED ORAL at 08:32

## 2020-11-29 RX ADMIN — TRAMADOL HYDROCHLORIDE 50 MG: 50 TABLET ORAL at 00:55

## 2020-11-29 RX ADMIN — Medication 10 ML: at 06:01

## 2020-11-29 RX ADMIN — Medication 20 ML: at 06:01

## 2020-11-29 RX ADMIN — MAGNESIUM GLUCONATE 500 MG ORAL TABLET 400 MG: 500 TABLET ORAL at 17:56

## 2020-11-29 RX ADMIN — Medication 20 ML: at 02:07

## 2020-11-29 RX ADMIN — OXYCODONE HYDROCHLORIDE 7.5 MG: 5 TABLET ORAL at 18:03

## 2020-11-29 RX ADMIN — OXYCODONE HYDROCHLORIDE 7.5 MG: 5 TABLET ORAL at 13:05

## 2020-11-29 RX ADMIN — LORAZEPAM 1 MG: 1 TABLET ORAL at 02:06

## 2020-11-29 RX ADMIN — CEFAZOLIN SODIUM 2 G: 100 INJECTION, POWDER, LYOPHILIZED, FOR SOLUTION INTRAVENOUS at 10:54

## 2020-11-29 RX ADMIN — CYCLOBENZAPRINE 10 MG: 10 TABLET, FILM COATED ORAL at 17:56

## 2020-11-29 RX ADMIN — LORAZEPAM 1 MG: 1 TABLET ORAL at 13:05

## 2020-11-29 RX ADMIN — CEFAZOLIN SODIUM 2 G: 100 INJECTION, POWDER, LYOPHILIZED, FOR SOLUTION INTRAVENOUS at 17:56

## 2020-11-29 RX ADMIN — Medication 10 ML: at 13:55

## 2020-11-29 NOTE — PROGRESS NOTES
END OF SHIFT NOTE:    INTAKE/OUTPUT  11/28 0701 - 11/29 0700  In: -   Out: 2125 [Urine:2125]  Voiding: YES  Catheter: NO  Drain:              Flatus: Patient does have flatus present. Stool:  0 occurrences. Characteristics:  Stool Assessment  Stool Color: Brown  Stool Appearance: Hard  Stool Amount: Small  Stool Source/Status: Rectum    Emesis: 0 occurrences. Characteristics:        VITAL SIGNS  Patient Vitals for the past 12 hrs:   Temp Pulse Resp BP SpO2   11/29/20 0347 97.8 °F (36.6 °C) 74 20 121/70 91 %   11/28/20 2352 97.6 °F (36.4 °C) (!) 101 20 117/66 91 %   11/28/20 2024     91 %   11/28/20 1939 98 °F (36.7 °C) 100 18 133/80 93 %       Pain Assessment  Pain Intensity 1: 8 (11/29/20 0308)  Pain Location 1: Chest  Pain Intervention(s) 1: Medication (see MAR)  Patient Stated Pain Goal: 0    Ambulating  Yes to BR    Shift report given to oncoming nurse at the bedside.     Junie Mason, RN

## 2020-11-29 NOTE — PROGRESS NOTES
END OF SHIFT NOTE:    INTAKE/OUTPUT  11/27 0701 - 11/28 0700  In: 720 [P.O.:720]  Out: 2200 [Urine:2200]  Voiding: YES  Catheter: NO  Drain:      Flatus: Patient does have flatus present. Stool:  0 occurrences. Characteristics:  Stool Assessment  Stool Color: Brown  Stool Appearance: Hard  Stool Amount: Small  Stool Source/Status: Rectum    Emesis: 0 occurrences. Characteristics:        VITAL SIGNS  Patient Vitals for the past 12 hrs:   Temp Pulse Resp BP SpO2   11/28/20 1557 98.6 °F (37 °C) (!) 101 18 121/77 100 %   11/28/20 1125 97.6 °F (36.4 °C) 100 18 109/75 100 %       Pain Assessment  Pain Intensity 1: 2 (11/28/20 1210)  Pain Location 1: Chest  Pain Intervention(s) 1: Medication (see MAR)  Patient Stated Pain Goal: 0    Ambulating  No, in bed all day. Shift report given to oncoming nurse at the bedside.     Bunny Bernstein RN

## 2020-11-29 NOTE — PROGRESS NOTES
Patient placed on continuous sat monitor for overnight oximetry study on room air. Monitor cleared of trends. RN notified. Alarm limits set to 100/80.

## 2020-11-29 NOTE — PROGRESS NOTES
Hospitalist Progress Note    Subjective:   Daily Progress Note: 11/29/2020 1203    Most recently, VATs 11/17 with chest tube x 2 to suction, thoracic epidural placed.  ROCKY placed.  Patient pulled all drains out 11/18.  1 Chest tube replaced by Dr Ventura Minus at bedside, bloody fluid.    11/20:  Surgery removed left chest tube. Continued tachypnea on 2 liters with oxygen sats 94-97%. Leilani Sam[place]oa desats to 88% on room air.  Lung sounds remain coarse and faint on left.  Still having a lot of pain.  Surgery signing off.  Minimal exertion increases oxygen needs exponentially. X fused  one unit PRBC.   11/21:  Worsening pain and tachypnea throughout the day. Oxygen sat 94-97% on 3 liters, desats to 88% 1 minute off oxygen, down to 74% after 3 minutes. Area of spongy edema left mid back around to mid axillary line with worsening size and pain.  Now approx 20 x17 cm including large incision line with dry, intact staples. No crepitus.  No erythema, heat, drainage or signs of infection.  Dr Lucas [place] in to exam also.  Will get CT chest in am and ask Pulmonary and surgery to see again.  RT in frequently.  Hgb down to 7.9 today.    11/22:  CT with increasing small basilar pleural effusions, left greater than right and increasing loculated fluid along left lateral lung margin extending into the chest wall.  Pulmonary in, signing back off. Transfused. Surgery back in, signing off.  ID may extend abx until 12/16.  11/25:  ID recommending repeat CT Chest in next 48 hours.  Continued need for oxygen.   11/27: Desaturats to 89% consistently when off oxygen, has been keeping sats 96-98% on 2 liters. Discontinuing IV dilaudid, weaning narcs.     11/28:  Oxygen qualifier:     Room air: SpO2 with O2 and liter flow   Resting SpO2  96%  98% on 2L   Ambulating SpO2  83%  89% on 3L    CM notified patient will need home oxygen on discharge. He reports he was unaware oxygen saturation dropped, states he did not feel SOB.  Does become tachypneic with minimal exertion. 11/29:  Overnight oxygenation study on paper chart. Longest continuous saturation <89 was 24:28. Weaning narcotics. Will speak with ID tomorrow regarding length of IV abx needed tomorrow. Can discharge home with oxygen when on po abx with close follow up. Will notify surgery in am to review CT chest done yesterday.      Current Facility-Administered Medications   Medication Dose Route Frequency    LORazepam (ATIVAN) tablet 1 mg  1 mg Oral Q4H PRN    oxyCODONE IR (ROXICODONE) tablet 7.5 mg  7.5 mg Oral Q4H PRN    traMADoL (ULTRAM) tablet 50 mg  50 mg Oral Q6H PRN    lidocaine 4 % patch 2 Patch  2 Patch TransDERmal Q24H    pantoprazole (PROTONIX) tablet 40 mg  40 mg Oral ACB    albuterol (PROVENTIL VENTOLIN) nebulizer solution 2.5 mg  2.5 mg Nebulization Q6H PRN    0.9% sodium chloride infusion 250 mL  250 mL IntraVENous PRN    cyclobenzaprine (FLEXERIL) tablet 10 mg  10 mg Oral TID    traZODone (DESYREL) tablet 50 mg  50 mg Oral QHS    magnesium oxide (MAG-OX) tablet 400 mg  400 mg Oral BID    calcium carbonate (TUMS) chewable tablet 200 mg [elemental]  200 mg Oral TID WITH MEALS    0.9% sodium chloride infusion 250 mL  250 mL IntraVENous PRN    naloxone (NARCAN) injection 0.4 mg  0.4 mg IntraVENous EVERY 2 MINUTES AS NEEDED    central line flush (saline) syringe 10 mL  10 mL InterCATHeter PRN    sodium chloride (NS) flush 5-40 mL  5-40 mL IntraVENous Q8H    sodium chloride (NS) flush 5-40 mL  5-40 mL IntraVENous PRN    promethazine (PHENERGAN) with saline injection 6.25 mg  6.25 mg IntraVENous Q15MIN PRN    central line flush (saline) syringe 10 mL  10 mL InterCATHeter PRN    ceFAZolin (ANCEF) 2 g/20 mL in sterile water IV syringe  2 g IntraVENous Q8H    sodium chloride (NS) flush 5-40 mL  5-40 mL IntraVENous Q8H    sodium chloride (NS) flush 5-40 mL  5-40 mL IntraVENous PRN    acetaminophen (TYLENOL) tablet 650 mg  650 mg Oral Q6H PRN    Or    acetaminophen (TYLENOL) suppository 650 mg  650 mg Rectal Q6H PRN    polyethylene glycol (MIRALAX) packet 17 g  17 g Oral DAILY PRN    promethazine (PHENERGAN) tablet 12.5 mg  12.5 mg Oral Q6H PRN    Or    ondansetron (ZOFRAN) injection 4 mg  4 mg IntraVENous Q6H PRN        Review of Systems  A comprehensive review of systems was negative except for that written in the HPI. Objective:     Visit Vitals  /74   Pulse (!) 105   Temp 99 °F (37.2 °C)   Resp 19   Ht 5' 10\" (1.778 m)   Wt 93 kg (205 lb)   SpO2 (!) 86%   BMI 29.41 kg/m²    O2 Flow Rate (L/min): 2 l/min O2 Device: Room air    Temp (24hrs), Av.1 °F (36.7 °C), Min:97.6 °F (36.4 °C), Max:99 °F (37.2 °C)     1901 -  0700  In: -   [facility] 5073 [facility]    General:  Drowsy this am.  No drainage from dressings,.  Feeling better in general despite SOB with minimal exertion and pain.  Up in room, tires easily. Keeping oxygen off most of the time. Head: Normocephalic, without obvious abnormality, atraumatic  Eyes: conjunctivae/corneas clear. PERRL  Throat: Lips, mucosa, and tongue normal. Teeth and gums normal  Neck: supple, symmetrical, trachea midline, no JVD  Lungs: Right lung clear to auscultation, left with diminished breath sounds in lower left lung.  Suture line with removed staples, dry and intact with edges well approximated. Shaune Gazella below dry and intact.  Continued O2 at 2 liters, desats with removal to 88%.  Posterior chest wall remains edematous without crepitus. Heart: regular rate and rhythm, S1, S2 normal, no murmur, click, rub or gallop.  See above for chest wall. Abdomen: Soft, non-tender.  Bowel sounds normal. No masses,  no organomegaly  Extremities: All extremities normal, atraumatic, no cyanosis or edema  Skin: Skin color, texture, turgor normal. No rashes or lesions  Neurologic: Grossly normal     Additional comments: Notes,orders, test results, vitals reviewed    Data Review  Recent Results (from the past 24 hour(s))   CBC W/O DIFF Collection Time: 11/29/20  6:06 AM   Result Value Ref Range    WBC 4.7 4.3 - 11.1 K/uL    RBC 2.49 (L) 4.23 - 5.6 M/uL    HGB 8.5 (L) 13.6 - 17.2 g/dL    HCT 26.0 (L) 41.1 - 50.3 %    .4 (H) 79.6 - 97.8 FL    MCH 34.1 (H) 26.1 - 32.9 PG    MCHC 32.7 31.4 - 35.0 g/dL    RDW 15.9 (H) 11.9 - 14.6 %    PLATELET 572 754 - 512 K/uL    MPV 9.5 9.4 - 12.3 FL    ABSOLUTE NRBC 0.00 0.0 - 0.2 K/uL   METABOLIC PANEL, BASIC    Collection Time: 11/29/20  6:06 AM   Result Value Ref Range    Sodium 138 138 - 145 mmol/L    Potassium 3.7 3.5 - 5.1 mmol/L    Chloride 103 98 - 107 mmol/L    CO2 29 21 - 32 mmol/L    Anion gap 6 (L) 7 - 16 mmol/L    Glucose 99 65 - 100 mg/dL    BUN 3 (L) 6 - 23 MG/DL    Creatinine 0.53 (L) 0.8 - 1.5 MG/DL    GFR est AA >60 >60 ml/min/1.73m2    GFR est non-AA >60 >60 ml/min/1.73m2    Calcium 8.2 (L) 8.3 - 10.4 MG/DL     Recent Results   Recent Results (from the past 24 hour(s))   CBC W/O DIFF     Collection Time: 11/28/20  4:39 AM   Result Value Ref Range     WBC 5.6 4.3 - 11.1 K/uL     RBC 2.59 (L) 4.23 - 5.6 M/uL     HGB 8.9 (L) 13.6 - 17.2 g/dL     HCT 27.6 (L) 41.1 - 50.3 %     .6 (H) 79.6 - 97.8 FL     MCH 34.4 (H) 26.1 - 32.9 PG     MCHC 32.2 31.4 - 35.0 g/dL     RDW 16.1 (H) 11.9 - 14.6 %     PLATELET 207 980 - 449 K/uL     MPV 9.6 9.4 - 12.3 FL     ABSOLUTE NRBC 0.00 0.0 - 0.2 K/uL   METABOLIC PANEL, BASIC     Collection Time: 11/28/20  4:39 AM   Result Value Ref Range     Sodium 137 (L) 138 - 145 mmol/L     Potassium 3.9 3.5 - 5.1 mmol/L     Chloride 101 98 - 107 mmol/L     CO2 31 21 - 32 mmol/L     Anion gap 5 (L) 7 - 16 mmol/L     Glucose 96 65 - 100 mg/dL     BUN 3 (L) 6 - 23 MG/DL     Creatinine 0.58 (L) 0.8 - 1.5 MG/DL     GFR est AA >60 >60 ml/min/1.73m2     GFR est non-AA >60 >60 ml/min/1.73m2     Calcium 8.6 8.3 - 10.4 MG/DL         11/14:  CXR: Unchanged left-sided empyema.     11/14:  CT CHEST:  No evidence of pulmonary embolism.  Mildly enlarged multilobulated left empyema.  Unchanged small focus of left lower lobe consolidation with small internal bubbles of air which may relate to pneumatoceles although an early lung abscess is not excluded. Multiple left rib fractures, several of which appear segmental suspect for a flail chest.     11/16:  DUPLEX LE:  No evidence of deep venous thrombosis within the lower extremities.     11/17:  CXR:  Left-sided chest tubes without definite pneumothorax. Improved aeration of the left lower hemithorax. Mild pleural thickening or fluid remains on the left in addition to patchy interstitial and airspace opacities.  Mild right basilar subsegmental atelectasis.     11/18:  CXR:  Status post removal of one of the left chest tubes and repositioning of the other. No pneumothorax. Stable left hemithorax changes and right base atelectasis.     11/19:  CXR:  Unchanged bilateral lung atelectasis and pleural thickening or effusion along the outer origin of the left lung.     11/20:  CXR:  Unchanged bilateral lung atelectasis and pleural thickening or effusion along the outer left lung.      11/22:  CT CHEST: . Associated numerous left lateral rib fractures. Increasing small basilar pleural effusions, left greater than  Increasing loculated fluid along left lateral lung margin extending into the chest wall, following VATS right     11/28:   CT CHEST:   Loculated fluid collections in the left chest are smaller. Left chest wall fluid collection is slightly larger.     Assessment/Plan:   ACUTE RESPIRATORY FAILURE WITH HYPOXIA: Chest tubes x 2 previously with empyema, hemothorax requiring tPa x 2 after traumatic fall with left rib fx               VATS and thoracic epidural placed 11/17 (third set of Chest tubes)              Pulled all tubes out 11/19:  One CT replaced left lung:  Removed 11/20              Surgery signed off 11/20               Pulmonary signed off 11/20              Continue oxygen 3 liters, unable to wean further 11/20,  de-sats to 88%, then 72% on RAr              Continues to desaturate without oxygen when talking or moving 11/27.              Overnight oxygen study pending               Oxygen walk test 11/28:  Desaturates to 86% on room air. Patient surprised, does not               Seem to notice tachypnea and SOB.                       Repeat CT chest per ID recs 11/28 as above: Will speak with surgery regarding increased              Chest wall edema               Weaning narcotics and benzos in preparation for discharge, hopefully 11/30 or 12/1              Will need close follow up on [place]:  11/4:  ID ON BOARD               Has been on rocephin since 11/4 with initial plan for EOT 12/4              Changed to ancef 11/16 with same EOT.  Hoping ID can switch to po 11/30              Daily CBC     ALCOHOL ABUSE: PRIOR TO LAST ADMISSION DRANK ONE PINT OF VODKA DAILY:  FINISHED LIBRIUM TAPER              Continued anxiety:  Ativan prn:  Changed to po only 11/28 11/21:  Marked worsening of spongy edematous area left back without crepitus.  Slightly worse  11/28 on CT              CT chest 11/22 w worsening loculated fluid:  Improved on CT 11/28              Surgery and Pulmonary in 11/22:  Both signed back off              Adding flexeril for muscle spasm.               Monitor closely    Asked surgery to review current CT 11/29    PRIOR TO DISCHARGE:   Will need to be on po abx, currently undetermined date of transition   Will need home oxygen:  CM aware  Will need follow up with Pulmonary for current issue and sleep apnea workup   Will need PHP follow up for any pain meds >5 days   Will need surgical follow up     Care Plan discussed with: Patient, Dr Vamsi Lamas, and Nurse    Signed By: Jackson Enriquez NP     November 29, 2020

## 2020-11-30 ENCOUNTER — APPOINTMENT (OUTPATIENT)
Dept: INFUSION THERAPY | Age: 50
End: 2020-11-30

## 2020-11-30 PROCEDURE — 74011250637 HC RX REV CODE- 250/637: Performed by: INTERNAL MEDICINE

## 2020-11-30 PROCEDURE — 74011250637 HC RX REV CODE- 250/637: Performed by: NURSE PRACTITIONER

## 2020-11-30 PROCEDURE — 74011250637 HC RX REV CODE- 250/637: Performed by: STUDENT IN AN ORGANIZED HEALTH CARE EDUCATION/TRAINING PROGRAM

## 2020-11-30 PROCEDURE — 74011250636 HC RX REV CODE- 250/636: Performed by: SURGERY

## 2020-11-30 PROCEDURE — 2709999900 HC NON-CHARGEABLE SUPPLY

## 2020-11-30 PROCEDURE — 74011000250 HC RX REV CODE- 250: Performed by: SURGERY

## 2020-11-30 PROCEDURE — 65270000029 HC RM PRIVATE

## 2020-11-30 PROCEDURE — 74011000250 HC RX REV CODE- 250: Performed by: NURSE PRACTITIONER

## 2020-11-30 PROCEDURE — 77030008031

## 2020-11-30 PROCEDURE — 74011250637 HC RX REV CODE- 250/637: Performed by: FAMILY MEDICINE

## 2020-11-30 RX ADMIN — Medication 10 ML: at 15:20

## 2020-11-30 RX ADMIN — CYCLOBENZAPRINE 10 MG: 10 TABLET, FILM COATED ORAL at 15:42

## 2020-11-30 RX ADMIN — OXYCODONE HYDROCHLORIDE 7.5 MG: 5 TABLET ORAL at 01:52

## 2020-11-30 RX ADMIN — TRAMADOL HYDROCHLORIDE 50 MG: 50 TABLET ORAL at 00:46

## 2020-11-30 RX ADMIN — TRAMADOL HYDROCHLORIDE 50 MG: 50 TABLET ORAL at 07:39

## 2020-11-30 RX ADMIN — OXYCODONE HYDROCHLORIDE 7.5 MG: 5 TABLET ORAL at 10:30

## 2020-11-30 RX ADMIN — MAGNESIUM GLUCONATE 500 MG ORAL TABLET 400 MG: 500 TABLET ORAL at 08:22

## 2020-11-30 RX ADMIN — Medication 20 ML: at 01:53

## 2020-11-30 RX ADMIN — OXYCODONE HYDROCHLORIDE 7.5 MG: 5 TABLET ORAL at 20:31

## 2020-11-30 RX ADMIN — MAGNESIUM GLUCONATE 500 MG ORAL TABLET 400 MG: 500 TABLET ORAL at 17:58

## 2020-11-30 RX ADMIN — CEFAZOLIN SODIUM 2 G: 100 INJECTION, POWDER, LYOPHILIZED, FOR SOLUTION INTRAVENOUS at 01:53

## 2020-11-30 RX ADMIN — CEFAZOLIN SODIUM 2 G: 100 INJECTION, POWDER, LYOPHILIZED, FOR SOLUTION INTRAVENOUS at 10:22

## 2020-11-30 RX ADMIN — CALCIUM CARBONATE (ANTACID) CHEW TAB 500 MG 200 MG: 500 CHEW TAB at 12:21

## 2020-11-30 RX ADMIN — CEFAZOLIN SODIUM 2 G: 100 INJECTION, POWDER, LYOPHILIZED, FOR SOLUTION INTRAVENOUS at 17:57

## 2020-11-30 RX ADMIN — CALCIUM CARBONATE (ANTACID) CHEW TAB 500 MG 200 MG: 500 CHEW TAB at 07:40

## 2020-11-30 RX ADMIN — Medication 10 ML: at 05:22

## 2020-11-30 RX ADMIN — CYCLOBENZAPRINE 10 MG: 10 TABLET, FILM COATED ORAL at 08:21

## 2020-11-30 RX ADMIN — LORAZEPAM 1 MG: 1 TABLET ORAL at 22:12

## 2020-11-30 RX ADMIN — TRAMADOL HYDROCHLORIDE 50 MG: 50 TABLET ORAL at 17:56

## 2020-11-30 RX ADMIN — PANTOPRAZOLE SODIUM 40 MG: 40 TABLET, DELAYED RELEASE ORAL at 05:25

## 2020-11-30 RX ADMIN — CALCIUM CARBONATE (ANTACID) CHEW TAB 500 MG 200 MG: 500 CHEW TAB at 17:58

## 2020-11-30 NOTE — PROGRESS NOTES
END OF SHIFT NOTE:    INTAKE/OUTPUT  11/29 0701 - 11/30 0700  In: -   Out: 220 [Urine:220]  Voiding: YES  Catheter: NO  Drain:              Flatus: Patient does have flatus present. Stool:  2 occurrences. Characteristics:  Stool Assessment  Stool Color: Brown  Stool Appearance: Formed  Stool Amount: Large  Stool Source/Status: Rectum    Emesis: 0 occurrences. Characteristics:        VITAL SIGNS  Patient Vitals for the past 12 hrs:   Temp Pulse Resp BP SpO2   11/30/20 0407 99.3 °F (37.4 °C) 99 18 110/64 92 %   11/29/20 2352 99.5 °F (37.5 °C) (!) 101 17 112/72 90 %   11/29/20 1938 99 °F (37.2 °C) 95 17 113/72 90 %       Pain Assessment  Pain Intensity 1: 10 (11/30/20 0156)  Pain Location 1: Chest  Pain Intervention(s) 1: Medication (see MAR)  Patient Stated Pain Goal: 0    Ambulating  Yes    Shift report given to oncoming nurse at the bedside.     Rudean Kanner, RN

## 2020-11-30 NOTE — PROGRESS NOTES
Infectious Disease Progress Note    Today's Date: 2020   Admit Date: 2020    Impression:   · MSSA bacteremia (11/3 - ),  BC NG,  TTE negative  · MSSA Left pleural empyema, s/p chest tube (20), Cx from pleural drainage positive  · Originally discharged 20 on four weeks IV ceftriaxone, EO T 2020  · S/p VATS 20  · S/p repeat thoracotomy, decortication (2020) with purulence, no cx. · ETOH dependence  · S/p fall through attic with contusion/trauma     Plan:   · For now, continue cefazolin through 2020 and transition to oral cefadroxil or cephalexin for an additional 2-4 weeks. Anti-infectives:   · CTX  Restarted -  · Ancef -       Subjective: Interval History: Repeat CT with noted improvement in the loculated fluid collections, worsening of chest wall fluid collection. He would like to try once daily ceftriaxone at the infusion center again. No Known Allergies     Review of Systems:  Pertinent items are noted in the History of Present Illness.     Objective:     Visit Vitals  /78   Pulse 96   Temp 98.2 °F (36.8 °C)   Resp 19   Ht 5' 10\" (1.778 m)   Wt 93 kg (205 lb)   SpO2 90%   BMI 29.41 kg/m²     Temp (24hrs), Av.7 °F (37.1 °C), Min:97.8 °F (36.6 °C), Max:99.5 °F (37.5 °C)     General:  Alert, no acute distress, appears stated age, appears very uncomfortable sitting in chair  Head:    Normocephalic, atraumatic  Eyes:   Anicteric sclerae, no drainage, not injected, EOMI  Mouth:  Moist mucosa  Neck:   Supple, symmetrical, trachea midline, no JVD  Lungs:   Poor air flow, especially L side  CV:   Regular rate and rhythm without audible murmur  Abdomen:  Soft, non tender, not distended, active bowel sounds  Extremities:  No cyanosi  Musculoskeletal: Moves all extremities with equal strength, no deformity  Skin:   L back staples with mild erythema and swelling, some drainage on the dressign  Psych:   Alert, oriented and appropriate without evidence of thought disorder  Lines:    benign      Data Review:     CBC:  Recent Labs     11/29/20  0606 11/28/20 0439   WBC 4.7 5.6   HGB 8.5* 8.9*   HCT 26.0* 27.6*    322       BMP:  Recent Labs     11/29/20  0606 11/28/20 0439   CREA 0.53* 0.58*   BUN 3* 3*    137*   K 3.7 3.9    101   CO2 29 31   AGAP 6* 5*   GLU 99 96       LFTS:  No results for input(s): TBILI, ALT, AP, TP, ALB in the last 72 hours. No lab exists for component: SGOT    Microbiology:     All Micro Results     Procedure Component Value Units Date/Time    CULTURE, BLOOD [[id number]] Collected:  11/15/20 0033    Order Status:  Completed Specimen:  Blood Updated:  11/20/20 0829     Special Requests: --        RIGHT  ARM       Culture result: NO GROWTH 5 DAYS       CULTURE, BLOOD [[id number]] Collected:  11/15/20 0035    Order Status:  Completed Specimen:  Blood Updated:  11/20/20 0829     Special Requests: --        LEFT  ARM       Culture result: NO GROWTH 5 DAYS             Imaging:   CT Chest wo cont 11/28/2020  FINDINGS:  -LUNGS: There is persistence pleural thickening in the lateral posterior left  chest.  The loculated fluid collections are much smaller. Only a small residual  3 cm collection is present posteriorly. There is minimal atelectasis in the  lung bases. No pneumothorax. -MEDIASTINUM/AXILLA: No significant adenopathy. -HEART/VESSELS: Mild coronary artery calcification. Heart size is normal.  -CHEST WALL/BONES: The left chest wall fluid collection is slightly larger, 10.5  x 4 cm compared with 9 x 4 cm on the prior study. Multiple adjacent left-sided  rib fractures are again noted. No new fractures or bone lesions. -UPPER ABDOMEN: No acute findings. CT chest (11/22/2020)  Impression:  1. Increasing loculated fluid along left lateral lung margin extending into the  chest wall, following VATS. 2. Associated numerous left lateral rib fractures.   3. Increasing small basilar pleural effusions, left greater than right       Signed By: Sheri Boyd NP     November 30, 2020

## 2020-11-30 NOTE — ADVANCED PRACTICE NURSE
END OF SHIFT NOTE:    INTAKE/OUTPUT  11/28 0701 - 11/29 0700  In: -   Out: 2125 [Urine:2125]  Voiding: YES  Catheter: NO  Drain:      Flatus: Patient does have flatus present. Stool:  0 occurrences. Characteristics:  Stool Assessment  Stool Color: Brown  Stool Appearance: Hard  Stool Amount: Small  Stool Source/Status: Rectum    Emesis: 0 occurrences. Characteristics:        VITAL SIGNS  Patient Vitals for the past 12 hrs:   Temp Pulse Resp BP SpO2   11/29/20 1531 98.4 °F (36.9 °C) 99 17 114/77 94 %   11/29/20 1139 97.8 °F (36.6 °C) (!) 101 18 126/77 92 %   11/29/20 0717 99 °F (37.2 °C) (!) 105 19 112/74 (!) 86 %       Pain Assessment  Pain Intensity 1: 5 (11/29/20 1803)  Pain Location 1: Back  Pain Intervention(s) 1: Medication (see MAR)  Patient Stated Pain Goal: 0    Ambulating  Yes; to bathroom. Shift report given to oncoming nurse at the bedside.     Nathalia Shepherd RN

## 2020-11-30 NOTE — PROGRESS NOTES
Chart review complete, pt continues with iv antibiotics, pt per oxygen quailifer pt needs home oxygen, pt is uninsured. Pt will meet with pt to see if he can afford to private pay for home oxygen. CM will remain available for dc needs.

## 2020-12-01 ENCOUNTER — APPOINTMENT (OUTPATIENT)
Dept: INFUSION THERAPY | Age: 50
End: 2020-12-01

## 2020-12-01 LAB
ALBUMIN SERPL-MCNC: 2 G/DL (ref 3.5–5)
ALBUMIN/GLOB SERPL: 0.4 {RATIO} (ref 1.2–3.5)
ALP SERPL-CCNC: 147 U/L (ref 50–136)
ALT SERPL-CCNC: 7 U/L (ref 12–65)
ANION GAP SERPL CALC-SCNC: 4 MMOL/L (ref 7–16)
AST SERPL-CCNC: 41 U/L (ref 15–37)
BASOPHILS # BLD: 0 K/UL (ref 0–0.2)
BASOPHILS NFR BLD: 1 % (ref 0–2)
BILIRUB SERPL-MCNC: 0.7 MG/DL (ref 0.2–1.1)
BUN SERPL-MCNC: 4 MG/DL (ref 6–23)
CALCIUM SERPL-MCNC: 8.8 MG/DL (ref 8.3–10.4)
CHLORIDE SERPL-SCNC: 103 MMOL/L (ref 98–107)
CO2 SERPL-SCNC: 30 MMOL/L (ref 21–32)
CREAT SERPL-MCNC: 0.66 MG/DL (ref 0.8–1.5)
DIFFERENTIAL METHOD BLD: ABNORMAL
EOSINOPHIL # BLD: 0.4 K/UL (ref 0–0.8)
EOSINOPHIL NFR BLD: 9 % (ref 0.5–7.8)
ERYTHROCYTE [DISTWIDTH] IN BLOOD BY AUTOMATED COUNT: 15.8 % (ref 11.9–14.6)
GLOBULIN SER CALC-MCNC: 4.8 G/DL (ref 2.3–3.5)
GLUCOSE SERPL-MCNC: 90 MG/DL (ref 65–100)
HCT VFR BLD AUTO: 27.9 % (ref 41.1–50.3)
HGB BLD-MCNC: 8.9 G/DL (ref 13.6–17.2)
IMM GRANULOCYTES # BLD AUTO: 0 K/UL (ref 0–0.5)
IMM GRANULOCYTES NFR BLD AUTO: 0 % (ref 0–5)
LYMPHOCYTES # BLD: 1.6 K/UL (ref 0.5–4.6)
LYMPHOCYTES NFR BLD: 39 % (ref 13–44)
MCH RBC QN AUTO: 33.7 PG (ref 26.1–32.9)
MCHC RBC AUTO-ENTMCNC: 31.9 G/DL (ref 31.4–35)
MCV RBC AUTO: 105.7 FL (ref 79.6–97.8)
MONOCYTES # BLD: 0.6 K/UL (ref 0.1–1.3)
MONOCYTES NFR BLD: 13 % (ref 4–12)
NEUTS SEG # BLD: 1.6 K/UL (ref 1.7–8.2)
NEUTS SEG NFR BLD: 39 % (ref 43–78)
NRBC # BLD: 0 K/UL (ref 0–0.2)
PLATELET # BLD AUTO: 263 K/UL (ref 150–450)
PMV BLD AUTO: 9.8 FL (ref 9.4–12.3)
POTASSIUM SERPL-SCNC: 3.6 MMOL/L (ref 3.5–5.1)
PROT SERPL-MCNC: 6.8 G/DL (ref 6.3–8.2)
RBC # BLD AUTO: 2.64 M/UL (ref 4.23–5.6)
SODIUM SERPL-SCNC: 137 MMOL/L (ref 138–145)
WBC # BLD AUTO: 4.2 K/UL (ref 4.3–11.1)

## 2020-12-01 PROCEDURE — 74011250637 HC RX REV CODE- 250/637: Performed by: NURSE PRACTITIONER

## 2020-12-01 PROCEDURE — 74011250636 HC RX REV CODE- 250/636: Performed by: SURGERY

## 2020-12-01 PROCEDURE — 74011000250 HC RX REV CODE- 250: Performed by: SURGERY

## 2020-12-01 PROCEDURE — 80053 COMPREHEN METABOLIC PANEL: CPT

## 2020-12-01 PROCEDURE — 74011250637 HC RX REV CODE- 250/637: Performed by: STUDENT IN AN ORGANIZED HEALTH CARE EDUCATION/TRAINING PROGRAM

## 2020-12-01 PROCEDURE — 74011250637 HC RX REV CODE- 250/637: Performed by: FAMILY MEDICINE

## 2020-12-01 PROCEDURE — 2709999900 HC NON-CHARGEABLE SUPPLY

## 2020-12-01 PROCEDURE — 74011250637 HC RX REV CODE- 250/637: Performed by: INTERNAL MEDICINE

## 2020-12-01 PROCEDURE — 74011000250 HC RX REV CODE- 250: Performed by: NURSE PRACTITIONER

## 2020-12-01 PROCEDURE — C1751 CATH, INF, PER/CENT/MIDLINE: HCPCS

## 2020-12-01 PROCEDURE — 65270000029 HC RM PRIVATE

## 2020-12-01 PROCEDURE — 85025 COMPLETE CBC W/AUTO DIFF WBC: CPT

## 2020-12-01 RX ORDER — OXYCODONE HYDROCHLORIDE 5 MG/1
10 TABLET ORAL
Status: DISCONTINUED | OUTPATIENT
Start: 2020-12-01 | End: 2020-12-03 | Stop reason: HOSPADM

## 2020-12-01 RX ADMIN — CEFAZOLIN SODIUM 2 G: 100 INJECTION, POWDER, LYOPHILIZED, FOR SOLUTION INTRAVENOUS at 10:22

## 2020-12-01 RX ADMIN — OXYCODONE HYDROCHLORIDE 10 MG: 5 TABLET ORAL at 12:24

## 2020-12-01 RX ADMIN — MAGNESIUM GLUCONATE 500 MG ORAL TABLET 400 MG: 500 TABLET ORAL at 08:23

## 2020-12-01 RX ADMIN — Medication 10 ML: at 21:04

## 2020-12-01 RX ADMIN — PANTOPRAZOLE SODIUM 40 MG: 40 TABLET, DELAYED RELEASE ORAL at 05:31

## 2020-12-01 RX ADMIN — OXYCODONE HYDROCHLORIDE 7.5 MG: 5 TABLET ORAL at 05:31

## 2020-12-01 RX ADMIN — CYCLOBENZAPRINE 10 MG: 10 TABLET, FILM COATED ORAL at 08:23

## 2020-12-01 RX ADMIN — OXYCODONE HYDROCHLORIDE 10 MG: 5 TABLET ORAL at 20:56

## 2020-12-01 RX ADMIN — CALCIUM CARBONATE (ANTACID) CHEW TAB 500 MG 200 MG: 500 CHEW TAB at 08:23

## 2020-12-01 RX ADMIN — CEFAZOLIN SODIUM 2 G: 100 INJECTION, POWDER, LYOPHILIZED, FOR SOLUTION INTRAVENOUS at 17:42

## 2020-12-01 RX ADMIN — OXYCODONE HYDROCHLORIDE 10 MG: 5 TABLET ORAL at 08:28

## 2020-12-01 RX ADMIN — CEFAZOLIN SODIUM 2 G: 100 INJECTION, POWDER, LYOPHILIZED, FOR SOLUTION INTRAVENOUS at 03:20

## 2020-12-01 RX ADMIN — MAGNESIUM GLUCONATE 500 MG ORAL TABLET 400 MG: 500 TABLET ORAL at 17:15

## 2020-12-01 RX ADMIN — CYCLOBENZAPRINE 10 MG: 10 TABLET, FILM COATED ORAL at 17:15

## 2020-12-01 RX ADMIN — Medication 10 ML: at 13:45

## 2020-12-01 RX ADMIN — TRAMADOL HYDROCHLORIDE 50 MG: 50 TABLET ORAL at 03:20

## 2020-12-01 RX ADMIN — CYCLOBENZAPRINE 10 MG: 10 TABLET, FILM COATED ORAL at 20:57

## 2020-12-01 NOTE — PROGRESS NOTES
Outpt infusion orders faxed to [facility] at the [address] [place] [address] infusion center, per ORLÉANS NP pt will need infusion thru 12/16/20, pt not medically ready for dc at this time. outpt infusion center aware of pending dc date. CM will remain available for further needs and will update infusion center when pt ready for dc.

## 2020-12-01 NOTE — PROGRESS NOTES
END OF SHIFT NOTE:    INTAKE/OUTPUT  11/30 0701 - 12/01 0700  In: -   Out: 6368 [Urine:1750]  Voiding: YES  Catheter: NO  Drain:              Flatus: Patient does have flatus present. Stool:  0 occurrences. Characteristics:  Stool Assessment  Stool Color: Brown  Stool Appearance: Formed  Stool Amount: Large  Stool Source/Status: Rectum    Emesis: 0 occurrences. Characteristics:        VITAL SIGNS  Patient Vitals for the past 12 hrs:   Temp Pulse Resp BP SpO2   12/01/20 1645 97.5 °F (36.4 °C) 97 19 103/67 90 %   12/01/20 1124 98.4 °F (36.9 °C) 96 18 92/63 91 %   12/01/20 0741 97.8 °F (36.6 °C) 89 17 115/69 92 %       Pain Assessment  Pain Intensity 1: 6 (12/01/20 1300)  Pain Location 1: Chest  Pain Intervention(s) 1: Rest  Patient Stated Pain Goal: 0    Ambulating  Yes    Shift report given to oncoming nurse at the bedside.     Dat Ramesh

## 2020-12-01 NOTE — PROGRESS NOTES
END OF SHIFT NOTE:    INTAKE/OUTPUT  11/30 0701 - 12/01 0700  In: -   Out: 8097 [Urine:1750]  Voiding: YES  Catheter: NO  Drain:              Flatus: Patient does have flatus present. Stool:  0 occurrences. Emesis: 0 occurrences. Characteristics:        VITAL SIGNS  Patient Vitals for the past 12 hrs:   Temp Pulse Resp BP SpO2   12/01/20 0337 97.8 °F (36.6 °C) 97 18 105/73 90 %   11/30/20 2344 99 °F (37.2 °C) 93 18 113/70 90 %   11/30/20 2005 99.3 °F (37.4 °C) 96 19 116/75 93 %       Pain Assessment  Pain Intensity 1: 7 (11/30/20 2030)  Pain Location 1: Abdomen  Pain Intervention(s) 1: Medication (see MAR)  Patient Stated Pain Goal: 0    Ambulating  Yes    Shift report given to oncoming nurse at the bedside.     Kymberly Thayer RN

## 2020-12-01 NOTE — PROGRESS NOTES
Progress Note    2020  Admit Date: 2020  9:21 PM   NAME: Boubacar Philip   :  1970   MRN:  [id number]   Attending: Alexia Diaz MD  PCP:  None  Treatment Team: Attending Provider: Alexia Diaz MD; Consulting Provider: Debra Quezada MD; Care Manager: Kendall Williamson, RN; Utilization Review: Dedrick Hernandez, RN; Nurse Practitioner: Olena Vasquez, NP; Primary Nurse: Huey Lebron RN; Nurse Practitioner: Jane Arrington NP; Staff Nurse: Miguel Rivera RN    Full Code   SUBJECTIVE:     Mr. Trinidad Noriega is a 49 yo male with PMH of smoker, ETOH abuse, recent hospitalizations for traumatic pneumothorax and rib fx. Berny Montesinos has had 2 recent admissions for same requiring chest tubes and TPA to assist with drainage, deemed no need for surgery at that time.  Pt found to have MSSA bacteremia and left chest wound.  ID consulted and pt tx with IV rocephin for 4-6 weeks with EOT 20. Berny Montesinos presented to the ER by EMS after being found hypoxic with SOB at the infusion center.  CXR showed unchanged left sided empyema.  CT chest showed mildly enlarged multilobulated left empyema, multiple rib fx.  IR consulted. Pulm consulted. General Surgery consulted.  ID changed rocephin to ancef.   underwent VATs, chest tube X2, thoracic epidural placed, ROCKY placed.   pt pulled out drains. 1 chest tube replaced by Dr. Ani Sue. Ivon Riley left chest tube removed per Surgery.   hypoxic, spongy edema left mid back around mid axillary. CT chest obtained showed increased loculated fluid along left lateral lung margin extending into the chest wall, following VATS, increasing small basilar pleural effusion, left greater than right.  Surgery re-consulted. Pulmonary consulted.  ID extending abx until at least 20.  Required PRBC  for drop in hgb.  had O2 qualifier and needs home O2, uninsured so affordability may be an issue.  Repeat CT chest  showed loculated fluid collection in the left chest are smaller, left chest wall fluid collection is slightly larger. On RA during exam. Today reports pain uncontrolled left flank. States has had paint here since admission, today seems more uncomfortable. Past Medical History:   Diagnosis Date    GERD (gastroesophageal reflux disease)     once a week OTC med     Recent Results (from the past 24 hour(s))   CBC WITH AUTOMATED DIFF    Collection Time: 12/01/20  5:36 AM   Result Value Ref Range    WBC 4.2 (L) 4.3 - 11.1 K/uL    RBC 2.64 (L) 4.23 - 5.6 M/uL    HGB 8.9 (L) 13.6 - 17.2 g/dL    HCT 27.9 (L) 41.1 - 50.3 %    .7 (H) 79.6 - 97.8 FL    MCH 33.7 (H) 26.1 - 32.9 PG    MCHC 31.9 31.4 - 35.0 g/dL    RDW 15.8 (H) 11.9 - 14.6 %    PLATELET 240 016 - 048 K/uL    MPV 9.8 9.4 - 12.3 FL    ABSOLUTE NRBC 0.00 0.0 - 0.2 K/uL    DF AUTOMATED      NEUTROPHILS 39 (L) 43 - 78 %    LYMPHOCYTES 39 13 - 44 %    MONOCYTES 13 (H) 4.0 - 12.0 %    EOSINOPHILS 9 (H) 0.5 - 7.8 %    BASOPHILS 1 0.0 - 2.0 %    IMMATURE GRANULOCYTES 0 0.0 - 5.0 %    ABS. NEUTROPHILS 1.6 (L) 1.7 - 8.2 K/UL    ABS. LYMPHOCYTES 1.6 0.5 - 4.6 K/UL    ABS. MONOCYTES 0.6 0.1 - 1.3 K/UL    ABS. EOSINOPHILS 0.4 0.0 - 0.8 K/UL    ABS. BASOPHILS 0.0 0.0 - 0.2 K/UL    ABS. IMM. GRANS. 0.0 0.0 - 0.5 K/UL   METABOLIC PANEL, COMPREHENSIVE    Collection Time: 12/01/20  5:36 AM   Result Value Ref Range    Sodium 137 (L) 138 - 145 mmol/L    Potassium 3.6 3.5 - 5.1 mmol/L    Chloride 103 98 - 107 mmol/L    CO2 30 21 - 32 mmol/L    Anion gap 4 (L) 7 - 16 mmol/L    Glucose 90 65 - 100 mg/dL    BUN 4 (L) 6 - 23 MG/DL    Creatinine 0.66 (L) 0.8 - 1.5 MG/DL    GFR est AA >60 >60 ml/min/1.73m2    GFR est non-AA >60 >60 ml/min/1.73m2    Calcium 8.8 8.3 - 10.4 MG/DL    Bilirubin, total 0.7 0.2 - 1.1 MG/DL    ALT (SGPT) 7 (L) 12 - 65 U/L    AST (SGOT) 41 (H) 15 - 37 U/L    Alk.  phosphatase 147 (H) 50 - 136 U/L    Protein, total 6.8 6.3 - 8.2 g/dL    Albumin 2.0 (L) 3.5 - 5.0 g/dL    Globulin 4.8 (H) 2.3 - 3.5 g/dL    A-G Ratio 0.4 (L) 1.2 - 3.5       No Known Allergies  Current Facility-Administered Medications   Medication Dose Route Frequency Provider Last Rate Last Dose    oxyCODONE IR (ROXICODONE) tablet 10 mg  10 mg Oral Q4H PRN Rohith Kendall B, NP   10 mg at 12/01/20 8986    LORazepam (ATIVAN) tablet 1 mg  1 mg Oral Q4H PRN Caitlin Turk NP   1 mg at 11/30/20 2212    traMADoL (ULTRAM) tablet 50 mg  50 mg Oral Q6H PRN Caitlin Turk NP   50 mg at 12/01/20 0320    lidocaine 4 % patch 2 Patch  2 Patch TransDERmal Q24H Caitlin Turk NP   2 Patch at 11/30/20 1221    pantoprazole (PROTONIX) tablet 40 mg  40 mg Oral ACB Yu Carmona, DO   40 mg at 12/01/20 0531    albuterol (PROVENTIL VENTOLIN) nebulizer solution 2.5 mg  2.5 mg Nebulization Q6H PRN Adriana Waldron MD        0.9% sodium chloride infusion 250 mL  250 mL IntraVENous PRN Dulce Milan NP        cyclobenzaprine (FLEXERIL) tablet 10 mg  10 mg Oral TID Igor Valle MD   10 mg at 12/01/20 8420    traZODone (DESYREL) tablet 50 mg  50 mg Oral QHS Cleo Acuna NP   Stopped at 11/30/20 2200    magnesium oxide (MAG-OX) tablet 400 mg  400 mg Oral BID Caitlin Turk NP   400 mg at 12/01/20 0452    calcium carbonate (TUMS) chewable tablet 200 mg [elemental]  200 mg Oral TID WITH MEALS Jimmie Hernandez MD   200 mg at 12/01/20 6838    0.9% sodium chloride infusion 250 mL  250 mL IntraVENous PRN Fabi Cooney NP        naloxone (NARCAN) injection 0.4 mg  0.4 mg IntraVENous EVERY 2 MINUTES AS NEEDED Caitlin Turk NP        central line flush (saline) syringe 10 mL  10 mL InterCATHeter PRN Jordon Montenegro MD        sodium chloride (NS) flush 5-40 mL  5-40 mL IntraVENous Q8H Jamel Fu MD   10 mL at 11/30/20 0522    sodium chloride (NS) flush 5-40 mL  5-40 mL IntraVENous PRN Jamel Fu MD   20 mL at 11/30/20 0153    promethazine (PHENERGAN) with saline injection 6.25 mg 6.25 mg IntraVENous Q15MIN PRN Criss Alva MD   6.25 mg at 20    central line flush (saline) syringe 10 mL  10 mL InterCATHeter PRN Douglas Johns MD        ceFAZolin (ANCEF) 2 g/20 mL in sterile water IV syringe  2 g IntraVENous Q8H Michael Aaron MD   2 g at 20 1022    sodium chloride (NS) flush 5-40 mL  5-40 mL IntraVENous Q8H Michael Aaron MD   10 mL at 20 1520    sodium chloride (NS) flush 5-40 mL  5-40 mL IntraVENous PRN Michael Aaron MD   20 mL at 20 0139    acetaminophen (TYLENOL) tablet 650 mg  650 mg Oral Q6H PRN Michael Aaron MD        Or    acetaminophen (TYLENOL) suppository 650 mg  650 mg Rectal Q6H PRN Michael Aaron MD        polyethylene glycol (MIRALAX) packet 17 g  17 g Oral DAILY PRN Michael Aaron MD        promethazine (PHENERGAN) tablet 12.5 mg  12.5 mg Oral Q6H PRN Michael Aaron MD        Or    ondansetron [facility]) injection 4 mg  4 mg IntraVENous Q6H PRN Michael Aaron MD   4 mg at 20 1938       Review of Systems negative with exception of pertinent positives noted above  PHYSICAL EXAM     Visit Vitals  /69   Pulse 89   Temp 97.8 °F (36.6 °C)   Resp 17   Ht 5' 10\" (1.778 m)   Wt 93 kg (205 lb)   SpO2 92%   BMI 29.41 kg/m²      Temp (24hrs), Av.4 °F (36.9 °C), Min:97.6 °F (36.4 °C), Max:99.3 °F (37.4 °C)    Oxygen Therapy  O2 Sat (%): 92 % (20 0741)  Pulse via Oximetry: 102 beats per minute (20)  O2 Device: Room air (20 0825)  O2 Flow Rate (L/min): 2 l/min (20 0709)  FIO2 (%): 28 % (20 1317)  ETCO2 (mmHg): 99 mmHg (20 1849)    Intake/Output Summary (Last 24 hours) at 2020 1049  Last data filed at 2020 0341  Gross per 24 hour   Intake    Output 1750 ml   Net -1750 ml        General:       No acute distress, appears ill, uncomfortable in bed   Lungs:          Diminished left side.  Steri strips in place to left chest/back surgical incision, no surrounding erythema, drainage, warmth. Posterior chest wall with edema, no crepitus. Heart:            S1S2 present without murmurs rubs gallops. RRR. No LE edema  Abdomen:    Soft, non tender, non distended. BS present  Extremities: Moves ext spontaneously. No cyanosis  Neurologic:  A/O X3. No focal deficits.     Results summary of Diagnostic Studies/Procedures copied from within [facility] EMR:      [address] Problems    Diagnosis Date Noted    Acute respiratory failure with hypoxia ([facility] [address] 11/15/2020    Loculated empyema ([facility] [address] 11/15/2020    MSSA bacteremia 11/04/2020    Alcohol abuse 08/17/2016     Plan:       Acute resp failure with hypoxia/loculated empyema/rib fx with pneumothorax  Pulm signed off  IR consulted  General Surgery consulted,s/p VATS 11/17.   11/19 pulled all tubes out, chest tube replaced 11/20 removed  On rocephin from 11/13-11/16 now on Ancef with  EOT 12/16/20. May can switch back to once daily rocephin on DC, ID to determine  ID following  Will need O2 on DC, uninsured, affordability may be an issue. 11/28 O2 qualifier desats to 86% RA.   Repeat Chest CT 11/28 as above  Surgery to review CT, awaiting recs   General Surgery signed off, F/U 1 week Dr. Cindy Bianchi at [facility]     MSSA Bacteremia  ID following  Rocephin 11/4-11/16  Ancef started 1/16 with EOT 12/16/20, hopefully can change to PO      Alcohol abuse  Ativan prn      Marked worsening of spongy edematous area left back without crepitus 11/21  CT chest 11/22 w worsening loculated fluid   Surgery and Pulmonary in 11/22   Added  flexeril for muscle spasm.      Acute blood loss anemia  11/22 and 11/23 transfused PRBC  hgb stable         PRIOR TO DISCHARGE:   Will need abx plan from ID  Will need home oxygen:  CM aware  Will need follow up with Pulmonary for current issue and sleep apnea workup   Will need PHP follow up for any pain meds >5 days   Will need surgical follow up  1 week post DC        Notes, labs, VS, diagnostic testing reviewed        DVT Prophylaxis: SCD  Plan of Care Discussed with: Supervising MD Dr. Lyn Shaw, care team, pt        Evans Dior, JENNA

## 2020-12-01 NOTE — PROGRESS NOTES
Infectious Disease Progress Note    Today's Date: 2020   Admit Date: 2020    Impression:   · MSSA bacteremia (11/3 - ),  BC NG,  TTE negative  · MSSA Left pleural empyema, s/p chest tube (20), Cx from pleural drainage positive  · Originally discharged 20 on four weeks IV ceftriaxone, EO T 2020  · S/p VATS 20  · S/p repeat thoracotomy, decortication (2020) with purulence, no cx. · ETOH dependence  · S/p fall through attic with contusion/trauma     Plan:   He is still having a lot of L lung pain. Once controlled, we will be able to transition from cefazolin to ceftriaxone 2g Q24 hr at the infusion center through 2020. Would then transition to oral cefadroxil 500 mg PO BID for an additional 2-4 weeks. Discussed with case management. ID Plan of Care:   Routine PICC care  Ceftriaxone 2g IV Q 24 hrs through 2020  Q Monday labs: CBC with diff, serum creatinine, LFTs  Please fax labs to [phone], [facility]  Follow up with ID will be scheduled ~2020. Anti-infectives:   · Ceftriaxone restarted -  · Ancef -    Subjective: Interval History: Still with L flank pain; denies fevers, chills, sweats, nausea, vomiting or other complaints. Is concerned he will get home and not be able to control the pain. No Known Allergies     Review of Systems:  Pertinent items are noted in the History of Present Illness.     Objective:     Visit Vitals  BP 92/63   Pulse 96   Temp 98.4 °F (36.9 °C)   Resp 18   Ht 5' 10\" (1.778 m)   Wt 93 kg (205 lb)   SpO2 91%   BMI 29.41 kg/m²     Temp (24hrs), Av.5 °F (36.9 °C), Min:97.8 °F (36.6 °C), Max:99.3 °F (37.4 °C)     General:  Alert, no acute distress, appears stated age, well nourished and well developed  Head:    Normocephalic, atraumatic  Eyes:   Anicteric sclerae, no drainage, not injected, EOMI  Mouth:  Moist mucosa, op clear, teeth in good repair  Neck:   Supple, symmetrical, trachea midline, no JVD  Lungs:   Clear anterior lung fields, shallow breathing. CV:   Regular rate and rhythm without audible murmur  Abdomen:  Soft, non tender, not distended, active bowel sounds  Extremities:  No cyanosis or edema  Pulses:  2+ DP bilaterally  Musculoskeletal: Moves all extremities with equal strength, no deformity  Skin:   No acute rash or lesions; still with sutures L side; posterior staples have been removed, steristrips in place. Psych:  Alert, oriented and appropriate without evidence of thought disorder  Lines:    benign      Data Review:     CBC:  Recent Labs     12/01/20 0536 11/29/20 0606   WBC 4.2* 4.7   GRANS 39*  --    MONOS 13*  --    EOS 9*  --    ANEU 1.6*  --    ABL 1.6  --    HGB 8.9* 8.5*   HCT 27.9* 26.0*    284       BMP:  Recent Labs     12/01/20 0536 11/29/20 0606   CREA 0.66* 0.53*   BUN 4* 3*   * 138   K 3.6 3.7    103   CO2 30 29   AGAP 4* 6*   GLU 90 99       LFTS:  Recent Labs     12/01/20 0536   TBILI 0.7   ALT 7*   *   TP 6.8   ALB 2.0*       Microbiology:     All Micro Results     Procedure Component Value Units Date/Time    CULTURE, BLOOD [[id number]] Collected:  11/15/20 0033    Order Status:  Completed Specimen:  Blood Updated:  11/20/20 0829     Special Requests: --        RIGHT  ARM       Culture result: NO GROWTH 5 DAYS       CULTURE, BLOOD [[id number]] Collected:  11/15/20 0035    Order Status:  Completed Specimen:  Blood Updated:  11/20/20 0829     Special Requests: --        LEFT  ARM       Culture result: NO GROWTH 5 DAYS             Imaging:   CT Chest wo cont 11/28/2020  FINDINGS:  -LUNGS: There is persistence pleural thickening in the lateral posterior left  chest.  The loculated fluid collections are much smaller. Only a small residual  3 cm collection is present posteriorly. There is minimal atelectasis in the  lung bases. No pneumothorax. -MEDIASTINUM/AXILLA: No significant adenopathy.   -HEART/VESSELS: Mild coronary artery calcification. Heart size is normal.  -CHEST WALL/BONES: The left chest wall fluid collection is slightly larger, 10.5  x 4 cm compared with 9 x 4 cm on the prior study. Multiple adjacent left-sided  rib fractures are again noted. No new fractures or bone lesions. -UPPER ABDOMEN: No acute findings. CT chest (11/22/2020)  Impression:  1. Increasing loculated fluid along left lateral lung margin extending into the  chest wall, following VATS. 2. Associated numerous left lateral rib fractures.   3. Increasing small basilar pleural effusions, left greater than right       Signed By: Aysha Weldon NP     December 1, 2020

## 2020-12-02 ENCOUNTER — APPOINTMENT (OUTPATIENT)
Dept: CT IMAGING | Age: 50
DRG: 163 | End: 2020-12-02
Attending: NURSE PRACTITIONER

## 2020-12-02 ENCOUNTER — APPOINTMENT (OUTPATIENT)
Dept: INFUSION THERAPY | Age: 50
End: 2020-12-02

## 2020-12-02 LAB
ALBUMIN SERPL-MCNC: 2 G/DL (ref 3.5–5)
ALBUMIN/GLOB SERPL: 0.4 {RATIO} (ref 1.2–3.5)
ALP SERPL-CCNC: 147 U/L (ref 50–136)
ALT SERPL-CCNC: <6 U/L (ref 12–65)
ANION GAP SERPL CALC-SCNC: 5 MMOL/L (ref 7–16)
AST SERPL-CCNC: 45 U/L (ref 15–37)
BASOPHILS # BLD: 0 K/UL (ref 0–0.2)
BASOPHILS NFR BLD: 1 % (ref 0–2)
BILIRUB SERPL-MCNC: 0.8 MG/DL (ref 0.2–1.1)
BUN SERPL-MCNC: 4 MG/DL (ref 6–23)
CALCIUM SERPL-MCNC: 8.3 MG/DL (ref 8.3–10.4)
CHLORIDE SERPL-SCNC: 103 MMOL/L (ref 98–107)
CO2 SERPL-SCNC: 29 MMOL/L (ref 21–32)
CREAT SERPL-MCNC: 0.58 MG/DL (ref 0.8–1.5)
DIFFERENTIAL METHOD BLD: ABNORMAL
EOSINOPHIL # BLD: 0.4 K/UL (ref 0–0.8)
EOSINOPHIL NFR BLD: 10 % (ref 0.5–7.8)
ERYTHROCYTE [DISTWIDTH] IN BLOOD BY AUTOMATED COUNT: 15.5 % (ref 11.9–14.6)
GLOBULIN SER CALC-MCNC: 4.6 G/DL (ref 2.3–3.5)
GLUCOSE SERPL-MCNC: 89 MG/DL (ref 65–100)
HCT VFR BLD AUTO: 27.8 % (ref 41.1–50.3)
HGB BLD-MCNC: 8.8 G/DL (ref 13.6–17.2)
IMM GRANULOCYTES # BLD AUTO: 0 K/UL (ref 0–0.5)
IMM GRANULOCYTES NFR BLD AUTO: 0 % (ref 0–5)
LYMPHOCYTES # BLD: 1.7 K/UL (ref 0.5–4.6)
LYMPHOCYTES NFR BLD: 43 % (ref 13–44)
MCH RBC QN AUTO: 32.7 PG (ref 26.1–32.9)
MCHC RBC AUTO-ENTMCNC: 31.7 G/DL (ref 31.4–35)
MCV RBC AUTO: 103.3 FL (ref 79.6–97.8)
MONOCYTES # BLD: 0.4 K/UL (ref 0.1–1.3)
MONOCYTES NFR BLD: 11 % (ref 4–12)
NEUTS SEG # BLD: 1.4 K/UL (ref 1.7–8.2)
NEUTS SEG NFR BLD: 35 % (ref 43–78)
NRBC # BLD: 0 K/UL (ref 0–0.2)
PLATELET # BLD AUTO: 270 K/UL (ref 150–450)
PMV BLD AUTO: 9.8 FL (ref 9.4–12.3)
POTASSIUM SERPL-SCNC: 3.7 MMOL/L (ref 3.5–5.1)
PROT SERPL-MCNC: 6.6 G/DL (ref 6.3–8.2)
RBC # BLD AUTO: 2.69 M/UL (ref 4.23–5.6)
SODIUM SERPL-SCNC: 137 MMOL/L (ref 136–145)
WBC # BLD AUTO: 3.9 K/UL (ref 4.3–11.1)

## 2020-12-02 PROCEDURE — 74011250637 HC RX REV CODE- 250/637: Performed by: NURSE PRACTITIONER

## 2020-12-02 PROCEDURE — 74011250636 HC RX REV CODE- 250/636: Performed by: SURGERY

## 2020-12-02 PROCEDURE — 74011000250 HC RX REV CODE- 250: Performed by: SURGERY

## 2020-12-02 PROCEDURE — 74011250637 HC RX REV CODE- 250/637: Performed by: STUDENT IN AN ORGANIZED HEALTH CARE EDUCATION/TRAINING PROGRAM

## 2020-12-02 PROCEDURE — 74011250637 HC RX REV CODE- 250/637: Performed by: FAMILY MEDICINE

## 2020-12-02 PROCEDURE — 80053 COMPREHEN METABOLIC PANEL: CPT

## 2020-12-02 PROCEDURE — 71250 CT THORAX DX C-: CPT

## 2020-12-02 PROCEDURE — 74011000250 HC RX REV CODE- 250: Performed by: NURSE PRACTITIONER

## 2020-12-02 PROCEDURE — 65270000029 HC RM PRIVATE

## 2020-12-02 PROCEDURE — 85025 COMPLETE CBC W/AUTO DIFF WBC: CPT

## 2020-12-02 PROCEDURE — 74011250637 HC RX REV CODE- 250/637: Performed by: INTERNAL MEDICINE

## 2020-12-02 RX ADMIN — CYCLOBENZAPRINE 10 MG: 10 TABLET, FILM COATED ORAL at 15:53

## 2020-12-02 RX ADMIN — CEFAZOLIN SODIUM 2 G: 100 INJECTION, POWDER, LYOPHILIZED, FOR SOLUTION INTRAVENOUS at 10:16

## 2020-12-02 RX ADMIN — Medication 10 ML: at 13:23

## 2020-12-02 RX ADMIN — TRAZODONE HYDROCHLORIDE 50 MG: 50 TABLET ORAL at 22:34

## 2020-12-02 RX ADMIN — TRAMADOL HYDROCHLORIDE 50 MG: 50 TABLET ORAL at 17:14

## 2020-12-02 RX ADMIN — OXYCODONE HYDROCHLORIDE 10 MG: 5 TABLET ORAL at 09:18

## 2020-12-02 RX ADMIN — MAGNESIUM GLUCONATE 500 MG ORAL TABLET 400 MG: 500 TABLET ORAL at 09:18

## 2020-12-02 RX ADMIN — Medication 10 ML: at 06:02

## 2020-12-02 RX ADMIN — OXYCODONE HYDROCHLORIDE 10 MG: 5 TABLET ORAL at 02:55

## 2020-12-02 RX ADMIN — CEFAZOLIN SODIUM 2 G: 100 INJECTION, POWDER, LYOPHILIZED, FOR SOLUTION INTRAVENOUS at 02:41

## 2020-12-02 RX ADMIN — Medication 20 ML: at 22:00

## 2020-12-02 RX ADMIN — CEFAZOLIN SODIUM 2 G: 100 INJECTION, POWDER, LYOPHILIZED, FOR SOLUTION INTRAVENOUS at 17:09

## 2020-12-02 RX ADMIN — CYCLOBENZAPRINE 10 MG: 10 TABLET, FILM COATED ORAL at 09:18

## 2020-12-02 RX ADMIN — PANTOPRAZOLE SODIUM 40 MG: 40 TABLET, DELAYED RELEASE ORAL at 06:02

## 2020-12-02 RX ADMIN — TRAMADOL HYDROCHLORIDE 50 MG: 50 TABLET ORAL at 10:20

## 2020-12-02 RX ADMIN — OXYCODONE HYDROCHLORIDE 10 MG: 5 TABLET ORAL at 15:53

## 2020-12-02 RX ADMIN — CYCLOBENZAPRINE 10 MG: 10 TABLET, FILM COATED ORAL at 22:33

## 2020-12-02 RX ADMIN — MAGNESIUM GLUCONATE 500 MG ORAL TABLET 400 MG: 500 TABLET ORAL at 15:53

## 2020-12-02 RX ADMIN — CALCIUM CARBONATE (ANTACID) CHEW TAB 500 MG 200 MG: 500 CHEW TAB at 09:18

## 2020-12-02 NOTE — PROGRESS NOTES
END OF SHIFT NOTE:    INTAKE/OUTPUT  12/01 0701 - 12/02 0700  In: -   Out: 975 [Urine:975]  Voiding: YES  Catheter: NO  Drain:              Flatus: Patient does have flatus present. Stool:  0 occurrences. Characteristics:    Emesis: 0 occurrences. Characteristics:        VITAL SIGNS  Patient Vitals for the past 12 hrs:   Temp Pulse Resp BP SpO2   12/02/20 0341 98.8 °F (37.1 °C) 91 18 112/67 90 %   12/01/20 2344 98.3 °F (36.8 °C) 88 18 108/69 92 %   12/01/20 2033 97.8 °F (36.6 °C) 97 19 122/71 91 %       Pain Assessment  Pain Intensity 1: 0 (12/02/20 0355)  Pain Location 1: Chest  Pain Intervention(s) 1: Medication (see MAR)  Patient Stated Pain Goal: 0    Ambulating  Yes    Shift report given to oncoming nurse at the bedside.     [address]

## 2020-12-02 NOTE — ROUTINE PROCESS
Bedside and verbal shift change report given to Yvette Loya RN (oncoming nurse) by self Juaquin Hassan nurse). Report included the following information SBAR, Kardex, Intake/Output, MAR, Recent Results.

## 2020-12-02 NOTE — PROGRESS NOTES
Progress Note    2020  Admit Date: 2020  9:21 PM   NAME: Emely Banks   :  1970   MRN:  [id number]   Attending: Jennifer Wong MD  PCP:  None  Treatment Team: Attending Provider: Jennifer Wong MD; Consulting Provider: Khoa Juarez MD; Care Manager: Jered Segura, RN; Utilization Review: Neelam Vergara RN; Nurse Practitioner: Henry Barbosa NP; Primary Nurse: Bulmaro Brandon RN; Nurse Practitioner: Refugio Salguero NP    Full Code   SUBJECTIVE:   Mr. Yoselin Dixon is a 47 yo male with PMH of smoker, ETOH abuse, recent hospitalizations for traumatic pneumothorax and rib fx. Sebastian Lloyd has had 2 recent admissions for same requiring chest tubes and TPA to assist with drainage, deemed no need for surgery at that time.  Pt found to have MSSA bacteremia and left chest wound.  ID consulted and pt tx with IV rocephin for 4-6 weeks with EOT 20. Sebastian Lloyd presented to the ER by EMS after being found hypoxic with SOB at the infusion center.  CXR showed unchanged left sided empyema.  CT chest showed mildly enlarged multilobulated left empyema, multiple rib fx.  IR consulted. Pulm consulted. General Surgery consulted.  ID changed rocephin to ancef.   underwent VATs, chest tube X2, thoracic epidural placed, ROCKY placed.   pt pulled out drains. 1 chest tube replaced by Dr. Merary Corona. Pee Cortes left chest tube removed per Surgery.   hypoxic, spongy edema left mid back around mid axillary. CT chest obtained showed increased loculated fluid along left lateral lung margin extending into the chest wall, following VATS, increasing small basilar pleural effusion, left greater than right.  Surgery re-consulted. Pulmonary consulted.  ID extending abx until at least 20.  Required PRBC  for drop in hgb.  had O2 qualifier and needs home O2, uninsured so affordability may be an issue.  Repeat CT chest  showed loculated fluid collection in the left chest are smaller, left chest wall fluid collection is slightly larger.  On RA during exam. Will likely need repeat O2 qualifier prior to DC. Reports significant pain left flank around surgical site. Also with some edema left lower mid axillary below surgical incision. Denies worsening SOB, CP. Past Medical History:   Diagnosis Date    GERD (gastroesophageal reflux disease)     once a week OTC med     Recent Results (from the past 24 hour(s))   CBC WITH AUTOMATED DIFF    Collection Time: 12/02/20  6:01 AM   Result Value Ref Range    WBC 3.9 (L) 4.3 - 11.1 K/uL    RBC 2.69 (L) 4.23 - 5.6 M/uL    HGB 8.8 (L) 13.6 - 17.2 g/dL    HCT 27.8 (L) 41.1 - 50.3 %    .3 (H) 79.6 - 97.8 FL    MCH 32.7 26.1 - 32.9 PG    MCHC 31.7 31.4 - 35.0 g/dL    RDW 15.5 (H) 11.9 - 14.6 %    PLATELET 893 098 - 839 K/uL    MPV 9.8 9.4 - 12.3 FL    ABSOLUTE NRBC 0.00 0.0 - 0.2 K/uL    DF AUTOMATED      NEUTROPHILS 35 (L) 43 - 78 %    LYMPHOCYTES 43 13 - 44 %    MONOCYTES 11 4.0 - 12.0 %    EOSINOPHILS 10 (H) 0.5 - 7.8 %    BASOPHILS 1 0.0 - 2.0 %    IMMATURE GRANULOCYTES 0 0.0 - 5.0 %    ABS. NEUTROPHILS 1.4 (L) 1.7 - 8.2 K/UL    ABS. LYMPHOCYTES 1.7 0.5 - 4.6 K/UL    ABS. MONOCYTES 0.4 0.1 - 1.3 K/UL    ABS. EOSINOPHILS 0.4 0.0 - 0.8 K/UL    ABS. BASOPHILS 0.0 0.0 - 0.2 K/UL    ABS. IMM. GRANS. 0.0 0.0 - 0.5 K/UL   METABOLIC PANEL, COMPREHENSIVE    Collection Time: 12/02/20  6:01 AM   Result Value Ref Range    Sodium 137 136 - 145 mmol/L    Potassium 3.7 3.5 - 5.1 mmol/L    Chloride 103 98 - 107 mmol/L    CO2 29 21 - 32 mmol/L    Anion gap 5 (L) 7 - 16 mmol/L    Glucose 89 65 - 100 mg/dL    BUN 4 (L) 6 - 23 MG/DL    Creatinine 0.58 (L) 0.8 - 1.5 MG/DL    GFR est AA >60 >60 ml/min/1.73m2    GFR est non-AA >60 >60 ml/min/1.73m2    Calcium 8.3 8.3 - 10.4 MG/DL    Bilirubin, total 0.8 0.2 - 1.1 MG/DL    ALT (SGPT) <6 (L) 12 - 65 U/L    AST (SGOT) 45 (H) 15 - 37 U/L    Alk.  phosphatase 147 (H) 50 - 136 U/L    Protein, total 6.6 6.3 - 8.2 g/dL    Albumin 2.0 (L) 3.5 - 5.0 g/dL    Globulin 4.6 (H) 2.3 - 3.5 g/dL    A-G Ratio 0.4 (L) 1.2 - 3.5       No Known Allergies  Current Facility-Administered Medications   Medication Dose Route Frequency Provider Last Rate Last Dose    oxyCODONE IR (ROXICODONE) tablet 10 mg  10 mg Oral Q4H PRN Jesus Patel B, NP   10 mg at 12/02/20 1527    LORazepam (ATIVAN) tablet 1 mg  1 mg Oral Q4H PRN Radha Turk NP   1 mg at 11/30/20 2212    traMADoL (ULTRAM) tablet 50 mg  50 mg Oral Q6H PRN Radha Turk NP   50 mg at 12/02/20 1020    lidocaine 4 % patch 2 Patch  2 Patch TransDERmal Q24H Radha Turk NP   2 Patch at 12/01/20 1220    pantoprazole (PROTONIX) tablet 40 mg  40 mg Oral ACB Yu Carmona, DO   40 mg at 12/02/20 0602    albuterol (PROVENTIL VENTOLIN) nebulizer solution 2.5 mg  2.5 mg Nebulization Q6H PRN Alma Waldron MD        0.9% sodium chloride infusion 250 mL  250 mL IntraVENous PRN Delia Abad NP        cyclobenzaprine (FLEXERIL) tablet 10 mg  10 mg Oral TID Cathleen Stone MD   10 mg at 12/02/20 5356    traZODone (DESYREL) tablet 50 mg  50 mg Oral QHS Beecher Peabody, NP   Stopped at 11/30/20 2200    magnesium oxide (MAG-OX) tablet 400 mg  400 mg Oral BID Radha Turk NP   400 mg at 12/02/20 7629    calcium carbonate (TUMS) chewable tablet 200 mg [elemental]  200 mg Oral TID WITH MEALS Terry Singleton MD   200 mg at 12/02/20 2982    0.9% sodium chloride infusion 250 mL  250 mL IntraVENous PRN Fabi Cooney NP        naloxone (NARCAN) injection 0.4 mg  0.4 mg IntraVENous EVERY 2 MINUTES AS NEEDED Radha Turk NP        central line flush (saline) syringe 10 mL  10 mL InterCATHeter PRN Tony Appiah MD        sodium chloride (NS) flush 5-40 mL  5-40 mL IntraVENous Q8H Waldemar Abdi MD   10 mL at 12/02/20 0602    sodium chloride (NS) flush 5-40 mL  5-40 mL IntraVENous PRN Waldemar Abdi MD   20 mL at 11/30/20 0153    promethazine (PHENERGAN) with saline injection 6.25 mg  6.25 mg IntraVENous Q15MIN PRN Manuel Evans MD   6.25 mg at 20    central line flush (saline) syringe 10 mL  10 mL InterCATHeter PRN Shilpi Velasquez MD        ceFAZolin (ANCEF) 2 g/20 mL in sterile water IV syringe  2 g IntraVENous Q8H Casie Prince MD   2 g at 20 1016    sodium chloride (NS) flush 5-40 mL  5-40 mL IntraVENous Q8H Casie Prince MD   10 mL at 20 0602    sodium chloride (NS) flush 5-40 mL  5-40 mL IntraVENous PRN Casie Prince MD   20 mL at 20 0139    acetaminophen (TYLENOL) tablet 650 mg  650 mg Oral Q6H PRN Casie Prince MD        Or    acetaminophen (TYLENOL) suppository 650 mg  650 mg Rectal Q6H PRN Casie Prince MD        polyethylene glycol (MIRALAX) packet 17 g  17 g Oral DAILY PRN Casie Prince MD        promethazine (PHENERGAN) tablet 12.5 mg  12.5 mg Oral Q6H PRN Casie Prince MD        Or    ondansetron [facility]) injection 4 mg  4 mg IntraVENous Q6H PRN Casie Prince MD   4 mg at 20       Review of Systems negative with exception of pertinent positives noted above  PHYSICAL EXAM     Visit Vitals  /73   Pulse 96   Temp 98.7 °F (37.1 °C)   Resp 18   Ht 5' 10\" (1.778 m)   Wt 93 kg (205 lb)   SpO2 98%   BMI 29.41 kg/m²      Temp (24hrs), Av.3 °F (36.8 °C), Min:97.5 °F (36.4 °C), Max:98.8 °F (37.1 °C)    Oxygen Therapy  O2 Sat (%): 98 % (20)  Pulse via Oximetry: 102 beats per minute (20)  O2 Device: Room air (20)  O2 Flow Rate (L/min): 2 l/min (20 07)  FIO2 (%): 28 % (20 1317)  ETCO2 (mmHg): 99 mmHg (20 1849)    Intake/Output Summary (Last 24 hours) at 2020 1027  Last data filed at 2020 0318  Gross per 24 hour   Intake    Output 975 ml   Net -975 ml        General:       No acute distress, appears ill, uncomfortable in bed   Lungs:          Diminished left side.  Steri strips in place to left chest/back surgical incision, no surrounding erythema, drainage, warmth. Posterior chest wall without edema, no crepitus.  left mid axillary lower portion of flank with significant pain light palpation, also some spongy edema. Heart:            S1S2 present without murmurs rubs gallops. RRR. No LE edema  Abdomen:    Soft, non tender, non distended. BS present  Extremities: Moves ext spontaneously. No cyanosis  Neurologic:  A/O X3. No focal deficits.     Results summary of Diagnostic Studies/Procedures copied from within [facility] EMR:        [address] Problems    Diagnosis Date Noted    Acute respiratory failure with hypoxia ([facility] [address] 11/15/2020    Loculated empyema ([facility] [address] 11/15/2020    MSSA bacteremia 11/04/2020    Alcohol abuse 08/17/2016     Plan:    Acute resp failure with hypoxia/loculated empyema/rib fx with pneumothorax  Pulm signed off  IR consulted  General Surgery consulted,s/p VATS 11/17.   11/19 pulled all tubes out, chest tube replaced 11/20 removed  On rocephin from 11/13-11/16 now on Ancef with  EOT 12/16/20. May can switch back to once daily rocephin on DC, ID to determine  ID following  Will need O2 on DC, uninsured, affordability may be an issue.  11/28 O2 qualifier desats to 86% RA.   Repeat Chest CT 11/28 as above  General Surgery signed off, F/U 1 week Dr. Junito Winters at [place]  Repeat CT chest today     MSSA Bacteremia  ID following  Rocephin 11/4-11/16  Ancef started 1/16 with EOT 12/16/20, hopefully can change to PO      Alcohol abuse  Ativan prn      Marked worsening of spongy edematous area left back without crepitus 11/21  CT chest 11/22 w worsening loculated fluid   Surgery and Pulmonary in 11/22   Added  flexeril for muscle spasm.      Acute blood loss anemia  11/22 and 11/23 transfused PRBC  hgb stable         PRIOR TO DISCHARGE:   Will need abx plan from ID  Will need home oxygen:  CM aware  Will need follow up with Pulmonary for current issue and sleep apnea workup   Will need PHP follow up for any pain meds >5 days   Will need surgical follow up  1 week post DC        Notes, labs, VS, diagnostic testing reviewed        DVT Prophylaxis: SCD  Plan of Care Discussed with: Supervising MD Dr. Huy Dias, care team, pt        Valorie July, NP                                Addendum  CT chest reviewed. No changes to plan of care. Impression:  Multiple left rib fractures. Pleural thickening remains. Loculated fluid collection continues to resolve. Plates of atelectasis.

## 2020-12-02 NOTE — PROGRESS NOTES
Chart review complete, IV antibx continue, orders faxed to the out patient infusion center on 12/1/20 awaiting dc orders to notify them of start date in the infusion center. Per VS graph pt is on room air sats 98%, CM will remain available for dc needs.

## 2020-12-02 NOTE — PROGRESS NOTES
Comprehensive Nutrition Assessment    Type and Reason for Visit: Reassess    Nutrition Recommendations/Plan:    Continue current diet   Increase Ensure to BID     Nutrition Assessment:   Nutrition History: Compromised intake with last admission      Nutrition Background: PMH remarkable for mechanical fall through attic floor sustaining rib fractures and left pneumothorax., ETOH abuse. Admitted w acute respiratory failure w hypoxia s/p VATS and thoracic epidural placed 11/17 (3rd set of chest tubes), pulled all tubes 11/19, currently w 1 chest tube, MSSA bacteremia. Daily Update:  Patient seen today. He reports he continues to eat 50% of his meals. He states that is an improvement from his usual intake at home so he feels like he is progressing. He states that he has been drinking Ensure. When RD discussing possible increase in Ensure he agrees. Current intake + Ensure Enlive x 1 per day meeting ~70-75% needs. Nutrition Related Findings: no physical findings of malnutrition      Current Nutrition Therapies:  DIET REGULAR  DIET NUTRITIONAL SUPPLEMENTS Dinner; Ensure [employer] ( )    Current Intake: Average Meal Intake: 26-50% Average Supplement Intake: %(1 Ensure per day)      Anthropometric Measures:  Height: 5' 10\" (177.8 cm)  Current Body Wt: 103.2 kg (227 lb 8.2 oz)(11/15), Weight source: Bed scale  BMI: 32.6, Obese class 1 (BMI 30.0-34. 9)  Admission Body Weight: 199 lb 15.3 oz(no source)  Ideal Body Wt: 166 lbs (75 kg), 137.1 %  Usual Body Wt: (highly variable 185# Oct to current 227#),            Estimated Daily Nutrient Needs:  Energy (kcal): 3715-6936 (Kcal/kg(25-30 kcal/kg), Weight Used: Ideal(secondary visually overwt and CBD increase from prior wt))  Protein (g): 75-90(1-1.2 g/kg) Weight Used: (Ideal)  Fluid (ml/day):   (1 ml/kcal)    Nutrition Diagnosis:   · Inadequate oral intake related to (compromised appetite) as evidenced by (po meeting ~75% needs)    Nutrition Interventions:   Food and/or Nutrient Delivery: Continue current diet, Modify oral nutrition supplement     Coordination of Nutrition Care: Continue to monitor while inpatient    Goals: Previous Goal Met: Progressing toward goal(s)  Meet >90% estimated needs    Nutrition Monitoring and Evaluation:      Food/Nutrient Intake Outcomes: Food and nutrient intake, Supplement intake       Discharge Planning:     Too soon to determine    [address] [place] [place], LD on 12/2/2020 at 11:57 AM  Contact: [phone]

## 2020-12-02 NOTE — ROUTINE PROCESS
Bedside and Verbal shift change report received from [place] [id number] [facility] (offgoing nurse) to self (oncoming nurse). Report included the following information SBAR, Kardex, Intake/Output, MAR, Recent Results.

## 2020-12-02 NOTE — PROGRESS NOTES
Infectious Disease Progress Note    Today's Date: 2020   Admit Date: 2020    Impression:   · MSSA bacteremia (11/3 - ),  BC NG,  TTE negative  · MSSA Left pleural empyema, s/p chest tube (20), Cx from pleural drainage positive  · Originally discharged 20 on four weeks IV ceftriaxone, EO T 2020  · S/p VATS 20  · S/p repeat thoracotomy, decortication (2020) with purulence, no cx. · ETOH dependence  · S/p fall through attic with contusion/trauma     Plan:   Follow pending CT chest today due to worsening lung pain. Continue cefazolin while inpatient. ID Plan of Care when ready for discharge: unchanged  Routine PICC care  Ceftriaxone 2g IV Q 24 hrs through 2020  Q Monday labs: CBC with diff, serum creatinine, LFTs  Please fax labs to [phone], [facility]  Follow up with ID will be scheduled ~2020. Plan to transition to oral cefadroxil at EOT for 2-4 weeks. Anti-infectives:   · Ceftriaxone restarted -  · Ancef -    Subjective: Interval History: Complains of worsening lung pain today, denies nausea, vomiting, diarrhea, fevers, chills or sweats. Tmax 98.8. WBCs 3.9k. No Known Allergies     Review of Systems:  Pertinent items are noted in the History of Present Illness.     Objective:     Visit Vitals  /73   Pulse 96   Temp 98.7 °F (37.1 °C)   Resp 18   Ht 5' 10\" (1.778 m)   Wt 93 kg (205 lb)   SpO2 98%   BMI 29.41 kg/m²     Temp (24hrs), Av.3 °F (36.8 °C), Min:97.5 °F (36.4 °C), Max:98.8 °F (37.1 °C)     General:  Alert, no acute distress, appears stated age, well nourished and well developed  Head:    Normocephalic, atraumatic  Eyes:   Anicteric sclerae, no drainage, not injected, EOMI  Mouth:  Moist mucosa, op clear  Neck:   Supple, symmetrical, trachea midline, no JVD  Lungs:   Clear lung fields; deep pain at incision sites  CV:   Regular rate and rhythm without audible murmur  Abdomen:  Soft, non tender, not distended, active bowel sounds  Extremities:  No cyanosis or edema  Musculoskeletal: Moves all extremities with equal strength, no deformity  Skin:   No acute rash; L side with sutures, L posterior incision with steristrips, mild erythema  Psych:  Alert, oriented and appropriate without evidence of thought disorder  Lines:    benign      Data Review:     CBC:  Recent Labs     12/02/20  0601 12/01/20  0536   WBC 3.9* 4.2*   GRANS 35* 39*   MONOS 11 13*   EOS 10* 9*   ANEU 1.4* 1.6*   ABL 1.7 1.6   HGB 8.8* 8.9*   HCT 27.8* 27.9*    263       BMP:  Recent Labs     12/02/20  0601 12/01/20  0536   CREA 0.58* 0.66*   BUN 4* 4*    137*   K 3.7 3.6    103   CO2 29 30   AGAP 5* 4*   GLU 89 90       LFTS:  Recent Labs     12/02/20  0601 12/01/20  0536   TBILI 0.8 0.7   ALT <6* 7*   * 147*   TP 6.6 6.8   ALB 2.0* 2.0*       Microbiology:     All Micro Results     Procedure Component Value Units Date/Time    CULTURE, BLOOD [[id number]] Collected:  11/15/20 0033    Order Status:  Completed Specimen:  Blood Updated:  11/20/20 0829     Special Requests: --        RIGHT  ARM       Culture result: NO GROWTH 5 DAYS       CULTURE, BLOOD [[id number]] Collected:  11/15/20 0035    Order Status:  Completed Specimen:  Blood Updated:  11/20/20 0829     Special Requests: --        LEFT  ARM       Culture result: NO GROWTH 5 DAYS             Imaging:   CT Chest wo cont 11/28/2020  FINDINGS:  -LUNGS: There is persistence pleural thickening in the lateral posterior left  chest.  The loculated fluid collections are much smaller. Only a small residual  3 cm collection is present posteriorly. There is minimal atelectasis in the  lung bases. No pneumothorax. -MEDIASTINUM/AXILLA: No significant adenopathy. -HEART/VESSELS: Mild coronary artery calcification. Heart size is normal.  -CHEST WALL/BONES: The left chest wall fluid collection is slightly larger, 10.5  x 4 cm compared with 9 x 4 cm on the prior study.   Multiple adjacent left-sided  rib fractures are again noted. No new fractures or bone lesions. -UPPER ABDOMEN: No acute findings. CT chest (11/22/2020)  Impression:  1. Increasing loculated fluid along left lateral lung margin extending into the  chest wall, following VATS. 2. Associated numerous left lateral rib fractures.   3. Increasing small basilar pleural effusions, left greater than right       Signed By: Nathanael Walters NP     December 2, 2020

## 2020-12-03 ENCOUNTER — APPOINTMENT (OUTPATIENT)
Dept: INFUSION THERAPY | Age: 50
End: 2020-12-03

## 2020-12-03 VITALS
HEIGHT: 70 IN | DIASTOLIC BLOOD PRESSURE: 67 MMHG | TEMPERATURE: 98.2 F | BODY MASS INDEX: 29.35 KG/M2 | OXYGEN SATURATION: 90 % | SYSTOLIC BLOOD PRESSURE: 105 MMHG | WEIGHT: 205 LBS | RESPIRATION RATE: 18 BRPM | HEART RATE: 90 BPM

## 2020-12-03 LAB
ALBUMIN SERPL-MCNC: 2.2 G/DL (ref 3.5–5)
ALBUMIN/GLOB SERPL: 0.4 {RATIO} (ref 1.2–3.5)
ALP SERPL-CCNC: 156 U/L (ref 50–136)
ALT SERPL-CCNC: 6 U/L (ref 12–65)
ANION GAP SERPL CALC-SCNC: 4 MMOL/L (ref 7–16)
AST SERPL-CCNC: 52 U/L (ref 15–37)
BASOPHILS # BLD: 0 K/UL (ref 0–0.2)
BASOPHILS # BLD: 0 K/UL (ref 0–0.2)
BASOPHILS NFR BLD: 0 % (ref 0–2)
BASOPHILS NFR BLD: 1 % (ref 0–2)
BILIRUB SERPL-MCNC: 1 MG/DL (ref 0.2–1.1)
BUN SERPL-MCNC: 5 MG/DL (ref 6–23)
CALCIUM SERPL-MCNC: 8.7 MG/DL (ref 8.3–10.4)
CHLORIDE SERPL-SCNC: 103 MMOL/L (ref 98–107)
CO2 SERPL-SCNC: 30 MMOL/L (ref 21–32)
CREAT SERPL-MCNC: 0.6 MG/DL (ref 0.8–1.5)
DIFFERENTIAL METHOD BLD: ABNORMAL
DIFFERENTIAL METHOD BLD: ABNORMAL
EOSINOPHIL # BLD: 0.4 K/UL (ref 0–0.8)
EOSINOPHIL # BLD: 0.5 K/UL (ref 0–0.8)
EOSINOPHIL NFR BLD: 9 % (ref 0.5–7.8)
EOSINOPHIL NFR BLD: 9 % (ref 0.5–7.8)
ERYTHROCYTE [DISTWIDTH] IN BLOOD BY AUTOMATED COUNT: 15.6 % (ref 11.9–14.6)
ERYTHROCYTE [DISTWIDTH] IN BLOOD BY AUTOMATED COUNT: 15.8 % (ref 11.9–14.6)
GLOBULIN SER CALC-MCNC: 5.2 G/DL (ref 2.3–3.5)
GLUCOSE SERPL-MCNC: 92 MG/DL (ref 65–100)
HCT VFR BLD AUTO: 13.9 % (ref 41.1–50.3)
HCT VFR BLD AUTO: 28 % (ref 41.1–50.3)
HGB BLD-MCNC: 4.3 G/DL (ref 13.6–17.2)
HGB BLD-MCNC: 8.9 G/DL (ref 13.6–17.2)
IMM GRANULOCYTES # BLD AUTO: 0 K/UL (ref 0–0.5)
IMM GRANULOCYTES # BLD AUTO: 0 K/UL (ref 0–0.5)
IMM GRANULOCYTES NFR BLD AUTO: 0 % (ref 0–5)
IMM GRANULOCYTES NFR BLD AUTO: 0 % (ref 0–5)
LYMPHOCYTES # BLD: 1.8 K/UL (ref 0.5–4.6)
LYMPHOCYTES # BLD: 2.7 K/UL (ref 0.5–4.6)
LYMPHOCYTES NFR BLD: 45 % (ref 13–44)
LYMPHOCYTES NFR BLD: 50 % (ref 13–44)
MCH RBC QN AUTO: 32.6 PG (ref 26.1–32.9)
MCH RBC QN AUTO: 32.8 PG (ref 26.1–32.9)
MCHC RBC AUTO-ENTMCNC: 30.9 G/DL (ref 31.4–35)
MCHC RBC AUTO-ENTMCNC: 31.8 G/DL (ref 31.4–35)
MCV RBC AUTO: 103.3 FL (ref 79.6–97.8)
MCV RBC AUTO: 105.3 FL (ref 79.6–97.8)
MONOCYTES # BLD: 0.4 K/UL (ref 0.1–1.3)
MONOCYTES # BLD: 0.6 K/UL (ref 0.1–1.3)
MONOCYTES NFR BLD: 11 % (ref 4–12)
MONOCYTES NFR BLD: 11 % (ref 4–12)
NEUTS SEG # BLD: 1.3 K/UL (ref 1.7–8.2)
NEUTS SEG # BLD: 1.6 K/UL (ref 1.7–8.2)
NEUTS SEG NFR BLD: 30 % (ref 43–78)
NEUTS SEG NFR BLD: 34 % (ref 43–78)
NRBC # BLD: 0 K/UL (ref 0–0.2)
NRBC # BLD: 0 K/UL (ref 0–0.2)
PLATELET # BLD AUTO: 264 K/UL (ref 150–450)
PLATELET # BLD AUTO: 369 K/UL (ref 150–450)
PMV BLD AUTO: 10 FL (ref 9.4–12.3)
PMV BLD AUTO: 9.7 FL (ref 9.4–12.3)
POTASSIUM SERPL-SCNC: 3.9 MMOL/L (ref 3.5–5.1)
PROT SERPL-MCNC: 7.4 G/DL (ref 6.3–8.2)
RBC # BLD AUTO: 1.32 M/UL (ref 4.23–5.6)
RBC # BLD AUTO: 2.71 M/UL (ref 4.23–5.6)
SODIUM SERPL-SCNC: 137 MMOL/L (ref 136–145)
WBC # BLD AUTO: 3.9 K/UL (ref 4.3–11.1)
WBC # BLD AUTO: 5.5 K/UL (ref 4.3–11.1)

## 2020-12-03 PROCEDURE — 36415 COLL VENOUS BLD VENIPUNCTURE: CPT

## 2020-12-03 PROCEDURE — 94761 N-INVAS EAR/PLS OXIMETRY MLT: CPT

## 2020-12-03 PROCEDURE — 74011250636 HC RX REV CODE- 250/636: Performed by: SURGERY

## 2020-12-03 PROCEDURE — 74011250637 HC RX REV CODE- 250/637: Performed by: FAMILY MEDICINE

## 2020-12-03 PROCEDURE — 85025 COMPLETE CBC W/AUTO DIFF WBC: CPT

## 2020-12-03 PROCEDURE — 74011250637 HC RX REV CODE- 250/637: Performed by: STUDENT IN AN ORGANIZED HEALTH CARE EDUCATION/TRAINING PROGRAM

## 2020-12-03 PROCEDURE — 74011250637 HC RX REV CODE- 250/637: Performed by: NURSE PRACTITIONER

## 2020-12-03 PROCEDURE — 74011000250 HC RX REV CODE- 250: Performed by: SURGERY

## 2020-12-03 PROCEDURE — 80053 COMPREHEN METABOLIC PANEL: CPT

## 2020-12-03 RX ORDER — OXYCODONE HYDROCHLORIDE 10 MG/1
10 TABLET ORAL
Qty: 18 TAB | Refills: 0 | Status: SHIPPED | OUTPATIENT
Start: 2020-12-03 | End: 2020-12-06

## 2020-12-03 RX ADMIN — CEFAZOLIN SODIUM 2 G: 100 INJECTION, POWDER, LYOPHILIZED, FOR SOLUTION INTRAVENOUS at 12:16

## 2020-12-03 RX ADMIN — MAGNESIUM GLUCONATE 500 MG ORAL TABLET 400 MG: 500 TABLET ORAL at 09:27

## 2020-12-03 RX ADMIN — OXYCODONE HYDROCHLORIDE 10 MG: 5 TABLET ORAL at 09:34

## 2020-12-03 RX ADMIN — CALCIUM CARBONATE (ANTACID) CHEW TAB 500 MG 200 MG: 500 CHEW TAB at 09:27

## 2020-12-03 RX ADMIN — CALCIUM CARBONATE (ANTACID) CHEW TAB 500 MG 200 MG: 500 CHEW TAB at 12:16

## 2020-12-03 RX ADMIN — CEFAZOLIN SODIUM 2 G: 100 INJECTION, POWDER, LYOPHILIZED, FOR SOLUTION INTRAVENOUS at 01:50

## 2020-12-03 RX ADMIN — OXYCODONE HYDROCHLORIDE 10 MG: 5 TABLET ORAL at 04:09

## 2020-12-03 RX ADMIN — OXYCODONE HYDROCHLORIDE 10 MG: 5 TABLET ORAL at 14:43

## 2020-12-03 RX ADMIN — CYCLOBENZAPRINE 10 MG: 10 TABLET, FILM COATED ORAL at 09:27

## 2020-12-03 NOTE — PROGRESS NOTES
Infectious Disease Progress Note    Today's Date: 12/3/2020   Admit Date: 2020    Impression:   · MSSA bacteremia (11/3 - ),  BC NG,  TTE negative  · MSSA Left pleural empyema, s/p chest tube (20), Cx from pleural drainage positive  · Originally discharged 20 on four weeks IV ceftriaxone, EO T 2020  · S/p VATS 20  · S/p repeat thoracotomy, decortication (2020) with purulence, no cx. · ETOH dependence  · S/p fall through attic with contusion/trauma     Plan:   CT showed improvement in the empyema and persistent rib fractures. No objection to discharge from ID standpoint. ID Plan of Care: unchanged  Routine PICC care  Ceftriaxone 2g IV Q 24 hrs through 2020  Q Monday labs: CBC with diff, serum creatinine, LFTs  Please fax labs to [phone], [facility]  Follow up with ID 2020 at 3 pm with Radhika Dubois NP  Plan to transition to oral cefadroxil or cephalexin at EOT for 2-4 weeks. Anti-infectives:   · Ceftriaxone restarted -  · Ancef -    Subjective: Interval History: Feeling about the same this morning. Had hemoglobin reported at 4.3, recheck is baseline 8.9. He is afebrile. Denies nausea, vomiting, diarrhea, fevers, chills, or sweats. No Known Allergies     Review of Systems:  Pertinent items are noted in the History of Present Illness.     Objective:     Visit Vitals  BP 97/63   Pulse 93   Temp 98.1 °F (36.7 °C)   Resp 18   Ht 5' 10\" (1.778 m)   Wt 93 kg (205 lb)   SpO2 91%   BMI 29.41 kg/m²     Temp (24hrs), Av.6 °F (37 °C), Min:98.1 °F (36.7 °C), Max:99.2 °F (37.3 °C)     General:  Alert, no acute distress, appears stated age, well nourished and well developed  Head:    Normocephalic, atraumatic  Eyes:   Anicteric sclerae, no drainage, not injected, EOMI  Mouth:  Moist mucosa  Neck:   Supple, symmetrical, trachea midline, no JVD  Lungs:   Clear anterior lung fields, diminished L, posterior incision with mild erythema, steristrips  CV:   Regular rate and rhythm without audible murmur  Abdomen:  Soft, non tender, not distended, active bowel sounds  Extremities:  No cyanosis or edema  Musculoskeletal: Moves all extremities with equal strength, no deformity  Skin:   No acute rash or lesions,  Psych:  Alert, oriented and appropriate without evidence of thought disorder  Lines:    benign      Data Review:     CBC:  Recent Labs     12/03/20  0550 12/03/20  0410 12/02/20  0601   WBC 3.9* 5.5 3.9*   GRANS 34* 30* 35*   MONOS 11 11 11   EOS 9* 9* 10*   ANEU 1.3* 1.6* 1.4*   ABL 1.8 2.7 1.7   HGB 8.9* 4.3* 8.8*   HCT 28.0* 13.9* 27.8*    369 270       BMP:  Recent Labs     12/03/20  0410 12/02/20  0601 12/01/20  0536   CREA 0.60* 0.58* 0.66*   BUN 5* 4* 4*    137 137*   K 3.9 3.7 3.6    103 103   CO2 30 29 30   AGAP 4* 5* 4*   GLU 92 89 90       LFTS:  Recent Labs     12/03/20  0410 12/02/20  0601 12/01/20  0536   TBILI 1.0 0.8 0.7   ALT 6* <6* 7*   * 147* 147*   TP 7.4 6.6 6.8   ALB 2.2* 2.0* 2.0*       Microbiology:     All Micro Results     Procedure Component Value Units Date/Time    CULTURE, BLOOD [[id number]] Collected:  11/15/20 0033    Order Status:  Completed Specimen:  Blood Updated:  11/20/20 0829     Special Requests: --        RIGHT  ARM       Culture result: NO GROWTH 5 DAYS       CULTURE, BLOOD [[id number]] Collected:  11/15/20 0035    Order Status:  Completed Specimen:  Blood Updated:  11/20/20 0829     Special Requests: --        LEFT  ARM       Culture result: NO GROWTH 5 DAYS             Imaging:   CT Chest wo cont 11/28/2020  FINDINGS:  -LUNGS: There is persistence pleural thickening in the lateral posterior left  chest.  The loculated fluid collections are much smaller. Only a small residual  3 cm collection is present posteriorly. There is minimal atelectasis in the  lung bases. No pneumothorax. -MEDIASTINUM/AXILLA: No significant adenopathy.   -HEART/VESSELS: Mild coronary artery calcification. Heart size is normal.  -CHEST WALL/BONES: The left chest wall fluid collection is slightly larger, 10.5  x 4 cm compared with 9 x 4 cm on the prior study. Multiple adjacent left-sided  rib fractures are again noted. No new fractures or bone lesions. -UPPER ABDOMEN: No acute findings. CT chest (11/22/2020)  Impression:  1. Increasing loculated fluid along left lateral lung margin extending into the  chest wall, following VATS. 2. Associated numerous left lateral rib fractures.   3. Increasing small basilar pleural effusions, left greater than right       Signed By: Richard Pemberton NP     December 3, 2020

## 2020-12-03 NOTE — ROUTINE PROCESS
Discharge instructions reviewed with patient. Patient verbalized/ signed agreement/ understanding. Paper copy placed in chart. KATIE PICC remains in place for IV antibiotics, dressing c/d/i.

## 2020-12-03 NOTE — DISCHARGE SUMMARY
Hospitalist Discharge Summary     Admit Date:  2020  9:21 PM   Name:  Ada Carrasco   Age:  48 y.o.  :  1970   MRN:  [id number]   PCP:  None  Treatment Team: Attending Provider: Tuan Rasmay MD; Consulting Provider: Linda Lucas MD; Care Manager: Jennifer Barnard, RN; Utilization Review: Shikha Hui RN; Nurse Practitioner: Howie Trejo NP; Primary Nurse: Kaushal Sutton RN    Problem List for this Hospitalization:  Hospital Problems as of 12/3/2020 Date Reviewed: 11/3/2020          Codes Class Noted - Resolved POA    * (Principal) Acute respiratory failure with hypoxia [facility]) ICD-10-CM: J96.01  ICD-9-CM: 518.81  11/15/2020 - Present Unknown        Loculated empyema ([facility] [address] ICD-10-CM: J86.9  ICD-9-CM: 510.9  11/15/2020 - Present Unknown        MSSA bacteremia ICD-10-CM: R78.81, B95.61  ICD-9-CM: 790.7, 041.11  2020 - Present Yes        Alcohol abuse ICD-10-CM: F10.10  ICD-9-CM: 305.00  2016 - Present Yes                Admission HPI from 2020:    47 yo male with PMH of smoker, ETOH abuse, recent hospitalizations for traumatic pneumothorax and rib fx. Carlos Huerta has had 2 recent admissions for same requiring chest tubes and TPA to assist with drainage, deemed no need for surgery at that time.  Pt found to have MSSA bacteremia and left chest wound.  ID consulted and pt tx with IV rocephin for 4-6 weeks with EOT 20. Carlos Huerta presented to the ER by EMS after being found hypoxic with SOB at the infusion center.  CXR showed unchanged left sided empyema.  CT chest showed mildly enlarged multilobulated left empyema, multiple rib fx.  IR consulted. Pulm consulted. General Surgery consulted.  ID changed rocephin to ancef.   underwent VATs, chest tube X2, thoracic epidural placed, ROCKY placed.   pt pulled out drains. 1 chest tube replaced by Dr. Jonathan Martinez. Mardel Stalling left chest tube removed per Surgery.   hypoxic, spongy edema left mid back around mid axillary.  CT chest obtained showed increased loculated fluid along left lateral lung margin extending into the chest wall, following VATS, increasing small basilar pleural effusion, left greater than right.  Surgery re-consulted. Pulmonary consulted.  ID extending abx until at least 12/16/20.  Required PRBC 11/22 for drop in hgb. CT chest on 12/2 shows Multiple left rib fractures. Pleural thickening remains. Loculated  fluid collection continues to resolve. Plates of atelectasis. On day of discharge patient is alert and oriented x3. Afebrile, saturations on RA noted low 90s. Hemoglobin 8.9. He will follow up with Dr. Junito Winters in 1 week. ID will follow patient outpatient and he will resume going to the infusion center for his Rocephin until 12/16. Follow up instructions and discharge meds at bottom of this note. Plan was discussed with patient, CM. All questions answered. Patient was stable at time of discharge. 10 systems reviewed and negative except as noted in HPI. Diagnostic Imaging/Tests:   Ct Chest W Cont    Result Date: 11/15/2020  EXAM: CT Chest with IV contrast - PE protocol. INDICATION: Hypoxia. COMPARISON: Prior CT chest on November 11, 2020. TECHNIQUE: Axial CT images of the chest were obtained after the intravenous injection of 100 mL Isovue 370 CT contrast. Radiation dose reduction techniques were used for this study. Our CT scanners use one or all of the following: Automated exposure control, adjustment of the mA and/or kV according to patient size, iterative reconstruction. FINDINGS: - Pleura/pericardium: There has been mild enlargement of the previous lobulated and pineal along the posterior left lung, particularly along its upper component. A small pericardial effusion is unchanged.  - Lungs: Bibasilar lung atelectasis is unchanged, as is a 3.3 cm focus of left lower lobe consolidation with internal bubbles of air which may relate to pneumatoceles or early abscess formation. - Alexandrea/Mediastinum: Within normal limits. - Tracheobronchial tree: Within normal limits. - Aorta/pulmonary arteries: Within normal limits. - Heart: Within normal limits. - Coronary arteries: No coronary artery calcifications. - Chest wall: Within normal limits. - Spine/bones: Multiple mildly displaced acute appearing left rib fractures are unchanged. - Additional comments: None. IMPRESSION: 1. No evidence of pulmonary embolism. 2. Mildly enlarged multilobulated left empyema. 3. Unchanged small focus of left lower lobe consolidation with small internal bubbles of air which may relate to pneumatoceles although an early lung abscess is not excluded. 4. Multiple left rib fractures, several of which appear segmental suspect for a flail chest.     Xr Chest Port    Result Date: 11/14/2020  EXAM: Chest x-ray. INDICATION: Dyspnea. COMPARISON: Prior CT chest on November 11, 2020. TECHNIQUE: Frontal view chest x-ray. FINDINGS: Allowing for differences in technique, there has been no significant change in the previously described left-sided empyema. The right lung and pleural space are clear. No pneumothorax is identified. There are several left-sided rib fractures. The right arm PICC line tip is at the cavoatrial junction. IMPRESSION: Unchanged left-sided empyema. Echocardiogram results:  No results found for this visit on 11/14/20.       All Micro Results     Procedure Component Value Units Date/Time    CULTURE, BLOOD [[id number]] Collected:  11/15/20 0033    Order Status:  Completed Specimen:  Blood Updated:  11/20/20 0829     Special Requests: --        RIGHT  ARM       Culture result: NO GROWTH 5 DAYS       CULTURE, BLOOD [[id number]] Collected:  11/15/20 0035    Order Status:  Completed Specimen:  Blood Updated:  11/20/20 0829     Special Requests: --        LEFT  ARM       Culture result: NO GROWTH 5 DAYS             Labs: Results:       BMP, Mg, Phos Recent Labs     12/03/20  0410 12/02/20  0601 12/01/20  0536    137 137*   K 3.9 3.7 3.6    103 103   CO2 30 29 30   AGAP 4* 5* 4*   BUN 5* 4* 4*   CREA 0.60* 0.58* 0.66*   CA 8.7 8.3 8.8   GLU 92 89 90      CBC Recent Labs     12/03/20  0550 12/03/20  0410 12/02/20  0601   WBC 3.9* 5.5 3.9*   RBC 2.71* 1.32* 2.69*   HGB 8.9* 4.3* 8.8*   HCT 28.0* 13.9* 27.8*    369 270   GRANS 34* 30* 35*   LYMPH 45* 50* 43   EOS 9* 9* 10*   MONOS 11 11 11   BASOS 1 0 1   IG 0 0 0   ANEU 1.3* 1.6* 1.4*   ABL 1.8 2.7 1.7   RANDY 0.4 0.5 0.4   ABM 0.4 0.6 0.4   ABB 0.0 0.0 0.0   AIG 0.0 0.0 0.0      LFT Recent Labs     12/03/20  0410 12/02/20  0601 12/01/20  0536   ALT 6* <6* 7*   * 147* 147*   TP 7.4 6.6 6.8   ALB 2.2* 2.0* 2.0*   GLOB 5.2* 4.6* 4.8*   AGRAT 0.4* 0.4* 0.4*      Cardiac Testing No results found for: BNPP, BNP, CPK, RCK1, RCK2, RCK3, RCK4, CKMB, CKNDX, CKND1, TROPT, TROIQ   Coagulation Tests Lab Results   Component Value Date/Time    Prothrombin time 15.2 (H) 11/03/2020 11:48 AM    Prothrombin time 13.4 (H) 08/18/2016 06:32 AM    INR 1.2 11/03/2020 11:48 AM    INR 1.2 08/18/2016 06:32 AM    aPTT 35.7 (H) 11/03/2020 11:48 AM      A1c No results found for: HBA1C, HGBE8, [id number]   Lipid Panel No results found for: CHOL, CHOLPOCT, CHOLX, CHLST, CHOLV, 795445, HDL, HDLP, LDL, LDLC, DLDLP, 849390, VLDLC, VLDL, TGLX, TRIGL, TRIGP, TGLPOCT, CHHD, CHHDX   Thyroid Panel No results found for: TSH, T4, FT4, TT3, T3U, TSHEXT     Most Recent UA Lab Results   Component Value Date/Time    Color ORANGE 11/15/2020 04:11 AM    Appearance TURBID 11/15/2020 04:11 AM    Specific gravity 1.082 (H) 11/15/2020 04:11 AM    pH (UA) 6.0 11/15/2020 04:11 AM    Protein TRACE (A) 11/15/2020 04:11 AM    Glucose Negative 11/15/2020 04:11 AM    Ketone Negative 11/15/2020 04:11 AM    Bilirubin SMALL (A) 11/15/2020 04:11 AM    Blood Negative 11/15/2020 04:11 AM    Urobilinogen 1.0 11/15/2020 04:11 AM    Nitrites Negative 11/15/2020 04:11 AM    Leukocyte Esterase Negative 11/15/2020 04:11 AM        No Known Allergies  Immunization History   Administered Date(s) Administered    TDAP Vaccine 09/01/2011       All Labs from Last 24 Hrs:  Recent Results (from the past 24 hour(s))   CBC WITH AUTOMATED DIFF    Collection Time: 12/03/20  4:10 AM   Result Value Ref Range    WBC 5.5 4.3 - 11.1 K/uL    RBC 1.32 (L) 4.23 - 5.6 M/uL    HGB 4.3 (LL) 13.6 - 17.2 g/dL    HCT 13.9 (L) 41.1 - 50.3 %    .3 (H) 79.6 - 97.8 FL    MCH 32.6 26.1 - 32.9 PG    MCHC 30.9 (L) 31.4 - 35.0 g/dL    RDW 15.8 (H) 11.9 - 14.6 %    PLATELET 055 714 - 917 K/uL    MPV 10.0 9.4 - 12.3 FL    ABSOLUTE NRBC 0.00 0.0 - 0.2 K/uL    DF AUTOMATED      NEUTROPHILS 30 (L) 43 - 78 %    LYMPHOCYTES 50 (H) 13 - 44 %    MONOCYTES 11 4.0 - 12.0 %    EOSINOPHILS 9 (H) 0.5 - 7.8 %    BASOPHILS 0 0.0 - 2.0 %    IMMATURE GRANULOCYTES 0 0.0 - 5.0 %    ABS. NEUTROPHILS 1.6 (L) 1.7 - 8.2 K/UL    ABS. LYMPHOCYTES 2.7 0.5 - 4.6 K/UL    ABS. MONOCYTES 0.6 0.1 - 1.3 K/UL    ABS. EOSINOPHILS 0.5 0.0 - 0.8 K/UL    ABS. BASOPHILS 0.0 0.0 - 0.2 K/UL    ABS. IMM. GRANS. 0.0 0.0 - 0.5 K/UL   METABOLIC PANEL, COMPREHENSIVE    Collection Time: 12/03/20  4:10 AM   Result Value Ref Range    Sodium 137 136 - 145 mmol/L    Potassium 3.9 3.5 - 5.1 mmol/L    Chloride 103 98 - 107 mmol/L    CO2 30 21 - 32 mmol/L    Anion gap 4 (L) 7 - 16 mmol/L    Glucose 92 65 - 100 mg/dL    BUN 5 (L) 6 - 23 MG/DL    Creatinine 0.60 (L) 0.8 - 1.5 MG/DL    GFR est AA >60 >60 ml/min/1.73m2    GFR est non-AA >60 >60 ml/min/1.73m2    Calcium 8.7 8.3 - 10.4 MG/DL    Bilirubin, total 1.0 0.2 - 1.1 MG/DL    ALT (SGPT) 6 (L) 12 - 65 U/L    AST (SGOT) 52 (H) 15 - 37 U/L    Alk.  phosphatase 156 (H) 50 - 136 U/L    Protein, total 7.4 6.3 - 8.2 g/dL    Albumin 2.2 (L) 3.5 - 5.0 g/dL    Globulin 5.2 (H) 2.3 - 3.5 g/dL    A-G Ratio 0.4 (L) 1.2 - 3.5     CBC WITH AUTOMATED DIFF    Collection Time: 12/03/20  5:50 AM   Result Value Ref Range    WBC 3.9 (L) 4.3 - 11.1 K/uL    RBC 2.71 (L) 4.23 - 5.6 M/uL    HGB 8.9 (L) 13.6 - 17.2 g/dL    HCT 28.0 (L) 41.1 - 50.3 %    .3 (H) 79.6 - 97.8 FL    MCH 32.8 26.1 - 32.9 PG    MCHC 31.8 31.4 - 35.0 g/dL    RDW 15.6 (H) 11.9 - 14.6 %    PLATELET 421 408 - 502 K/uL    MPV 9.7 9.4 - 12.3 FL    ABSOLUTE NRBC 0.00 0.0 - 0.2 K/uL    DF AUTOMATED      NEUTROPHILS 34 (L) 43 - 78 %    LYMPHOCYTES 45 (H) 13 - 44 %    MONOCYTES 11 4.0 - 12.0 %    EOSINOPHILS 9 (H) 0.5 - 7.8 %    BASOPHILS 1 0.0 - 2.0 %    IMMATURE GRANULOCYTES 0 0.0 - 5.0 %    ABS. NEUTROPHILS 1.3 (L) 1.7 - 8.2 K/UL    ABS. LYMPHOCYTES 1.8 0.5 - 4.6 K/UL    ABS. MONOCYTES 0.4 0.1 - 1.3 K/UL    ABS. EOSINOPHILS 0.4 0.0 - 0.8 K/UL    ABS. BASOPHILS 0.0 0.0 - 0.2 K/UL    ABS. IMM. GRANS. 0.0 0.0 - 0.5 K/UL       Discharge Exam:  Patient Vitals for the past 24 hrs:   Temp Pulse Resp BP SpO2   12/03/20 0722 98.1 °F (36.7 °C) 93 18 97/63 91 %   12/03/20 0441 99.2 °F (37.3 °C) 94 18 110/67 92 %   12/02/20 2341 98.6 °F (37 °C) 91 18 110/63 90 %   12/02/20 1920 98.7 °F (37.1 °C) 89 18 112/73 91 %   12/02/20 1537 98.6 °F (37 °C) 95 18 113/74 90 %     Oxygen Therapy  O2 Sat (%): 91 % (12/03/20 0722)  Pulse via Oximetry: 102 beats per minute (11/28/20 2024)  O2 Device: Room air (12/03/20 0748)  O2 Flow Rate (L/min): 2 l/min (11/27/20 0709)  FIO2 (%): 28 % (11/19/20 1317)  ETCO2 (mmHg): 99 mmHg (11/16/20 1849)    Intake/Output Summary (Last 24 hours) at 12/3/2020 1134  Last data filed at 12/3/2020 0409  Gross per 24 hour   Intake    Output 1350 ml   Net -1350 ml         Physical exam:  General:    Well nourished. Alert. No distress. Eyes:   Normal sclera. Extraocular movements intact. ENT:  Normocephalic, atraumatic. Moist mucous membranes  CV:   Regular rate and rhythm. No murmur, rub, or gallop. Lungs:  Clear to auscultation bilaterally. No wheezing, rhonchi, or rales. Abdomen: Soft, nontender, nondistended. Bowel sounds normal.   Extremities: Warm and dry. No cyanosis or edema.   Neurologic: No focal deficits  Skin:     No rashes or jaundice. Psych:  Normal mood and affect. Discharge Info:   Current Discharge Medication List      START taking these medications    Details   oxyCODONE IR (ROXICODONE) 10 mg tab immediate release tablet Take 1 Tab by mouth every four (4) hours as needed for Pain for up to 3 days. Max Daily Amount: 60 mg.  Qty: 18 Tab, Refills: 0    Associated Diagnoses: Closed fracture of multiple ribs of left side with delayed healing, subsequent encounter         CONTINUE these medications which have NOT CHANGED    Details   cyclobenzaprine (FLEXERIL) 10 mg tablet Take 1 Tab by mouth three (3) times daily as needed for Muscle Spasm(s). Qty: 15 Tab, Refills: 0    Associated Diagnoses: Acute left flank pain      naloxone (Narcan) 4 mg/actuation nasal spray Use 1 spray intranasally, then discard. Repeat with new spray every 2 min as needed for opioid overdose symptoms, alternating nostrils. Qty: 1 Each, Refills: 1         STOP taking these medications       oxyCODONE-acetaminophen (PERCOCET) 5-325 mg per tablet Comments:   Reason for Stopping:                 Disposition: home    Activity: Activity as tolerated  Diet: DIET REGULAR  DIET NUTRITIONAL SUPPLEMENTS Dinner, Breakfast; Ensure [employer] ( )    Follow-up Appointments   Procedures    FOLLOW UP VISIT Appointment in: 3 - 5 Days PCP Surgery follow up with Dr. Yobani Roth 1 week     PCP  Surgery follow up with Dr. Yobani Roth 1 week     Standing Status:   Standing     Number of Occurrences:   1     Order Specific Question:   Appointment in     Answer:   3 - 5 Days         Follow-up Information     Follow up With Specialties Details Why Contact Info    Melanie Ott MD General Surgery, Thoracic Surgery, Surgery In 2 weeks For suture and staple removal [address] [place] [address] [facility]  [phone]      None    None ([phone]) Patient stated that they have no PCP              Time spent in patient discharge planning and coordination 35 minutes.     Signed:  Azucena Watkins NP

## 2020-12-03 NOTE — PROGRESS NOTES
Oxygen Qualifier       Room air:   Resting SpO2  91%   Ambulating SpO2  94%         Completed by:    Dmitri Spring, RT

## 2020-12-03 NOTE — DISCHARGE INSTRUCTIONS
Routine PICC care  Ceftriaxone 2g IV Q 24 hrs through 12/16/2020  Q Monday labs: CBC with diff, serum creatinine, LFTs  Please fax labs to [phone], [facility]  Follow up with ID will be scheduled ~12/16/2020. Plan to transition to oral cefadroxil at EOT for 2-4 weeks. He will follow up with Dr. Jonathan Martinez in 1 week. ID will follow patient outpatient and he will resume going to the infusion center for his Rocephin until 12/16.    Disposition: home    Activity: Activity as tolerated  Diet: DIET REGULAR  DIET NUTRITIONAL SUPPLEMENTS Dinner, Breakfast; Ensure [employer] ( )      Video-Assisted Thoracoscopic Surgery (VATS): What to Expect at [address] [facility]  Video-assisted thoracoscopic surgery (VATS) is a way to do surgery inside the chest using several small cuts (incisions) instead of one larger incision (open surgery). VATS also is different from open surgery because it does not require the doctor to cut through the ribs or breastbone (sternum). The doctor may have used VATS to find and treat problems with the lungs, heart, or spine. Or VATS may have been done to operate on other organs in your chest.  Your chest may be sore where the doctor made the incisions and put in the surgical tools. This usually gets better after 2 to 3 weeks. You will have stitches or staples in the incisions. Your doctor will take these out 1 to 2 weeks after your surgery. The amount of time you will need to recover depends on the surgery you had. But you probably will need to take it easy at home for at least 1 to 2 weeks. This care sheet gives you a general idea about how long it will take for you to recover. But each person recovers at a different pace. Follow the steps below to get better as quickly as possible. How can you care for yourself at home? Activity    · Rest when you feel tired. Getting enough sleep will help you recover.     · Try to walk each day. Start by walking a little more than you did the day before.  Bit by bit, increase the amount you walk. Walking boosts blood flow and helps prevent pneumonia and constipation.     · Avoid strenuous activities, such as bicycle riding, jogging, weight lifting, or aerobic exercise, until your doctor says it is okay.     · Avoid lifting anything that would make you strain. This may include a child, heavy grocery bags and milk containers, a heavy briefcase or backpack, cat litter or dog food bags, or a vacuum .     · Do breathing exercises at home if instructed by your doctor. This will help prevent pneumonia.     · Ask your doctor when it is safe to you to drive or fly.     · You will probably need to take at least 1 to 2 weeks off from work. It depends on the type of work you do and the surgery you had.     · You may take showers. Do not take a bath for the first 2 weeks, or until your doctor tells you it is okay. Diet    · You can eat your normal diet. If your stomach is upset, try bland, low-fat foods like plain rice, broiled chicken, toast, and yogurt.     · Drink plenty of fluids (unless your doctor tells you not to).     · You may notice that your bowel movements are not regular right after your surgery. This is common. Try to avoid constipation and straining with bowel movements. You may want to take a fiber supplement every day. If you have not had a bowel movement after a couple of days, ask your doctor about taking a mild laxative. Medicines    · Your doctor will tell you if and when you can restart your medicines. He or she will also give you instructions about taking any new medicines.     · If you take aspirin or some other blood thinner, ask your doctor if and when to start taking it again. Make sure that you understand exactly what your doctor wants you to do.     · Take pain medicines exactly as directed. ? If the doctor gave you a prescription medicine for pain, take it as prescribed.   ? If you are not taking a prescription pain medicine, ask your doctor if you can take an over-the-counter medicine.     · If you think your pain medicine is making you sick to your stomach:  ? Take your medicine after meals (unless your doctor has told you not to). ? Ask your doctor for a different pain medicine.     · If your doctor prescribed antibiotics, take them as directed. Do not stop taking them just because you feel better. You need to take the full course of antibiotics. Incision care    · If you have strips of tape on the incisions, leave the tape on for a week or until it falls off.     · Wash the area daily with warm, soapy water, and pat it dry. Don't use hydrogen peroxide or alcohol, which can slow healing. You may cover the area with a gauze bandage if it weeps or rubs against clothing. Change the bandage every day.     · Keep the area clean and dry. Follow-up care is a key part of your treatment and safety. Be sure to make and go to all appointments, and call your doctor if you are having problems. It's also a good idea to know your test results and keep a list of the medicines you take. When should you call for help? Call 911 anytime you think you may need emergency care. For example, call if:    · You passed out (lost consciousness).     · You have severe trouble breathing.     · You have sudden chest pain and shortness of breath, or you cough up blood. Call your doctor now or seek immediate medical care if:    · You are sick to your stomach or cannot keep fluids down.     · You have pain that does not get better after you take pain medicine.     · You have a fever over 100°F.     · You have signs of infection, such as:  ? Increased pain, swelling, warmth, or redness. ? Red streaks leading from the incision. ? Pus draining from the incision. ? Swollen lymph nodes in your neck, armpits, or groin. ? A fever.     · You have loose stitches, or your incisions come open.     · Bright red blood has soaked through the bandage over your incision.    Watch closely for changes in your health, and be sure to contact your doctor if you have any problems. Where can you learn more? Go to http://www.gray.com/  Enter [id number] in the search box to learn more about \"Video-Assisted Thoracoscopic Surgery (VATS): What to Expect at Home. \"  Current as of: February 24, 2020               Content Version: 12.6  © 2006-2020 [employer]. Care instructions adapted under license by [employer] (which disclaims liability or warranty for this information). If you have questions about a medical condition or this instruction, always ask your healthcare professional. [address] any warranty or liability for your use of this information.

## 2020-12-03 NOTE — PROGRESS NOTES
Patient is up for discharge today. Patient will have an appointment with the [facility] at 5:30 tomorrow, 12/4. No other needs have been identified at this time.      Care Management Interventions  PCP Verified by CM: No  Mode of Transport at Discharge: Self  Transition of Care Consult (CM Consult): Discharge Planning, [facility]  Discharge Durable Medical Equipment: No  Physical Therapy Consult: No  Occupational Therapy Consult: No  Speech Therapy Consult: No  Current Support Network: Lives with Spouse(Bindu Neri )  Confirm Follow Up Transport: Family  Discharge Location  Discharge Placement: Home with outpatient services(Infusion Center )

## 2020-12-04 ENCOUNTER — APPOINTMENT (OUTPATIENT)
Dept: INFUSION THERAPY | Age: 50
End: 2020-12-04

## 2020-12-04 ENCOUNTER — HOSPITAL ENCOUNTER (OUTPATIENT)
Dept: INFUSION THERAPY | Age: 50
Discharge: HOME | End: 2020-12-04

## 2020-12-04 VITALS
OXYGEN SATURATION: 94 % | RESPIRATION RATE: 16 BRPM | TEMPERATURE: 97.1 F | HEART RATE: 95 BPM | SYSTOLIC BLOOD PRESSURE: 115 MMHG | DIASTOLIC BLOOD PRESSURE: 70 MMHG

## 2020-12-04 PROCEDURE — 96365 THER/PROPH/DIAG IV INF INIT: CPT

## 2020-12-04 PROCEDURE — 74011250636 HC RX REV CODE- 250/636: Performed by: NURSE PRACTITIONER

## 2020-12-04 PROCEDURE — 74011000258 HC RX REV CODE- 258: Performed by: NURSE PRACTITIONER

## 2020-12-04 RX ORDER — SODIUM CHLORIDE 0.9 % (FLUSH) 0.9 %
10 SYRINGE (ML) INJECTION ONCE
Status: COMPLETED | OUTPATIENT
Start: 2020-12-04 | End: 2020-12-04

## 2020-12-04 RX ADMIN — CEFTRIAXONE 2 G: 2 INJECTION, POWDER, FOR SOLUTION INTRAMUSCULAR; INTRAVENOUS at 17:13

## 2020-12-04 RX ADMIN — Medication 10 ML: at 17:06

## 2020-12-04 NOTE — PROGRESS NOTES
Arrived to the [facility]. Marci completed. Patient tolerated very well. Any issues or concerns during appointment: none. Patient aware of next infusion appointment on 12/5/2020 @ 1630. Discharged ambulatory to private.

## 2020-12-05 ENCOUNTER — HOSPITAL ENCOUNTER (OUTPATIENT)
Dept: INFUSION THERAPY | Age: 50
Discharge: HOME | End: 2020-12-05

## 2020-12-05 VITALS
TEMPERATURE: 97.5 F | OXYGEN SATURATION: 100 % | RESPIRATION RATE: 18 BRPM | SYSTOLIC BLOOD PRESSURE: 121 MMHG | HEART RATE: 106 BPM | DIASTOLIC BLOOD PRESSURE: 78 MMHG

## 2020-12-05 PROCEDURE — 74011250636 HC RX REV CODE- 250/636: Performed by: NURSE PRACTITIONER

## 2020-12-05 PROCEDURE — 74011000258 HC RX REV CODE- 258: Performed by: NURSE PRACTITIONER

## 2020-12-05 PROCEDURE — 96365 THER/PROPH/DIAG IV INF INIT: CPT

## 2020-12-05 RX ORDER — SODIUM CHLORIDE 0.9 % (FLUSH) 0.9 %
10-40 SYRINGE (ML) INJECTION AS NEEDED
Status: DISCONTINUED | OUTPATIENT
Start: 2020-12-05 | End: 2020-12-07 | Stop reason: HOSPADM

## 2020-12-05 RX ADMIN — CEFTRIAXONE 2 G: 2 INJECTION, POWDER, FOR SOLUTION INTRAMUSCULAR; INTRAVENOUS at 16:04

## 2020-12-05 RX ADMIN — Medication 10 ML: at 16:04

## 2020-12-05 RX ADMIN — Medication 10 ML: at 16:41

## 2020-12-05 NOTE — PROGRESS NOTES
Patient here for Rocephin. Reviewed the procedure and process with patient and he verbalizes understanding.

## 2020-12-05 NOTE — PROGRESS NOTES
Arrived to the [facility]. Assessment completed. Patient tolerated Rocephin well. Any issues or concerns during appointment: none. Patient aware of next infusion appointment on 12/6/20 (date) at [phone] (time) with IV infusion center. Discharged via [address], with spouse. Patient instructed to call his doctor's office immediately for any problems or concerns. He verbalizes understanding.

## 2020-12-06 ENCOUNTER — HOSPITAL ENCOUNTER (OUTPATIENT)
Dept: INFUSION THERAPY | Age: 50
Discharge: HOME | End: 2020-12-06

## 2020-12-06 VITALS
HEART RATE: 89 BPM | OXYGEN SATURATION: 95 % | SYSTOLIC BLOOD PRESSURE: 112 MMHG | DIASTOLIC BLOOD PRESSURE: 73 MMHG | RESPIRATION RATE: 18 BRPM | TEMPERATURE: 97.5 F

## 2020-12-06 PROCEDURE — 96365 THER/PROPH/DIAG IV INF INIT: CPT

## 2020-12-06 PROCEDURE — 74011000258 HC RX REV CODE- 258: Performed by: NURSE PRACTITIONER

## 2020-12-06 PROCEDURE — 74011250636 HC RX REV CODE- 250/636: Performed by: NURSE PRACTITIONER

## 2020-12-06 RX ORDER — SODIUM CHLORIDE 0.9 % (FLUSH) 0.9 %
10 SYRINGE (ML) INJECTION AS NEEDED
Status: DISCONTINUED | OUTPATIENT
Start: 2020-12-06 | End: 2020-12-08 | Stop reason: HOSPADM

## 2020-12-06 RX ADMIN — Medication 10 ML: at 15:02

## 2020-12-06 RX ADMIN — CEFTRIAXONE 2 G: 2 INJECTION, POWDER, FOR SOLUTION INTRAMUSCULAR; INTRAVENOUS at 14:33

## 2020-12-06 NOTE — PROGRESS NOTES
Arrived to the [facility]. Rocephin completed. Provided education on Rocephin    Patient instructed to report any side affects to ordering provider. Patient tolerated without problems. Any issues or concerns during appointment: no.  Patient aware of next infusion appointment on 12/7/20 (date) at 36 (time). Discharged via [facility] with family.

## 2020-12-07 ENCOUNTER — HOSPITAL ENCOUNTER (OUTPATIENT)
Dept: INFUSION THERAPY | Age: 50
Discharge: HOME | End: 2020-12-07

## 2020-12-07 VITALS
HEART RATE: 101 BPM | DIASTOLIC BLOOD PRESSURE: 75 MMHG | OXYGEN SATURATION: 98 % | TEMPERATURE: 97.8 F | SYSTOLIC BLOOD PRESSURE: 116 MMHG | RESPIRATION RATE: 18 BRPM

## 2020-12-07 LAB
ALBUMIN SERPL-MCNC: 2.7 G/DL (ref 3.5–5)
ALBUMIN/GLOB SERPL: 0.6 {RATIO} (ref 1.2–3.5)
ALP SERPL-CCNC: 139 U/L (ref 50–136)
ALT SERPL-CCNC: 13 U/L (ref 12–65)
AST SERPL-CCNC: 46 U/L (ref 15–37)
BASOPHILS # BLD: 0 K/UL (ref 0–0.2)
BASOPHILS NFR BLD: 1 % (ref 0–2)
BILIRUB DIRECT SERPL-MCNC: 0.2 MG/DL
BILIRUB SERPL-MCNC: 1 MG/DL (ref 0.2–1.1)
CREAT SERPL-MCNC: 0.6 MG/DL (ref 0.8–1.5)
DIFFERENTIAL METHOD BLD: ABNORMAL
EOSINOPHIL # BLD: 0.4 K/UL (ref 0–0.8)
EOSINOPHIL NFR BLD: 8 % (ref 0.5–7.8)
ERYTHROCYTE [DISTWIDTH] IN BLOOD BY AUTOMATED COUNT: 14.9 % (ref 11.9–14.6)
GLOBULIN SER CALC-MCNC: 4.7 G/DL (ref 2.3–3.5)
HCT VFR BLD AUTO: 30.1 % (ref 41.1–50.3)
HGB BLD-MCNC: 9.8 G/DL (ref 13.6–17.2)
IMM GRANULOCYTES # BLD AUTO: 0 K/UL (ref 0–0.5)
IMM GRANULOCYTES NFR BLD AUTO: 0 % (ref 0–5)
LYMPHOCYTES # BLD: 2.1 K/UL (ref 0.5–4.6)
LYMPHOCYTES NFR BLD: 42 % (ref 13–44)
MCH RBC QN AUTO: 32.1 PG (ref 26.1–32.9)
MCHC RBC AUTO-ENTMCNC: 32.6 G/DL (ref 31.4–35)
MCV RBC AUTO: 98.7 FL (ref 79.6–97.8)
MONOCYTES # BLD: 0.4 K/UL (ref 0.1–1.3)
MONOCYTES NFR BLD: 7 % (ref 4–12)
NEUTS SEG # BLD: 2 K/UL (ref 1.7–8.2)
NEUTS SEG NFR BLD: 41 % (ref 43–78)
NRBC # BLD: 0 K/UL (ref 0–0.2)
PLATELET # BLD AUTO: 280 K/UL (ref 150–450)
PMV BLD AUTO: 9.6 FL (ref 9.4–12.3)
PROT SERPL-MCNC: 7.4 G/DL (ref 6.3–8.2)
RBC # BLD AUTO: 3.05 M/UL (ref 4.23–5.67)
WBC # BLD AUTO: 4.9 K/UL (ref 4.3–11.1)

## 2020-12-07 PROCEDURE — 96365 THER/PROPH/DIAG IV INF INIT: CPT

## 2020-12-07 PROCEDURE — 85025 COMPLETE CBC W/AUTO DIFF WBC: CPT

## 2020-12-07 PROCEDURE — 82565 ASSAY OF CREATININE: CPT

## 2020-12-07 PROCEDURE — 74011000258 HC RX REV CODE- 258: Performed by: NURSE PRACTITIONER

## 2020-12-07 PROCEDURE — 80076 HEPATIC FUNCTION PANEL: CPT

## 2020-12-07 PROCEDURE — 74011250636 HC RX REV CODE- 250/636: Performed by: NURSE PRACTITIONER

## 2020-12-07 RX ORDER — SODIUM CHLORIDE 0.9 % (FLUSH) 0.9 %
10 SYRINGE (ML) INJECTION ONCE
Status: COMPLETED | OUTPATIENT
Start: 2020-12-07 | End: 2020-12-07

## 2020-12-07 RX ADMIN — Medication 10 ML: at 15:49

## 2020-12-07 RX ADMIN — CEFTRIAXONE 2 G: 2 INJECTION, POWDER, FOR SOLUTION INTRAMUSCULAR; INTRAVENOUS at 16:00

## 2020-12-07 NOTE — PROGRESS NOTES
Arrived to the [facility]. Marci completed. Per order PICC Dressing changed and labs drawn. Patient tolerated very well. Any issues or concerns during appointment: none. Patient aware of next infusion appointment on 12/8/2020 @ 1630. Discharged ambulatory with wife to private residence.

## 2020-12-08 ENCOUNTER — HOSPITAL ENCOUNTER (OUTPATIENT)
Dept: INFUSION THERAPY | Age: 50
Discharge: HOME | End: 2020-12-08

## 2020-12-08 VITALS
HEART RATE: 87 BPM | TEMPERATURE: 97.1 F | SYSTOLIC BLOOD PRESSURE: 131 MMHG | DIASTOLIC BLOOD PRESSURE: 83 MMHG | OXYGEN SATURATION: 99 % | RESPIRATION RATE: 18 BRPM

## 2020-12-08 PROCEDURE — 74011250636 HC RX REV CODE- 250/636: Performed by: NURSE PRACTITIONER

## 2020-12-08 PROCEDURE — 74011000258 HC RX REV CODE- 258: Performed by: NURSE PRACTITIONER

## 2020-12-08 PROCEDURE — 96365 THER/PROPH/DIAG IV INF INIT: CPT

## 2020-12-08 RX ORDER — SODIUM CHLORIDE 0.9 % (FLUSH) 0.9 %
10 SYRINGE (ML) INJECTION AS NEEDED
Status: DISCONTINUED | OUTPATIENT
Start: 2020-12-08 | End: 2020-12-09 | Stop reason: HOSPADM

## 2020-12-08 RX ADMIN — Medication 10 ML: at 16:32

## 2020-12-08 RX ADMIN — CEFTRIAXONE 2 G: 2 INJECTION, POWDER, FOR SOLUTION INTRAMUSCULAR; INTRAVENOUS at 16:05

## 2020-12-08 RX ADMIN — Medication 10 ML: at 16:00

## 2020-12-08 NOTE — PROGRESS NOTES
Arrived to the [facility]. Marci completed. Patient tolerated well. Any issues or concerns during appointment: none. Patient aware of next infusion appointment on 12/9/20 at 1630. Discharged amb.

## 2020-12-09 ENCOUNTER — HOSPITAL ENCOUNTER (OUTPATIENT)
Dept: INFUSION THERAPY | Age: 50
Discharge: HOME | End: 2020-12-09

## 2020-12-09 VITALS
RESPIRATION RATE: 18 BRPM | OXYGEN SATURATION: 97 % | HEART RATE: 101 BPM | SYSTOLIC BLOOD PRESSURE: 125 MMHG | DIASTOLIC BLOOD PRESSURE: 80 MMHG | TEMPERATURE: 96.9 F

## 2020-12-09 PROCEDURE — 74011000258 HC RX REV CODE- 258: Performed by: NURSE PRACTITIONER

## 2020-12-09 PROCEDURE — 74011250636 HC RX REV CODE- 250/636: Performed by: NURSE PRACTITIONER

## 2020-12-09 PROCEDURE — 96365 THER/PROPH/DIAG IV INF INIT: CPT

## 2020-12-09 RX ORDER — SODIUM CHLORIDE 0.9 % (FLUSH) 0.9 %
10 SYRINGE (ML) INJECTION AS NEEDED
Status: DISCONTINUED | OUTPATIENT
Start: 2020-12-09 | End: 2020-12-11 | Stop reason: HOSPADM

## 2020-12-09 RX ADMIN — Medication 10 ML: at 16:42

## 2020-12-09 RX ADMIN — CEFTRIAXONE 2 G: 2 INJECTION, POWDER, FOR SOLUTION INTRAMUSCULAR; INTRAVENOUS at 16:45

## 2020-12-09 RX ADMIN — Medication 10 ML: at 17:15

## 2020-12-10 ENCOUNTER — HOSPITAL ENCOUNTER (OUTPATIENT)
Dept: INFUSION THERAPY | Age: 50
Discharge: HOME | End: 2020-12-10

## 2020-12-10 VITALS
SYSTOLIC BLOOD PRESSURE: 133 MMHG | DIASTOLIC BLOOD PRESSURE: 84 MMHG | TEMPERATURE: 98 F | RESPIRATION RATE: 18 BRPM | OXYGEN SATURATION: 98 % | HEART RATE: 82 BPM

## 2020-12-10 PROCEDURE — 74011000258 HC RX REV CODE- 258: Performed by: NURSE PRACTITIONER

## 2020-12-10 PROCEDURE — 74011250636 HC RX REV CODE- 250/636: Performed by: NURSE PRACTITIONER

## 2020-12-10 PROCEDURE — 96365 THER/PROPH/DIAG IV INF INIT: CPT

## 2020-12-10 RX ORDER — SODIUM CHLORIDE 0.9 % (FLUSH) 0.9 %
10 SYRINGE (ML) INJECTION AS NEEDED
Status: DISCONTINUED | OUTPATIENT
Start: 2020-12-10 | End: 2020-12-12 | Stop reason: HOSPADM

## 2020-12-10 RX ADMIN — Medication 10 ML: at 15:01

## 2020-12-10 RX ADMIN — CEFTRIAXONE 2 G: 2 INJECTION, POWDER, FOR SOLUTION INTRAMUSCULAR; INTRAVENOUS at 14:30

## 2020-12-10 RX ADMIN — Medication 10 ML: at 14:29

## 2020-12-10 NOTE — PROGRESS NOTES
Arrived to the [facility]. Rocephin completed. Patient tolerated without problems. Any issues or concerns during appointment: none  Patient aware of next infusion appointment on 12/11/20 at . Discharged ambulatory to home.

## 2020-12-11 ENCOUNTER — HOSPITAL ENCOUNTER (OUTPATIENT)
Dept: INFUSION THERAPY | Age: 50
Discharge: HOME | End: 2020-12-11

## 2020-12-11 VITALS
DIASTOLIC BLOOD PRESSURE: 81 MMHG | OXYGEN SATURATION: 97 % | SYSTOLIC BLOOD PRESSURE: 130 MMHG | RESPIRATION RATE: 18 BRPM | HEART RATE: 82 BPM

## 2020-12-11 PROCEDURE — 96365 THER/PROPH/DIAG IV INF INIT: CPT

## 2020-12-11 PROCEDURE — 74011000258 HC RX REV CODE- 258: Performed by: NURSE PRACTITIONER

## 2020-12-11 PROCEDURE — 74011250636 HC RX REV CODE- 250/636: Performed by: NURSE PRACTITIONER

## 2020-12-11 RX ORDER — SODIUM CHLORIDE 0.9 % (FLUSH) 0.9 %
10 SYRINGE (ML) INJECTION ONCE
Status: COMPLETED | OUTPATIENT
Start: 2020-12-11 | End: 2020-12-11

## 2020-12-11 RX ADMIN — CEFTRIAXONE 2 G: 2 INJECTION, POWDER, FOR SOLUTION INTRAMUSCULAR; INTRAVENOUS at 15:47

## 2020-12-11 RX ADMIN — Medication 10 ML: at 15:47

## 2020-12-11 NOTE — PROGRESS NOTES
Arrived to the [facility]. Ericephin completed. Patient tolerated well. Any issues or concerns during appointment: No.  Patient aware of next infusion appointment on 12/12/2020   Discharged home in stable condition.

## 2020-12-12 ENCOUNTER — HOSPITAL ENCOUNTER (OUTPATIENT)
Dept: INFUSION THERAPY | Age: 50
Discharge: HOME | End: 2020-12-12

## 2020-12-12 VITALS
SYSTOLIC BLOOD PRESSURE: 110 MMHG | HEART RATE: 86 BPM | DIASTOLIC BLOOD PRESSURE: 71 MMHG | OXYGEN SATURATION: 98 % | RESPIRATION RATE: 18 BRPM | TEMPERATURE: 97.4 F

## 2020-12-12 PROCEDURE — 74011250636 HC RX REV CODE- 250/636: Performed by: NURSE PRACTITIONER

## 2020-12-12 PROCEDURE — 96365 THER/PROPH/DIAG IV INF INIT: CPT

## 2020-12-12 PROCEDURE — 74011000258 HC RX REV CODE- 258: Performed by: NURSE PRACTITIONER

## 2020-12-12 RX ORDER — SODIUM CHLORIDE 0.9 % (FLUSH) 0.9 %
10 SYRINGE (ML) INJECTION AS NEEDED
Status: DISCONTINUED | OUTPATIENT
Start: 2020-12-12 | End: 2020-12-14 | Stop reason: HOSPADM

## 2020-12-12 RX ADMIN — Medication 10 ML: at 14:07

## 2020-12-12 RX ADMIN — CEFTRIAXONE 2 G: 2 INJECTION, POWDER, FOR SOLUTION INTRAMUSCULAR; INTRAVENOUS at 14:20

## 2020-12-12 RX ADMIN — Medication 10 ML: at 14:52

## 2020-12-12 NOTE — PROGRESS NOTES
Arrived to the [facility]. Ericephin completed. Patient tolerated well.    Any issues or concerns during appointment: No.  Patient aware of next infusion appointment on 12/13/2020   Discharged home in stable condition.

## 2020-12-13 ENCOUNTER — HOSPITAL ENCOUNTER (OUTPATIENT)
Dept: INFUSION THERAPY | Age: 50
Discharge: HOME | End: 2020-12-13

## 2020-12-13 VITALS
TEMPERATURE: 97.3 F | SYSTOLIC BLOOD PRESSURE: 116 MMHG | DIASTOLIC BLOOD PRESSURE: 74 MMHG | OXYGEN SATURATION: 96 % | HEART RATE: 95 BPM | RESPIRATION RATE: 18 BRPM

## 2020-12-13 PROCEDURE — 96365 THER/PROPH/DIAG IV INF INIT: CPT

## 2020-12-13 PROCEDURE — 74011250636 HC RX REV CODE- 250/636: Performed by: NURSE PRACTITIONER

## 2020-12-13 PROCEDURE — 74011000258 HC RX REV CODE- 258: Performed by: NURSE PRACTITIONER

## 2020-12-13 RX ORDER — SODIUM CHLORIDE 0.9 % (FLUSH) 0.9 %
10 SYRINGE (ML) INJECTION AS NEEDED
Status: DISCONTINUED | OUTPATIENT
Start: 2020-12-13 | End: 2020-12-15 | Stop reason: HOSPADM

## 2020-12-13 RX ADMIN — Medication 10 ML: at 13:30

## 2020-12-13 RX ADMIN — CEFTRIAXONE 2 G: 2 INJECTION, POWDER, FOR SOLUTION INTRAMUSCULAR; INTRAVENOUS at 13:00

## 2020-12-13 RX ADMIN — Medication 10 ML: at 13:00

## 2020-12-13 NOTE — PROGRESS NOTES
Arrived to the [facility]. Ericephin completed. Patient tolerated well. Any issues or concerns during appointment: None. Patient aware of next infusion appointment on December 14th at 1630. Discharged ambulatory.

## 2020-12-14 ENCOUNTER — HOSPITAL ENCOUNTER (OUTPATIENT)
Dept: INFUSION THERAPY | Age: 50
Discharge: HOME | End: 2020-12-14

## 2020-12-14 VITALS
DIASTOLIC BLOOD PRESSURE: 80 MMHG | SYSTOLIC BLOOD PRESSURE: 120 MMHG | OXYGEN SATURATION: 96 % | HEART RATE: 88 BPM | TEMPERATURE: 97.3 F | RESPIRATION RATE: 18 BRPM

## 2020-12-14 LAB
ALBUMIN SERPL-MCNC: 3.1 G/DL (ref 3.5–5)
ALBUMIN/GLOB SERPL: 0.7 {RATIO} (ref 1.2–3.5)
ALP SERPL-CCNC: 154 U/L (ref 50–136)
ALT SERPL-CCNC: 23 U/L (ref 12–65)
AST SERPL-CCNC: 30 U/L (ref 15–37)
BASOPHILS # BLD: 0.1 K/UL (ref 0–0.2)
BASOPHILS NFR BLD: 1 % (ref 0–2)
BILIRUB DIRECT SERPL-MCNC: 0.2 MG/DL
BILIRUB SERPL-MCNC: 0.9 MG/DL (ref 0.2–1.1)
CREAT SERPL-MCNC: 0.7 MG/DL (ref 0.8–1.5)
DIFFERENTIAL METHOD BLD: ABNORMAL
EOSINOPHIL # BLD: 0.2 K/UL (ref 0–0.8)
EOSINOPHIL NFR BLD: 3 % (ref 0.5–7.8)
ERYTHROCYTE [DISTWIDTH] IN BLOOD BY AUTOMATED COUNT: 14 % (ref 11.9–14.6)
GLOBULIN SER CALC-MCNC: 4.5 G/DL (ref 2.3–3.5)
HCT VFR BLD AUTO: 35.9 % (ref 41.1–50.3)
HGB BLD-MCNC: 11.7 G/DL (ref 13.6–17.2)
IMM GRANULOCYTES # BLD AUTO: 0 K/UL (ref 0–0.5)
IMM GRANULOCYTES NFR BLD AUTO: 0 % (ref 0–5)
LYMPHOCYTES # BLD: 2.9 K/UL (ref 0.5–4.6)
LYMPHOCYTES NFR BLD: 48 % (ref 13–44)
MCH RBC QN AUTO: 31.5 PG (ref 26.1–32.9)
MCHC RBC AUTO-ENTMCNC: 32.6 G/DL (ref 31.4–35)
MCV RBC AUTO: 96.5 FL (ref 79.6–97.8)
MONOCYTES # BLD: 0.5 K/UL (ref 0.1–1.3)
MONOCYTES NFR BLD: 8 % (ref 4–12)
NEUTS SEG # BLD: 2.4 K/UL (ref 1.7–8.2)
NEUTS SEG NFR BLD: 40 % (ref 43–78)
NRBC # BLD: 0 K/UL (ref 0–0.2)
PLATELET # BLD AUTO: 277 K/UL (ref 150–450)
PMV BLD AUTO: 9.9 FL (ref 9.4–12.3)
PROT SERPL-MCNC: 7.6 G/DL (ref 6.3–8.2)
RBC # BLD AUTO: 3.72 M/UL (ref 4.23–5.67)
WBC # BLD AUTO: 6 K/UL (ref 4.3–11.1)

## 2020-12-14 PROCEDURE — 80076 HEPATIC FUNCTION PANEL: CPT

## 2020-12-14 PROCEDURE — 74011250636 HC RX REV CODE- 250/636: Performed by: NURSE PRACTITIONER

## 2020-12-14 PROCEDURE — 74011000258 HC RX REV CODE- 258: Performed by: NURSE PRACTITIONER

## 2020-12-14 PROCEDURE — 85025 COMPLETE CBC W/AUTO DIFF WBC: CPT

## 2020-12-14 PROCEDURE — 96365 THER/PROPH/DIAG IV INF INIT: CPT

## 2020-12-14 PROCEDURE — 82565 ASSAY OF CREATININE: CPT

## 2020-12-14 RX ORDER — SODIUM CHLORIDE 0.9 % (FLUSH) 0.9 %
10 SYRINGE (ML) INJECTION AS NEEDED
Status: DISCONTINUED | OUTPATIENT
Start: 2020-12-14 | End: 2020-12-15 | Stop reason: HOSPADM

## 2020-12-14 RX ADMIN — Medication 10 ML: at 16:46

## 2020-12-14 RX ADMIN — Medication 10 ML: at 16:11

## 2020-12-14 RX ADMIN — CEFTRIAXONE 2 G: 2 INJECTION, POWDER, FOR SOLUTION INTRAMUSCULAR; INTRAVENOUS at 16:15

## 2020-12-14 RX ADMIN — Medication 10 ML: at 16:10

## 2020-12-14 NOTE — PROGRESS NOTES
Arrived to the [facility]. Rocephin infusion and PICC dsg change and lab draq completed. Patient tolerated well. Any issues or concerns during appointment: NO.  Patient aware of next infusion appointment on 12/15/2020  at 1600 . Discharged ambulatory.

## 2020-12-15 ENCOUNTER — HOSPITAL ENCOUNTER (OUTPATIENT)
Dept: INFUSION THERAPY | Age: 50
Discharge: HOME | End: 2020-12-15

## 2020-12-15 VITALS
HEART RATE: 79 BPM | TEMPERATURE: 97.3 F | SYSTOLIC BLOOD PRESSURE: 127 MMHG | OXYGEN SATURATION: 98 % | RESPIRATION RATE: 18 BRPM | DIASTOLIC BLOOD PRESSURE: 79 MMHG

## 2020-12-15 PROCEDURE — 96365 THER/PROPH/DIAG IV INF INIT: CPT

## 2020-12-15 PROCEDURE — 74011250636 HC RX REV CODE- 250/636: Performed by: NURSE PRACTITIONER

## 2020-12-15 PROCEDURE — 74011000258 HC RX REV CODE- 258: Performed by: NURSE PRACTITIONER

## 2020-12-15 RX ORDER — SODIUM CHLORIDE 0.9 % (FLUSH) 0.9 %
10 SYRINGE (ML) INJECTION ONCE
Status: COMPLETED | OUTPATIENT
Start: 2020-12-15 | End: 2020-12-15

## 2020-12-15 RX ORDER — SODIUM CHLORIDE 0.9 % (FLUSH) 0.9 %
10 SYRINGE (ML) INJECTION EVERY 8 HOURS
Status: DISCONTINUED | OUTPATIENT
Start: 2020-12-15 | End: 2020-12-17 | Stop reason: HOSPADM

## 2020-12-15 RX ADMIN — Medication 10 ML: at 16:20

## 2020-12-15 RX ADMIN — CEFTRIAXONE 2 G: 2 INJECTION, POWDER, FOR SOLUTION INTRAMUSCULAR; INTRAVENOUS at 15:49

## 2020-12-15 RX ADMIN — Medication 10 ML: at 15:48

## 2020-12-15 NOTE — PROGRESS NOTES
Arrived to the [facility]. Assessment completed. Rocephin completed. Patient tolerated without problems. Any issues or concerns during appointment: None  Instructed to call Dr Cristal Betancourt  with any side effects or concerns  Patient aware of next infusion appointment on 12/16/20 (date) at 3 PM (time).   Discharged ambulatory

## 2020-12-16 ENCOUNTER — HOSPITAL ENCOUNTER (OUTPATIENT)
Dept: INFUSION THERAPY | Age: 50
Discharge: HOME | End: 2020-12-16

## 2020-12-16 VITALS
SYSTOLIC BLOOD PRESSURE: 144 MMHG | OXYGEN SATURATION: 98 % | RESPIRATION RATE: 18 BRPM | TEMPERATURE: 97.3 F | HEART RATE: 84 BPM | DIASTOLIC BLOOD PRESSURE: 94 MMHG

## 2020-12-16 PROCEDURE — 74011250636 HC RX REV CODE- 250/636: Performed by: NURSE PRACTITIONER

## 2020-12-16 PROCEDURE — 74011000258 HC RX REV CODE- 258: Performed by: NURSE PRACTITIONER

## 2020-12-16 PROCEDURE — 96365 THER/PROPH/DIAG IV INF INIT: CPT

## 2020-12-16 RX ORDER — SODIUM CHLORIDE 0.9 % (FLUSH) 0.9 %
10-30 SYRINGE (ML) INJECTION AS NEEDED
Status: DISCONTINUED | OUTPATIENT
Start: 2020-12-16 | End: 2020-12-18 | Stop reason: HOSPADM

## 2020-12-16 RX ADMIN — Medication 10 ML: at 15:45

## 2020-12-16 RX ADMIN — Medication 10 ML: at 15:15

## 2020-12-16 RX ADMIN — CEFTRIAXONE 2 G: 2 INJECTION, POWDER, FOR SOLUTION INTRAMUSCULAR; INTRAVENOUS at 15:15

## 2020-12-16 NOTE — PROGRESS NOTES
Arrived to the [facility]. Rocephin completed.    Provided education on Rocephin-ge has been receiving Rocephin   Patient instructed to report any side affects to ordering provider-ID  Patient tolerated well   Any issues or concerns during appointment:Patient has appointment with ID tomorrow-PICC line to be removed @ that time  Patient has no future appointments in OPI @ this time  Discharged home ambulatory

## 2020-12-17 ENCOUNTER — HOSPITAL ENCOUNTER (EMERGENCY)
Age: 50
Discharge: HOME | End: 2020-12-17

## 2020-12-18 ENCOUNTER — HOSPITAL ENCOUNTER (EMERGENCY)
Age: 50
Discharge: HOME | End: 2020-12-18
Attending: EMERGENCY MEDICINE

## 2020-12-18 ENCOUNTER — APPOINTMENT (OUTPATIENT)
Dept: CT IMAGING | Age: 50
End: 2020-12-18
Attending: EMERGENCY MEDICINE

## 2020-12-18 ENCOUNTER — APPOINTMENT (OUTPATIENT)
Dept: ULTRASOUND IMAGING | Age: 50
End: 2020-12-18
Attending: EMERGENCY MEDICINE

## 2020-12-18 VITALS
HEART RATE: 97 BPM | RESPIRATION RATE: 24 BRPM | WEIGHT: 195 LBS | SYSTOLIC BLOOD PRESSURE: 127 MMHG | HEIGHT: 70 IN | BODY MASS INDEX: 27.92 KG/M2 | TEMPERATURE: 97.9 F | DIASTOLIC BLOOD PRESSURE: 84 MMHG | OXYGEN SATURATION: 100 %

## 2020-12-18 DIAGNOSIS — R10.11 RUQ ABDOMINAL PAIN: Primary | ICD-10-CM

## 2020-12-18 LAB
ALBUMIN SERPL-MCNC: 3.2 G/DL (ref 3.5–5)
ALBUMIN/GLOB SERPL: 0.7 {RATIO} (ref 1.2–3.5)
ALP SERPL-CCNC: 211 U/L (ref 50–136)
ALT SERPL-CCNC: 29 U/L (ref 12–65)
ANION GAP SERPL CALC-SCNC: 10 MMOL/L (ref 7–16)
AST SERPL-CCNC: 88 U/L (ref 15–37)
ATRIAL RATE: 90 BPM
BASOPHILS # BLD: 0.1 K/UL (ref 0–0.2)
BASOPHILS NFR BLD: 1 % (ref 0–2)
BILIRUB SERPL-MCNC: 2.7 MG/DL (ref 0.2–1.1)
BUN SERPL-MCNC: 7 MG/DL (ref 6–23)
CALCIUM SERPL-MCNC: 9.4 MG/DL (ref 8.3–10.4)
CALCULATED P AXIS, ECG09: 65 DEGREES
CALCULATED R AXIS, ECG10: 41 DEGREES
CALCULATED T AXIS, ECG11: 57 DEGREES
CHLORIDE SERPL-SCNC: 105 MMOL/L (ref 98–107)
CO2 SERPL-SCNC: 24 MMOL/L (ref 21–32)
CREAT SERPL-MCNC: 0.71 MG/DL (ref 0.8–1.5)
DIAGNOSIS, 93000: NORMAL
DIFFERENTIAL METHOD BLD: ABNORMAL
EOSINOPHIL # BLD: 0.4 K/UL (ref 0–0.8)
EOSINOPHIL NFR BLD: 4 % (ref 0.5–7.8)
ERYTHROCYTE [DISTWIDTH] IN BLOOD BY AUTOMATED COUNT: 14.1 % (ref 11.9–14.6)
GLOBULIN SER CALC-MCNC: 4.4 G/DL (ref 2.3–3.5)
GLUCOSE SERPL-MCNC: 128 MG/DL (ref 65–100)
HCT VFR BLD AUTO: 38.4 % (ref 41.1–50.3)
HGB BLD-MCNC: 12.5 G/DL (ref 13.6–17.2)
IMM GRANULOCYTES # BLD AUTO: 0.1 K/UL (ref 0–0.5)
IMM GRANULOCYTES NFR BLD AUTO: 1 % (ref 0–5)
LIPASE SERPL-CCNC: 248 U/L (ref 73–393)
LYMPHOCYTES # BLD: 3.4 K/UL (ref 0.5–4.6)
LYMPHOCYTES NFR BLD: 32 % (ref 13–44)
MCH RBC QN AUTO: 31.4 PG (ref 26.1–32.9)
MCHC RBC AUTO-ENTMCNC: 32.6 G/DL (ref 31.4–35)
MCV RBC AUTO: 96.5 FL (ref 79.6–97.8)
MONOCYTES # BLD: 0.6 K/UL (ref 0.1–1.3)
MONOCYTES NFR BLD: 6 % (ref 4–12)
NEUTS SEG # BLD: 6 K/UL (ref 1.7–8.2)
NEUTS SEG NFR BLD: 57 % (ref 43–78)
NRBC # BLD: 0 K/UL (ref 0–0.2)
P-R INTERVAL, ECG05: 158 MS
PLATELET # BLD AUTO: 254 K/UL (ref 150–450)
PMV BLD AUTO: 9.9 FL (ref 9.4–12.3)
POTASSIUM SERPL-SCNC: 3.8 MMOL/L (ref 3.5–5.1)
PROT SERPL-MCNC: 7.6 G/DL (ref 6.3–8.2)
Q-T INTERVAL, ECG07: 344 MS
QRS DURATION, ECG06: 102 MS
QTC CALCULATION (BEZET), ECG08: 420 MS
RBC # BLD AUTO: 3.98 M/UL (ref 4.23–5.6)
SODIUM SERPL-SCNC: 139 MMOL/L (ref 136–145)
VENTRICULAR RATE, ECG03: 90 BPM
WBC # BLD AUTO: 10.4 K/UL (ref 4.3–11.1)

## 2020-12-18 PROCEDURE — 83690 ASSAY OF LIPASE: CPT

## 2020-12-18 PROCEDURE — 93005 ELECTROCARDIOGRAM TRACING: CPT | Performed by: EMERGENCY MEDICINE

## 2020-12-18 PROCEDURE — 85025 COMPLETE CBC W/AUTO DIFF WBC: CPT

## 2020-12-18 PROCEDURE — 74177 CT ABD & PELVIS W/CONTRAST: CPT

## 2020-12-18 PROCEDURE — 74011250636 HC RX REV CODE- 250/636: Performed by: EMERGENCY MEDICINE

## 2020-12-18 PROCEDURE — 99284 EMERGENCY DEPT VISIT MOD MDM: CPT

## 2020-12-18 PROCEDURE — 74011000258 HC RX REV CODE- 258: Performed by: EMERGENCY MEDICINE

## 2020-12-18 PROCEDURE — 96374 THER/PROPH/DIAG INJ IV PUSH: CPT

## 2020-12-18 PROCEDURE — 80053 COMPREHEN METABOLIC PANEL: CPT

## 2020-12-18 PROCEDURE — 76705 ECHO EXAM OF ABDOMEN: CPT

## 2020-12-18 PROCEDURE — 74011000636 HC RX REV CODE- 636: Performed by: EMERGENCY MEDICINE

## 2020-12-18 RX ORDER — HYDROCODONE BITARTRATE AND ACETAMINOPHEN 5; 325 MG/1; MG/1
1 TABLET ORAL
Qty: 12 TAB | Refills: 0 | Status: SHIPPED | OUTPATIENT
Start: 2020-12-18 | End: 2020-12-30

## 2020-12-18 RX ORDER — ONDANSETRON 4 MG/1
4 TABLET, ORALLY DISINTEGRATING ORAL
Qty: 16 TAB | Refills: 0 | OUTPATIENT
Start: 2020-12-18 | End: 2022-01-09

## 2020-12-18 RX ORDER — SODIUM CHLORIDE 0.9 % (FLUSH) 0.9 %
10 SYRINGE (ML) INJECTION
Status: COMPLETED | OUTPATIENT
Start: 2020-12-18 | End: 2020-12-18

## 2020-12-18 RX ORDER — HYDROMORPHONE HYDROCHLORIDE 1 MG/ML
1 INJECTION, SOLUTION INTRAMUSCULAR; INTRAVENOUS; SUBCUTANEOUS ONCE
Status: COMPLETED | OUTPATIENT
Start: 2020-12-18 | End: 2020-12-18

## 2020-12-18 RX ADMIN — HYDROMORPHONE HYDROCHLORIDE 1 MG: 1 INJECTION, SOLUTION INTRAMUSCULAR; INTRAVENOUS; SUBCUTANEOUS at 05:51

## 2020-12-18 RX ADMIN — IOPAMIDOL 100 ML: 755 INJECTION, SOLUTION INTRAVENOUS at 06:14

## 2020-12-18 RX ADMIN — SODIUM CHLORIDE 100 ML: 900 INJECTION, SOLUTION INTRAVENOUS at 06:14

## 2020-12-18 RX ADMIN — Medication 10 ML: at 06:14

## 2020-12-18 NOTE — ED TRIAGE NOTES
Pt arrives ambulatory to triage complaining of \"abdominal pain so bad I cant breath\" Pain is located in the upper quadrants and is described as squeezing. Pt Pt denies N/V.  Last bowel movement yesterday was normal.

## 2020-12-18 NOTE — DISCHARGE INSTRUCTIONS
Call [place] [facility] at the number listed on this paperwork today before 2:00 to schedule appointment time and date for recheck    Return to the emergency department for worsening pain, temperature greater than 100.4, or other symptoms that concern you    Do not drive or operate heavy machinery within 6 hours of taking your prescribed narcotic pain medication

## 2020-12-18 NOTE — ED PROVIDER NOTES
Patient is a 71-year-old male who comes to the emergency department early this morning complaining of abdominal pain. Patient states yesterday he had some sharp epigastric and right upper quadrant abdominal pain and then it recurred tonight in the middle the night and awoke him from sleep. He has had several recent admissions to the hospital with rib fractures and pneumothorax. The history is provided by the patient. Abdominal Pain   This is a new problem. The current episode started yesterday. The problem has been gradually worsening. The pain is associated with an unknown factor. The pain is located in the RUQ and epigastric region. The quality of the pain is sharp. The pain is severe. Pertinent negatives include no fever, no diarrhea, no melena, no nausea, no vomiting, no dysuria, no frequency, no chest pain and no back pain. Nothing worsens the pain. The pain is relieved by nothing. Past Medical History:   Diagnosis Date    GERD (gastroesophageal reflux disease)     once a week OTC med       Past Surgical History:   Procedure Laterality Date    HX HEENT      wisdom teeth         No family history on file. Social History     Socioeconomic History    Marital status: SINGLE     Spouse name: Not on file    Number of children: Not on file    Years of education: Not on file    Highest education level: Not on file   Occupational History    Occupation:  at [address] [place] [address] resource strain: Not on file    Food insecurity     Worry: Not on file     Inability: Not on file   [employer] needs     Medical: Not on file     Non-medical: Not on file   Tobacco Use    Smoking status: Current Every Day Smoker     Packs/day: 1.00     Years: 20.00     Pack years: 20.00    Smokeless tobacco: Never Used   Substance and Sexual Activity    Alcohol use:  Yes     Alcohol/week: 6.7 standard drinks     Types: 8 Shots of liquor per week    Drug use: No    Sexual activity: Not on file Lifestyle    Physical activity     Days per week: Not on file     Minutes per session: Not on file    Stress: Not on file   Relationships    Social connections     Talks on phone: Not on file     Gets together: Not on file     Attends Yarsanism service: Not on file     Active member of club or organization: Not on file     Attends meetings of clubs or organizations: Not on file     Relationship status: Not on file    Intimate partner violence     Fear of current or ex partner: Not on file     Emotionally abused: Not on file     Physically abused: Not on file     Forced sexual activity: Not on file   Other Topics Concern    Not on file   Social History Narrative    Not on file         ALLERGIES: Patient has no known allergies. Review of Systems   Constitutional: Negative for chills, fatigue and fever. HENT: Negative for congestion, rhinorrhea and sore throat. Eyes: Negative for pain, discharge and visual disturbance. Respiratory: Negative for cough and shortness of breath. Cardiovascular: Negative for chest pain and palpitations. Gastrointestinal: Positive for abdominal pain. Negative for diarrhea, melena, nausea and vomiting. Endocrine: Negative for polydipsia and polyuria. Genitourinary: Negative for dysuria, frequency and urgency. Musculoskeletal: Negative for back pain and neck pain. Skin: Negative for rash. Neurological: Negative for seizures, syncope and weakness. Hematological: Negative. Vitals:    12/18/20 0513   BP: (!) 161/96   Pulse: 97   Resp: 24   Temp: 97.9 °F (36.6 °C)   SpO2: 100%   Weight: 88.5 kg (195 lb)   Height: 5' 10\" (1.778 m)            Physical Exam  Vitals signs and nursing note reviewed. Constitutional:       Appearance: Normal appearance. He is well-developed. He is ill-appearing. HENT:      Head: Normocephalic and atraumatic. Nose: Nose normal.   Eyes:      Extraocular Movements: Extraocular movements intact.       Conjunctiva/sclera: Conjunctivae normal.      Pupils: Pupils are equal, round, and reactive to light. Neck:      Musculoskeletal: Normal range of motion and neck supple. Cardiovascular:      Rate and Rhythm: Normal rate and regular rhythm. Heart sounds: Normal heart sounds. Pulmonary:      Effort: Pulmonary effort is normal.      Breath sounds: Normal breath sounds. Abdominal:      Palpations: Abdomen is soft. Tenderness: There is abdominal tenderness in the right upper quadrant. There is guarding. There is no rebound. Musculoskeletal: Normal range of motion. General: No tenderness. Lymphadenopathy:      Cervical: No cervical adenopathy. Skin:     General: Skin is warm and dry. Capillary Refill: Capillary refill takes less than 2 seconds. Findings: No rash. Neurological:      General: No focal deficit present. Mental Status: He is alert and oriented to person, place, and time. GCS: GCS eye subscore is 4. GCS verbal subscore is 5. GCS motor subscore is 6. Cranial Nerves: No cranial nerve deficit. Sensory: No sensory deficit. Motor: Motor function is intact. Psychiatric:         Mood and Affect: Mood is anxious. MDM  Number of Diagnoses or Management Options  Diagnosis management comments: I wore appropriate PPE throughout this patient's ED visit. Nubia Cheung MD, 5:45 AM    6:44 AM  Records were reviewed patient did have prolonged admission to the hospital with multiple rib fractures pneumothorax empyema he is getting chronic antibiotics via the PICC line. CBC is unremarkable white count normal  Chemistries show an elevated bilirubin of 2.7    Patient is feeling better after some IV Dilaudid. Repeat exam nontender. 6:47 AM  CT scan of the abdomen pelvis read by the radiologist shows a distended gallbladder with questionable gallstone and some pericholecystic haziness. They recommend ultrasound for further evaluation.     Given his new elevation in bilirubin I think the CT scan is warranted to rule out ductal obstruction. Care is signed over to the next physician to follow-up on those results.          Amount and/or Complexity of Data Reviewed  Clinical lab tests: ordered and reviewed  Tests in the radiology section of CPT®: ordered and reviewed  Review and summarize past medical records: yes  Discuss the patient with other providers: yes  Independent visualization of images, tracings, or specimens: yes    Risk of Complications, Morbidity, and/or Mortality  Presenting problems: moderate  Diagnostic procedures: moderate  Management options: moderate    Patient Progress  Patient progress: improved         Procedures

## 2020-12-18 NOTE — ED NOTES
Recent Results (from the past 12 hour(s))   EKG, 12 LEAD, INITIAL    Collection Time: 12/18/20  5:16 AM   Result Value Ref Range    Ventricular Rate 90 BPM    Atrial Rate 90 BPM    P-R Interval 158 ms    QRS Duration 102 ms    Q-T Interval 344 ms    QTC Calculation (Bezet) 420 ms    Calculated P Axis 65 degrees    Calculated R Axis 41 degrees    Calculated T Axis 57 degrees    Diagnosis       !! AGE AND GENDER SPECIFIC ECG ANALYSIS !! Normal sinus rhythm  Incomplete right bundle branch block  Borderline ECG  When compared with ECG of 14-NOV-2020 21:16,  Nonspecific T wave abnormality no longer evident in Lateral leads  Confirmed by [facility]  MD ()PILO ([id number]) on 12/18/2020 6:37:05 AM     CBC WITH AUTOMATED DIFF    Collection Time: 12/18/20  5:20 AM   Result Value Ref Range    WBC 10.4 4.3 - 11.1 K/uL    RBC 3.98 (L) 4.23 - 5.6 M/uL    HGB 12.5 (L) 13.6 - 17.2 g/dL    HCT 38.4 (L) 41.1 - 50.3 %    MCV 96.5 79.6 - 97.8 FL    MCH 31.4 26.1 - 32.9 PG    MCHC 32.6 31.4 - 35.0 g/dL    RDW 14.1 11.9 - 14.6 %    PLATELET 032 191 - 800 K/uL    MPV 9.9 9.4 - 12.3 FL    ABSOLUTE NRBC 0.00 0.0 - 0.2 K/uL    DF AUTOMATED      NEUTROPHILS 57 43 - 78 %    LYMPHOCYTES 32 13 - 44 %    MONOCYTES 6 4.0 - 12.0 %    EOSINOPHILS 4 0.5 - 7.8 %    BASOPHILS 1 0.0 - 2.0 %    IMMATURE GRANULOCYTES 1 0.0 - 5.0 %    ABS. NEUTROPHILS 6.0 1.7 - 8.2 K/UL    ABS. LYMPHOCYTES 3.4 0.5 - 4.6 K/UL    ABS. MONOCYTES 0.6 0.1 - 1.3 K/UL    ABS. EOSINOPHILS 0.4 0.0 - 0.8 K/UL    ABS. BASOPHILS 0.1 0.0 - 0.2 K/UL    ABS. IMM.  GRANS. 0.1 0.0 - 0.5 K/UL   METABOLIC PANEL, COMPREHENSIVE    Collection Time: 12/18/20  5:20 AM   Result Value Ref Range    Sodium 139 136 - 145 mmol/L    Potassium 3.8 3.5 - 5.1 mmol/L    Chloride 105 98 - 107 mmol/L    CO2 24 21 - 32 mmol/L    Anion gap 10 7 - 16 mmol/L    Glucose 128 (H) 65 - 100 mg/dL    BUN 7 6 - 23 MG/DL    Creatinine 0.71 (L) 0.8 - 1.5 MG/DL    GFR est AA >60 >60 ml/min/1.73m2    GFR est non-AA >60 >60 ml/min/1.73m2    Calcium 9.4 8.3 - 10.4 MG/DL    Bilirubin, total 2.7 (H) 0.2 - 1.1 MG/DL    ALT (SGPT) 29 12 - 65 U/L    AST (SGOT) 88 (H) 15 - 37 U/L    Alk. phosphatase 211 (H) 50 - 136 U/L    Protein, total 7.6 6.3 - 8.2 g/dL    Albumin 3.2 (L) 3.5 - 5.0 g/dL    Globulin 4.4 (H) 2.3 - 3.5 g/dL    A-G Ratio 0.7 (L) 1.2 - 3.5     LIPASE    Collection Time: 12/18/20  5:20 AM   Result Value Ref Range    Lipase 248 73 - 393 U/L           Ct Abd Pelv W Cont    Result Date: 12/18/2020  CT abdomen and pelvis with contrast COMPARISON: February 28, 2018. INDICATION: Abdominal pain. Upper abdominal pain. ... TECHNIQUE: CT imaging was performed of the abdomen and pelvis following the uncomplicated administration of intravenous contrast (Isovue 370, 100 mL). Oral contrast was administered. Radiation dose reduction techniques were used for this study:  Our CT scanners use one or all of the following: Automated exposure control, adjustment of the mA and/or kVp according to patient's size, iterative reconstruction. FINDINGS: There is left pleural thickening. Healing left rib fractures are noted. There are patchy subsegmental atelectasis at the visualized lung bases. Abdomen findings: Gallbladder is distended. There is suggestion of pericholecystic haziness. No radiopaque gallstone. The liver, pancreas, spleen, adrenal glands, kidneys, and abdominal aorta are unremarkable. Stomach is normal in contour. Small bowel loops are normal in caliber. There is no small bowel obstruction. No evidence of lymphadenopathy. Pelvic findings: Urinary bladder is normal in contour. There is short segment sigmoid colon wall thickening. No significant diverticula. Colon is there is no evidence of appendicitis. There is no free air or free fluid. IMPRESSION: 1. Distended gallbladder and suggestion of pericholecystic haziness. No radiopaque gallstone. Follow-up gallbladder ultrasound could be considered.  2. Apparent mild wall thickening of the sigmoid colon, nonspecific but may be due to muscular hypertrophy or less likely focal colitis. No significant diverticula. No bowel obstruction. 3. No hydronephrosis. Us Abd Ltd    Result Date: 12/18/2020  RIGHT UPPER QUADRANT ULTRASOUND 12/18/2020 HISTORY: abdominal pain and elevated bilirubin; ; TECHNIQUE: Sonographic imaging of the right upper quadrant was performed. COMPARISON: Ultrasound 8/17/2016 and CT abdomen and pelvis 12/18/2020 FINDINGS: The pancreatic head is normal in appearance  The gallbladder is distended and contains multiple intraluminal debris without shadowing stones. Gallbladder wall is thickened up to 4.5 mm. The sonographic Stock's sign is absent. The common bile duct is 5.4 mm in diameter. There is no intrahepatic biliary ductal dilatation. A Hypoechoic lesion with a peripheral echogenic border in the right hepatic lobe centrally measures 1.6 cm in diameter. There is no correlate on the CT performed earlier today. The right kidney is 12.3 cm in length. There is no hydronephrosis. The distal aorta is 2.1cm in diameter. The IVC is patent. IMPRESSION: 1. Distended gallbladder containing debris with mild wall thickening up to 4.5 mm. Acalculous cholecystitis could account for this finding. 2. Indeterminate 1.6 cm lesion in the right hepatic lobe. Recommend follow-up evaluation with a hepatic protocol MR. Medications   HYDROmorphone (PF) (DILAUDID) injection 1 mg (1 mg IntraVENous Given 12/18/20 0551)   iopamidoL (ISOVUE-370) 76 % injection 100 mL (100 mL IntraVENous Given 12/18/20 0614)   saline peripheral flush soln 10 mL (10 mL InterCATHeter Given 12/18/20 0614)   sodium chloride 0.9 % bolus infusion 100 mL (0 mL IntraVENous IV Completed 12/18/20 0653)         Disposition  I assumed care of the patient from Dr Hilda Retana at AM checkout. Plan was to follow-up on right upper quadrant ultrasound and surgical consultation.   Read quadrant ultrasound shows questionable acalculous cholecystitis with a borderline gallbladder wall thickness of 4.5 mm. CBD was normal.  I reevaluated the patient. He has some epigastric and right upper quadrant tenderness but no guarding. Specifically negative Stock's. I discussed case with Valentino Sayers, BETSY on-call for [facility] surgical.  Did not feel the patient needed to come in the hospital at this point. She did request that the patient follow closely as an outpatient and I will have the patient call [facility] surgical today to schedule appointment for recheck. I did give the patient strict return precautions. Patient verbalized understanding and states he is comfortable with this plan of care. Please note, this chart was dictated using Dragon dictation, voice recognition software. While efforts were made to correct any transcription errors, some may inadvertently remain. Please forgive punctuation and typographic/voice recognition errors. Please contact me with any questions concerns or for clarification of documentation.

## 2020-12-18 NOTE — ED NOTES
I have reviewed discharge instructions with the patient. The patient verbalized understanding. Patient left ED via Discharge Method: ambulatory to Home with self. Opportunity for questions and clarification provided. Patient given 2 scripts. To continue your aftercare when you leave the hospital, you may receive an automated call from our care team to check in on how you are doing. This is a free service and part of our promise to provide the best care and service to meet your aftercare needs.  If you have questions, or wish to unsubscribe from this service please call [phone]. Thank you for Choosing our [facility] Emergency Department.

## 2020-12-28 ENCOUNTER — HOSPITAL ENCOUNTER (OUTPATIENT)
Dept: GENERAL RADIOLOGY | Age: 50
Discharge: HOME | End: 2020-12-28

## 2020-12-28 DIAGNOSIS — Z09 HOSPITAL DISCHARGE FOLLOW-UP: ICD-10-CM

## 2020-12-28 DIAGNOSIS — J90 PLEURAL EFFUSION: ICD-10-CM

## 2020-12-28 PROCEDURE — 71046 X-RAY EXAM CHEST 2 VIEWS: CPT

## 2020-12-29 PROBLEM — Z98.890 S/P THORACOTOMY: Status: ACTIVE | Noted: 2020-12-29

## 2020-12-29 PROBLEM — F17.200 NICOTINE DEPENDENCE: Status: ACTIVE | Noted: 2020-12-29

## 2021-08-25 NOTE — ROUTINE PROCESS
Neck , supple TRANSFER - IN REPORT: 
 
Verbal report received from [address] on [address]  being received from 6th floor  for routine progression of care Report consisted of patients Situation, Background, Assessment and  
Recommendations(SBAR). Information from the following report(s) SBAR, Kardex, Procedure Summary and Recent Results was reviewed with the receiving nurse. Opportunity for questions and clarification was provided. Assessment completed upon patients arrival to unit and care assumed.

## 2022-01-09 ENCOUNTER — APPOINTMENT (OUTPATIENT)
Dept: GENERAL RADIOLOGY | Age: 52
End: 2022-01-09
Attending: PHYSICIAN ASSISTANT

## 2022-01-09 ENCOUNTER — HOSPITAL ENCOUNTER (EMERGENCY)
Age: 52
Discharge: HOME | End: 2022-01-09
Attending: EMERGENCY MEDICINE

## 2022-01-09 VITALS
BODY MASS INDEX: 27.2 KG/M2 | RESPIRATION RATE: 18 BRPM | OXYGEN SATURATION: 97 % | WEIGHT: 190 LBS | DIASTOLIC BLOOD PRESSURE: 81 MMHG | HEART RATE: 83 BPM | SYSTOLIC BLOOD PRESSURE: 124 MMHG | HEIGHT: 70 IN | TEMPERATURE: 98.4 F

## 2022-01-09 DIAGNOSIS — B34.9 VIRAL ILLNESS: Primary | ICD-10-CM

## 2022-01-09 LAB
COVID-19 RAPID TEST, COVR: NOT DETECTED
SARS-COV-2, COV2: NORMAL
SOURCE, COVRS: NORMAL

## 2022-01-09 PROCEDURE — 99283 EMERGENCY DEPT VISIT LOW MDM: CPT

## 2022-01-09 PROCEDURE — U0005 INFEC AGEN DETEC AMPLI PROBE: HCPCS

## 2022-01-09 PROCEDURE — 87635 SARS-COV-2 COVID-19 AMP PRB: CPT

## 2022-01-09 PROCEDURE — 71046 X-RAY EXAM CHEST 2 VIEWS: CPT

## 2022-01-09 PROCEDURE — 74011250637 HC RX REV CODE- 250/637: Performed by: PHYSICIAN ASSISTANT

## 2022-01-09 RX ORDER — ONDANSETRON 4 MG/1
4 TABLET, ORALLY DISINTEGRATING ORAL
Status: COMPLETED | OUTPATIENT
Start: 2022-01-09 | End: 2022-01-09

## 2022-01-09 RX ORDER — ONDANSETRON 8 MG/1
8 TABLET, ORALLY DISINTEGRATING ORAL
Qty: 12 TABLET | Refills: 0 | Status: SHIPPED | OUTPATIENT
Start: 2022-01-09

## 2022-01-09 RX ADMIN — ONDANSETRON 4 MG: 4 TABLET, ORALLY DISINTEGRATING ORAL at 19:18

## 2022-01-09 NOTE — Clinical Note
[facility] EMERGENCY DEPT  [address] [facility] [address] [phone]    Work/School Note    Date: 1/9/2022    To Whom It May concern:    Pancho Gonzalez was seen and treated today in the emergency room by the following provider(s):  Attending Provider: Joanna Lubin MD  Physician Assistant: CHRIS Bustamante. Pancho Gonzalez is excused from work/school on 1/9/2022 through 1/11/2022. He is medically clear to return to work/school on 1/12/2022.          Sincerely,          CHRIS Heard

## 2022-01-09 NOTE — Clinical Note
[facility] EMERGENCY DEPT  [address] [facility] [id number] [phone]    Work/School Note    Date: 1/9/2022    To Whom It May concern:    Awilda Singh was seen and treated today in the emergency room by the following provider(s):  Attending Provider: Julio Jamil MD  Physician Assistant: CHRIS Clark. Awilda Singh is excused from work/school on 1/9/2022 through 1/11/2022. He is medically clear to return to work/school on 1/12/2022.          Sincerely,          Jono Chavez RN

## 2022-01-10 LAB
SARS-COV-2, COV2: NOT DETECTED
SPECIMEN SOURCE, FCOV2M: NORMAL

## 2022-01-10 NOTE — ED NOTES
I have reviewed discharge instructions with the patient. The patient verbalized understanding. Patient left ED via Discharge Method: ambulatory to Home with self. Opportunity for questions and clarification provided. Patient given 0 scripts. To continue your aftercare when you leave the hospital, you may receive an automated call from our care team to check in on how you are doing. This is a free service and part of our promise to provide the best care and service to meet your aftercare needs.  If you have questions, or wish to unsubscribe from this service please call [phone]. Thank you for Choosing our [facility] Emergency Department.

## 2022-01-10 NOTE — DISCHARGE INSTRUCTIONS
Use nausea medicine as directed,use over the counter cold meds for symptomatic relief, check  your [facility] account for pcr covid results, will also call any positive test results, see your primary md office for recheck

## 2022-01-10 NOTE — ED PROVIDER NOTES
Patient with about 1 week history of cough congestion body aches nausea vomiting, headache short of breath. He has not taken any over-the-counter medications. Patient is a smoker    The history is provided by the patient. Cough  This is a new problem. Episode onset: 1 week  The problem occurs constantly. The problem has not changed since onset. Associated symptoms comments: Cough congestion body aches nausea vomiting. Nothing aggravates the symptoms. Nothing relieves the symptoms. He has tried nothing for the symptoms. The treatment provided no relief. Past Medical History:   Diagnosis Date    GERD (gastroesophageal reflux disease)     once a week OTC med       Past Surgical History:   Procedure Laterality Date    HX HEENT      wisdom teeth         No family history on file. Social History     Socioeconomic History    Marital status: SINGLE     Spouse name: Not on file    Number of children: Not on file    Years of education: Not on file    Highest education level: Not on file   Occupational History    Occupation:  at [employer]. Smoking status: Current Every Day Smoker     Packs/day: 1.00     Years: 25.00     Pack years: 25.00     Types: Cigarettes    Smokeless tobacco: Never Used    Tobacco comment: stopped on 11/01/2020   Substance and Sexual Activity    Alcohol use: Yes     Alcohol/week: 6.7 standard drinks     Types: 8 Shots of liquor per week    Drug use: No    Sexual activity: Not on file   Other Topics Concern    Not on file   Social History Narrative    Not on file     Social Determinants of Health     Financial Resource Strain:     Difficulty of Paying Living Expenses: Not on file   Food Insecurity:     Worried About Running Out of Food in the Last Year: Not on file    Ivana of Food in the Last Year: Not on file   Transportation Needs:     Lack of Transportation (Medical): Not on file    Lack of Transportation (Non-Medical):  Not on file   Physical Activity:     Days of Exercise per Week: Not on file    Minutes of Exercise per Session: Not on file   Stress:     Feeling of Stress : Not on file   Social Connections:     Frequency of Communication with Friends and Family: Not on file    Frequency of Social Gatherings with Friends and Family: Not on file    Attends Evangelical Services: Not on file    Active Member of [address] [place] [address] or Organizations: Not on file    Attends Club or Organization Meetings: Not on file    Marital Status: Not on file   Intimate Partner Violence:     Fear of Current or Ex-Partner: Not on file    Emotionally Abused: Not on file    Physically Abused: Not on file    Sexually Abused: Not on file   Housing Stability:     Unable to Pay for Housing in the Last Year: Not on file    Number of [place] in the Last Year: Not on file    Unstable Housing in the Last Year: Not on file         ALLERGIES: Patient has no known allergies. Review of Systems   Respiratory: Positive for cough. All other systems reviewed and are negative. Vitals:    01/09/22 1646   BP: 137/88   Pulse: 89   Resp: 18   Temp: 98.6 °F (37 °C)   SpO2: 99%   Weight: 86.2 kg (190 lb)   Height: 5' 10\" (1.778 m)            Physical Exam  Vitals and nursing note reviewed. Constitutional:       General: He is not in acute distress. Appearance: Normal appearance. He is well-developed and normal weight. He is not diaphoretic. HENT:      Head: Normocephalic and atraumatic. Right Ear: External ear normal.      Left Ear: External ear normal.      Nose: Congestion present. Eyes:      Pupils: Pupils are equal, round, and reactive to light. Cardiovascular:      Rate and Rhythm: Normal rate and regular rhythm. Pulmonary:      Effort: Pulmonary effort is normal.      Breath sounds: Normal breath sounds. No wheezing or rhonchi. Abdominal:      General: Abdomen is flat. Bowel sounds are normal.      Palpations: Abdomen is soft.       Hernia: No hernia is present. Musculoskeletal:         General: Normal range of motion. Cervical back: Normal range of motion and neck supple. Skin:     General: Skin is warm. Neurological:      General: No focal deficit present. Mental Status: He is alert and oriented to person, place, and time. Psychiatric:         Mood and Affect: Mood normal.         Behavior: Behavior normal.          MDM  Number of Diagnoses or Management Options  Diagnosis management comments: Patient given Zofran in ER for nausea.   Rapid COVID was negative chest x-ray is normal PCR test is pending  Patient use over-the-counter medicines for symptomatic relief check my chart for COVID results work note given       Amount and/or Complexity of Data Reviewed  Clinical lab tests: ordered and reviewed  Tests in the radiology section of CPT®: ordered and reviewed  Review and summarize past medical records: yes    Risk of Complications, Morbidity, and/or Mortality  Presenting problems: moderate  Diagnostic procedures: moderate  Management options: low    Patient Progress  Patient progress: improved         Procedures

## 2022-03-18 PROBLEM — J96.01 ACUTE RESPIRATORY FAILURE WITH HYPOXIA (HCC): Status: ACTIVE | Noted: 2020-11-15

## 2022-03-18 PROBLEM — E87.1 HYPONATREMIA: Status: ACTIVE | Noted: 2020-11-03

## 2022-03-19 PROBLEM — R78.81 MSSA BACTEREMIA: Status: ACTIVE | Noted: 2020-11-04

## 2022-03-19 PROBLEM — Z98.890 S/P THORACOTOMY: Status: ACTIVE | Noted: 2020-12-29
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
## 2022-03-19 PROBLEM — F17.200 NICOTINE DEPENDENCE: Status: ACTIVE | Noted: 2020-12-29

## 2022-03-19 PROBLEM — J90 LOCULATED PLEURAL EFFUSION: Status: ACTIVE | Noted: 2020-11-03

## 2022-03-19 PROBLEM — B95.61 MSSA BACTEREMIA: Status: ACTIVE | Noted: 2020-11-04

## 2022-03-19 PROBLEM — D72.829 LEUKOCYTOSIS: Status: ACTIVE | Noted: 2020-11-03

## 2022-03-19 PROBLEM — J86.9 LOCULATED EMPYEMA (HCC): Status: ACTIVE | Noted: 2020-11-15

## 2022-03-20 PROBLEM — J94.8 HYDROPNEUMOTHORAX: Status: ACTIVE | Noted: 2020-11-03

## 2022-03-20 PROBLEM — R00.0 SINUS TACHYCARDIA: Status: ACTIVE | Noted: 2020-11-11

## 2022-07-13 ENCOUNTER — HOSPITAL ENCOUNTER (EMERGENCY)
Dept: GENERAL RADIOLOGY | Age: 52
Discharge: HOME | End: 2022-07-16

## 2022-07-13 ENCOUNTER — HOSPITAL ENCOUNTER (OUTPATIENT)
Age: 52
Setting detail: OBSERVATION
Discharge: HOME | End: 2022-07-14
Attending: EMERGENCY MEDICINE

## 2022-07-13 DIAGNOSIS — D64.9 SYMPTOMATIC ANEMIA: Primary | ICD-10-CM

## 2022-07-13 DIAGNOSIS — D64.9 ANEMIA, UNSPECIFIED TYPE: ICD-10-CM

## 2022-07-13 PROBLEM — F17.200 NICOTINE DEPENDENCE: Status: ACTIVE | Noted: 2020-12-29

## 2022-07-13 LAB
ALBUMIN SERPL-MCNC: 3.4 G/DL (ref 3.5–5)
ALBUMIN/GLOB SERPL: 1.1 {RATIO} (ref 1.2–3.5)
ALP SERPL-CCNC: 74 U/L (ref 50–136)
ALT SERPL-CCNC: 22 U/L (ref 12–65)
ANION GAP SERPL CALC-SCNC: 12 MMOL/L (ref 7–16)
AST SERPL-CCNC: 32 U/L (ref 15–37)
BASOPHILS # BLD: 0 K/UL (ref 0–0.2)
BASOPHILS NFR BLD: 1 % (ref 0–2)
BILIRUB SERPL-MCNC: 2 MG/DL (ref 0.2–1.1)
BUN SERPL-MCNC: 8 MG/DL (ref 6–23)
CALCIUM SERPL-MCNC: 8.9 MG/DL (ref 8.3–10.4)
CHLORIDE SERPL-SCNC: 99 MMOL/L (ref 98–107)
CO2 SERPL-SCNC: 29 MMOL/L (ref 21–32)
CREAT SERPL-MCNC: 0.5 MG/DL (ref 0.8–1.5)
DIFFERENTIAL METHOD BLD: ABNORMAL
DIFFERENTIAL METHOD BLD: NORMAL
EKG ATRIAL RATE: 103 BPM
EKG DIAGNOSIS: NORMAL
EKG P AXIS: 38 DEGREES
EKG P-R INTERVAL: 126 MS
EKG Q-T INTERVAL: 354 MS
EKG QRS DURATION: 88 MS
EKG QTC CALCULATION (BAZETT): 463 MS
EKG R AXIS: 38 DEGREES
EKG T AXIS: -1 DEGREES
EKG VENTRICULAR RATE: 103 BPM
EOSINOPHIL # BLD: 0 K/UL (ref 0–0.8)
EOSINOPHIL NFR BLD: 1 % (ref 0.5–7.8)
ERYTHROCYTE [DISTWIDTH] IN BLOOD BY AUTOMATED COUNT: 14.6 % (ref 11.9–14.6)
ERYTHROCYTE [DISTWIDTH] IN BLOOD BY AUTOMATED COUNT: NORMAL %
FERRITIN SERPL-MCNC: 648 NG/ML (ref 8–388)
GLOBULIN SER CALC-MCNC: 3.1 G/DL (ref 2.3–3.5)
GLUCOSE SERPL-MCNC: 170 MG/DL (ref 65–100)
HCT VFR BLD AUTO: 20.4 % (ref 41.1–50.3)
HCT VFR BLD AUTO: NORMAL % (ref 41.1–50.3)
HGB BLD-MCNC: 7.9 G/DL (ref 13.6–17.2)
HGB BLD-MCNC: NORMAL G/DL (ref 13.6–17.2)
HISTORY CHECK: NORMAL
IMM GRANULOCYTES # BLD AUTO: 0 K/UL (ref 0–0.5)
IMM GRANULOCYTES NFR BLD AUTO: 1 % (ref 0–5)
IRON SERPL-MCNC: 247 UG/DL (ref 35–150)
LIPASE SERPL-CCNC: 69 U/L (ref 73–393)
LYMPHOCYTES # BLD: 1.1 K/UL (ref 0.5–4.6)
LYMPHOCYTES NFR BLD: 25 % (ref 13–44)
MAGNESIUM SERPL-MCNC: 1.5 MG/DL (ref 1.8–2.4)
MCH RBC QN AUTO: 46.2 PG (ref 26.1–32.9)
MCH RBC QN AUTO: NORMAL PG (ref 26.1–32.9)
MCHC RBC AUTO-ENTMCNC: 38.7 G/DL (ref 31.4–35)
MCHC RBC AUTO-ENTMCNC: NORMAL G/DL (ref 31.4–35)
MCV RBC AUTO: 119.3 FL (ref 79.6–97.8)
MCV RBC AUTO: NORMAL FL (ref 79.6–97.8)
MONOCYTES # BLD: 0.1 K/UL (ref 0.1–1.3)
MONOCYTES NFR BLD: 3 % (ref 4–12)
NEUTS SEG # BLD: 3.2 K/UL (ref 1.7–8.2)
NEUTS SEG NFR BLD: 69 % (ref 43–78)
NRBC # BLD: 0.02 K/UL (ref 0–0.2)
NRBC # BLD: NORMAL K/UL
NT PRO BNP: 46 PG/ML (ref 5–125)
PLATELET # BLD AUTO: 239 K/UL (ref 150–450)
PLATELET # BLD AUTO: NORMAL K/UL
PLATELET COMMENT: ADEQUATE
PMV BLD AUTO: 10 FL (ref 9.4–12.3)
PMV BLD AUTO: NORMAL FL (ref 9.4–12.3)
POTASSIUM SERPL-SCNC: 3.4 MMOL/L (ref 3.5–5.1)
PROCALCITONIN SERPL-MCNC: <0.05 NG/ML (ref 0–0.49)
PROT SERPL-MCNC: 6.5 G/DL (ref 6.3–8.2)
RBC # BLD AUTO: 1.71 M/UL (ref 4.23–5.6)
RBC # BLD AUTO: NORMAL M/UL
RBC MORPH BLD: ABNORMAL
SODIUM SERPL-SCNC: 140 MMOL/L (ref 136–145)
TRANSFERRIN SERPL-MCNC: 165 MG/DL (ref 202–364)
TROPONIN I SERPL HS-MCNC: 4.1 PG/ML (ref 0–14)
WBC # BLD AUTO: 4.4 K/UL (ref 4.3–11.1)
WBC # BLD AUTO: NORMAL K/UL (ref 4.3–11.1)
WBC MORPH BLD: ABNORMAL

## 2022-07-13 PROCEDURE — G0378 HOSPITAL OBSERVATION PER HR: HCPCS

## 2022-07-13 PROCEDURE — 85025 COMPLETE CBC W/AUTO DIFF WBC: CPT

## 2022-07-13 PROCEDURE — P9016 RBC LEUKOCYTES REDUCED: HCPCS

## 2022-07-13 PROCEDURE — 83540 ASSAY OF IRON: CPT

## 2022-07-13 PROCEDURE — 86923 COMPATIBILITY TEST ELECTRIC: CPT

## 2022-07-13 PROCEDURE — 83880 ASSAY OF NATRIURETIC PEPTIDE: CPT

## 2022-07-13 PROCEDURE — 96368 THER/DIAG CONCURRENT INF: CPT

## 2022-07-13 PROCEDURE — 82728 ASSAY OF FERRITIN: CPT

## 2022-07-13 PROCEDURE — 93005 ELECTROCARDIOGRAM TRACING: CPT | Performed by: EMERGENCY MEDICINE

## 2022-07-13 PROCEDURE — 84466 ASSAY OF TRANSFERRIN: CPT

## 2022-07-13 PROCEDURE — 96365 THER/PROPH/DIAG IV INF INIT: CPT

## 2022-07-13 PROCEDURE — 6360000002 HC RX W HCPCS: Performed by: FAMILY MEDICINE

## 2022-07-13 PROCEDURE — 80053 COMPREHEN METABOLIC PANEL: CPT

## 2022-07-13 PROCEDURE — 6370000000 HC RX 637 (ALT 250 FOR IP): Performed by: FAMILY MEDICINE

## 2022-07-13 PROCEDURE — 99285 EMERGENCY DEPT VISIT HI MDM: CPT

## 2022-07-13 PROCEDURE — 84145 PROCALCITONIN (PCT): CPT

## 2022-07-13 PROCEDURE — 84484 ASSAY OF TROPONIN QUANT: CPT

## 2022-07-13 PROCEDURE — 94762 N-INVAS EAR/PLS OXIMTRY CONT: CPT

## 2022-07-13 PROCEDURE — 71045 X-RAY EXAM CHEST 1 VIEW: CPT

## 2022-07-13 PROCEDURE — 96366 THER/PROPH/DIAG IV INF ADDON: CPT

## 2022-07-13 PROCEDURE — 83690 ASSAY OF LIPASE: CPT

## 2022-07-13 PROCEDURE — 83735 ASSAY OF MAGNESIUM: CPT

## 2022-07-13 PROCEDURE — 36430 TRANSFUSION BLD/BLD COMPNT: CPT

## 2022-07-13 PROCEDURE — 86901 BLOOD TYPING SEROLOGIC RH(D): CPT

## 2022-07-13 PROCEDURE — 2580000003 HC RX 258: Performed by: FAMILY MEDICINE

## 2022-07-13 RX ORDER — ONDANSETRON 2 MG/ML
4 INJECTION INTRAMUSCULAR; INTRAVENOUS EVERY 6 HOURS PRN
Status: DISCONTINUED | OUTPATIENT
Start: 2022-07-13 | End: 2022-07-14 | Stop reason: HOSPADM

## 2022-07-13 RX ORDER — PANTOPRAZOLE SODIUM 40 MG/1
40 TABLET, DELAYED RELEASE ORAL ONCE
Status: COMPLETED | OUTPATIENT
Start: 2022-07-13 | End: 2022-07-13

## 2022-07-13 RX ORDER — NICOTINE 21 MG/24HR
1 PATCH, TRANSDERMAL 24 HOURS TRANSDERMAL DAILY
Status: DISCONTINUED | OUTPATIENT
Start: 2022-07-13 | End: 2022-07-14 | Stop reason: HOSPADM

## 2022-07-13 RX ORDER — SODIUM CHLORIDE 9 MG/ML
INJECTION, SOLUTION INTRAVENOUS PRN
Status: DISCONTINUED | OUTPATIENT
Start: 2022-07-13 | End: 2022-07-14 | Stop reason: HOSPADM

## 2022-07-13 RX ORDER — MAGNESIUM SULFATE 1 G/100ML
1000 INJECTION INTRAVENOUS ONCE
Status: COMPLETED | OUTPATIENT
Start: 2022-07-13 | End: 2022-07-13

## 2022-07-13 RX ORDER — SODIUM CHLORIDE 0.9 % (FLUSH) 0.9 %
5-40 SYRINGE (ML) INJECTION EVERY 12 HOURS SCHEDULED
Status: DISCONTINUED | OUTPATIENT
Start: 2022-07-13 | End: 2022-07-14 | Stop reason: HOSPADM

## 2022-07-13 RX ORDER — CALCIUM CARBONATE 200(500)MG
500 TABLET,CHEWABLE ORAL 3 TIMES DAILY PRN
Status: DISCONTINUED | OUTPATIENT
Start: 2022-07-13 | End: 2022-07-14 | Stop reason: HOSPADM

## 2022-07-13 RX ORDER — MIDAZOLAM HYDROCHLORIDE 1 MG/ML
1 INJECTION, SOLUTION INTRAMUSCULAR; INTRAVENOUS
Status: DISCONTINUED | OUTPATIENT
Start: 2022-07-13 | End: 2022-07-13

## 2022-07-13 RX ORDER — POLYETHYLENE GLYCOL 3350 17 G/17G
17 POWDER, FOR SOLUTION ORAL DAILY PRN
Status: DISCONTINUED | OUTPATIENT
Start: 2022-07-13 | End: 2022-07-14 | Stop reason: HOSPADM

## 2022-07-13 RX ORDER — LANOLIN ALCOHOL/MO/W.PET/CERES
100 CREAM (GRAM) TOPICAL DAILY
Status: DISCONTINUED | OUTPATIENT
Start: 2022-07-13 | End: 2022-07-14 | Stop reason: HOSPADM

## 2022-07-13 RX ORDER — SODIUM CHLORIDE 0.9 % (FLUSH) 0.9 %
5-40 SYRINGE (ML) INJECTION PRN
Status: DISCONTINUED | OUTPATIENT
Start: 2022-07-13 | End: 2022-07-14 | Stop reason: HOSPADM

## 2022-07-13 RX ORDER — DIAZEPAM 5 MG/ML
2 INJECTION, SOLUTION INTRAMUSCULAR; INTRAVENOUS EVERY 4 HOURS PRN
Status: DISCONTINUED | OUTPATIENT
Start: 2022-07-13 | End: 2022-07-14 | Stop reason: HOSPADM

## 2022-07-13 RX ORDER — FOLIC ACID 1 MG/1
1 TABLET ORAL DAILY
Status: DISCONTINUED | OUTPATIENT
Start: 2022-07-13 | End: 2022-07-14 | Stop reason: HOSPADM

## 2022-07-13 RX ORDER — ACETAMINOPHEN 650 MG/1
650 SUPPOSITORY RECTAL EVERY 6 HOURS PRN
Status: DISCONTINUED | OUTPATIENT
Start: 2022-07-13 | End: 2022-07-14 | Stop reason: HOSPADM

## 2022-07-13 RX ORDER — ACETAMINOPHEN 325 MG/1
650 TABLET ORAL EVERY 6 HOURS PRN
Status: DISCONTINUED | OUTPATIENT
Start: 2022-07-13 | End: 2022-07-14 | Stop reason: HOSPADM

## 2022-07-13 RX ORDER — ONDANSETRON 4 MG/1
4 TABLET, ORALLY DISINTEGRATING ORAL EVERY 8 HOURS PRN
Status: DISCONTINUED | OUTPATIENT
Start: 2022-07-13 | End: 2022-07-14 | Stop reason: HOSPADM

## 2022-07-13 RX ORDER — POTASSIUM CHLORIDE 20 MEQ/1
40 TABLET, EXTENDED RELEASE ORAL ONCE
Status: COMPLETED | OUTPATIENT
Start: 2022-07-13 | End: 2022-07-13

## 2022-07-13 RX ADMIN — PANTOPRAZOLE SODIUM 40 MG: 40 TABLET, DELAYED RELEASE ORAL at 22:03

## 2022-07-13 RX ADMIN — CALCIUM CARBONATE (ANTACID) CHEW TAB 500 MG 500 MG: 500 CHEW TAB at 19:45

## 2022-07-13 RX ADMIN — POTASSIUM CHLORIDE 40 MEQ: 20 TABLET, EXTENDED RELEASE ORAL at 16:09

## 2022-07-13 RX ADMIN — Medication 100 MG: at 16:09

## 2022-07-13 RX ADMIN — SODIUM CHLORIDE, PRESERVATIVE FREE 10 ML: 5 INJECTION INTRAVENOUS at 20:20

## 2022-07-13 RX ADMIN — MAGNESIUM SULFATE HEPTAHYDRATE 1000 MG: 1 INJECTION, SOLUTION INTRAVENOUS at 19:45

## 2022-07-13 RX ADMIN — FOLIC ACID 1 MG: 1 TABLET ORAL at 16:09

## 2022-07-13 ASSESSMENT — ENCOUNTER SYMPTOMS
COLOR CHANGE: 0
EYE REDNESS: 0
CHEST TIGHTNESS: 1
VOICE CHANGE: 0
BACK PAIN: 0
ANAL BLEEDING: 0
ABDOMINAL PAIN: 0
PHOTOPHOBIA: 0
SHORTNESS OF BREATH: 1
SWOLLEN GLANDS: 0
TROUBLE SWALLOWING: 0

## 2022-07-13 ASSESSMENT — PAIN SCALES - GENERAL
PAINLEVEL_OUTOF10: 0
PAINLEVEL_OUTOF10: 0

## 2022-07-13 ASSESSMENT — LIFESTYLE VARIABLES
HOW MANY STANDARD DRINKS CONTAINING ALCOHOL DO YOU HAVE ON A TYPICAL DAY: 3 OR 4
HOW OFTEN DO YOU HAVE A DRINK CONTAINING ALCOHOL: 4 OR MORE TIMES A WEEK

## 2022-07-13 ASSESSMENT — PAIN - FUNCTIONAL ASSESSMENT: PAIN_FUNCTIONAL_ASSESSMENT: 0-10

## 2022-07-13 NOTE — ED NOTES
Report called to receiving nurse on 5th floor. Patient ready for transport at this time.       Jessica Briones RN  07/13/22 1971

## 2022-07-13 NOTE — CONSENT
Informed Consent for Blood Component Transfusion Note    I have discussed with the patient the rationale for blood component transfusion; its benefits in treating or preventing fatigue, organ damage, or death; and its risk which includes mild transfusion reactions, rare risk of blood borne infection, or more serious but rare reactions. I have discussed the alternatives to transfusion, including the risk and consequences of not receiving transfusion. The patient had an opportunity to ask questions and had agreed to proceed with transfusion of blood components. Electronically signed by Osmany Bailey.  Leanna Palacios MD on 7/13/22 at 2:47 PM EDT

## 2022-07-13 NOTE — ED PROVIDER NOTES
[facility] Emergency Department Provider Note                   PCP:                No primary care provider on file. Age: 46 y.o. Sex: male     No diagnosis found. DISPOSITION          MDM  Number of Diagnoses or Management Options  Diagnosis management comments: Differential diagnose in this patient is broad. Reports has had issues with anemia as well. Is a smoker. Not wheezing now. Will obtain chest x-ray EKG cardiac enzymes. 2:54 PM  Hemoglobin today is 7.9. Previous was 12.5. We will type and cross for 1 unit begin transfusion. Rectal exam is negative for blood. Will discuss case with hospitalist for admission       Amount and/or Complexity of Data Reviewed  Clinical lab tests: ordered and reviewed  Tests in the radiology section of CPT®: ordered and reviewed  Review and summarize past medical records: yes  Independent visualization of images, tracings, or specimens: yes (EKG interpretation: Sinus tachycardia, rate of 103, normal axis, new T wave inversion noted in lateral leads in comparison EKG performed December 2020)    Risk of Complications, Morbidity, and/or Mortality  Presenting problems: high  Diagnostic procedures: moderate  Management options: moderate         Orders Placed This Encounter   Procedures    XR CHEST PORTABLE    CBC with Auto Differential    Comprehensive Metabolic Panel    proBNP, N-TERMINAL    Troponin    Procalcitonin    Magnesium    Lipase    Cardiac Monitor - ED Only    Continuous Pulse Oximetry    Orthostatic blood pressure and pulse    Nursing communication    EKG 12 Lead        Ilana Castellano is a 46 y.o. male who presents to the Emergency Department with chief complaint of    Chief Complaint   Patient presents with    Fatigue    Anemia      Patient presents ER with complaints of shortness of breath. Patient states that for the past couple weeks he has had increasing shortness of breath.   States is more exertional.  States when he walks up a flight of stairs she is very short of breath and feels tightness in his chest.  Reports lightheadedness and fatigue. Reports he has had some cough but reports he coughs daily as he is a smoker. Denies any fevers or chills. Denies any leg pain or leg swelling. Does report decreased appetite. The history is provided by the patient. Shortness of Breath  Severity:  Moderate  Onset quality:  Sudden  Timing:  Intermittent  Progression:  Worsening  Chronicity:  Recurrent  Context: activity    Relieved by:  Nothing  Worsened by:  Nothing  Associated symptoms: no abdominal pain, no chest pain, no claudication, no diaphoresis, no fever, no headaches, no syncope and no swollen glands    Risk factors: alcohol use and tobacco use          Review of Systems   Constitutional: Negative for diaphoresis and fever. HENT: Negative for congestion, dental problem, trouble swallowing and voice change. Eyes: Negative for photophobia and redness. Respiratory: Positive for chest tightness and shortness of breath. Cardiovascular: Negative for chest pain, palpitations, claudication, leg swelling and syncope. Gastrointestinal: Negative for abdominal pain and anal bleeding. Endocrine: Negative for polydipsia, polyphagia and polyuria. Genitourinary: Negative for penile swelling and urgency. Musculoskeletal: Negative for back pain, myalgias and neck stiffness. Skin: Negative for color change and pallor. Neurological: Negative for syncope, weakness and headaches. Hematological: Negative for adenopathy. Does not bruise/bleed easily. Psychiatric/Behavioral: Negative for behavioral problems and confusion. All other systems reviewed and are negative.       Past Medical History:   Diagnosis Date    GERD (gastroesophageal reflux disease)     once a week OTC med        Past Surgical History:   Procedure Laterality Date    CT INSERT CATH PLEURA W IMAGE  11/4/2020    CT INSERT CATH PLEURA W IMAGE 11/4/2020 [facility] CT SCAN    HEENT      wisdom teeth        History reviewed. No pertinent family history. Social Connections:     Frequency of Communication with Friends and Family: Not on file    Frequency of Social Gatherings with Friends and Family: Not on file    Attends Jew Services: Not on file    Active Member of Clubs or Organizations: Not on file    Attends Club or Organization Meetings: Not on file    Marital Status: Not on file        No Known Allergies     Vitals signs and nursing note reviewed. Patient Vitals for the past 4 hrs:   Temp Pulse Resp BP SpO2   07/13/22 1110 99.4 °F (37.4 °C) (!) 104 20 109/72 100 %          Physical Exam  Vitals and nursing note reviewed. Constitutional:       General: He is not in acute distress. Appearance: Normal appearance. He is not ill-appearing. HENT:      Head: Normocephalic and atraumatic. Right Ear: External ear normal.      Left Ear: External ear normal.      Nose: Nose normal. No congestion or rhinorrhea. Mouth/Throat:      Mouth: Mucous membranes are moist.      Pharynx: No oropharyngeal exudate or posterior oropharyngeal erythema. Eyes:      General:         Right eye: No discharge. Left eye: No discharge. Extraocular Movements: Extraocular movements intact. Pupils: Pupils are equal, round, and reactive to light. Cardiovascular:      Rate and Rhythm: Regular rhythm. Tachycardia present. Pulses: Normal pulses. Heart sounds: Normal heart sounds. No murmur heard. Pulmonary:      Effort: Pulmonary effort is normal. No respiratory distress. Breath sounds: Normal breath sounds. No stridor. No wheezing. Abdominal:      General: Abdomen is flat. There is no distension. Palpations: Abdomen is soft. There is no mass. Hernia: No hernia is present. Genitourinary:     Rectum: Guaiac result positive.    Musculoskeletal:         General: No swelling, tenderness, deformity or signs of injury. Normal range of motion. Cervical back: Normal range of motion. No rigidity or tenderness. Skin:     General: Skin is warm. Coloration: Skin is not jaundiced or pale. Findings: No bruising or erythema. Neurological:      General: No focal deficit present. Mental Status: He is alert and oriented to person, place, and time. Cranial Nerves: No cranial nerve deficit. Sensory: No sensory deficit. Psychiatric:         Mood and Affect: Mood normal.         Behavior: Behavior normal.          Procedures      Labs Reviewed   COMPREHENSIVE METABOLIC PANEL - Abnormal; Notable for the following components:       Result Value    Potassium 3.4 (*)     Glucose 170 (*)     CREATININE 0.50 (*)     Total Bilirubin 2.0 (*)     Albumin 3.4 (*)     Albumin/Globulin Ratio 1.1 (*)     All other components within normal limits   CBC WITH AUTO DIFFERENTIAL   PROBNP, N-TERMINAL   TROPONIN   PROCALCITONIN   MAGNESIUM   LIPASE        XR CHEST PORTABLE   Final Result      1. No acute cardiopulmonary process. CPT code(s) 71853                                         Voice dictation software was used during the making of this note. This software is not perfect and grammatical and other typographical errors may be present. This note has not been completely proofread for errors.      Jigar Dow MD  07/13/22 7277 Binta Henderson MD  07/13/22 7211

## 2022-07-13 NOTE — ACP (ADVANCE CARE PLANNING)
[facility]  At the heart of better care     Advance Care Planning   Admit Date:  2022 12:53 PM   Name:  Cole Flores   Age:  46 y.o. Sex:  male  :  1970   MRN:  [id number]   Room:  [facility] 170 is able to make his own decisions:   Yes    Patient / surrogate decision-maker directed:  FULL    Patient or surrogate consented to discussion of the current conditions, workup, management plans, prognosis, and understand the risk for further deterioration. Time spent: 17 minutes in direct discussion (face to face and/or over phone).       Signed:  Melissa Elizabeth DO

## 2022-07-13 NOTE — H&P
Hospitalist Admission History and Physical         NAME:            Thanh Salinas    Age:                46 y.o.    :               1970    MRN:              [id number]    PCP: None Provider    Consulting MD:    Treatment Team: Attending Provider: Shruthi Waldron DO; Registered Nurse: Johnathon Isaac RN         Chief Complaint   Patient presents with    Fatigue    Anemia   HPI:    Patient is a 46 y.o. male who presented to the ED for cc weakness, exertional dyspnea, and near syncope that has been happening over time. Nothing seems to make better or worse. Hx of anemia from ETOH abuse and malnutrition requiring blood transfusions in the past with negative GI work up, ETOH abuse, GERD, and MSSA bacteremia in 2020. He does not have PCP and denies taking any medications. Labs - K 3.4, Mg 1.5. Hg 7.9 (was 12.5 in 2020)    Stool occult negative. Past Medical History:   Diagnosis Date    GERD (gastroesophageal reflux disease)     once a week OTC med            Past Surgical History:   Procedure Laterality Date    CT INSERT CATH PLEURA W IMAGE  2020    CT INSERT CATH PLEURA W IMAGE 2020 [facility] CT SCAN    HEENT      wisdom teeth            History reviewed. No pertinent family history. Family history reviewed and negative except as noted above. Social History     Social History Narrative    Not on file            Social History     Tobacco Use    Smoking status: Current Every Day Smoker     Packs/day: 1.00    Smokeless tobacco: Never Used    Tobacco comment: Quit smoking: stopped on 2020   Substance Use Topics    Alcohol use: Yes     Alcohol/week: 6.7 standard drinks            Social History     Substance and Sexual Activity   Drug Use No                 No Known Allergies         Prior to Admission medications    Medication Sig Start Date End Date Taking?  Authorizing Provider   cyclobenzaprine (FLEXERIL) 10 MG tablet Take 10 mg by mouth 3 times daily as needed 2/28/18   Ar Automatic Reconciliation   naloxone (NARCAN) 4 MG/0.1ML LIQD nasal spray Use 1 spray intranasally, then discard. Repeat with new spray every 2 min as needed for opioid overdose symptoms, alternating nostrils. 11/13/20   Ar Automatic Reconciliation   ondansetron (ZOFRAN-ODT) 8 MG TBDP disintegrating tablet Take 8 mg by mouth 1/9/22   Ar Automatic Reconciliation                      Review of Systems         Constitutional: NAD    Eyes:  no change in visual acuity, no photophobia    Ears, nose, mouth, throat, and face: no  Odynphagia, dysphagia, no thrush or exudate, negative for chronic sinus congestion, recurrent headaches    Respiratory: exertional dyspnea    Cardiovascular: negative for CP, palpitations, or PND    Gastrointestinal: negative for abdominal pain, no hematemesis, hematochezia or BRBPR    Genitourinary: no urgency, frequency, or dysuria, no nocturia    Integument/breast: negative for skin rash or skin lesions    Hematologic/lymphatic: negative for known bleeding disorder    Musculoskeletal:chronic left sided thoracic discomfort    Neurological:generalized weakness    Behavioral/Psych: negative for depression or chronic anxiety,    Endocrine: negative for polydyspia, polyuria or intolerance to heat or cold    Allergic/Immunologic: negative for chronic allergic rhinitis, or known connective tissue disorder              Objective:         Patient Vitals for the past 24 hrs:   Temp Pulse Resp BP SpO2   07/13/22 1532 98.7 °F (37.1 °C) 91 18 114/76 94 %   07/13/22 1430 -- -- -- 121/72 95 %   07/13/22 1110 99.4 °F (37.4 °C) (!) 104 20 109/72 100 %            No intake/output data recorded. No intake/output data recorded.          Data Review:   Recent Results (from the past 24 hour(s))   EKG 12 Lead    Collection Time: 07/13/22 11:02 AM   Result Value Ref Range    Ventricular Rate 103 BPM    Atrial Rate 103 BPM    P-R Interval 126 ms    QRS Duration 88 ms    Q-T Interval 354 ms    QTc Calculation (Kulwinder) 463 ms    P Axis 38 degrees    R Axis 38 degrees    T Axis -1 degrees    Diagnosis Sinus tachycardia    CBC with Auto Differential    Collection Time: 07/13/22 11:48 AM   Result Value Ref Range    WBC  4.3 - 11.1 K/uL     CORRECTION TO MEDICAL RECORD-DISREGARD THESE TEST RESULTS    RBC  M/uL     CORRECTION TO MEDICAL RECORD-DISREGARD THESE TEST RESULTS    Hemoglobin  13.6 - 17.2 g/dL     CORRECTION TO MEDICAL RECORD-DISREGARD THESE TEST RESULTS    Hematocrit  41.1 - 50.3 %     CORRECTION TO MEDICAL RECORD-DISREGARD THESE TEST RESULTS    MCV  79.6 - 97.8 FL     CORRECTION TO MEDICAL RECORD-DISREGARD THESE TEST RESULTS    MCH  26.1 - 32.9 PG     CORRECTION TO MEDICAL RECORD-DISREGARD THESE TEST RESULTS    MCHC  31.4 - 35.0 g/dL     CORRECTION TO MEDICAL RECORD-DISREGARD THESE TEST RESULTS    RDW  %     CORRECTION TO MEDICAL RECORD-DISREGARD THESE TEST RESULTS    Platelets  K/uL     CORRECTION TO MEDICAL RECORD-DISREGARD THESE TEST RESULTS    MPV  9.4 - 12.3 FL     CORRECTION TO MEDICAL RECORD-DISREGARD THESE TEST RESULTS    nRBC  K/uL     CORRECTION TO MEDICAL RECORD-DISREGARD THESE TEST RESULTS    Differential Type AUTOMATED     Comprehensive Metabolic Panel    Collection Time: 07/13/22 11:48 AM   Result Value Ref Range    Sodium 140 136 - 145 mmol/L    Potassium 3.4 (L) 3.5 - 5.1 mmol/L    Chloride 99 98 - 107 mmol/L    CO2 29 21 - 32 mmol/L    Anion Gap 12 7 - 16 mmol/L    Glucose 170 (H) 65 - 100 mg/dL    BUN 8 6 - 23 MG/DL    CREATININE 0.50 (L) 0.8 - 1.5 MG/DL    GFR African American >60 >60 ml/min/1.73m2    GFR Non- >60 >60 ml/min/1.73m2    Calcium 8.9 8.3 - 10.4 MG/DL    Total Bilirubin 2.0 (H) 0.2 - 1.1 MG/DL    ALT 22 12 - 65 U/L    AST 32 15 - 37 U/L    Alk Phosphatase 74 50 - 136 U/L    Total Protein 6.5 6.3 - 8.2 g/dL    Albumin 3.4 (L) 3.5 - 5.0 g/dL    Globulin 3.1 2.3 - 3.5 g/dL    Albumin/Globulin Ratio 1.1 (L) 1.2 - 3.5     Troponin    Collection Time: 07/13/22 1:26 PM   Result Value Ref Range    Troponin, High Sensitivity 4.1 0 - 14 pg/mL   Magnesium    Collection Time: 07/13/22  1:26 PM   Result Value Ref Range    Magnesium 1.5 (L) 1.8 - 2.4 mg/dL   Lipase    Collection Time: 07/13/22  1:26 PM   Result Value Ref Range    Lipase 69 (L) 73 - 393 U/L   CBC with Auto Differential    Collection Time: 07/13/22  1:26 PM   Result Value Ref Range    WBC 4.4 4.3 - 11.1 K/uL    RBC 1.71 (L) 4.23 - 5.6 M/uL    Hemoglobin 7.9 (L) 13.6 - 17.2 g/dL    Hematocrit 20.4 (L) 41.1 - 50.3 %    .3 (H) 79.6 - 97.8 FL    MCH 46.2 (H) 26.1 - 32.9 PG    MCHC 38.7 (H) 31.4 - 35.0 g/dL    RDW 14.6 11.9 - 14.6 %    Platelets 134 671 - 685 K/uL    MPV 10.0 9.4 - 12.3 FL    nRBC 0.02 0.0 - 0.2 K/uL    Differential Type PENDING    TYPE AND SCREEN    Collection Time: 07/13/22  1:26 PM   Result Value Ref Range    Crossmatch expiration date 07/16/2022,9392     ABO/Rh A NEGATIVE     Antibody Screen NEG     Blood Bank Comment BLOOD READY CALLED TO [facility] 7/13 AT 1511 SB     Unit Number [id number]     Product Code Blood Bank  LR     Unit Divison 00     Dispense Status Blood Bank ISSUED     Crossmatch Result Compatible    proBNP, N-TERMINAL    Collection Time: 07/13/22  1:26 PM   Result Value Ref Range    NT Pro-BNP 46 5 - 125 PG/ML   PREPARE RBC (CROSSMATCH), 1 Units    Collection Time: 07/13/22  3:00 PM   Result Value Ref Range    History Check Historical check performed             Physical Exam:         General:    Alert, cooperative, no distress, appears stated age. Eyes:    Conjunctivae/corneas clear. PERRL    Ears:    Normal     Nose:    Wearing mask    Mouth/Throat:    Wearing mask    Neck:     no JVD. Back:     deferred    Lungs:     Clear to auscultation bilaterally. Heart:    Regular rate and rhythm, S1, S2 normal    Abdomen:     Soft, non-tender. Bowel sounds normal. No masses,  No organomegaly.     Extremities:    Extremities normal, atraumatic, no cyanosis or edema.    Skin:    Skin color, texture, turgor normal. No rashes or lesions    Neurologic:    CNII-XII intact. Normal strength, sensation and reflexes throughout. Assessment and Plan         Principal Problem:    Symptomatic anemia  Active Problems:    Alcohol abuse    Nicotine dependence  Resolved Problems:    * No resolved hospital problems. *    Symptomatic anemia - Suspecting due to ETOH abuse and poor nutrition. Needs to stop ETOH. Order iron studies. No obvious active GI bleeding so will not order GI consult nor PPI at this moment. Place as obs. To get 1 unit PRBC. If Hg stable tomorrow, likely can dc and have close follow up with a PCP. May need scheduled outpatient transfusions. Trend Hg. ETOH abuse - PRN ativan.  Thiamine, folic acid    Hypokalemia/hypomagnesemia - Supplement    Tobacco abuse - Nicotine patch    DVT prophylaxis - SCDs       Signed By:    Lobito Martínez DO    July 13, 2022

## 2022-07-13 NOTE — PROGRESS NOTES
TRANSFER - IN REPORT:    Verbal report received on Tha Mann  being received from [facility] for routine progression of patient care      Report consisted of patient's Situation, Background, Assessment and   Recommendations(SBAR). Information from the following report(s) Nurse Handoff Report was reviewed with the receiving nurse. Opportunity for questions and clarification was provided. Assessment completed upon patient's arrival to unit and care assumed.

## 2022-07-13 NOTE — ED TRIAGE NOTES
Patient presents ambulatory from home with complaints of generalized fatigue, and near syncope. Patient with complaints of nausea/vomiting, decreased po intake. Patient history of anemia with hgb of 4.3 in 2020 that required a blood transfusion. Patient states that he typically drinks 3 drinks per day.

## 2022-07-14 VITALS
HEIGHT: 70 IN | OXYGEN SATURATION: 98 % | RESPIRATION RATE: 18 BRPM | DIASTOLIC BLOOD PRESSURE: 90 MMHG | HEART RATE: 90 BPM | WEIGHT: 192.2 LBS | SYSTOLIC BLOOD PRESSURE: 124 MMHG | BODY MASS INDEX: 27.52 KG/M2 | TEMPERATURE: 98.6 F

## 2022-07-14 PROBLEM — E53.8 FOLIC ACID DEFICIENCY: Status: ACTIVE | Noted: 2022-07-14

## 2022-07-14 PROBLEM — E53.8 VITAMIN B12 DEFICIENCY: Status: ACTIVE | Noted: 2022-07-14

## 2022-07-14 LAB
ABO + RH BLD: NORMAL
ANION GAP SERPL CALC-SCNC: 2 MMOL/L (ref 7–16)
BASOPHILS # BLD: 0 K/UL (ref 0–0.2)
BASOPHILS NFR BLD: 1 % (ref 0–2)
BLD PROD TYP BPU: NORMAL
BLOOD BANK CMNT PATIENT-IMP: NORMAL
BLOOD BANK DISPENSE STATUS: NORMAL
BLOOD GROUP ANTIBODIES SERPL: NORMAL
BPU ID: NORMAL
BUN SERPL-MCNC: 13 MG/DL (ref 6–23)
CALCIUM SERPL-MCNC: 8.4 MG/DL (ref 8.3–10.4)
CHLORIDE SERPL-SCNC: 101 MMOL/L (ref 98–107)
CO2 SERPL-SCNC: 32 MMOL/L (ref 21–32)
CREAT SERPL-MCNC: 0.5 MG/DL (ref 0.8–1.5)
CROSSMATCH RESULT: NORMAL
DIFFERENTIAL METHOD BLD: ABNORMAL
EOSINOPHIL # BLD: 0.1 K/UL (ref 0–0.8)
EOSINOPHIL NFR BLD: 2 % (ref 0.5–7.8)
ERYTHROCYTE [DISTWIDTH] IN BLOOD BY AUTOMATED COUNT: 19.4 % (ref 11.9–14.6)
FOLATE SERPL-MCNC: 2.7 NG/ML (ref 3.1–17.5)
GLUCOSE SERPL-MCNC: 129 MG/DL (ref 65–100)
HCT VFR BLD AUTO: 21.1 % (ref 41.1–50.3)
HCT VFR BLD AUTO: 21.3 % (ref 41.1–50.3)
HCT VFR BLD AUTO: 23.7 % (ref 41.1–50.3)
HGB BLD-MCNC: 7.8 G/DL (ref 13.6–17.2)
HGB BLD-MCNC: 7.8 G/DL (ref 13.6–17.2)
HGB BLD-MCNC: 8.6 G/DL (ref 13.6–17.2)
IMM GRANULOCYTES # BLD AUTO: 0 K/UL (ref 0–0.5)
IMM GRANULOCYTES NFR BLD AUTO: 1 % (ref 0–5)
LYMPHOCYTES # BLD: 1.3 K/UL (ref 0.5–4.6)
LYMPHOCYTES NFR BLD: 29 % (ref 13–44)
MAGNESIUM SERPL-MCNC: 1.6 MG/DL (ref 1.8–2.4)
MCH RBC QN AUTO: 43.8 PG (ref 26.1–32.9)
MCHC RBC AUTO-ENTMCNC: 37 G/DL (ref 31.4–35)
MCV RBC AUTO: 118.5 FL (ref 79.6–97.8)
MONOCYTES # BLD: 0.1 K/UL (ref 0.1–1.3)
MONOCYTES NFR BLD: 2 % (ref 4–12)
NEUTS SEG # BLD: 2.9 K/UL (ref 1.7–8.2)
NEUTS SEG NFR BLD: 66 % (ref 43–78)
NRBC # BLD: 0 K/UL (ref 0–0.2)
PLATELET # BLD AUTO: 201 K/UL (ref 150–450)
PMV BLD AUTO: 10.2 FL (ref 9.4–12.3)
POTASSIUM SERPL-SCNC: 3.2 MMOL/L (ref 3.5–5.1)
RBC # BLD AUTO: 1.78 M/UL (ref 4.23–5.6)
SODIUM SERPL-SCNC: 135 MMOL/L (ref 138–145)
SPECIMEN EXP DATE BLD: NORMAL
UNIT DIVISION: 0
VIT B12 SERPL-MCNC: 173 PG/ML (ref 193–986)
WBC # BLD AUTO: 4.4 K/UL (ref 4.3–11.1)

## 2022-07-14 PROCEDURE — 96368 THER/DIAG CONCURRENT INF: CPT

## 2022-07-14 PROCEDURE — 6370000000 HC RX 637 (ALT 250 FOR IP): Performed by: FAMILY MEDICINE

## 2022-07-14 PROCEDURE — 82746 ASSAY OF FOLIC ACID SERUM: CPT

## 2022-07-14 PROCEDURE — 80048 BASIC METABOLIC PNL TOTAL CA: CPT

## 2022-07-14 PROCEDURE — 2580000003 HC RX 258: Performed by: FAMILY MEDICINE

## 2022-07-14 PROCEDURE — 96366 THER/PROPH/DIAG IV INF ADDON: CPT

## 2022-07-14 PROCEDURE — 6360000002 HC RX W HCPCS: Performed by: FAMILY MEDICINE

## 2022-07-14 PROCEDURE — 85025 COMPLETE CBC W/AUTO DIFF WBC: CPT

## 2022-07-14 PROCEDURE — 83735 ASSAY OF MAGNESIUM: CPT

## 2022-07-14 PROCEDURE — 85014 HEMATOCRIT: CPT

## 2022-07-14 PROCEDURE — G0378 HOSPITAL OBSERVATION PER HR: HCPCS

## 2022-07-14 PROCEDURE — 36415 COLL VENOUS BLD VENIPUNCTURE: CPT

## 2022-07-14 PROCEDURE — 82607 VITAMIN B-12: CPT

## 2022-07-14 PROCEDURE — 96372 THER/PROPH/DIAG INJ SC/IM: CPT

## 2022-07-14 RX ORDER — POTASSIUM CHLORIDE 7.45 MG/ML
10 INJECTION INTRAVENOUS ONCE
Status: COMPLETED | OUTPATIENT
Start: 2022-07-14 | End: 2022-07-14

## 2022-07-14 RX ORDER — LORAZEPAM 0.5 MG/1
0.5 TABLET ORAL ONCE
Status: COMPLETED | OUTPATIENT
Start: 2022-07-14 | End: 2022-07-14

## 2022-07-14 RX ORDER — POTASSIUM CHLORIDE 750 MG/1
10 TABLET, EXTENDED RELEASE ORAL DAILY
Qty: 30 TABLET | Refills: 0 | Status: SHIPPED | OUTPATIENT
Start: 2022-07-14 | End: 2022-08-13

## 2022-07-14 RX ORDER — LANOLIN ALCOHOL/MO/W.PET/CERES
100 CREAM (GRAM) TOPICAL DAILY
Qty: 30 TABLET | Refills: 0 | Status: SHIPPED | OUTPATIENT
Start: 2022-07-15

## 2022-07-14 RX ORDER — MAGNESIUM SULFATE IN WATER 40 MG/ML
2000 INJECTION, SOLUTION INTRAVENOUS ONCE
Status: COMPLETED | OUTPATIENT
Start: 2022-07-14 | End: 2022-07-14

## 2022-07-14 RX ORDER — ADHESIVE TAPE 3"X 2.3 YD
200 TAPE, NON-MEDICATED TOPICAL DAILY
Qty: 30 TABLET | Refills: 0 | Status: SHIPPED | OUTPATIENT
Start: 2022-07-14

## 2022-07-14 RX ORDER — CYANOCOBALAMIN 1000 UG/ML
1000 INJECTION INTRAMUSCULAR; SUBCUTANEOUS ONCE
Status: COMPLETED | OUTPATIENT
Start: 2022-07-14 | End: 2022-07-14

## 2022-07-14 RX ORDER — POTASSIUM CHLORIDE 20 MEQ/1
40 TABLET, EXTENDED RELEASE ORAL
Status: DISCONTINUED | OUTPATIENT
Start: 2022-07-14 | End: 2022-07-14 | Stop reason: HOSPADM

## 2022-07-14 RX ORDER — FAMOTIDINE 20 MG/1
20 TABLET, FILM COATED ORAL 2 TIMES DAILY
Qty: 60 TABLET | Refills: 0 | Status: SHIPPED | OUTPATIENT
Start: 2022-07-14 | End: 2022-08-13

## 2022-07-14 RX ORDER — FOLIC ACID 1 MG/1
1 TABLET ORAL DAILY
Qty: 30 TABLET | Refills: 3 | Status: SHIPPED | OUTPATIENT
Start: 2022-07-15

## 2022-07-14 RX ADMIN — FOLIC ACID 1 MG: 1 TABLET ORAL at 09:31

## 2022-07-14 RX ADMIN — CYANOCOBALAMIN 1000 MCG: 1000 INJECTION, SOLUTION INTRAMUSCULAR; SUBCUTANEOUS at 12:55

## 2022-07-14 RX ADMIN — MAGNESIUM SULFATE HEPTAHYDRATE 2000 MG: 40 INJECTION, SOLUTION INTRAVENOUS at 09:32

## 2022-07-14 RX ADMIN — POTASSIUM CHLORIDE 10 MEQ: 7.46 INJECTION, SOLUTION INTRAVENOUS at 09:31

## 2022-07-14 RX ADMIN — LORAZEPAM 0.5 MG: 0.5 TABLET ORAL at 00:57

## 2022-07-14 RX ADMIN — POTASSIUM CHLORIDE 40 MEQ: 20 TABLET, EXTENDED RELEASE ORAL at 11:25

## 2022-07-14 RX ADMIN — SODIUM CHLORIDE, PRESERVATIVE FREE 20 ML: 5 INJECTION INTRAVENOUS at 09:57

## 2022-07-14 RX ADMIN — Medication 100 MG: at 09:31

## 2022-07-14 ASSESSMENT — PAIN SCALES - GENERAL: PAINLEVEL_OUTOF10: 0

## 2022-07-14 NOTE — PROGRESS NOTES
Patient complaining of mild indigestion after dinner. Patient ordered tums and pepcid.  Patient also given x1 dose of ativan per hospitalist.

## 2022-07-14 NOTE — DISCHARGE SUMMARY
Full Code    Follow Ups:  PCP AND GI    Follow up labs/diagnostics (ultimately defer to outpatient provider):  CBC, BMP and magnesium  Consider to start patient on parenteral B12 replacement outpatient. Time spent in patient discharge and coordination 38 minutes. Plan was discussed with patient. All questions answered. Patient was stable at time of discharge. Instructions given to call a physician or return if any concerns. Current Discharge Medication List      START taking these medications    Details   folic acid (FOLVITE) 1 MG tablet Take 1 tablet by mouth daily  Qty: 30 tablet, Refills: 3      Magnesium Oxide 200 MG TABS Take 200 mg by mouth daily  Qty: 30 tablet, Refills: 0      potassium chloride (KLOR-CON M) 10 MEQ extended release tablet Take 1 tablet by mouth daily  Qty: 30 tablet, Refills: 0      thiamine 100 MG tablet Take 1 tablet by mouth daily  Qty: 30 tablet, Refills: 0      famotidine (PEPCID) 20 MG tablet Take 1 tablet by mouth 2 times daily  Qty: 60 tablet, Refills: 0      cyanocobalamin ([employer] VITAMIN B12) 1000 MCG tablet Take 1 tablet by mouth in the morning and at bedtime  Qty: 30 tablet, Refills: 3         CONTINUE these medications which have NOT CHANGED    Details   cyclobenzaprine (FLEXERIL) 10 MG tablet Take 10 mg by mouth 3 times daily as needed      naloxone (NARCAN) 4 MG/0.1ML LIQD nasal spray Use 1 spray intranasally, then discard. Repeat with new spray every 2 min as needed for opioid overdose symptoms, alternating nostrils. ondansetron (ZOFRAN-ODT) 8 MG TBDP disintegrating tablet Take 8 mg by mouth             Procedures done this admission:  * No surgery found *    Consults this admission:  None    Echocardiogram results:  No results found for this or any previous visit. Diagnostic Imaging/Tests:   XR CHEST PORTABLE    Result Date: 7/13/2022  1. No acute cardiopulmonary process.  CPT code(s) 18396          Labs: Results:       BMP, Mg, Phos Recent Labs 07/13/22  1148 07/14/22  0653    135*   K 3.4* 3.2*   CL 99 101   CO2 29 32   ANIONGAP 12 2*   BUN 8 13   CREATININE 0.50* 0.50*   LABGLOM >60 >60   GFRAA >60 >60   CALCIUM 8.9 8.4   GLUCOSE 170* 129*      CBC Recent Labs     07/13/22  1148 07/13/22  1148 07/13/22  1326 07/13/22  2348 07/14/22  0653   WBC CORRECTION TO MEDICAL RECORD-DISREGARD THESE TEST RESULTS  --  4.4  --  4.4   RBC CORRECTION TO MEDICAL RECORD-DISREGARD THESE TEST RESULTS  --  1.71*  --  1.78*   HGB CORRECTION TO MEDICAL RECORD-DISREGARD THESE TEST RESULTS   < > 7.9* 8.6* 7.8*  7.8*   HCT CORRECTION TO MEDICAL RECORD-DISREGARD THESE TEST RESULTS   < > 20.4* 23.7* 21.1*  21.3*   MCV CORRECTION TO MEDICAL RECORD-DISREGARD THESE TEST RESULTS  --  119.3*  --  118.5*   MCH CORRECTION TO MEDICAL RECORD-DISREGARD THESE TEST RESULTS  --  46.2*  --  43.8*   MCHC CORRECTION TO MEDICAL RECORD-DISREGARD THESE TEST RESULTS  --  38.7*  --  37.0*   RDW CORRECTION TO MEDICAL RECORD-DISREGARD THESE TEST RESULTS  --  14.6  --  19.4*   PLT CORRECTION TO MEDICAL RECORD-DISREGARD THESE TEST RESULTS  --  239  --  201   MPV CORRECTION TO MEDICAL RECORD-DISREGARD THESE TEST RESULTS  --  10.0  --  10.2   NRBC CORRECTION TO MEDICAL RECORD-DISREGARD THESE TEST RESULTS  --  0.02  --  0.00   SEGS  --   --  69  --  66   LYMPHOPCT  --   --  25  --  29   EOSRELPCT  --   --  1  --  2   MONOPCT  --   --  3*  --  2*   BASOPCT  --   --  1  --  1   IMMGRAN  --   --  1  --  1   SEGSABS  --   --  3.2  --  2.9   LYMPHSABS  --   --  1.1  --  1.3   EOSABS  --   --  0.0  --  0.1   MONOSABS  --   --  0.1  --  0.1   BASOSABS  --   --  0.0  --  0.0   ABSIMMGRAN  --   --  0.0  --  0.0    < > = values in this interval not displayed.       LFT Recent Labs     07/13/22  1148   BILITOT 2.0*   ALKPHOS 74   AST 32   ALT 22   PROT 6.5   LABALBU 3.4*   GLOB 3.1      Cardiac  Lab Results   Component Value Date/Time    NTPROBNP 46 07/13/2022 01:26 PM    TROPHS 4.1 07/13/2022 01:26 PM    TROPHS 4.1 11/15/2020 12:33 AM    TROPHS 3.2 11/14/2020 09:23 PM      Coags Lab Results   Component Value Date/Time    PROTIME 15.2 11/03/2020 11:48 AM    INR 1.2 11/03/2020 11:48 AM    APTT 35.7 11/03/2020 11:48 AM      A1c No results found for: LABA1C, EAG   Lipids No results found for: CHOL, LDLCALC, LABVLDL, HDL, CHOLHDLRATIO, TRIG   Thyroid  No results found for: Gayle Nicholas     Most Recent UA Lab Results   Component Value Date/Time    COLORU ORANGE 11/15/2020 04:11 AM    SPECGRAV 1.082 11/15/2020 04:11 AM    PROTEINU TRACE 11/15/2020 04:11 AM    GLUCOSEU Negative 11/15/2020 04:11 AM    KETUA Negative 11/15/2020 04:11 AM    BILIRUBINUR SMALL 11/15/2020 04:11 AM    BLOODU Negative 11/15/2020 04:11 AM    NITRU Negative 11/15/2020 04:11 AM    LEUKOCYTESUR Negative 11/15/2020 04:11 AM    WBCUA 0-3 11/15/2020 04:11 AM    RBCUA 3-5 11/15/2020 04:11 AM    BACTERIA 0 11/15/2020 04:11 AM          All Labs from Last 24 Hrs:  Recent Results (from the past 24 hour(s))   PREPARE RBC (CROSSMATCH), 1 Units    Collection Time: 07/13/22  3:00 PM   Result Value Ref Range    History Check Historical check performed    Hemoglobin and Hematocrit    Collection Time: 07/13/22 11:48 PM   Result Value Ref Range    Hemoglobin 8.6 (L) 13.6 - 17.2 g/dL    Hematocrit 23.7 (L) 41.1 - 50.3 %   Hemoglobin and Hematocrit    Collection Time: 07/14/22  6:53 AM   Result Value Ref Range    Hemoglobin 7.8 (L) 13.6 - 17.2 g/dL    Hematocrit 21.3 (L) 41.1 - 50.3 %   Magnesium    Collection Time: 07/14/22  6:53 AM   Result Value Ref Range    Magnesium 1.6 (L) 1.8 - 2.4 mg/dL   Basic Metabolic Panel w/ Reflex to MG    Collection Time: 07/14/22  6:53 AM   Result Value Ref Range    Sodium 135 (L) 138 - 145 mmol/L    Potassium 3.2 (L) 3.5 - 5.1 mmol/L    Chloride 101 98 - 107 mmol/L    CO2 32 21 - 32 mmol/L    Anion Gap 2 (L) 7 - 16 mmol/L    Glucose 129 (H) 65 - 100 mg/dL    BUN 13 6 - 23 MG/DL    CREATININE 0.50 (L) 0.8 - 1.5 MG/DL    GFR  >60 >60 ml/min/1.73m2    GFR Non- >60 >60 ml/min/1.73m2    Calcium 8.4 8.3 - 10.4 MG/DL   CBC with Auto Differential    Collection Time: 07/14/22  6:53 AM   Result Value Ref Range    WBC 4.4 4.3 - 11.1 K/uL    RBC 1.78 (L) 4.23 - 5.6 M/uL    Hemoglobin 7.8 (L) 13.6 - 17.2 g/dL    Hematocrit 21.1 (L) 41.1 - 50.3 %    .5 (H) 79.6 - 97.8 FL    MCH 43.8 (H) 26.1 - 32.9 PG    MCHC 37.0 (H) 31.4 - 35.0 g/dL    RDW 19.4 (H) 11.9 - 14.6 %    Platelets 018 298 - 001 K/uL    MPV 10.2 9.4 - 12.3 FL    nRBC 0.00 0.0 - 0.2 K/uL    Differential Type AUTOMATED      Seg Neutrophils 66 43 - 78 %    Lymphocytes 29 13 - 44 %    Monocytes 2 (L) 4.0 - 12.0 %    Eosinophils % 2 0.5 - 7.8 %    Basophils 1 0.0 - 2.0 %    Immature Granulocytes 1 0.0 - 5.0 %    Segs Absolute 2.9 1.7 - 8.2 K/UL    Absolute Lymph # 1.3 0.5 - 4.6 K/UL    Absolute Mono # 0.1 0.1 - 1.3 K/UL    Absolute Eos # 0.1 0.0 - 0.8 K/UL    Basophils Absolute 0.0 0.0 - 0.2 K/UL    Absolute Immature Granulocyte 0.0 0.0 - 0.5 K/UL   Folate    Collection Time: 07/14/22  6:53 AM   Result Value Ref Range    Folate 2.7 (L) 3.1 - 17.5 ng/mL   Vitamin B12    Collection Time: 07/14/22  6:53 AM   Result Value Ref Range    Vitamin B-12 173 (L) 193 - 986 pg/mL       No Known Allergies  Immunization History   Administered Date(s) Administered    Tdap (Boostrix, Adacel) 09/01/2011       Recent Vital Data:  Patient Vitals for the past 24 hrs:   Temp Pulse Resp BP SpO2   07/14/22 1105 98.6 °F (37 °C) 90 18 (!) 124/90 98 %   07/14/22 0804 98.4 °F (36.9 °C) 85 18 111/78 98 %   07/14/22 0314 99 °F (37.2 °C) 90 18 (!) 145/85 95 %   07/13/22 2237 99.9 °F (37.7 °C) 93 18 116/83 98 %   07/13/22 1933 99.2 °F (37.3 °C) 94 18 128/83 98 %   07/13/22 1830 99 °F (37.2 °C) 94 17 127/88 98 %   07/13/22 1616 98.3 °F (36.8 °C) 94 17 136/89 99 %   07/13/22 1546 -- -- -- -- 95 %   07/13/22 1545 -- -- -- 116/72 --   07/13/22 1532 98.7 °F (37.1 °C) 91 18 114/76 94 %   07/13/22 1430 -- -- -- 121/72 95 %       Oxygen Therapy  SpO2: 98 %  O2 Device: None (Room air)    Estimated body mass index is 27.58 kg/m² as calculated from the following:    Height as of this encounter: 5' 10\" (1.778 m). Weight as of this encounter: 192 lb 3.2 oz (87.2 kg). Intake/Output Summary (Last 24 hours) at 7/14/2022 1326  Last data filed at 7/14/2022 0320  Gross per 24 hour   Intake 480 ml   Output --   Net 480 ml         Physical Exam:    General:    No overt distress  Head:  Normocephalic, atraumatic  Eyes:  Sclerae appear normal.  Pupils equally round. HENT:  Nares appear normal, no drainage. Moist mucous membranes  Neck:  No restricted ROM. Trachea midline  CV:   RRR. No m/r/g. No JVD  Lungs:   CTAB. No wheezing, rhonchi, or rales. Respirations even, unlabored  Abdomen:   Soft, nontender, nondistended. Extremities: Warm and dry. No cyanosis or clubbing. No edema. Skin:     No rashes. Normal coloration  Neuro:  CN II-XII grossly intact. Psych:  Normal mood and affect. Signed:  Denny Alonzo MD    Part of this note may have been written by using a voice dictation software. The note has been proof read but may still contain some grammatical/other typographical errors.

## 2022-07-14 NOTE — CARE COORDINATION
Pt is for discharge home today with no needs/supportive care orders received for CM at this time. Pt will have close follow up with [facility]  Patient given [facility] New patient packet    Milestones met    ASSESSMENT NOTE    Attending Physician: No att. providers found  Admit Problem: Symptomatic anemia [D64.9]  Anemia, unspecified type [D64.9]  Date/Time of Admission: 7/13/2022 12:53 PM  Problem List:  Patient Active Problem List   Diagnosis    Alcohol abuse    Acute respiratory failure with hypoxia (HCC)    Hyponatremia    Nicotine dependence    S/P thoracotomy    Loculated empyema (HCC)    Leukocytosis    Loculated pleural effusion    Tobacco abuse    Acute blood loss anemia    MSSA bacteremia    Fall with significant injury    Hydropneumothorax    Sinus tachycardia    Symptomatic anemia    Folic acid deficiency    Vitamin B12 deficiency       Service Assessment  Patient Orientation Alert and Oriented   Cognition Alert   History Provided By Patient   Primary Caregiver Self   Accompanied By/Relationship     Support Systems Friends/Neighbors   Patient's Healthcare Decision Maker is: Legal Next of Kin   PCP Verified by CM No ([facility] referral paperwork given to patient at bedside)   Last Visit to PCP     Prior Functional Level     Current Functional Level     Can patient return to prior living arrangement Yes   Ability to make needs known: Good   Family able to assist with home care needs: Yes   Would you like for me to discuss the discharge plan with any other family members/significant others, and if so, who?  Yes   Financial Resources     Community Resources Other (Comment) ([facility] Referral ( new Patient))   CM/SW Referral No PCP ([facility] referral)     Social/Functional History  Lives With     Type of [address] Access     Entrance Stairs - Number of Steps     Entrance Stairs - Rails     Bathroom Shower/Tub     Bathroom Toilet     Bathroom Equipment     Bathroom Accessibility     [address] Help From     [address] Work     Driving     Shopping          Other (Comment)     Homemaking Responsibilities     Meal Prep Responsibility     Laundry Responsibility     Cleaning Responsibility     Bill Paying/Finance [address] Management     Other (Comment)     Ambuation Assistance Independent   Transfer Assisstance Independent   Active      Patient's  Info     Mode of Transportation     Education     Occupation     Type of Occupation       Discharge Planning   Type of [address] Prior To Admission     Potential Assistance Needed     DME     DME     DME Ordered? Potential Assistance Purchasing Medications     Meds-to-Beds: Does the patient want to have any new prescriptions delivered to bedside prior to discharge? Type of Home Care Services     Patient expects to be discharged to: House   Follow Up Appointment: Best Day/Time     One/Two Story Residence:     # of Interior Steps     Height of Each Step (in)     [employer] Available     History of Falls? Services At/After Discharge  Transition of Care Consult (CM Consult): Internal Home Health     Internal Hospice     Reason Outside Agency [address]     Partner SNF     Reason Why Partner SNF Not Chosen     Internal Comfort Care     Reason Outside [address] Discharge     [address] Provided?  No   Mode of Transport at Discharge [address] [facility] Time of Discharge     Confirm Follow Up Transport Friends     Condition of Participation: Discharge Planning  The plan for Transition of Care is related to the following treatment goals: no further skilled needs at discharge   The Patient and/or Patient Representative was provided with a Choice of Provider? Patient   Name of the Patient Representative who was provided with the Choice of Provider and agrees with the Discharge Plan? The Patient and/or Patient Representative Agree with the Discharge Plan? Yes   Freedom of Choice list was provided with basic dialogue that supports the individualized plan of care/goals, treatment preferences, and shares the quality data associated with the providers?  Yes     Documentation for Discharge Appeal  Discharge Appealed by     Date notified by [facility] of appeal request:     Time notified by [facility] of appeal request:     Detailed Notice of Discharge given to:     Date Notice of Discharge given:     Time Notice of Discharge given:     Date records sent to [address] [employer]     Time records sent to [address] [employer]     Date Notified of Outcome     Time Notified of Outcome     Outcome of appeal           Liz Kenny RN 07/14/22 6:03 PM

## 2022-07-14 NOTE — PLAN OF CARE
Problem: Pain  Goal: Verbalizes/displays adequate comfort level or baseline comfort level  7/14/2022 1058 by Aviva Cormier RN  Outcome: Progressing  7/13/2022 2258 by Danyell Gonzalez RN  Outcome: Progressing  7/13/2022 2258 by Danyell Gonzalez RN  Outcome: Progressing     Problem: Safety - Adult  Goal: Free from fall injury  7/14/2022 1058 by Aviva Cormier RN  Outcome: Progressing  Flowsheets (Taken 7/14/2022 0900)  Free From Fall Injury:   Instruct family/caregiver on patient safety   Based on caregiver fall risk screen, instruct family/caregiver to ask for assistance with transferring infant if caregiver noted to have fall risk factors  7/13/2022 2258 by Danyell Gonzalez RN  Outcome: Progressing     Problem: ABCDS Injury Assessment  Goal: Absence of physical injury  Outcome: Progressing  Flowsheets (Taken 7/14/2022 0900)  Absence of Physical Injury: Implement safety measures based on patient assessment

## 2022-07-15 ENCOUNTER — TELEPHONE (OUTPATIENT)
Dept: INTERNAL MEDICINE CLINIC | Facility: CLINIC | Age: 52
End: 2022-07-15

## 2022-07-15 NOTE — TELEPHONE ENCOUNTER
Called patient to schedule [facility] appt following discharge from [facility] 07/14/2022. Dx Symptomatic anemia.         L/M

## 2022-08-31 NOTE — PROGRESS NOTES
Patient is confused. Argumentative. He would not stay in bed and allow his chest tube to be hooked up with suction. He disconnected the chest tube himself and walked around in the hallway. Impression   He is harming himself with this action. Will give Haldol IM once. He is alcoholic. Will keep him on Librium for delirium tremens prevention. Already on Folic acid and thiamine.    Monitor Adbry Pregnancy And Lactation Text: It is unknown if this medication will adversely affect pregnancy or breast feeding.

## 2023-01-01 ENCOUNTER — APPOINTMENT (OUTPATIENT)
Dept: GENERAL RADIOLOGY | Age: 53
DRG: 871 | End: 2023-01-01

## 2023-01-01 ENCOUNTER — APPOINTMENT (OUTPATIENT)
Dept: CT IMAGING | Age: 53
DRG: 871 | End: 2023-01-01

## 2023-01-01 ENCOUNTER — HOSPITAL ENCOUNTER (INPATIENT)
Age: 53
LOS: 1 days | DRG: 871 | End: 2023-06-19
Attending: EMERGENCY MEDICINE | Admitting: INTERNAL MEDICINE

## 2023-01-01 VITALS — HEIGHT: 70 IN | BODY MASS INDEX: 27.2 KG/M2 | WEIGHT: 190 LBS | TEMPERATURE: 32 F

## 2023-01-01 DIAGNOSIS — R11.2 NAUSEA AND VOMITING, UNSPECIFIED VOMITING TYPE: ICD-10-CM

## 2023-01-01 DIAGNOSIS — A41.9 SEPSIS, DUE TO UNSPECIFIED ORGANISM, UNSPECIFIED WHETHER ACUTE ORGAN DYSFUNCTION PRESENT (HCC): Primary | ICD-10-CM

## 2023-01-01 DIAGNOSIS — E87.29 ALCOHOLIC KETOACIDOSIS: ICD-10-CM

## 2023-01-01 DIAGNOSIS — N17.9 ACUTE RENAL FAILURE, UNSPECIFIED ACUTE RENAL FAILURE TYPE (HCC): ICD-10-CM

## 2023-01-01 LAB
ABO + RH BLD: NORMAL
ALBUMIN SERPL-MCNC: 2 G/DL (ref 3.5–5)
ALBUMIN SERPL-MCNC: 2.5 G/DL (ref 3.5–5)
ALBUMIN SERPL-MCNC: 3.1 G/DL (ref 3.5–5)
ALBUMIN/GLOB SERPL: 0.8 (ref 0.4–1.6)
ALBUMIN/GLOB SERPL: 0.9 (ref 0.4–1.6)
ALBUMIN/GLOB SERPL: 0.9 (ref 0.4–1.6)
ALP SERPL-CCNC: 118 U/L (ref 50–136)
ALP SERPL-CCNC: 42 U/L (ref 50–136)
ALP SERPL-CCNC: 84 U/L (ref 50–136)
ALT SERPL-CCNC: 31 U/L (ref 12–65)
ALT SERPL-CCNC: 33 U/L (ref 12–65)
ALT SERPL-CCNC: 51 U/L (ref 12–65)
ANION GAP SERPL CALC-SCNC: 25 MMOL/L (ref 2–11)
ANION GAP SERPL CALC-SCNC: 41 MMOL/L (ref 2–11)
ANION GAP SERPL CALC-SCNC: 49 MMOL/L (ref 2–11)
ARTERIAL PATENCY WRIST A: ABNORMAL
ARTERIAL PATENCY WRIST A: ABNORMAL
ARTERIAL PATENCY WRIST A: POSITIVE
ARTERIAL PATENCY WRIST A: POSITIVE
AST SERPL-CCNC: 124 U/L (ref 15–37)
AST SERPL-CCNC: 261 U/L (ref 15–37)
AST SERPL-CCNC: 98 U/L (ref 15–37)
BASE DEFICIT BLD-SCNC: 10.7 MMOL/L
BASE DEFICIT BLD-SCNC: 12.5 MMOL/L
BASE DEFICIT BLD-SCNC: 16.4 MMOL/L
BASE DEFICIT BLD-SCNC: 8.7 MMOL/L
BASOPHILS # BLD: 0 K/UL (ref 0–0.2)
BASOPHILS NFR BLD: 0 % (ref 0–2)
BDY SITE: ABNORMAL
BILIRUB SERPL-MCNC: 2.2 MG/DL (ref 0.2–1.1)
BILIRUB SERPL-MCNC: 2.3 MG/DL (ref 0.2–1.1)
BILIRUB SERPL-MCNC: 4.7 MG/DL (ref 0.2–1.1)
BLOOD GROUP ANTIBODIES SERPL: NORMAL
BUN SERPL-MCNC: 24 MG/DL (ref 6–23)
BUN SERPL-MCNC: 27 MG/DL (ref 6–23)
BUN SERPL-MCNC: 34 MG/DL (ref 6–23)
CALCIUM SERPL-MCNC: 5.5 MG/DL (ref 8.3–10.4)
CALCIUM SERPL-MCNC: 6.9 MG/DL (ref 8.3–10.4)
CALCIUM SERPL-MCNC: 8.8 MG/DL (ref 8.3–10.4)
CHLORIDE SERPL-SCNC: 77 MMOL/L (ref 101–110)
CHLORIDE SERPL-SCNC: 83 MMOL/L (ref 101–110)
CHLORIDE SERPL-SCNC: 93 MMOL/L (ref 101–110)
CO2 SERPL-SCNC: 18 MMOL/L (ref 21–32)
CO2 SERPL-SCNC: 4 MMOL/L (ref 21–32)
CO2 SERPL-SCNC: 7 MMOL/L (ref 21–32)
CREAT SERPL-MCNC: 2.7 MG/DL (ref 0.8–1.5)
CREAT SERPL-MCNC: 3.3 MG/DL (ref 0.8–1.5)
CREAT SERPL-MCNC: 3.8 MG/DL (ref 0.8–1.5)
DIFFERENTIAL METHOD BLD: ABNORMAL
EKG ATRIAL RATE: 141 BPM
EKG ATRIAL RATE: 96 BPM
EKG DIAGNOSIS: NORMAL
EKG DIAGNOSIS: NORMAL
EKG P AXIS: 18 DEGREES
EKG P AXIS: 71 DEGREES
EKG P-R INTERVAL: 151 MS
EKG P-R INTERVAL: 160 MS
EKG Q-T INTERVAL: 346 MS
EKG Q-T INTERVAL: 432 MS
EKG QRS DURATION: 107 MS
EKG QRS DURATION: 84 MS
EKG QTC CALCULATION (BAZETT): 529 MS
EKG QTC CALCULATION (BAZETT): 546 MS
EKG R AXIS: 21 DEGREES
EKG R AXIS: 39 DEGREES
EKG T AXIS: 69 DEGREES
EKG T AXIS: 79 DEGREES
EKG VENTRICULAR RATE: 141 BPM
EKG VENTRICULAR RATE: 96 BPM
EOSINOPHIL # BLD: 0.1 K/UL (ref 0–0.8)
EOSINOPHIL NFR BLD: 1 % (ref 0.5–7.8)
ERYTHROCYTE [DISTWIDTH] IN BLOOD BY AUTOMATED COUNT: 13.7 % (ref 11.9–14.6)
ERYTHROCYTE [DISTWIDTH] IN BLOOD BY AUTOMATED COUNT: 14 % (ref 11.9–14.6)
ETHANOL SERPL-MCNC: 200 MG/DL (ref 0–0.08)
GAS FLOW.O2 O2 DELIVERY SYS: ABNORMAL
GLOBULIN SER CALC-MCNC: 2.2 G/DL (ref 2.8–4.5)
GLOBULIN SER CALC-MCNC: 2.7 G/DL (ref 2.8–4.5)
GLOBULIN SER CALC-MCNC: 4 G/DL (ref 2.8–4.5)
GLUCOSE BLD STRIP.AUTO-MCNC: 125 MG/DL (ref 65–100)
GLUCOSE BLD STRIP.AUTO-MCNC: 147 MG/DL (ref 65–100)
GLUCOSE BLD STRIP.AUTO-MCNC: 57 MG/DL (ref 65–100)
GLUCOSE SERPL-MCNC: 132 MG/DL (ref 65–100)
GLUCOSE SERPL-MCNC: 178 MG/DL (ref 65–100)
GLUCOSE SERPL-MCNC: 190 MG/DL (ref 65–100)
HCO3 BLD-SCNC: 17.4 MMOL/L (ref 22–26)
HCO3 BLD-SCNC: 17.4 MMOL/L (ref 22–26)
HCO3 BLD-SCNC: 19.3 MMOL/L (ref 22–26)
HCO3 BLD-SCNC: 19.3 MMOL/L (ref 22–26)
HCT VFR BLD AUTO: 35.5 % (ref 41.1–50.3)
HCT VFR BLD AUTO: 48.8 % (ref 41.1–50.3)
HGB BLD-MCNC: 12 G/DL (ref 13.6–17.2)
HGB BLD-MCNC: 15.8 G/DL (ref 13.6–17.2)
IMM GRANULOCYTES # BLD AUTO: 0.1 K/UL (ref 0–0.5)
IMM GRANULOCYTES NFR BLD AUTO: 2 % (ref 0–5)
INSPIRATION.DURATION SETTING TIME VENT: 0.9 SEC
INSPIRATION.DURATION SETTING TIME VENT: 66 SEC
IPAP/PIP/HIGH PEEP: 28
IPAP/PIP/HIGH PEEP: 33
IPAP/PIP/HIGH PEEP: 33
LACTATE SERPL-SCNC: 14.5 MMOL/L (ref 0.4–2)
LACTATE SERPL-SCNC: 14.6 MMOL/L (ref 0.4–2)
LACTATE SERPL-SCNC: 15.2 MMOL/L (ref 0.4–2)
LACTATE SERPL-SCNC: 15.6 MMOL/L (ref 0.4–2)
LACTATE SERPL-SCNC: 16.7 MMOL/L (ref 0.4–2)
LACTATE SERPL-SCNC: 17.3 MMOL/L (ref 0.4–2)
LACTATE SERPL-SCNC: 21.5 MMOL/L (ref 0.4–2)
LACTATE SERPL-SCNC: 26.7 MMOL/L (ref 0.4–2)
LACTATE SERPL-SCNC: 28.6 MMOL/L (ref 0.4–2)
LACTATE SERPL-SCNC: 29.7 MMOL/L (ref 0.4–2)
LIPASE SERPL-CCNC: 139 U/L (ref 73–393)
LYMPHOCYTES # BLD: 0.8 K/UL (ref 0.5–4.6)
LYMPHOCYTES NFR BLD: 14 % (ref 13–44)
MAGNESIUM SERPL-MCNC: 1.1 MG/DL (ref 1.8–2.4)
MAGNESIUM SERPL-MCNC: 1.2 MG/DL (ref 1.8–2.4)
MAGNESIUM SERPL-MCNC: 2.1 MG/DL (ref 1.8–2.4)
MAGNESIUM SERPL-MCNC: 2.5 MG/DL (ref 1.8–2.4)
MCH RBC QN AUTO: 37.5 PG (ref 26.1–32.9)
MCH RBC QN AUTO: 37.9 PG (ref 26.1–32.9)
MCHC RBC AUTO-ENTMCNC: 32.4 G/DL (ref 31.4–35)
MCHC RBC AUTO-ENTMCNC: 33.8 G/DL (ref 31.4–35)
MCV RBC AUTO: 110.9 FL (ref 82–102)
MCV RBC AUTO: 117 FL (ref 82–102)
MONOCYTES # BLD: 0.2 K/UL (ref 0.1–1.3)
MONOCYTES NFR BLD: 3 % (ref 4–12)
NEUTS SEG # BLD: 4.6 K/UL (ref 1.7–8.2)
NEUTS SEG NFR BLD: 79 % (ref 43–78)
NRBC # BLD: 0.02 K/UL (ref 0–0.2)
NRBC # BLD: 0.03 K/UL (ref 0–0.2)
O2/TOTAL GAS SETTING VFR VENT: 100 %
O2/TOTAL GAS SETTING VFR VENT: 80 %
PAW @ MEAN EXP FLOW ON VENT: 15 CMH2O
PAW @ MEAN EXP FLOW ON VENT: 16 CMH2O
PAW @ MEAN EXP FLOW ON VENT: 17 CMH2O
PCO2 BLD: 48.6 MMHG (ref 35–45)
PCO2 BLD: 56.2 MMHG (ref 35–45)
PCO2 BLD: 59.5 MMHG (ref 35–45)
PCO2 BLD: 82.2 MMHG (ref 35–45)
PEEP RESPIRATORY: 8 CMH2O
PH BLD: 6.93 (ref 7.35–7.45)
PH BLD: 7.1 (ref 7.35–7.45)
PH BLD: 7.12 (ref 7.35–7.45)
PH BLD: 7.21 (ref 7.35–7.45)
PHOSPHATE SERPL-MCNC: 6 MG/DL (ref 2.5–4.5)
PLATELET # BLD AUTO: 207 K/UL (ref 150–450)
PLATELET # BLD AUTO: 54 K/UL (ref 150–450)
PMV BLD AUTO: 11.6 FL (ref 9.4–12.3)
PMV BLD AUTO: 11.8 FL (ref 9.4–12.3)
PO2 BLD: 203 MMHG (ref 75–100)
PO2 BLD: 68 MMHG (ref 75–100)
PO2 BLD: 70 MMHG (ref 75–100)
PO2 BLD: 95 MMHG (ref 75–100)
POTASSIUM SERPL-SCNC: 3.1 MMOL/L (ref 3.5–5.1)
POTASSIUM SERPL-SCNC: 3.8 MMOL/L (ref 3.5–5.1)
POTASSIUM SERPL-SCNC: 4 MMOL/L (ref 3.5–5.1)
PROCALCITONIN SERPL-MCNC: 3.44 NG/ML (ref 0–0.49)
PROT SERPL-MCNC: 4.2 G/DL (ref 6.3–8.2)
PROT SERPL-MCNC: 5.2 G/DL (ref 6.3–8.2)
PROT SERPL-MCNC: 7.1 G/DL (ref 6.3–8.2)
RBC # BLD AUTO: 3.2 M/UL (ref 4.23–5.6)
RBC # BLD AUTO: 4.17 M/UL (ref 4.23–5.6)
RESPIRATORY RATE, POC: 22 (ref 5–40)
RESPIRATORY RATE, POC: 27 (ref 5–40)
RESPIRATORY RATE, POC: 32 (ref 5–40)
SAO2 % BLD: 86.4 % (ref 95–98)
SAO2 % BLD: 88.8 % (ref 95–98)
SAO2 % BLD: 93.6 % (ref 95–98)
SAO2 % BLD: 98.8 % (ref 95–98)
SERVICE CMNT-IMP: ABNORMAL
SODIUM SERPL-SCNC: 130 MMOL/L (ref 133–143)
SODIUM SERPL-SCNC: 131 MMOL/L (ref 133–143)
SODIUM SERPL-SCNC: 136 MMOL/L (ref 133–143)
SPECIMEN EXP DATE BLD: NORMAL
SPECIMEN TYPE: ABNORMAL
VANCOMYCIN SERPL-MCNC: 24.1 UG/ML
VENTILATION MODE VENT: ABNORMAL
VT SETTING VENT: 450 ML
VT SETTING VENT: 520 ML
VT SETTING VENT: 520 ML
VT SETTING VENT: 550 ML
WBC # BLD AUTO: 18.2 K/UL (ref 4.3–11.1)
WBC # BLD AUTO: 5.9 K/UL (ref 4.3–11.1)

## 2023-01-01 PROCEDURE — C9113 INJ PANTOPRAZOLE SODIUM, VIA: HCPCS

## 2023-01-01 PROCEDURE — 93005 ELECTROCARDIOGRAM TRACING: CPT

## 2023-01-01 PROCEDURE — 51798 US URINE CAPACITY MEASURE: CPT

## 2023-01-01 PROCEDURE — 2500000003 HC RX 250 WO HCPCS: Performed by: INTERNAL MEDICINE

## 2023-01-01 PROCEDURE — 02HV33Z INSERTION OF INFUSION DEVICE INTO SUPERIOR VENA CAVA, PERCUTANEOUS APPROACH: ICD-10-PCS | Performed by: INTERNAL MEDICINE

## 2023-01-01 PROCEDURE — 0BH17EZ INSERTION OF ENDOTRACHEAL AIRWAY INTO TRACHEA, VIA NATURAL OR ARTIFICIAL OPENING: ICD-10-PCS

## 2023-01-01 PROCEDURE — A4216 STERILE WATER/SALINE, 10 ML: HCPCS | Performed by: INTERNAL MEDICINE

## 2023-01-01 PROCEDURE — 96365 THER/PROPH/DIAG IV INF INIT: CPT

## 2023-01-01 PROCEDURE — 2580000003 HC RX 258: Performed by: INTERNAL MEDICINE

## 2023-01-01 PROCEDURE — 6360000002 HC RX W HCPCS: Performed by: INTERNAL MEDICINE

## 2023-01-01 PROCEDURE — 71045 X-RAY EXAM CHEST 1 VIEW: CPT

## 2023-01-01 PROCEDURE — 36620 INSERTION CATHETER ARTERY: CPT

## 2023-01-01 PROCEDURE — 6360000002 HC RX W HCPCS

## 2023-01-01 PROCEDURE — 6370000000 HC RX 637 (ALT 250 FOR IP): Performed by: INTERNAL MEDICINE

## 2023-01-01 PROCEDURE — 36415 COLL VENOUS BLD VENIPUNCTURE: CPT

## 2023-01-01 PROCEDURE — 2100000000 HC CCU R&B

## 2023-01-01 PROCEDURE — 99291 CRITICAL CARE FIRST HOUR: CPT

## 2023-01-01 PROCEDURE — 80053 COMPREHEN METABOLIC PANEL: CPT

## 2023-01-01 PROCEDURE — 2580000003 HC RX 258

## 2023-01-01 PROCEDURE — 82803 BLOOD GASES ANY COMBINATION: CPT

## 2023-01-01 PROCEDURE — 2500000003 HC RX 250 WO HCPCS

## 2023-01-01 PROCEDURE — 2500000003 HC RX 250 WO HCPCS: Performed by: EMERGENCY MEDICINE

## 2023-01-01 PROCEDURE — 74176 CT ABD & PELVIS W/O CONTRAST: CPT

## 2023-01-01 PROCEDURE — 85027 COMPLETE CBC AUTOMATED: CPT

## 2023-01-01 PROCEDURE — 84100 ASSAY OF PHOSPHORUS: CPT

## 2023-01-01 PROCEDURE — 2700000000 HC OXYGEN THERAPY PER DAY

## 2023-01-01 PROCEDURE — 94003 VENT MGMT INPAT SUBQ DAY: CPT

## 2023-01-01 PROCEDURE — 80202 ASSAY OF VANCOMYCIN: CPT

## 2023-01-01 PROCEDURE — 93005 ELECTROCARDIOGRAM TRACING: CPT | Performed by: INTERNAL MEDICINE

## 2023-01-01 PROCEDURE — 6360000002 HC RX W HCPCS: Performed by: EMERGENCY MEDICINE

## 2023-01-01 PROCEDURE — 99253 IP/OBS CNSLTJ NEW/EST LOW 45: CPT | Performed by: INTERNAL MEDICINE

## 2023-01-01 PROCEDURE — 82962 GLUCOSE BLOOD TEST: CPT

## 2023-01-01 PROCEDURE — 83605 ASSAY OF LACTIC ACID: CPT

## 2023-01-01 PROCEDURE — 82077 ASSAY SPEC XCP UR&BREATH IA: CPT

## 2023-01-01 PROCEDURE — 5A1935Z RESPIRATORY VENTILATION, LESS THAN 24 CONSECUTIVE HOURS: ICD-10-PCS

## 2023-01-01 PROCEDURE — 86850 RBC ANTIBODY SCREEN: CPT

## 2023-01-01 PROCEDURE — 74018 RADEX ABDOMEN 1 VIEW: CPT

## 2023-01-01 PROCEDURE — 84145 PROCALCITONIN (PCT): CPT

## 2023-01-01 PROCEDURE — 4A133B1 MONITORING OF ARTERIAL PRESSURE, PERIPHERAL, PERCUTANEOUS APPROACH: ICD-10-PCS

## 2023-01-01 PROCEDURE — 83735 ASSAY OF MAGNESIUM: CPT

## 2023-01-01 PROCEDURE — 37799 UNLISTED PX VASCULAR SURGERY: CPT

## 2023-01-01 PROCEDURE — 31500 INSERT EMERGENCY AIRWAY: CPT

## 2023-01-01 PROCEDURE — 86901 BLOOD TYPING SEROLOGIC RH(D): CPT

## 2023-01-01 PROCEDURE — 36556 INSERT NON-TUNNEL CV CATH: CPT | Performed by: INTERNAL MEDICINE

## 2023-01-01 PROCEDURE — 36592 COLLECT BLOOD FROM PICC: CPT

## 2023-01-01 PROCEDURE — 96361 HYDRATE IV INFUSION ADD-ON: CPT

## 2023-01-01 PROCEDURE — 99285 EMERGENCY DEPT VISIT HI MDM: CPT

## 2023-01-01 PROCEDURE — 99291 CRITICAL CARE FIRST HOUR: CPT | Performed by: INTERNAL MEDICINE

## 2023-01-01 PROCEDURE — 36600 WITHDRAWAL OF ARTERIAL BLOOD: CPT

## 2023-01-01 PROCEDURE — 86900 BLOOD TYPING SEROLOGIC ABO: CPT

## 2023-01-01 PROCEDURE — A4216 STERILE WATER/SALINE, 10 ML: HCPCS

## 2023-01-01 PROCEDURE — 2580000003 HC RX 258: Performed by: EMERGENCY MEDICINE

## 2023-01-01 PROCEDURE — 87040 BLOOD CULTURE FOR BACTERIA: CPT

## 2023-01-01 PROCEDURE — C9113 INJ PANTOPRAZOLE SODIUM, VIA: HCPCS | Performed by: INTERNAL MEDICINE

## 2023-01-01 PROCEDURE — 83690 ASSAY OF LIPASE: CPT

## 2023-01-01 PROCEDURE — 94002 VENT MGMT INPAT INIT DAY: CPT

## 2023-01-01 PROCEDURE — 4A133J1 MONITORING OF ARTERIAL PULSE, PERIPHERAL, PERCUTANEOUS APPROACH: ICD-10-PCS

## 2023-01-01 PROCEDURE — 96375 TX/PRO/DX INJ NEW DRUG ADDON: CPT

## 2023-01-01 PROCEDURE — 85025 COMPLETE CBC W/AUTO DIFF WBC: CPT

## 2023-01-01 PROCEDURE — 36556 INSERT NON-TUNNEL CV CATH: CPT

## 2023-01-01 RX ORDER — LORAZEPAM 1 MG/1
2 TABLET ORAL
Status: DISCONTINUED | OUTPATIENT
Start: 2023-01-01 | End: 2023-01-01

## 2023-01-01 RX ORDER — MAGNESIUM SULFATE IN WATER 40 MG/ML
4000 INJECTION, SOLUTION INTRAVENOUS ONCE
Status: COMPLETED | OUTPATIENT
Start: 2023-01-01 | End: 2023-01-01

## 2023-01-01 RX ORDER — ACETAMINOPHEN 650 MG/1
650 SUPPOSITORY RECTAL EVERY 6 HOURS PRN
Status: DISCONTINUED | OUTPATIENT
Start: 2023-01-01 | End: 2023-06-20 | Stop reason: HOSPADM

## 2023-01-01 RX ORDER — MORPHINE SULFATE 4 MG/ML
4 INJECTION, SOLUTION INTRAMUSCULAR; INTRAVENOUS
Status: DISCONTINUED | OUTPATIENT
Start: 2023-01-01 | End: 2023-06-20 | Stop reason: HOSPADM

## 2023-01-01 RX ORDER — MIDAZOLAM HYDROCHLORIDE 1 MG/ML
2 INJECTION INTRAMUSCULAR; INTRAVENOUS ONCE
Status: COMPLETED | OUTPATIENT
Start: 2023-01-01 | End: 2023-01-01

## 2023-01-01 RX ORDER — NOREPINEPHRINE BITARTRATE 0.06 MG/ML
1-100 INJECTION, SOLUTION INTRAVENOUS CONTINUOUS
Status: DISCONTINUED | OUTPATIENT
Start: 2023-01-01 | End: 2023-01-01 | Stop reason: SDUPTHER

## 2023-01-01 RX ORDER — SUCCINYLCHOLINE/SOD CL,ISO/PF 200MG/10ML
200 SYRINGE (ML) INTRAVENOUS ONCE
Status: COMPLETED | OUTPATIENT
Start: 2023-01-01 | End: 2023-01-01

## 2023-01-01 RX ORDER — LORAZEPAM 1 MG/1
3 TABLET ORAL
Status: DISCONTINUED | OUTPATIENT
Start: 2023-01-01 | End: 2023-01-01

## 2023-01-01 RX ORDER — MIDAZOLAM HYDROCHLORIDE 1 MG/ML
1-10 INJECTION, SOLUTION INTRAVENOUS CONTINUOUS
Status: DISCONTINUED | OUTPATIENT
Start: 2023-01-01 | End: 2023-01-01

## 2023-01-01 RX ORDER — SODIUM CHLORIDE 0.9 % (FLUSH) 0.9 %
5-40 SYRINGE (ML) INJECTION PRN
Status: DISCONTINUED | OUTPATIENT
Start: 2023-01-01 | End: 2023-01-01

## 2023-01-01 RX ORDER — 0.9 % SODIUM CHLORIDE 0.9 %
1000 INTRAVENOUS SOLUTION INTRAVENOUS ONCE
Status: COMPLETED | OUTPATIENT
Start: 2023-01-01 | End: 2023-01-01

## 2023-01-01 RX ORDER — ETOMIDATE 2 MG/ML
20 INJECTION INTRAVENOUS ONCE
Status: COMPLETED | OUTPATIENT
Start: 2023-01-01 | End: 2023-01-01

## 2023-01-01 RX ORDER — ONDANSETRON 4 MG/1
4 TABLET, ORALLY DISINTEGRATING ORAL EVERY 8 HOURS PRN
Status: DISCONTINUED | OUTPATIENT
Start: 2023-01-01 | End: 2023-06-20 | Stop reason: HOSPADM

## 2023-01-01 RX ORDER — LORAZEPAM 2 MG/ML
1 INJECTION INTRAMUSCULAR
Status: DISCONTINUED | OUTPATIENT
Start: 2023-01-01 | End: 2023-01-01

## 2023-01-01 RX ORDER — MAGNESIUM SULFATE IN WATER 40 MG/ML
2000 INJECTION, SOLUTION INTRAVENOUS ONCE
Status: COMPLETED | OUTPATIENT
Start: 2023-01-01 | End: 2023-01-01

## 2023-01-01 RX ORDER — NOREPINEPHRINE BITARTRATE 0.06 MG/ML
1-100 INJECTION, SOLUTION INTRAVENOUS CONTINUOUS
Status: DISCONTINUED | OUTPATIENT
Start: 2023-01-01 | End: 2023-01-01

## 2023-01-01 RX ORDER — CALCIUM GLUCONATE 20 MG/ML
2000 INJECTION, SOLUTION INTRAVENOUS ONCE
Status: COMPLETED | OUTPATIENT
Start: 2023-01-01 | End: 2023-01-01

## 2023-01-01 RX ORDER — DEXTROSE MONOHYDRATE 100 MG/ML
INJECTION, SOLUTION INTRAVENOUS CONTINUOUS PRN
Status: DISCONTINUED | OUTPATIENT
Start: 2023-01-01 | End: 2023-01-01

## 2023-01-01 RX ORDER — ONDANSETRON 2 MG/ML
4 INJECTION INTRAMUSCULAR; INTRAVENOUS EVERY 6 HOURS PRN
Status: DISCONTINUED | OUTPATIENT
Start: 2023-01-01 | End: 2023-06-20 | Stop reason: HOSPADM

## 2023-01-01 RX ORDER — 0.9 % SODIUM CHLORIDE 0.9 %
2000 INTRAVENOUS SOLUTION INTRAVENOUS ONCE
Status: COMPLETED | OUTPATIENT
Start: 2023-01-01 | End: 2023-01-01

## 2023-01-01 RX ORDER — DEXMEDETOMIDINE HYDROCHLORIDE 4 UG/ML
.1-1.5 INJECTION, SOLUTION INTRAVENOUS CONTINUOUS
Status: DISCONTINUED | OUTPATIENT
Start: 2023-01-01 | End: 2023-01-01

## 2023-01-01 RX ORDER — NICOTINE 21 MG/24HR
1 PATCH, TRANSDERMAL 24 HOURS TRANSDERMAL DAILY
Status: DISCONTINUED | OUTPATIENT
Start: 2023-01-01 | End: 2023-01-01

## 2023-01-01 RX ORDER — LORAZEPAM 2 MG/ML
3 INJECTION INTRAMUSCULAR
Status: DISCONTINUED | OUTPATIENT
Start: 2023-01-01 | End: 2023-01-01

## 2023-01-01 RX ORDER — SODIUM CHLORIDE, SODIUM LACTATE, POTASSIUM CHLORIDE, AND CALCIUM CHLORIDE .6; .31; .03; .02 G/100ML; G/100ML; G/100ML; G/100ML
1000 INJECTION, SOLUTION INTRAVENOUS ONCE
Status: COMPLETED | OUTPATIENT
Start: 2023-01-01 | End: 2023-01-01

## 2023-01-01 RX ORDER — SODIUM CHLORIDE 9 MG/ML
INJECTION, SOLUTION INTRAVENOUS PRN
Status: DISCONTINUED | OUTPATIENT
Start: 2023-01-01 | End: 2023-01-01

## 2023-01-01 RX ORDER — THIAMINE HYDROCHLORIDE 100 MG/ML
100 INJECTION, SOLUTION INTRAMUSCULAR; INTRAVENOUS DAILY
Status: DISCONTINUED | OUTPATIENT
Start: 2023-01-01 | End: 2023-01-01

## 2023-01-01 RX ORDER — ACETAMINOPHEN 325 MG/1
650 TABLET ORAL EVERY 6 HOURS PRN
Status: DISCONTINUED | OUTPATIENT
Start: 2023-01-01 | End: 2023-06-20 | Stop reason: HOSPADM

## 2023-01-01 RX ORDER — FENTANYL CITRATE-0.9 % NACL/PF 10 MCG/ML
25-200 PLASTIC BAG, INJECTION (ML) INTRAVENOUS CONTINUOUS
Status: DISCONTINUED | OUTPATIENT
Start: 2023-01-01 | End: 2023-01-01

## 2023-01-01 RX ORDER — SODIUM CHLORIDE 0.9 % (FLUSH) 0.9 %
5-40 SYRINGE (ML) INJECTION EVERY 12 HOURS SCHEDULED
Status: DISCONTINUED | OUTPATIENT
Start: 2023-01-01 | End: 2023-01-01

## 2023-01-01 RX ORDER — LORAZEPAM 1 MG/1
4 TABLET ORAL
Status: DISCONTINUED | OUTPATIENT
Start: 2023-01-01 | End: 2023-01-01

## 2023-01-01 RX ORDER — SUCCINYLCHOLINE/SOD CL,ISO/PF 200MG/10ML
SYRINGE (ML) INTRAVENOUS
Status: COMPLETED
Start: 2023-01-01 | End: 2023-01-01

## 2023-01-01 RX ORDER — LORAZEPAM 2 MG/ML
4 INJECTION INTRAMUSCULAR
Status: DISCONTINUED | OUTPATIENT
Start: 2023-01-01 | End: 2023-01-01

## 2023-01-01 RX ORDER — ETOMIDATE 2 MG/ML
INJECTION INTRAVENOUS
Status: COMPLETED
Start: 2023-01-01 | End: 2023-01-01

## 2023-01-01 RX ORDER — ALBUTEROL SULFATE 2.5 MG/3ML
2.5 SOLUTION RESPIRATORY (INHALATION)
Status: DISCONTINUED | OUTPATIENT
Start: 2023-01-01 | End: 2023-01-01

## 2023-01-01 RX ORDER — LORAZEPAM 1 MG/1
1 TABLET ORAL
Status: DISCONTINUED | OUTPATIENT
Start: 2023-01-01 | End: 2023-01-01

## 2023-01-01 RX ORDER — ONDANSETRON 2 MG/ML
4 INJECTION INTRAMUSCULAR; INTRAVENOUS ONCE
Status: DISCONTINUED | OUTPATIENT
Start: 2023-01-01 | End: 2023-01-01

## 2023-01-01 RX ORDER — FENTANYL CITRATE-0.9 % NACL/PF 20 MCG/2ML
50 SYRINGE (ML) INTRAVENOUS EVERY 30 MIN PRN
Status: DISCONTINUED | OUTPATIENT
Start: 2023-01-01 | End: 2023-01-01

## 2023-01-01 RX ORDER — MORPHINE SULFATE 2 MG/ML
2 INJECTION, SOLUTION INTRAMUSCULAR; INTRAVENOUS
Status: DISCONTINUED | OUTPATIENT
Start: 2023-01-01 | End: 2023-06-20 | Stop reason: HOSPADM

## 2023-01-01 RX ORDER — NOREPINEPHRINE BITARTRATE 0.02 MG/ML
1-100 INJECTION, SOLUTION INTRAVENOUS CONTINUOUS
Status: DISCONTINUED | OUTPATIENT
Start: 2023-01-01 | End: 2023-01-01

## 2023-01-01 RX ORDER — LORAZEPAM 2 MG/ML
2 INJECTION INTRAMUSCULAR
Status: DISCONTINUED | OUTPATIENT
Start: 2023-01-01 | End: 2023-01-01

## 2023-01-01 RX ORDER — LORAZEPAM 2 MG/ML
0.5 INJECTION INTRAMUSCULAR
Status: DISCONTINUED | OUTPATIENT
Start: 2023-01-01 | End: 2023-06-20 | Stop reason: HOSPADM

## 2023-01-01 RX ORDER — FENTANYL CITRATE 50 UG/ML
INJECTION, SOLUTION INTRAMUSCULAR; INTRAVENOUS
Status: DISPENSED
Start: 2023-01-01 | End: 2023-01-01

## 2023-01-01 RX ORDER — POLYETHYLENE GLYCOL 3350 17 G/17G
17 POWDER, FOR SOLUTION ORAL DAILY PRN
Status: DISCONTINUED | OUTPATIENT
Start: 2023-01-01 | End: 2023-01-01

## 2023-01-01 RX ORDER — FENTANYL CITRATE-0.9 % NACL/PF 10 MCG/ML
25-200 PLASTIC BAG, INJECTION (ML) INTRAVENOUS CONTINUOUS
Status: DISCONTINUED | OUTPATIENT
Start: 2023-01-01 | End: 2023-01-01 | Stop reason: CLARIF

## 2023-01-01 RX ADMIN — SODIUM CHLORIDE, PRESERVATIVE FREE 20 ML: 5 INJECTION INTRAVENOUS at 19:53

## 2023-01-01 RX ADMIN — NOREPINEPHRINE BITARTRATE 100 MCG/MIN: 1 SOLUTION INTRAVENOUS at 05:00

## 2023-01-01 RX ADMIN — SODIUM CHLORIDE, PRESERVATIVE FREE 10 ML: 5 INJECTION INTRAVENOUS at 08:35

## 2023-01-01 RX ADMIN — NOREPINEPHRINE BITARTRATE 100 MCG/MIN: 1 SOLUTION INTRAVENOUS at 02:20

## 2023-01-01 RX ADMIN — VANCOMYCIN HYDROCHLORIDE 2000 MG: 10 INJECTION, POWDER, LYOPHILIZED, FOR SOLUTION INTRAVENOUS at 06:39

## 2023-01-01 RX ADMIN — VASOPRESSIN 0.03 UNITS/MIN: 0.2 INJECTION INTRAVENOUS at 18:50

## 2023-01-01 RX ADMIN — PIPERACILLIN AND TAZOBACTAM 4500 MG: 4; .5 INJECTION, POWDER, FOR SOLUTION INTRAVENOUS at 06:29

## 2023-01-01 RX ADMIN — Medication 100 MCG/MIN: at 11:16

## 2023-01-01 RX ADMIN — PHENYLEPHRINE HYDROCHLORIDE 160 MCG/MIN: 10 INJECTION INTRAVENOUS at 18:50

## 2023-01-01 RX ADMIN — SODIUM BICARBONATE: 84 INJECTION, SOLUTION INTRAVENOUS at 08:02

## 2023-01-01 RX ADMIN — SODIUM BICARBONATE: 84 INJECTION, SOLUTION INTRAVENOUS at 16:36

## 2023-01-01 RX ADMIN — SODIUM BICARBONATE 50 MEQ: 84 INJECTION, SOLUTION INTRAVENOUS at 00:40

## 2023-01-01 RX ADMIN — Medication 200 MG: at 20:07

## 2023-01-01 RX ADMIN — MIDAZOLAM 2 MG: 1 INJECTION INTRAMUSCULAR; INTRAVENOUS at 13:50

## 2023-01-01 RX ADMIN — SODIUM BICARBONATE 50 MEQ: 84 INJECTION, SOLUTION INTRAVENOUS at 04:43

## 2023-01-01 RX ADMIN — SODIUM CHLORIDE, PRESERVATIVE FREE 10 ML: 5 INJECTION INTRAVENOUS at 20:23

## 2023-01-01 RX ADMIN — NOREPINEPHRINE BITARTRATE 5 MCG/MIN: 1 SOLUTION INTRAVENOUS at 07:13

## 2023-01-01 RX ADMIN — VASOPRESSIN 0.01 UNITS/MIN: 0.2 INJECTION INTRAVENOUS at 22:42

## 2023-01-01 RX ADMIN — SODIUM CHLORIDE, POTASSIUM CHLORIDE, SODIUM LACTATE AND CALCIUM CHLORIDE 1000 ML: 600; 310; 30; 20 INJECTION, SOLUTION INTRAVENOUS at 01:16

## 2023-01-01 RX ADMIN — NOREPINEPHRINE BITARTRATE 100 MCG/MIN: 1 SOLUTION INTRAVENOUS at 08:02

## 2023-01-01 RX ADMIN — MAGNESIUM SULFATE HEPTAHYDRATE 4000 MG: 40 INJECTION, SOLUTION INTRAVENOUS at 13:45

## 2023-01-01 RX ADMIN — PIPERACILLIN AND TAZOBACTAM 3375 MG: 3; .375 INJECTION, POWDER, LYOPHILIZED, FOR SOLUTION INTRAVENOUS at 21:26

## 2023-01-01 RX ADMIN — FENTANYL CITRATE 25 MCG/HR: 0.05 INJECTION, SOLUTION INTRAMUSCULAR; INTRAVENOUS at 21:11

## 2023-01-01 RX ADMIN — FENTANYL CITRATE 75 MCG/HR: 0.05 INJECTION, SOLUTION INTRAMUSCULAR; INTRAVENOUS at 13:57

## 2023-01-01 RX ADMIN — SODIUM CHLORIDE, PRESERVATIVE FREE 40 MG: 5 INJECTION INTRAVENOUS at 21:11

## 2023-01-01 RX ADMIN — MIDAZOLAM 2 MG/HR: 5 INJECTION, SOLUTION INTRAMUSCULAR; INTRAVENOUS at 06:54

## 2023-01-01 RX ADMIN — SODIUM BICARBONATE 50 MEQ: 84 INJECTION, SOLUTION INTRAVENOUS at 04:07

## 2023-01-01 RX ADMIN — VASOPRESSIN 0.03 UNITS/MIN: 0.2 INJECTION INTRAVENOUS at 07:30

## 2023-01-01 RX ADMIN — Medication 100 MCG/MIN: at 14:12

## 2023-01-01 RX ADMIN — SODIUM BICARBONATE 50 MEQ: 84 INJECTION, SOLUTION INTRAVENOUS at 01:22

## 2023-01-01 RX ADMIN — ACETAMINOPHEN 650 MG: 650 SUPPOSITORY RECTAL at 12:34

## 2023-01-01 RX ADMIN — SODIUM CHLORIDE 1000 ML: 9 INJECTION, SOLUTION INTRAVENOUS at 06:50

## 2023-01-01 RX ADMIN — MAGNESIUM SULFATE HEPTAHYDRATE 2000 MG: 40 INJECTION, SOLUTION INTRAVENOUS at 08:14

## 2023-01-01 RX ADMIN — LORAZEPAM 4 MG: 2 INJECTION INTRAMUSCULAR; INTRAVENOUS at 19:32

## 2023-01-01 RX ADMIN — SODIUM BICARBONATE 100 MEQ: 84 INJECTION, SOLUTION INTRAVENOUS at 13:42

## 2023-01-01 RX ADMIN — NOREPINEPHRINE BITARTRATE 60 MCG/MIN: 1 SOLUTION INTRAVENOUS at 21:10

## 2023-01-01 RX ADMIN — SODIUM CHLORIDE 40 MG: 9 INJECTION INTRAMUSCULAR; INTRAVENOUS; SUBCUTANEOUS at 11:07

## 2023-01-01 RX ADMIN — HYDROCORTISONE SODIUM SUCCINATE 50 MG: 100 INJECTION, POWDER, FOR SOLUTION INTRAMUSCULAR; INTRAVENOUS at 17:37

## 2023-01-01 RX ADMIN — EPINEPHRINE 5 MCG/MIN: 1 INJECTION INTRAMUSCULAR; INTRAVENOUS; SUBCUTANEOUS at 03:57

## 2023-01-01 RX ADMIN — PIPERACILLIN AND TAZOBACTAM 3375 MG: 3; .375 INJECTION, POWDER, LYOPHILIZED, FOR SOLUTION INTRAVENOUS at 12:35

## 2023-01-01 RX ADMIN — MORPHINE SULFATE 4 MG: 4 INJECTION INTRAVENOUS at 21:15

## 2023-01-01 RX ADMIN — HYDROCORTISONE SODIUM SUCCINATE 50 MG: 100 INJECTION, POWDER, FOR SOLUTION INTRAMUSCULAR; INTRAVENOUS at 11:43

## 2023-01-01 RX ADMIN — NOREPINEPHRINE BITARTRATE 30 MCG/MIN: 1 SOLUTION INTRAVENOUS at 16:49

## 2023-01-01 RX ADMIN — SODIUM CHLORIDE 1000 ML: 9 INJECTION, SOLUTION INTRAVENOUS at 05:55

## 2023-01-01 RX ADMIN — CALCIUM GLUCONATE 2000 MG: 20 INJECTION, SOLUTION INTRAVENOUS at 06:00

## 2023-01-01 RX ADMIN — THIAMINE HYDROCHLORIDE 100 MG: 100 INJECTION, SOLUTION INTRAMUSCULAR; INTRAVENOUS at 08:34

## 2023-01-01 RX ADMIN — SODIUM CHLORIDE, PRESERVATIVE FREE 40 MG: 5 INJECTION INTRAVENOUS at 08:34

## 2023-01-01 RX ADMIN — SODIUM BICARBONATE: 84 INJECTION, SOLUTION INTRAVENOUS at 09:04

## 2023-01-01 RX ADMIN — EPINEPHRINE 20 MCG/MIN: 1 INJECTION INTRAMUSCULAR; INTRAVENOUS; SUBCUTANEOUS at 08:02

## 2023-01-01 RX ADMIN — ETOMIDATE 20 MG: 40 INJECTION, SOLUTION INTRAVENOUS at 20:06

## 2023-01-01 RX ADMIN — DEXMEDETOMIDINE 0.2 MCG/KG/HR: 100 INJECTION, SOLUTION INTRAVENOUS at 11:39

## 2023-01-01 RX ADMIN — NOREPINEPHRINE BITARTRATE 40 MCG/MIN: 1 SOLUTION INTRAVENOUS at 19:37

## 2023-01-01 RX ADMIN — MIDAZOLAM 2 MG: 1 INJECTION INTRAMUSCULAR; INTRAVENOUS at 14:07

## 2023-01-01 RX ADMIN — FENTANYL CITRATE 50 MCG/HR: 0.05 INJECTION, SOLUTION INTRAMUSCULAR; INTRAVENOUS at 21:46

## 2023-01-01 RX ADMIN — PHENYLEPHRINE HYDROCHLORIDE 140 MCG/MIN: 10 INJECTION INTRAVENOUS at 12:32

## 2023-01-01 RX ADMIN — ETOMIDATE 20 MG: 2 INJECTION INTRAVENOUS at 20:06

## 2023-01-01 RX ADMIN — PHENYLEPHRINE HYDROCHLORIDE 30 MCG/MIN: 10 INJECTION INTRAVENOUS at 04:46

## 2023-01-01 RX ADMIN — Medication 100 MCG/MIN: at 17:35

## 2023-01-01 RX ADMIN — DEXTROSE 150 MG: 50 INJECTION, SOLUTION INTRAVENOUS at 19:38

## 2023-01-01 RX ADMIN — SODIUM CHLORIDE, POTASSIUM CHLORIDE, SODIUM LACTATE AND CALCIUM CHLORIDE 1000 ML: 600; 310; 30; 20 INJECTION, SOLUTION INTRAVENOUS at 00:12

## 2023-01-01 RX ADMIN — NOREPINEPHRINE BITARTRATE 70 MCG/MIN: 1 SOLUTION INTRAVENOUS at 22:25

## 2023-01-01 RX ADMIN — SODIUM CHLORIDE 1000 ML: 9 INJECTION, SOLUTION INTRAVENOUS at 13:16

## 2023-01-01 RX ADMIN — SODIUM BICARBONATE: 84 INJECTION, SOLUTION INTRAVENOUS at 00:44

## 2023-01-01 RX ADMIN — EPINEPHRINE 20 MCG/MIN: 1 INJECTION INTRAMUSCULAR; INTRAVENOUS; SUBCUTANEOUS at 12:44

## 2023-01-01 RX ADMIN — SODIUM CHLORIDE, PRESERVATIVE FREE 10 ML: 5 INJECTION INTRAVENOUS at 10:53

## 2023-01-01 RX ADMIN — PIPERACILLIN AND TAZOBACTAM 3375 MG: 3; .375 INJECTION, POWDER, LYOPHILIZED, FOR SOLUTION INTRAVENOUS at 04:07

## 2023-01-01 RX ADMIN — SODIUM BICARBONATE: 84 INJECTION, SOLUTION INTRAVENOUS at 15:59

## 2023-01-01 RX ADMIN — SODIUM CHLORIDE 2000 ML: 9 INJECTION, SOLUTION INTRAVENOUS at 15:55

## 2023-01-01 RX ADMIN — AMIODARONE HYDROCHLORIDE 1 MG/MIN: 50 INJECTION, SOLUTION INTRAVENOUS at 19:52

## 2023-01-01 RX ADMIN — LORAZEPAM 0.5 MG: 2 INJECTION INTRAMUSCULAR; INTRAVENOUS at 21:15

## 2023-01-01 RX ADMIN — FENTANYL CITRATE 50 MCG/HR: 0.05 INJECTION, SOLUTION INTRAMUSCULAR; INTRAVENOUS at 06:54

## 2023-01-01 RX ADMIN — PIPERACILLIN AND TAZOBACTAM 3375 MG: 3; .375 INJECTION, POWDER, LYOPHILIZED, FOR SOLUTION INTRAVENOUS at 12:59

## 2023-01-01 RX ADMIN — THIAMINE HYDROCHLORIDE 100 MG: 100 INJECTION, SOLUTION INTRAMUSCULAR; INTRAVENOUS at 15:00

## 2023-01-01 RX ADMIN — MIDAZOLAM 1 MG/HR: 5 INJECTION, SOLUTION INTRAMUSCULAR; INTRAVENOUS at 21:14

## 2023-01-01 RX ADMIN — SODIUM CHLORIDE 1000 ML: 9 INJECTION, SOLUTION INTRAVENOUS at 10:15

## 2023-01-01 RX ADMIN — DEXTROSE MONOHYDRATE 125 ML: 100 INJECTION, SOLUTION INTRAVENOUS at 18:47

## 2023-01-01 ASSESSMENT — PAIN DESCRIPTION - LOCATION: LOCATION: ABDOMEN

## 2023-01-01 ASSESSMENT — ENCOUNTER SYMPTOMS
NAUSEA: 1
COUGH: 1
SHORTNESS OF BREATH: 1
ABDOMINAL PAIN: 0
VOMITING: 1

## 2023-01-01 ASSESSMENT — PAIN SCALES - GENERAL
PAINLEVEL_OUTOF10: 0
PAINLEVEL_OUTOF10: 0
PAINLEVEL_OUTOF10: 5
PAINLEVEL_OUTOF10: 0

## 2023-01-01 ASSESSMENT — PULMONARY FUNCTION TESTS
PIF_VALUE: 38
PIF_VALUE: 32
PIF_VALUE: 29
PIF_VALUE: 36
PIF_VALUE: 35
PIF_VALUE: 34
PIF_VALUE: 38

## 2023-01-01 ASSESSMENT — LIFESTYLE VARIABLES
HOW OFTEN DO YOU HAVE A DRINK CONTAINING ALCOHOL: 4 OR MORE TIMES A WEEK
HOW MANY STANDARD DRINKS CONTAINING ALCOHOL DO YOU HAVE ON A TYPICAL DAY: 5 OR 6

## 2023-01-01 ASSESSMENT — PAIN DESCRIPTION - DESCRIPTORS: DESCRIPTORS: CRAMPING

## 2023-01-01 ASSESSMENT — PAIN DESCRIPTION - PAIN TYPE: TYPE: ACUTE PAIN

## 2023-01-01 ASSESSMENT — PAIN DESCRIPTION - FREQUENCY: FREQUENCY: INTERMITTENT

## 2023-03-16 ENCOUNTER — APPOINTMENT (OUTPATIENT)
Dept: GENERAL RADIOLOGY | Age: 53
End: 2023-03-16

## 2023-03-16 ENCOUNTER — HOSPITAL ENCOUNTER (EMERGENCY)
Age: 53
Discharge: HOME | End: 2023-03-16
Attending: EMERGENCY MEDICINE

## 2023-03-16 VITALS
SYSTOLIC BLOOD PRESSURE: 114 MMHG | HEIGHT: 70 IN | BODY MASS INDEX: 25.77 KG/M2 | RESPIRATION RATE: 18 BRPM | DIASTOLIC BLOOD PRESSURE: 73 MMHG | HEART RATE: 91 BPM | OXYGEN SATURATION: 96 % | TEMPERATURE: 97.8 F | WEIGHT: 180 LBS

## 2023-03-16 DIAGNOSIS — R20.2 PARESTHESIA: ICD-10-CM

## 2023-03-16 DIAGNOSIS — R53.83 FATIGUE, UNSPECIFIED TYPE: ICD-10-CM

## 2023-03-16 DIAGNOSIS — F10.920 ACUTE ALCOHOLIC INTOXICATION WITHOUT COMPLICATION (HCC): ICD-10-CM

## 2023-03-16 DIAGNOSIS — R11.2 NAUSEA AND VOMITING, UNSPECIFIED VOMITING TYPE: Primary | ICD-10-CM

## 2023-03-16 LAB
ALBUMIN SERPL-MCNC: 3.2 G/DL (ref 3.5–5)
ALBUMIN/GLOB SERPL: 1 (ref 0.4–1.6)
ALP SERPL-CCNC: 93 U/L (ref 50–136)
ALT SERPL-CCNC: 23 U/L (ref 12–65)
ANION GAP SERPL CALC-SCNC: 8 MMOL/L (ref 2–11)
AST SERPL-CCNC: 43 U/L (ref 15–37)
BASOPHILS # BLD: 0.1 K/UL (ref 0–0.2)
BASOPHILS NFR BLD: 1 % (ref 0–2)
BILIRUB SERPL-MCNC: 1.4 MG/DL (ref 0.2–1.1)
BUN SERPL-MCNC: 5 MG/DL (ref 6–23)
CALCIUM SERPL-MCNC: 8.3 MG/DL (ref 8.3–10.4)
CHLORIDE SERPL-SCNC: 104 MMOL/L (ref 101–110)
CO2 SERPL-SCNC: 28 MMOL/L (ref 21–32)
CREAT SERPL-MCNC: 0.8 MG/DL (ref 0.8–1.5)
DIFFERENTIAL METHOD BLD: ABNORMAL
EKG ATRIAL RATE: 101 BPM
EKG DIAGNOSIS: NORMAL
EKG P AXIS: 42 DEGREES
EKG P-R INTERVAL: 140 MS
EKG Q-T INTERVAL: 366 MS
EKG QRS DURATION: 92 MS
EKG QTC CALCULATION (BAZETT): 474 MS
EKG R AXIS: 6 DEGREES
EKG T AXIS: 49 DEGREES
EKG VENTRICULAR RATE: 101 BPM
EOSINOPHIL # BLD: 0.1 K/UL (ref 0–0.8)
EOSINOPHIL NFR BLD: 2 % (ref 0.5–7.8)
ERYTHROCYTE [DISTWIDTH] IN BLOOD BY AUTOMATED COUNT: 14.1 % (ref 11.9–14.6)
ETHANOL SERPL-MCNC: 172 MG/DL (ref 0–0.08)
GLOBULIN SER CALC-MCNC: 3.2 G/DL (ref 2.8–4.5)
GLUCOSE SERPL-MCNC: 166 MG/DL (ref 65–100)
HCT VFR BLD AUTO: 34.1 % (ref 41.1–50.3)
HGB BLD-MCNC: 11.9 G/DL (ref 13.6–17.2)
IMM GRANULOCYTES # BLD AUTO: 0 K/UL (ref 0–0.5)
IMM GRANULOCYTES NFR BLD AUTO: 0 % (ref 0–5)
LIPASE SERPL-CCNC: 180 U/L (ref 73–393)
LYMPHOCYTES # BLD: 2.9 K/UL (ref 0.5–4.6)
LYMPHOCYTES NFR BLD: 47 % (ref 13–44)
MAGNESIUM SERPL-MCNC: 1.8 MG/DL (ref 1.8–2.4)
MCH RBC QN AUTO: 41.5 PG (ref 26.1–32.9)
MCHC RBC AUTO-ENTMCNC: 34.9 G/DL (ref 31.4–35)
MCV RBC AUTO: 118.8 FL (ref 82–102)
MONOCYTES # BLD: 0.3 K/UL (ref 0.1–1.3)
MONOCYTES NFR BLD: 5 % (ref 4–12)
NEUTS SEG # BLD: 2.8 K/UL (ref 1.7–8.2)
NEUTS SEG NFR BLD: 45 % (ref 43–78)
NRBC # BLD: 0 K/UL (ref 0–0.2)
NT PRO BNP: 48 PG/ML (ref 5–125)
PLATELET # BLD AUTO: 217 K/UL (ref 150–450)
PMV BLD AUTO: 10.1 FL (ref 9.4–12.3)
POTASSIUM SERPL-SCNC: 3.8 MMOL/L (ref 3.5–5.1)
PROT SERPL-MCNC: 6.4 G/DL (ref 6.3–8.2)
RBC # BLD AUTO: 2.87 M/UL (ref 4.23–5.6)
SARS-COV-2 RDRP RESP QL NAA+PROBE: NOT DETECTED
SODIUM SERPL-SCNC: 140 MMOL/L (ref 133–143)
SOURCE: NORMAL
TROPONIN I SERPL HS-MCNC: <3 PG/ML (ref 0–57)
WBC # BLD AUTO: 6.1 K/UL (ref 4.3–11.1)

## 2023-03-16 PROCEDURE — 96375 TX/PRO/DX INJ NEW DRUG ADDON: CPT

## 2023-03-16 PROCEDURE — 87635 SARS-COV-2 COVID-19 AMP PRB: CPT

## 2023-03-16 PROCEDURE — 6360000002 HC RX W HCPCS: Performed by: EMERGENCY MEDICINE

## 2023-03-16 PROCEDURE — 83880 ASSAY OF NATRIURETIC PEPTIDE: CPT

## 2023-03-16 PROCEDURE — 83735 ASSAY OF MAGNESIUM: CPT

## 2023-03-16 PROCEDURE — 99285 EMERGENCY DEPT VISIT HI MDM: CPT

## 2023-03-16 PROCEDURE — 82077 ASSAY SPEC XCP UR&BREATH IA: CPT

## 2023-03-16 PROCEDURE — A4216 STERILE WATER/SALINE, 10 ML: HCPCS | Performed by: EMERGENCY MEDICINE

## 2023-03-16 PROCEDURE — 84484 ASSAY OF TROPONIN QUANT: CPT

## 2023-03-16 PROCEDURE — 80053 COMPREHEN METABOLIC PANEL: CPT

## 2023-03-16 PROCEDURE — 2580000003 HC RX 258: Performed by: EMERGENCY MEDICINE

## 2023-03-16 PROCEDURE — 71045 X-RAY EXAM CHEST 1 VIEW: CPT

## 2023-03-16 PROCEDURE — 96374 THER/PROPH/DIAG INJ IV PUSH: CPT

## 2023-03-16 PROCEDURE — 83690 ASSAY OF LIPASE: CPT

## 2023-03-16 PROCEDURE — 93005 ELECTROCARDIOGRAM TRACING: CPT | Performed by: EMERGENCY MEDICINE

## 2023-03-16 PROCEDURE — 2500000003 HC RX 250 WO HCPCS: Performed by: EMERGENCY MEDICINE

## 2023-03-16 PROCEDURE — 96361 HYDRATE IV INFUSION ADD-ON: CPT

## 2023-03-16 PROCEDURE — 85025 COMPLETE CBC W/AUTO DIFF WBC: CPT

## 2023-03-16 RX ORDER — FAMOTIDINE 20 MG/1
20 TABLET, FILM COATED ORAL 2 TIMES DAILY
Qty: 60 TABLET | Refills: 0 | Status: SHIPPED | OUTPATIENT
Start: 2023-03-16 | End: 2023-04-15

## 2023-03-16 RX ORDER — SODIUM CHLORIDE, SODIUM LACTATE, POTASSIUM CHLORIDE, AND CALCIUM CHLORIDE .6; .31; .03; .02 G/100ML; G/100ML; G/100ML; G/100ML
1000 INJECTION, SOLUTION INTRAVENOUS
Status: COMPLETED | OUTPATIENT
Start: 2023-03-16 | End: 2023-03-16

## 2023-03-16 RX ORDER — ADHESIVE TAPE 3"X 2.3 YD
200 TAPE, NON-MEDICATED TOPICAL DAILY
Qty: 30 TABLET | Refills: 0 | Status: SHIPPED | OUTPATIENT
Start: 2023-03-16

## 2023-03-16 RX ORDER — FOLIC ACID 1 MG/1
1 TABLET ORAL DAILY
Qty: 30 TABLET | Refills: 3 | Status: SHIPPED | OUTPATIENT
Start: 2023-03-16

## 2023-03-16 RX ORDER — LANOLIN ALCOHOL/MO/W.PET/CERES
100 CREAM (GRAM) TOPICAL DAILY
Qty: 30 TABLET | Refills: 0 | Status: SHIPPED | OUTPATIENT
Start: 2023-03-16

## 2023-03-16 RX ORDER — ONDANSETRON 2 MG/ML
4 INJECTION INTRAMUSCULAR; INTRAVENOUS
Status: COMPLETED | OUTPATIENT
Start: 2023-03-16 | End: 2023-03-16

## 2023-03-16 RX ORDER — SODIUM CHLORIDE, SODIUM LACTATE, POTASSIUM CHLORIDE, AND CALCIUM CHLORIDE .6; .31; .03; .02 G/100ML; G/100ML; G/100ML; G/100ML
1000 INJECTION, SOLUTION INTRAVENOUS
Status: DISCONTINUED | OUTPATIENT
Start: 2023-03-16 | End: 2023-03-16

## 2023-03-16 RX ADMIN — ONDANSETRON 4 MG: 2 INJECTION INTRAMUSCULAR; INTRAVENOUS at 05:26

## 2023-03-16 RX ADMIN — SODIUM CHLORIDE, POTASSIUM CHLORIDE, SODIUM LACTATE AND CALCIUM CHLORIDE 1000 ML: 600; 310; 30; 20 INJECTION, SOLUTION INTRAVENOUS at 05:26

## 2023-03-16 RX ADMIN — FAMOTIDINE 20 MG: 10 INJECTION INTRAVENOUS at 05:25

## 2023-03-16 RX ADMIN — SODIUM CHLORIDE, POTASSIUM CHLORIDE, SODIUM LACTATE AND CALCIUM CHLORIDE 1000 ML: 600; 310; 30; 20 INJECTION, SOLUTION INTRAVENOUS at 06:57

## 2023-03-16 ASSESSMENT — ENCOUNTER SYMPTOMS
VOMITING: 1
NAUSEA: 1
ABDOMINAL PAIN: 0
DIARRHEA: 0
SHORTNESS OF BREATH: 1
FACIAL SWELLING: 0

## 2023-03-16 ASSESSMENT — PAIN SCALES - GENERAL: PAINLEVEL_OUTOF10: 0

## 2023-03-16 ASSESSMENT — PAIN - FUNCTIONAL ASSESSMENT: PAIN_FUNCTIONAL_ASSESSMENT: 0-10

## 2023-03-16 NOTE — DISCHARGE INSTRUCTIONS
Reduce alcohol intake.  Resume vitamins, they have been prescribed for you today.  You must follow-up with your primary care physician.  Return for worsening or concerning symptoms.

## 2023-03-16 NOTE — ED TRIAGE NOTES
Pt presents c/o shortness of breath x 3 days, generalized fatigue, denies cough/CP. +N/V x 2 days, denies diarrhea/previous abd surgeries. Denies peripheral swelling but states he has experienced intermittent numbness in fingertips.

## 2023-03-16 NOTE — ED PROVIDER NOTES
Emergency Department Provider Note                   PCP:                On File Not (Inactive)               Age: 46 y.o. Sex: male     DISPOSITION Discharge - Pending Orders Complete 03/16/2023 06:45:26 AM       ICD-10-CM    1. Nausea and vomiting, unspecified vomiting type  R11.2       2. Fatigue, unspecified type  R53.83       3. Paresthesia  R20.2           MEDICAL DECISION MAKING  Complexity of Problems Addressed:  Acute illness    Data Reviewed and Analyzed:  Category 1:   I reviewed records from an external source: ED records from outside this hospital.  I reviewed records from an external source: provider visit notes from outside specialist.  I ordered each unique test.  I reviewed the results of each unique test.        Category 2:   ED EKG was independently interpreted in the absence of a cardiologist.  Rate: 101  EKG Interpretation: EKG Interpretation: sinus rhythm, LVH  ST Segments: Nonspecific ST segments - NO STEMI    I independently ordered and reviewed the EKG. I independently ordered and reviewed the X-rays. No acute, agree with radiologist interpretation  I independently ordered and reviewed the labs    Category 3: Discussion of management or test interpretation. Slightly elevated blood sugar, but has been elevated in the past.  Work-up otherwise unremarkable. No significant anemia or electrolyte disturbance. Patient admits he has not been taking any of his medications including folic acid, thiamine, magnesium, Pepcid, B12.  Given refills. Given fluids. Plan to discharge if tachycardia resolved and troponin normal as it is still pending at this time. Risk of Complications and/or Morbidity of Patient Management:  Patient was discharged risks and benefits of hospitalization were considered and Shared medical decision making was utilized in creating the patients health plan today. Is this patient to be included in the SEP-1 core measure due to severe sepsis or septic shock? No Exclusion criteria - the patient is NOT to be included for SEP-1 Core Measure due to: 2+ SIRS criteria are not met     Amparo Fajardo is a 46 y.o. male who presents to the Emergency Department with chief complaint of    Chief Complaint   Patient presents with    Shortness of Breath      70-year-old male with history of alcohol abuse, traumatic pneumothorax complicated by empyema presents with 3 days of generalized weakness, shortness of breath with exertion, nausea, and vomiting. He denies cough, congestion, fever, chest pain, abdominal pain, diarrhea, blood in stools. Denies any underlying history of heart or lung disease. No known exposure to COVID or ill contacts. Also reports numbness in lips and fingertips. Denies history of anxiety. Denies taking any daily medications. Review of Systems   Constitutional:  Positive for fatigue. Negative for fever. HENT:  Negative for facial swelling. Eyes:  Negative for visual disturbance. Respiratory:  Positive for shortness of breath. Cardiovascular:  Negative for chest pain. Gastrointestinal:  Positive for nausea and vomiting. Negative for abdominal pain and diarrhea. Musculoskeletal:  Negative for joint swelling. Skin:  Negative for rash. Neurological:  Positive for numbness. Negative for speech difficulty. Psychiatric/Behavioral:  Negative for confusion. All other systems reviewed and are negative. Vitals signs and nursing note reviewed:  Patient Vitals for the past 4 hrs:   Temp Pulse Resp BP SpO2   03/16/23 0503 97.8 °F (36.6 °C) (!) 102 18 122/84 96 %          Physical Exam  Vitals and nursing note reviewed. Constitutional:       Appearance: Normal appearance. HENT:      Head: Normocephalic and atraumatic. Nose: Nose normal.      Mouth/Throat:      Mouth: Mucous membranes are moist.   Eyes:      Extraocular Movements: Extraocular movements intact. Pupils: Pupils are equal, round, and reactive to light. Cardiovascular:      Rate and Rhythm: Regular rhythm. Tachycardia present. Pulmonary:      Effort: Pulmonary effort is normal. No respiratory distress. Breath sounds: Normal breath sounds. Abdominal:      General: Abdomen is flat. There is no distension. Palpations: Abdomen is soft. Tenderness: There is no abdominal tenderness. Musculoskeletal:         General: No deformity. Normal range of motion. Cervical back: Normal range of motion and neck supple. Skin:     General: Skin is warm and dry. Neurological:      General: No focal deficit present. Mental Status: He is alert. Mental status is at baseline. Psychiatric:         Mood and Affect: Mood normal.        Procedures          Orders Placed This Encounter   Procedures    COVID-19, Rapid    XR CHEST PORTABLE    CBC with Auto Differential    Comprehensive Metabolic Panel    Magnesium    Troponin    Ethanol    Lipase    Brain Natriuretic Peptide    Continuous Pulse Oximetry    Orthostatic Vital Signs    EKG 12 Lead    Saline lock IV        Medications   lactated ringers bolus (1,000 mLs IntraVENous New Bag 3/16/23 0526)   ondansetron (ZOFRAN) injection 4 mg (4 mg IntraVENous Given 3/16/23 0526)   famotidine (PEPCID) 20 mg in sodium chloride (PF) 0.9 % 10 mL injection (20 mg IntraVENous Given 3/16/23 0525)       Current Discharge Medication List           Past Medical History:   Diagnosis Date    GERD (gastroesophageal reflux disease)     once a week OTC med        Past Surgical History:   Procedure Laterality Date    CT INSERT CATH PLEURA W IMAGE  11/4/2020    CT INSERT CATH PLEURA W IMAGE 11/4/2020 [facility]OLOGY CT SCAN    HEENT      wisdom teeth        History reviewed. No pertinent family history.      Social History     Socioeconomic History    Marital status: Single     Spouse name: None    Number of children: None    Years of education: None    Highest education level: None   Tobacco Use    Smoking status: Every Day Packs/day: 0.50     Types: Cigarettes    Smokeless tobacco: Never    Tobacco comments:     Quit smoking: stopped on 11/01/2020   Substance and Sexual Activity    Alcohol use: Not Currently     Comment: 3-4 drinks a day    Drug use: No        Allergies: Patient has no known allergies. Current Discharge Medication List        CONTINUE these medications which have NOT CHANGED    Details   potassium chloride (KLOR-CON M) 10 MEQ extended release tablet Take 1 tablet by mouth daily  Qty: 30 tablet, Refills: 0      cyclobenzaprine (FLEXERIL) 10 MG tablet Take 10 mg by mouth 3 times daily as needed      naloxone 4 MG/0.1ML LIQD nasal spray Use 1 spray intranasally, then discard. Repeat with new spray every 2 min as needed for opioid overdose symptoms, alternating nostrils. ondansetron (ZOFRAN-ODT) 8 MG TBDP disintegrating tablet Take 8 mg by mouth              Results for orders placed or performed during the hospital encounter of 03/16/23   COVID-19, Rapid    Specimen: Nasopharyngeal Swab   Result Value Ref Range    Source NASAL      SARS-CoV-2, Rapid Not detected NOTD     XR CHEST PORTABLE    Narrative    Portable chest xray      COMPARISON: July 13, 2022    INDICATION: Shortness of breath    FINDINGS:     There is no focal pulmonary consolidation, pleural effusion or pneumothorax. No  pulmonary edema. Cardiac mediastinal silhouette is within normal limits. Surrounding bones are intact. Old/healed left-sided rib fractures are noted. Impression    1. No radiographic evidence of pneumonia or pulmonary edema.      CBC with Auto Differential   Result Value Ref Range    WBC 6.1 4.3 - 11.1 K/uL    RBC 2.87 (L) 4.23 - 5.6 M/uL    Hemoglobin 11.9 (L) 13.6 - 17.2 g/dL    Hematocrit 34.1 (L) 41.1 - 50.3 %    .8 (H) 82 - 102 FL    MCH 41.5 (H) 26.1 - 32.9 PG    MCHC 34.9 31.4 - 35.0 g/dL    RDW 14.1 11.9 - 14.6 %    Platelets 052 465 - 333 K/uL    MPV 10.1 9.4 - 12.3 FL    nRBC 0.00 0.0 - 0.2 K/uL Differential Type AUTOMATED      Seg Neutrophils 45 43 - 78 %    Lymphocytes 47 (H) 13 - 44 %    Monocytes 5 4.0 - 12.0 %    Eosinophils % 2 0.5 - 7.8 %    Basophils 1 0.0 - 2.0 %    Immature Granulocytes 0 0.0 - 5.0 %    Segs Absolute 2.8 1.7 - 8.2 K/UL    Absolute Lymph # 2.9 0.5 - 4.6 K/UL    Absolute Mono # 0.3 0.1 - 1.3 K/UL    Absolute Eos # 0.1 0.0 - 0.8 K/UL    Basophils Absolute 0.1 0.0 - 0.2 K/UL    Absolute Immature Granulocyte 0.0 0.0 - 0.5 K/UL   Comprehensive Metabolic Panel   Result Value Ref Range    Sodium 140 133 - 143 mmol/L    Potassium 3.8 3.5 - 5.1 mmol/L    Chloride 104 101 - 110 mmol/L    CO2 28 21 - 32 mmol/L    Anion Gap 8 2 - 11 mmol/L    Glucose 166 (H) 65 - 100 mg/dL    BUN 5 (L) 6 - 23 MG/DL    Creatinine 0.80 0.8 - 1.5 MG/DL    Est, Glom Filt Rate >60 >60 ml/min/1.73m2    Calcium 8.3 8.3 - 10.4 MG/DL    Total Bilirubin 1.4 (H) 0.2 - 1.1 MG/DL    ALT 23 12 - 65 U/L    AST 43 (H) 15 - 37 U/L    Alk Phosphatase 93 50 - 136 U/L    Total Protein 6.4 6.3 - 8.2 g/dL    Albumin 3.2 (L) 3.5 - 5.0 g/dL    Globulin 3.2 2.8 - 4.5 g/dL    Albumin/Globulin Ratio 1.0 0.4 - 1.6     Magnesium   Result Value Ref Range    Magnesium 1.8 1.8 - 2.4 mg/dL   Ethanol   Result Value Ref Range    Ethanol Lvl 172 MG/DL   Lipase   Result Value Ref Range    Lipase 180 73 - 393 U/L   Brain Natriuretic Peptide   Result Value Ref Range    NT Pro-BNP 48 5 - 125 PG/ML   EKG 12 Lead   Result Value Ref Range    Ventricular Rate 101 BPM    Atrial Rate 101 BPM    P-R Interval 140 ms    QRS Duration 92 ms    Q-T Interval 366 ms    QTc Calculation (Bazett) 474 ms    P Axis 42 degrees    R Axis 6 degrees    T Axis 49 degrees    Diagnosis Sinus tachycardia         XR CHEST PORTABLE   Final Result      1. No radiographic evidence of pneumonia or pulmonary edema. Voice dictation software was used during the making of this note.   This software is not perfect and grammatical and other typographical errors may be present.  This note has not been completely proofread for errors.     Anabel Adrian MD  03/16/23 0649

## 2023-03-16 NOTE — ED NOTES
I have reviewed discharge instructions with the patient.  The patient verbalized understanding.    Patient left ED via Discharge Method: ambulatory to Home with (Self).    Opportunity for questions and clarification provided.       Patient given 5 scripts.         To continue your aftercare when you leave the hospital, you may receive an automated call from our care team to check in on how you are doing.  This is a free service and part of our promise to provide the best care and service to meet your aftercare needs.” If you have questions, or wish to unsubscribe from this service please call [phone].  Thank you for Choosing our [facility] Emergency Department.        Adriana Fox RN  03/16/23 4568

## 2023-03-25 ENCOUNTER — HOSPITAL ENCOUNTER (EMERGENCY)
Age: 53
Discharge: HOME | End: 2023-03-25
Attending: EMERGENCY MEDICINE

## 2023-03-25 ENCOUNTER — APPOINTMENT (OUTPATIENT)
Dept: GENERAL RADIOLOGY | Age: 53
End: 2023-03-25

## 2023-03-25 VITALS
HEART RATE: 76 BPM | HEIGHT: 70 IN | SYSTOLIC BLOOD PRESSURE: 145 MMHG | TEMPERATURE: 97.9 F | BODY MASS INDEX: 25.77 KG/M2 | DIASTOLIC BLOOD PRESSURE: 84 MMHG | OXYGEN SATURATION: 95 % | WEIGHT: 180 LBS | RESPIRATION RATE: 13 BRPM

## 2023-03-25 DIAGNOSIS — E83.42 HYPOMAGNESEMIA: ICD-10-CM

## 2023-03-25 DIAGNOSIS — R53.83 FATIGUE, UNSPECIFIED TYPE: Primary | ICD-10-CM

## 2023-03-25 DIAGNOSIS — F10.920 ACUTE ALCOHOLIC INTOXICATION WITHOUT COMPLICATION (HCC): ICD-10-CM

## 2023-03-25 LAB
ALBUMIN SERPL-MCNC: 3.4 G/DL (ref 3.5–5)
ALBUMIN/GLOB SERPL: 1 (ref 0.4–1.6)
ALP SERPL-CCNC: 107 U/L (ref 50–136)
ALT SERPL-CCNC: 33 U/L (ref 12–65)
AMPHET UR QL SCN: NEGATIVE
ANION GAP SERPL CALC-SCNC: 1 MMOL/L (ref 2–11)
APPEARANCE UR: CLEAR
AST SERPL-CCNC: 53 U/L (ref 15–37)
B PERT DNA SPEC QL NAA+PROBE: NOT DETECTED
BARBITURATES UR QL SCN: NEGATIVE
BASOPHILS # BLD: 0.1 K/UL (ref 0–0.2)
BASOPHILS NFR BLD: 1 % (ref 0–2)
BENZODIAZ UR QL: NEGATIVE
BILIRUB SERPL-MCNC: 1.5 MG/DL (ref 0.2–1.1)
BILIRUB UR QL: NEGATIVE
BORDETELLA PARAPERTUSSIS BY PCR: NOT DETECTED
BUN SERPL-MCNC: 6 MG/DL (ref 6–23)
C PNEUM DNA SPEC QL NAA+PROBE: NOT DETECTED
CALCIUM SERPL-MCNC: 8.3 MG/DL (ref 8.3–10.4)
CANNABINOIDS UR QL SCN: POSITIVE
CHLORIDE SERPL-SCNC: 101 MMOL/L (ref 101–110)
CO2 SERPL-SCNC: 31 MMOL/L (ref 21–32)
COCAINE UR QL SCN: NEGATIVE
COLOR UR: NORMAL
CREAT SERPL-MCNC: 0.7 MG/DL (ref 0.8–1.5)
DIFFERENTIAL METHOD BLD: ABNORMAL
EKG ATRIAL RATE: 88 BPM
EKG DIAGNOSIS: NORMAL
EKG P AXIS: 46 DEGREES
EKG P-R INTERVAL: 143 MS
EKG Q-T INTERVAL: 386 MS
EKG QRS DURATION: 98 MS
EKG QTC CALCULATION (BAZETT): 467 MS
EKG R AXIS: 22 DEGREES
EKG T AXIS: 60 DEGREES
EKG VENTRICULAR RATE: 88 BPM
EOSINOPHIL # BLD: 0.1 K/UL (ref 0–0.8)
EOSINOPHIL NFR BLD: 2 % (ref 0.5–7.8)
ERYTHROCYTE [DISTWIDTH] IN BLOOD BY AUTOMATED COUNT: 16.9 % (ref 11.9–14.6)
ETHANOL SERPL-MCNC: 184 MG/DL (ref 0–0.08)
FLUAV SUBTYP SPEC NAA+PROBE: NOT DETECTED
FLUBV RNA SPEC QL NAA+PROBE: NOT DETECTED
GLOBULIN SER CALC-MCNC: 3.4 G/DL (ref 2.8–4.5)
GLUCOSE SERPL-MCNC: 199 MG/DL (ref 65–100)
GLUCOSE UR STRIP.AUTO-MCNC: NEGATIVE MG/DL
HADV DNA SPEC QL NAA+PROBE: NOT DETECTED
HCOV 229E RNA SPEC QL NAA+PROBE: NOT DETECTED
HCOV HKU1 RNA SPEC QL NAA+PROBE: NOT DETECTED
HCOV NL63 RNA SPEC QL NAA+PROBE: NOT DETECTED
HCOV OC43 RNA SPEC QL NAA+PROBE: NOT DETECTED
HCT VFR BLD AUTO: 36.4 % (ref 41.1–50.3)
HGB BLD-MCNC: 12.9 G/DL (ref 13.6–17.2)
HGB UR QL STRIP: NEGATIVE
HMPV RNA SPEC QL NAA+PROBE: NOT DETECTED
HPIV1 RNA SPEC QL NAA+PROBE: NOT DETECTED
HPIV2 RNA SPEC QL NAA+PROBE: NOT DETECTED
HPIV3 RNA SPEC QL NAA+PROBE: NOT DETECTED
HPIV4 RNA SPEC QL NAA+PROBE: NOT DETECTED
IMM GRANULOCYTES # BLD AUTO: 0 K/UL (ref 0–0.5)
IMM GRANULOCYTES NFR BLD AUTO: 1 % (ref 0–5)
KETONES UR QL STRIP.AUTO: NEGATIVE MG/DL
LEUKOCYTE ESTERASE UR QL STRIP.AUTO: NEGATIVE
LIPASE SERPL-CCNC: 65 U/L (ref 73–393)
LYMPHOCYTES # BLD: 2 K/UL (ref 0.5–4.6)
LYMPHOCYTES NFR BLD: 46 % (ref 13–44)
M PNEUMO DNA SPEC QL NAA+PROBE: NOT DETECTED
MAGNESIUM SERPL-MCNC: 1.7 MG/DL (ref 1.8–2.4)
MCH RBC QN AUTO: 41.3 PG (ref 26.1–32.9)
MCHC RBC AUTO-ENTMCNC: 35.4 G/DL (ref 31.4–35)
MCV RBC AUTO: 116.7 FL (ref 82–102)
METHADONE UR QL: NEGATIVE
MONOCYTES # BLD: 0.3 K/UL (ref 0.1–1.3)
MONOCYTES NFR BLD: 7 % (ref 4–12)
NEUTS SEG # BLD: 1.9 K/UL (ref 1.7–8.2)
NEUTS SEG NFR BLD: 43 % (ref 43–78)
NITRITE UR QL STRIP.AUTO: NEGATIVE
NRBC # BLD: 0 K/UL (ref 0–0.2)
OPIATES UR QL: NEGATIVE
PCP UR QL: NEGATIVE
PH UR STRIP: 7.5 (ref 5–9)
PLATELET # BLD AUTO: 232 K/UL (ref 150–450)
PMV BLD AUTO: 10.1 FL (ref 9.4–12.3)
POTASSIUM SERPL-SCNC: 3.5 MMOL/L (ref 3.5–5.1)
PROT SERPL-MCNC: 6.8 G/DL (ref 6.3–8.2)
PROT UR STRIP-MCNC: NEGATIVE MG/DL
RBC # BLD AUTO: 3.12 M/UL (ref 4.23–5.6)
RSV RNA SPEC QL NAA+PROBE: NOT DETECTED
RV+EV RNA SPEC QL NAA+PROBE: NOT DETECTED
SARS-COV-2 RNA RESP QL NAA+PROBE: NOT DETECTED
SODIUM SERPL-SCNC: 133 MMOL/L (ref 133–143)
SP GR UR REFRACTOMETRY: 1.01 (ref 1–1.02)
TROPONIN I SERPL HS-MCNC: <3 PG/ML (ref 0–57)
UROBILINOGEN UR QL STRIP.AUTO: 1 EU/DL (ref 0.2–1)
VIT B12 SERPL-MCNC: 1037 PG/ML (ref 193–986)
WBC # BLD AUTO: 4.3 K/UL (ref 4.3–11.1)

## 2023-03-25 PROCEDURE — 82607 VITAMIN B-12: CPT

## 2023-03-25 PROCEDURE — 85025 COMPLETE CBC W/AUTO DIFF WBC: CPT

## 2023-03-25 PROCEDURE — A4216 STERILE WATER/SALINE, 10 ML: HCPCS | Performed by: PHYSICIAN ASSISTANT

## 2023-03-25 PROCEDURE — 0202U NFCT DS 22 TRGT SARS-COV-2: CPT

## 2023-03-25 PROCEDURE — 96375 TX/PRO/DX INJ NEW DRUG ADDON: CPT

## 2023-03-25 PROCEDURE — 74022 RADEX COMPL AQT ABD SERIES: CPT

## 2023-03-25 PROCEDURE — 83690 ASSAY OF LIPASE: CPT

## 2023-03-25 PROCEDURE — 82077 ASSAY SPEC XCP UR&BREATH IA: CPT

## 2023-03-25 PROCEDURE — 96368 THER/DIAG CONCURRENT INF: CPT

## 2023-03-25 PROCEDURE — 84484 ASSAY OF TROPONIN QUANT: CPT

## 2023-03-25 PROCEDURE — 6360000002 HC RX W HCPCS: Performed by: PHYSICIAN ASSISTANT

## 2023-03-25 PROCEDURE — 80307 DRUG TEST PRSMV CHEM ANLYZR: CPT

## 2023-03-25 PROCEDURE — 2580000003 HC RX 258: Performed by: PHYSICIAN ASSISTANT

## 2023-03-25 PROCEDURE — 96365 THER/PROPH/DIAG IV INF INIT: CPT

## 2023-03-25 PROCEDURE — 99285 EMERGENCY DEPT VISIT HI MDM: CPT

## 2023-03-25 PROCEDURE — 96366 THER/PROPH/DIAG IV INF ADDON: CPT

## 2023-03-25 PROCEDURE — 93005 ELECTROCARDIOGRAM TRACING: CPT | Performed by: PHYSICIAN ASSISTANT

## 2023-03-25 PROCEDURE — 81003 URINALYSIS AUTO W/O SCOPE: CPT

## 2023-03-25 PROCEDURE — 2500000003 HC RX 250 WO HCPCS: Performed by: PHYSICIAN ASSISTANT

## 2023-03-25 PROCEDURE — 83735 ASSAY OF MAGNESIUM: CPT

## 2023-03-25 PROCEDURE — 80053 COMPREHEN METABOLIC PANEL: CPT

## 2023-03-25 RX ORDER — MAGNESIUM SULFATE 1 G/100ML
1000 INJECTION INTRAVENOUS
Status: COMPLETED | OUTPATIENT
Start: 2023-03-25 | End: 2023-03-25

## 2023-03-25 RX ORDER — SODIUM CHLORIDE, SODIUM LACTATE, POTASSIUM CHLORIDE, AND CALCIUM CHLORIDE .6; .31; .03; .02 G/100ML; G/100ML; G/100ML; G/100ML
1000 INJECTION, SOLUTION INTRAVENOUS
Status: COMPLETED | OUTPATIENT
Start: 2023-03-25 | End: 2023-03-25

## 2023-03-25 RX ADMIN — MAGNESIUM SULFATE HEPTAHYDRATE 1000 MG: 1 INJECTION, SOLUTION INTRAVENOUS at 13:57

## 2023-03-25 RX ADMIN — FAMOTIDINE 20 MG: 10 INJECTION, SOLUTION INTRAVENOUS at 11:42

## 2023-03-25 RX ADMIN — THIAMINE HYDROCHLORIDE: 100 INJECTION, SOLUTION INTRAMUSCULAR; INTRAVENOUS at 12:35

## 2023-03-25 RX ADMIN — SODIUM CHLORIDE, POTASSIUM CHLORIDE, SODIUM LACTATE AND CALCIUM CHLORIDE 1000 ML: 600; 310; 30; 20 INJECTION, SOLUTION INTRAVENOUS at 11:48

## 2023-03-25 ASSESSMENT — PAIN - FUNCTIONAL ASSESSMENT: PAIN_FUNCTIONAL_ASSESSMENT: NONE - DENIES PAIN

## 2023-03-25 ASSESSMENT — ENCOUNTER SYMPTOMS
ABDOMINAL DISTENTION: 0
CONSTIPATION: 0
HEMATOCHEZIA: 0
VISUAL CHANGE: 0
ALLERGIC/IMMUNOLOGIC NEGATIVE: 1
EYES NEGATIVE: 1
ANAL BLEEDING: 0
SHORTNESS OF BREATH: 0
DIARRHEA: 0
RESPIRATORY NEGATIVE: 1
NAUSEA: 0
VOMITING: 0
BLOOD IN STOOL: 0
COUGH: 0
ABDOMINAL PAIN: 1

## 2023-03-25 NOTE — ED TRIAGE NOTES
Pt states he feels completely fatigued for about a week. States fingertips are numb, along with his mouth. (-) nausea, vomiting, diarrhea, fever. Pt was seen here in the ED 10 days ago and was prescribed vitamins with no relief.    A+Ox4

## 2023-03-25 NOTE — ED PROVIDER NOTES
Emergency Department Provider Note                   PCP:                On File Not (Inactive)               Age: 46 y.o. Sex: male     DISPOSITION Discharge - Pending Orders Complete 03/25/2023 01:36:47 PM       ICD-10-CM    1. Fatigue, unspecified type  R53.83       2. Acute alcoholic intoxication without complication (HCC)  O15.396       3. Hypomagnesemia  E83.42           MEDICAL DECISION MAKING  Complexity of Problems Addressed:  1 or more chronic illnesses with a severe exacerbation or progression. Data Reviewed and Analyzed:  Category 1:     I ordered each unique test.  I interpreted the results of each unique test.        Category 2:   ED EKG was independently interpreted in the absence of a cardiologist.  Rate: 88 bpm.  EKG Interpretation: EKG Interpretation: sinus rhythm  ST Segments: Normal ST segments - NO STEMI        Category 3: Discussion of management or test interpretation. Patient's work-up is reassuring. No acute changes at this time. He will need to follow-up with PCP for this issue. It certainly could be related to his nutrition status. He does have refills of the medication Dr. Diandra Guo wrote for him when he was here 9 days ago. He was encouraged to take those and follow-up with the [facility] for alcohol detox and treatment. Patient expresses understanding. There is no urgent or emergent signs or symptoms at this time to indicate an emergent need for further evaluation here in the ED. Patient presented with acute fatigue, parestethias to the ED. History obtained from patient, additional history provided by independent historian none. I reviewed the following tests labs, imaging. My independent interpretation of the data was done. I did review records from an external source prior visits. I did engage in shared decision making with the patient/family. There is no social determinant of health affecting care.  I did not use a clinical guideline to determine care of the K/UL    Absolute Lymph # 2.0 0.5 - 4.6 K/UL    Absolute Mono # 0.3 0.1 - 1.3 K/UL    Absolute Eos # 0.1 0.0 - 0.8 K/UL    Basophils Absolute 0.1 0.0 - 0.2 K/UL    Absolute Immature Granulocyte 0.0 0.0 - 0.5 K/UL   CMP   Result Value Ref Range    Sodium 133 133 - 143 mmol/L    Potassium 3.5 3.5 - 5.1 mmol/L    Chloride 101 101 - 110 mmol/L    CO2 31 21 - 32 mmol/L    Anion Gap 1 (L) 2 - 11 mmol/L    Glucose 199 (H) 65 - 100 mg/dL    BUN 6 6 - 23 MG/DL    Creatinine 0.70 (L) 0.8 - 1.5 MG/DL    Est, Glom Filt Rate >60 >60 ml/min/1.73m2    Calcium 8.3 8.3 - 10.4 MG/DL    Total Bilirubin 1.5 (H) 0.2 - 1.1 MG/DL    ALT 33 12 - 65 U/L    AST 53 (H) 15 - 37 U/L    Alk Phosphatase 107 50 - 136 U/L    Total Protein 6.8 6.3 - 8.2 g/dL    Albumin 3.4 (L) 3.5 - 5.0 g/dL    Globulin 3.4 2.8 - 4.5 g/dL    Albumin/Globulin Ratio 1.0 0.4 - 1.6     ETOH   Result Value Ref Range    Ethanol Lvl 184 MG/DL   Lipase   Result Value Ref Range    Lipase 65 (L) 73 - 393 U/L   Magnesium   Result Value Ref Range    Magnesium 1.7 (L) 1.8 - 2.4 mg/dL   Vitamin B12   Result Value Ref Range    Vitamin B-12 1037 (H) 193 - 986 pg/mL   Troponin   Result Value Ref Range    Troponin, High Sensitivity <3.0 0 - 57 pg/mL   Urinalysis w rflx microscopic   Result Value Ref Range    Color, UA YELLOW/STRAW      Appearance CLEAR      Specific Gravity, UA 1.009 1.001 - 1.023      pH, Urine 7.5 5.0 - 9.0      Protein, UA Negative NEG mg/dL    Glucose, UA Negative mg/dL    Ketones, Urine Negative NEG mg/dL    Bilirubin Urine Negative NEG      Blood, Urine Negative NEG      Urobilinogen, Urine 1.0 0.2 - 1.0 EU/dL    Nitrite, Urine Negative NEG      Leukocyte Esterase, Urine Negative NEG     Urine Drug Screen   Result Value Ref Range    PCP, Urine Negative NEG      Benzodiazepines, Urine Negative NEG      Cocaine, Urine Negative NEG      Amphetamine, Urine Negative NEG      Methadone, Urine Negative NEG      THC, TH-Cannabinol, Urine Positive (A) NEG      Opiates,

## 2023-03-25 NOTE — Clinical Note
Christiano Kaye was seen and treated in our emergency department on 3/25/2023. He may return to work on 03/27/2023. If you have any questions or concerns, please don't hesitate to call.       DONNA Kay

## 2023-03-25 NOTE — DISCHARGE INSTRUCTIONS
You can contact the [facility] for treatment. Take the magnesium daily. Drink plenty of fluids, rest, follow-up with PCP for recheck and return to the ED if worsening in any way.

## 2023-06-18 PROBLEM — R65.21 SEPTIC SHOCK (HCC): Status: ACTIVE | Noted: 2023-01-01

## 2023-06-18 PROBLEM — R11.11 VOMITING WITHOUT NAUSEA: Status: ACTIVE | Noted: 2023-01-01

## 2023-06-18 PROBLEM — N17.9 AKI (ACUTE KIDNEY INJURY) (HCC): Status: ACTIVE | Noted: 2023-01-01

## 2023-06-18 PROBLEM — A41.9 SEPTIC SHOCK (HCC): Status: ACTIVE | Noted: 2023-01-01

## 2023-06-18 PROBLEM — J96.01 ACUTE RESPIRATORY FAILURE WITH HYPOXIA (HCC): Status: ACTIVE | Noted: 2020-11-15

## 2023-06-18 PROBLEM — E87.20 LACTIC ACIDOSIS: Status: ACTIVE | Noted: 2023-01-01

## 2023-06-18 PROBLEM — R11.2 NAUSEA AND VOMITING: Status: ACTIVE | Noted: 2023-01-01

## 2023-06-19 PROBLEM — E87.29 ALCOHOLIC KETOACIDOSIS: Status: ACTIVE | Noted: 2023-01-01

## 2023-06-19 PROBLEM — I95.9 HYPOTENSION: Status: ACTIVE | Noted: 2023-01-01

## 2023-06-19 NOTE — PLAN OF CARE
Problem: Safety - Medical Restraint  Goal: Remains free of injury from restraints (Restraint for Interference with Medical Device)  Description: INTERVENTIONS:  1. Determine that other, less restrictive measures have been tried or would not be effective before applying the restraint  2. Evaluate the patient's condition at the time of restraint application  3. Inform patient/family regarding the reason for restraint  4.  Q2H: Monitor safety, psychosocial status, comfort, nutrition and hydration  Recent Flowsheet Documentation  Taken 6/18/2023 1643 by Jason Coley RN  Remains free of injury from restraints (restraint for interference with medical device):   Every 2 hours: Monitor safety, psychosocial status, comfort, nutrition and hydration   Inform patient/family regarding the reason for restraint   Evaluate the patient's condition at the time of restraint application   Determine that other, less restrictive measures have been tried or would not be effective before applying the restraint     Problem: Respiratory - Adult  Goal: Achieves optimal ventilation and oxygenation  Outcome: Progressing

## 2023-06-19 NOTE — PROCEDURES
Procedure: Endotracheal intubation    Indication: Acute respiratory failure 518.81    Medications:  Fentanyl 100 mcg   Etomidate 30 mg   succinylcholine 200 mg    After assessing the airway, the patient underwent preoxygenation with 100% FiO2 for 5 min. Fentanyl was then given and after 3 min, etomidate and succinylcholine were given sequentially in rapid IV push. The Sellick maneuver was performed throughout the entire sequence. After adequate sedation and paralysis intubation was performed. A Rosemary 3.0 laryngoscope was used to visualize the epiglottis and vocal cords. After positive identification of epiglottis and the vocal cords, a size 7.5 ET tube was placed into the trachea with direct visualization. Confirmation of endotracheal positionin. The ET tube was directly visualized passing through the vocal cords. 2.  CO2 colorimetry was employed immediately to verify tube in airway with appropriate color change indicating detection/lack of CO2.   3.  Water vapor was seen within the ET tube, and auscultation of the abdomen revealed no bubbling sounds. 4.  Auscultation  And inspection of the chest after intubation showed symmetric chest excursion and symmetric air entry bilaterally. 5.  Chest X-ray has been ordered and is pending. The tube was secured at 23 cm at the teeth with a tube koroma. The patient has been placed on a mechanical ventilator. Patient had a large amount of black emesis prior to induction and intubation  There were no other complications.     Mesfin Cortes MD

## 2023-06-19 NOTE — PROGRESS NOTES
Attempted to contact patient's mother, Pratik Agudelo [phone], no answer and mail box was full. Attempted to contact patient's sister, Yana Edmonds who is also the patient's emergency contact on face sheet, no answer. Left a message. Also called patient's girlfriend Nubia Burt [phone]) whom he also leaves with, no answer and left a message. Nurys Pond returned my call, updated her on patient's condition and needing to reach Mother. Nurys Pond will try to contact patient's mother and sister.

## 2023-06-19 NOTE — PROGRESS NOTES
The patient is critically ill with respiratory failure, circulatory failure and requires high complexity decision making for assessment and support including frequent ventilator adjustment , frequent evaluation and titration of therapies , application of advanced monitoring technologies and extensive interpretation of multiple databases    Cumulative time devoted to patient care services by me for day of service -60 min     Tatyana Hart MD

## 2023-06-19 NOTE — PROGRESS NOTES
VANCO DAILY FOLLOW UP RENAL INSUFFICIENCY PATIENT   [address] Pharmacokinetic Monitoring Service - Vancomycin    Consulting Provider: Dr. Paula Hutton   Indication: sepsis  Target Concentration: Random level ? 20 mg/L  Day of Therapy: 2  Additional Antimicrobials: piperacillin-tazobactam    Patient eligible for piperacillin-tazobactam to cefepime auto-substitution per P&T approved protocol? No    Pertinent Laboratory Values: Wt Readings from Last 1 Encounters:   06/19/23 190 lb (86.2 kg)     Temp Readings from Last 1 Encounters:   06/19/23 (!) 103.8 °F (39.9 °C) (Bladder)     Recent Labs     06/18/23  0551 06/18/23  1255 06/18/23  1256 06/18/23  1656 06/19/23  0332 06/19/23  0635 06/19/23  1147   BUN 24*  --  27*  --  34*  --   --    CREATININE 2.70*  --  3.30*  --  3.80*  --   --    WBC 18.2*  --   --   --  5.9  --   --    PROCAL 3.44*  --   --   --   --   --   --    LACACIDPL 29.7*   < >  --    < > 14.5* 15.2* 15.6*    < > = values in this interval not displayed. Lab Results   Component Value Date/Time    VANCORANDOM 24.1 06/19/2023 03:32 AM       MRSA Nasal Swab: N/A. Non-respiratory infection.     Assessment:  Date:  Dose/Freq Admin Times Level/Time:   6/18 2000 mg x 1 0851    6/19   Rd @ 6312 = 24.1   6/20                    Plan:  Concentration-guided dosing due to renal impairment  No dose needed at this time  Vancomycin concentration ordered for 6/20 @ 0400    Pharmacy will continue to monitor patient and adjust therapy as indicated    Thank you for the consult,  Daniel Mennedez, [address] [facility]

## 2023-06-19 NOTE — PROGRESS NOTES
Patient was intubated with a number 7.5 ET Tube. Tube placement verified by auscultation, by CXR, and ETCO2 monitor. ET Tube is secured at the 24 cm viviana at the lip and on the center side. Patient was intubated by Dr. Nichole Henry on the 2 attempt. Breath sounds are coarse and crackles. Patient is Negative for subcutaneous air and chest excursion is symmetric. Trachea is midline. Patient is also Negative for cyanosis and is Negative for pitting edema. Patient placed on ventilator on documented settings. All alarms are set and audible. Resuscitation bag is  at the head of the bed. Ventilator Settings  Mode FIO2 Rate Tidal Volume Pressure PEEP I:E Ratio   AC/PRVC  100 %   20 450    8   1:1.5      Peak airway pressure:   38  Minute ventilation:   12.4    ABG: No results for input(s): PH, PCO2, PO2, HCO3 in the last 72 hours.

## 2023-06-19 NOTE — CARE COORDINATION
Case Management Assessment  Initial Evaluation    Date/Time of Evaluation: 6/19/2023 2:33 PM  Assessment Completed by: Marcello Vallejo RN    If patient is discharged prior to next notation, then this note serves as note for discharge by case management. Patient Name: Andressa Fry                   YOB: 1970  Diagnosis: Alcoholic ketoacidosis [I55.11]  Septic shock ([facility] [address] [A41.9, R65.21]  Acute renal failure, unspecified acute renal failure type ([address] [N17.9]  Sepsis, due to unspecified organism, unspecified whether acute organ dysfunction present ([facility] [address] [A41.9]  Nausea and vomiting, unspecified vomiting type [R11.2]                   Date / Time: 6/18/2023  5:22 AM    Patient Admission Status: Inpatient   Readmission Risk (Low < 19, Mod (19-27), High > 27): Readmission Risk Score: 16.1    Current PCP: On File Not (Inactive)  PCP verified by CM? (P) No    Chart Reviewed: Yes      History Provided by: (P) Child/Family (sister, Tequila)  Patient Orientation: (P) Other (see comment)    Patient Cognition: (P) Other (see comment)    Hospitalization in the last 30 days (Readmission):  No    If yes, Readmission Assessment in CM Navigator will be completed.     Advance Directives:      Code Status: Full Code   Patient's Primary Decision Maker is: (P) Legal Next of Kin    Primary Decision Maker: Evon Oliveros - Parent - [phone]    Primary Decision Maker: Cassie Du - Parent - [phone]    Discharge Planning:    Patient lives with: (P) Spouse/Significant Other Type of Home: (P) Other (Comment) (pending)  Primary Care Giver: (P) Self  Patient Support Systems include: (P) Spouse/Significant Other, Family Members, Parent   Current Financial resources: (P) Other (Comment) (self pay/Elevate PFS to follow)  Current community resources: (P) None  Current services prior to admission: (P) None            Current DME:              Type of Home Care services:  None    ADLS  Prior functional level: (P) Independent in

## 2023-06-19 NOTE — PROCEDURES
PROCEDURE NOTE  ARTERIAL LINE PLACEMENT  06/19/23  12:13 AM    Indication: shock in need of hemodynamic monitoring    A time-out was completed verifying correct patient, procedure, site, positioning, and special equipment if applicable. Prasads test was performed to ensure adequate perfusion. The patients right wrist was prepped and draped in sterile fashion. 1% Lidocaine was used to anesthetize the area. A 20G Arrow arterial line was introduced into the right radial artery. The catheter was threaded over the guide wire and the needle was removed with appropriate pulsatile blood return. The catheter was then sutured in place to the skin and a sterile dressing applied. Perfusion to the extremity distal to the point of catheter insertion was checked and found to be adequate. The patient tolerated the procedure well and there were no complications.     Kourtney Hunter MD

## 2023-06-19 NOTE — PROGRESS NOTES
Called to patient's room patient very somnolent and hypoventilating after receiving Ativan per [facility] protocol. Respiratory therapist and nurse at bedside using bag-valve-mask to ventilate maintaining SaO2 95% or greater. Preparing to intubate patient to protect airway when patient had a large amount of black emesis. Suction was applied to clear the airway and patient intubated with bilateral equal breath sounds and no gastric sounds. Patient continued to have copious amounts of black emesis. NG tube placed to evacuate stomach. ET tube had cuff leak so it was changed over bougie without difficulty. Chest x-ray shows ET tube at tyrell. it was withdrawn 3 cm and is in good position. SaO2 remained in the 100%.

## 2023-06-19 NOTE — PROGRESS NOTES
Ventilator check complete; patient has a #7.5 ET tube secured at the 24 at the lip. Patient is  sedated. Patient is not able to follow commands. Breath sounds are coarse and diminished. Trachea is midline, Negative for subcutaneous air, and chest excursion is symmetric. Patient is also Negative for cyanosis and is Negative for pitting edema. All alarms are set and audible. Resuscitation bag is  at the head of the bed. Ventilator Settings  Mode FIO2 Rate Tidal Volume Pressure PEEP I:E Ratio   AC/VC  85 % 30   550  8 1:2      Peak airway pressure:     Minute ventilation:       ABG: No results for input(s): PH, PCO2, PO2, HCO3 in the last 72 hours.       CATHY SCHMIDT RCP

## 2023-06-19 NOTE — PROGRESS NOTES
[facility] Critical Care Note[de-identified] 6/19/2023  [address]  Admission Date: 6/18/2023     Length of Stay: 1 days    Background:  48 y.o. male with alcohol abuse, requiring blood transfusion in the past with negative GI work-up, alcohol abuse, GERD and MSSA bacteremia in 2020 admitted with symptomatic anemia, suspecting due to alcohol abuse and poor nutrition. Brought in this morning from home with SOB, took 4 shots of bourbon for his symptoms. Was hypotensive on arrival to ED, severe acidosis and now on levophed. AG is 50. He reports some lip swelling for months and genital shrinking. He wants to drink, but states he's been vomiting for days anything that he tried to eat or drink. He denies any abdominal pain, but doesn't believe he's had gas or a bowel movement in 3 days. Notable PMH:  has a past medical history of GERD (gastroesophageal reflux disease). 24 Hour events:     He is intubated now on vent 80% and in severe shock on 4 pressors ( Epi, Levo, Vaso, Jagdish ). He is sedated with Versed and Fentanyl. Remain hypotensive and CVP is 12. On bicarb drip and has coffee ground NGT drainage. Review of Systems: Unable to obtain due to patient factors. Lines: (insertion date)   ETT  (Active)     NG/OG/NJ/NE Tube Orogastric Center mouth (Active)       Urinary Catheter 06/18/23 Law-Temperature (Active)     CVC Triple Lumen 06/18/23 Left Internal jugular (Active)     Drips: current dose (range)  Dose (mg/hr) Midazolam: 2 mg/hr  Dose (mcg/kg/hr) Dexmedetomidine: 0 mcg/kg/hr (pt unresponsive, desat/low respiratory volumes. stopped per MD.)  Dose (mcg/hr) Fentanyl: 50 mcg/hr  Dose (mcg/min) Epinephrine: 20 mcg/min  Dose (mcg/min) Norepinephrine: 100 mcg/min  Dose (units/hr) Vasopressin: 1.8 Units/hr  Dose (mcg/min) Phenylephrine : 100 mcg/min     Pertinent Exam:         Blood pressure (!) 87/54, pulse (!) 122, temperature (!) 100.7 °F (38.2 °C), temperature source Oral, resp.  rate (!) 34, height 5'

## 2023-06-19 NOTE — CONSULTS
HPI:      48 y.o. male with alcohol abuse, requiring blood transfusion in the past with negative GI work-up, alcohol abuse, GERD and MSSA bacteremia in 2020 admitted suspecting due to alcohol abuse and poor nutrition. Brought in from home with SOB, took 4 shots of bourbon for his symptoms. Was hypotensive on arrival to ED, severe acidosis and now on levophed. AG is 50. Unable to get history from patient. GI consulted for possible GI bleed. ROS:    HPI      Physical Exam:    General:          Intubated, sedated. Head:               Normocephalic, atraumatic  Eyes:               Sclerae appear normal.  Pupils equally round. ENT:                Nares appear normal.  Neck:               No restricted ROM. Trachea midline   CV:                  No m/r/g. No jugular venous distension. Lungs:             Rhonchi. Abdomen:        Soft, no guarding, ND. Extremities:     No cyanosis or clubbing. No edema  Skin:                No rashes and normal coloration. Warm and dry. Labs, Imaging reviewed    Impression:    Septic shock. ETOH dependence. Plan:     No signs of overt GI bleeding noted. Has coffee ground on NG suction. Hb stable at baseline. Has septic shock. Abx per ICU team.  No GI interventions needed at this time. PPI for stress ulcer prophylaxis. GI will signoff.
vasopressin, Levophed, Jagdish-Synephrine, and epinephrine. EF unknown. Continue supportive care until family can arrive. *Septic shock (HCC)-antibiotics, 4 pressors on board, grim prognosis. Lactic acidosis-antibiotics, pressors, IV fluids as needed. RODRIGO (acute kidney injury) (HCC)-as above. No urine output per nursing staff today. Prognosis is grim. Acute respiratory failure with hypoxia (HCC)-vent support, sedation. Alcohol abuse-chronic heavy alcohol use, likely advanced cirrhosis. CT yesterday showed hepatomegaly with diffuse hepatic steatosis. Critically ill with multiorgan system failure and persistent severe hypotension despite 4 pressors on board. Tachycardia persists. Stable on the vent but overall prognosis is grim. Discussed with the family. Best option may be to withdraw with comfort care but we will continue to follow with you overnight.     Geetha Fontenot MD  [facility] Cardiology  Pager [phone]

## 2023-06-19 NOTE — ACP (ADVANCE CARE PLANNING)
Advance Care Planning     Advance Care Planning Activator (Inpatient)  Conversation Note      Date of ACP Conversation: 6/19/2023       ACP Activator: Chanelle Devine RN    {When Decision Maker makes decisions on behalf of the incapacitated patient: Decision Maker is asked to consider and make decisions based on patient values, known preferences, or best interests. Health Care Decision Maker: Pt not , no children. Parents are legal NOK unless document presented or completed stating otherwise. Has 2 living siblings. Current Designated Health Care Decision Maker:     Primary Decision Maker: Arelis Ron - Parent - [phone]    Primary Decision Maker: Angelita Olvera - Parent - [phone]  Click here to complete Healthcare Decision Makers including section of the Healthcare Decision Maker Relationship (ie \"Primary\")  Today we documented Decision Maker(s) consistent with Legal Next of Kin hierarchy.

## 2023-06-19 NOTE — INTERDISCIPLINARY ROUNDS
Multi-D Rounds/Checklist (leapfrog):  Lines: can any be removed?: None  ETT  (Active) DAY 2     NG/OG/NJ/NE Tube Orogastric Center mouth (Active)       Urinary Catheter 06/18/23 Law-Temperature (Active) DAY 2     CVC Triple Lumen 06/18/23 Left Internal jugular (Active) DAY 2     DVT Prophylaxis: Ordered- SCDs (thrombocytopenia)   Vent: HOB elevated? Yes ;  mL/kg: N/A ; Vent day 2   Nutrition Ordered/appropriate: Contraindicated- recently intubated  NGT to suction  Can antibiotics or other drugs be stopped? Is Nozin performed: Yes/End Date set  Inpat Anti-Infectives (From admission, onward)       Start     Ordered Stop    06/18/23 1300  piperacillin-tazobactam (ZOSYN) 3,375 mg in sodium chloride 0.9 % 50 mL IVPB (mini-bag)  3,375 mg,   IntraVENous,   EVERY 8 HOURS         06/18/23 0850 06/25/23 1259    06/18/23 0920  vancomycin (VANCOCIN) intermittent dosing (placeholder)  Other,   Rx Placeholder         06/18/23 0920 --                  Consults needed: None  A: Is pain control adequate? (has PRNs? Stop drip?) Yes  B: Sedation break and SBT? Yes  C: Is sedation choice appropriate? Yes  D: Delirium/CAM-ICU? Yes  E: Mobility goals/appropriateness? N/A  F: Family update and plan? Girlfriend and sister Pan Moise) are primary contact and is being updated daily by primary attending and nursing staff.     HEIDI Kapoor - ANTOINETTE

## 2023-06-19 NOTE — INTERDISCIPLINARY ROUNDS
Interdisciplinary team rounds were held 6/19/2023 with the following team members:Care Management, Nursing, Nurse Practitioner, Pharmacy, Physician, Respiratory Therapy, and Clinical Coordinator and the sibling(s). Plan of care discussed. See clinical pathway and/or care plan for interventions and desired outcomes.

## 2023-06-20 NOTE — PROGRESS NOTES
DEATH NOTE    I was called to see patient for unresponsiveness. On my exam the patient did not respond to verbal or physical stimuli. Absent heart and breath sounds. Absent carotid and peripheral pulses. Pupils are fixed and dilated. Patient pronounced dead at 2127 on 6/19/23 by Damon Zhang MD. Family notified. Autopsy declined. Not an organ donation candidate.     Brandan Shea MD  [place] license #[id number]

## 2023-06-20 NOTE — PROGRESS NOTES
Respiratory Mechanics completed and are as follows:     Patient compassionately extubated to a 2L NC. Patient is unable to communicate. No complications with extubation.      Aishwarya Benton RCP

## 2023-06-20 NOTE — DISCHARGE SUMMARY
Death Summary    [address]  Admission date:  2023  Discharge date:  2023    Admitting Diagnosis:  Alcoholic ketoacidosis [I22.53]  Septic shock (HCC) [A41.9, R65.21]  Acute renal failure, unspecified acute renal failure type ([facility] [address] [N17.9]  Sepsis, due to unspecified organism, unspecified whether acute organ dysfunction present ([facility] [address] [A41.9]  Nausea and vomiting, unspecified vomiting type [R11.2]  Discharge Diagnosis:  Severe sepsis with shock                                         Alcoholism                                         RODRIGO    Consultants: Gastroenterology                         Cardiology    Studies/Procedures: Central line placement                                      Intubation                                      Arterial line placement                                      CXR                                      [address] course:  Admitted through the ER with report of vomiting and shortness of breath. He was found to be very hypotensive and in suspected severe septic shock. CXR with questionable left lower lobe pneumonia. He had a history of alcoholism with previous bacteremia. He was cultured and started on antibiotics and fluid resuscitated. He required intubation. He remained hypotensive and ended up on 4 pressors for blood pressure support. His family was contacted and updated. They elected to compassionately extubate him due to lack of response to medical therapy. He  and was pronounced by Dr. Pili Sebastian. Final:  --Pronounced dead at . --Total discharge greater than 30 minutes in duration.     HEIDI De La Paz - CNP

## 2023-06-20 NOTE — PROGRESS NOTES
Patient's family was made aware of the patient's current state and MAP being under 65, despite being maxed out on four pressors.  Family was given education about different routes of care, decided on comfort care

## 2023-06-23 LAB
BACTERIA SPEC CULT: NORMAL
BACTERIA SPEC CULT: NORMAL
SERVICE CMNT-IMP: NORMAL
SERVICE CMNT-IMP: NORMAL

## 2023-07-18 NOTE — ED NOTES

## 2025-01-20 NOTE — PROGRESS NOTES
Problem: Falls - Risk of  Goal: *Absence of Falls  Description: Document Kathleen Vital Fall Risk and appropriate interventions in the flowsheet.   Outcome: Progressing Towards Goal  Note: Fall Risk Interventions:  Mobility Interventions: Communicate number of staff needed for ambulation/transfer, Patient to call before getting OOB, PT Consult for mobility concerns         Medication Interventions: Patient to call before getting OOB, Teach patient to arise slowly    Elimination Interventions: Call light in reach, Patient to call for help with toileting needs, Stay With Me (per policy), Toilet paper/wipes in reach, Toileting schedule/hourly rounds, Urinal in reach    History of Falls Interventions: Consult care management for discharge planning, Door open when patient unattended, Investigate reason for fall, Room close to nurse's station         Problem: Patient Education: Go to Patient Education Activity  Goal: Patient/Family Education  Outcome: Progressing Towards Goal     Problem: Airway Clearance - Ineffective  Goal: *Patent airway  Outcome: Progressing Towards Goal  Goal: *Absence of airway secretions  Outcome: Progressing Towards Goal  Goal: *Able to cough effectively  Outcome: Progressing Towards Goal  Goal: *PALLIATIVE CARE:  Alleviation of secretions, cough and/or nasal congestion  Outcome: Progressing Towards Goal     Problem: Patient Education: Go to Patient Education Activity  Goal: Patient/Family Education  Outcome: Progressing Towards Goal Home

## (undated) DEVICE — GENERAL LAPAROSCOPY: Brand: MEDLINE INDUSTRIES, INC.

## (undated) DEVICE — 3M™ STERI-DRAPE™ INSTRUMENT POUCH 1018: Brand: STERI-DRAPE™

## (undated) DEVICE — LOGICUT SCISSOR LENGTH 320MM: Brand: LOGI - LAPAROSCOPIC INSTRUMENT SYSTEM

## (undated) DEVICE — DRSG GZ PETROLATM OCL 3X18IN -- CURAD

## (undated) DEVICE — 2000CC GUARDIAN II: Brand: GUARDIAN

## (undated) DEVICE — SPONGE LAP 18X18IN STRL -- 5/PK

## (undated) DEVICE — TROCAR: Brand: KII FIOS FIRST ENTRY

## (undated) DEVICE — SUTURE PDS II SZ 1 L36IN ABSRB VLT L48MM CTX 1/2 CIR Z371T

## (undated) DEVICE — TUBING INSUFFLATION SMK EVAC HI FLO SET PNEUMOCLEAR

## (undated) DEVICE — TUBING, SUCTION, 1/4" X 10', STRAIGHT: Brand: MEDLINE

## (undated) DEVICE — TRAY CATH 16F URIN MTR LTX -- CONVERT TO ITEM 363111

## (undated) DEVICE — DRAPE,LAP,CHOLE,W/TROUGHS,STERILE: Brand: MEDLINE

## (undated) DEVICE — BIFURCATED DRAIN EXTENSION FOR CLOSED WOUND DRAIN: Brand: AXIOM®

## (undated) DEVICE — SUTURE VCRL SZ 2 L54IN ABSRB UD L65MM TP-1 1/2 CIR J880T

## (undated) DEVICE — BUTTON SWITCH PENCIL BLADE ELECTRODE, HOLSTER: Brand: EDGE

## (undated) DEVICE — SYR LR LCK 1ML GRAD NSAF 30ML --

## (undated) DEVICE — SOLUTION IRRIG 3000ML 0.9% SOD CHL FLX CONT 0797208] ICU MEDICAL INC]

## (undated) DEVICE — SUTURE PERMAHAND SZ 0 L30IN NONABSORBABLE BLK L30MM PSL 3/8 590H

## (undated) DEVICE — BLADE SURG NO15 S STL STR DISP GLASSVAN

## (undated) DEVICE — SUTURE VCRL SZ 0 L36IN ABSRB UD L36MM CT-1 1/2 CIR J946H

## (undated) DEVICE — Device

## (undated) DEVICE — SUTURE PERMAHAND SZ 2-0 L12X18IN NONABSORBABLE BLK SILK A185H

## (undated) DEVICE — DRAIN SURG 19FR 100% SIL RND END PERF

## (undated) DEVICE — DRAPE TWL SURG 16X26IN BLU ORB04] ALLCARE INC]

## (undated) DEVICE — GARMENT,MEDLINE,DVT,INT,CALF,MED, GEN2: Brand: MEDLINE

## (undated) DEVICE — RESERVOIR,SUCTION,100CC,SILICONE: Brand: MEDLINE

## (undated) DEVICE — KIT,ANTI FOG,W/SPONGE & FLUID,SOFT PACK: Brand: MEDLINE

## (undated) DEVICE — VISUALIZATION SYSTEM: Brand: CLEARIFY

## (undated) DEVICE — 3M™ IOBAN™ 2 ANTIMICROBIAL INCISE DRAPE 6650EZ: Brand: IOBAN™ 2

## (undated) DEVICE — SUTURE PERMAHAND SZ 2-0 L18IN NONABSORBABLE BLK L26MM SH C012D

## (undated) DEVICE — YANKAUER,BULB TIP,W/O VENT,RIGID,STERILE: Brand: MEDLINE

## (undated) DEVICE — CATHETER THOR 32FR L23IN PVC 5 EYELET STR ATRAUM

## (undated) DEVICE — SPONGE: SPECIALTY PEANUT XR 100/CS: Brand: MEDICAL ACTION INDUSTRIES

## (undated) DEVICE — SEALANT TISS 4 CC FIBRIN VISTASEAL

## (undated) DEVICE — CATHETER THOR 32FR L23IN PVC 6 EYELET STR ATRAUM

## (undated) DEVICE — PREP SKN CHLRAPRP APL 26ML STR --

## (undated) DEVICE — REM POLYHESIVE ADULT PATIENT RETURN ELECTRODE: Brand: VALLEYLAB

## (undated) DEVICE — BLADE ELECTRODE: Brand: VALLEYLAB

## (undated) DEVICE — SYR 10ML LUER LOK 1/5ML GRAD --

## (undated) DEVICE — SUTURE PERMAHAND SZ 3-0 L18IN NONABSORBABLE BLK L26MM SH C013D